# Patient Record
Sex: MALE | Race: WHITE | NOT HISPANIC OR LATINO | URBAN - METROPOLITAN AREA
[De-identification: names, ages, dates, MRNs, and addresses within clinical notes are randomized per-mention and may not be internally consistent; named-entity substitution may affect disease eponyms.]

---

## 2017-01-04 ENCOUNTER — OUTPATIENT (OUTPATIENT)
Dept: OUTPATIENT SERVICES | Facility: HOSPITAL | Age: 67
LOS: 1 days | Discharge: HOME | End: 2017-01-04

## 2017-06-27 DIAGNOSIS — R94.39 ABNORMAL RESULT OF OTHER CARDIOVASCULAR FUNCTION STUDY: ICD-10-CM

## 2017-06-27 DIAGNOSIS — I10 ESSENTIAL (PRIMARY) HYPERTENSION: ICD-10-CM

## 2019-05-01 PROBLEM — Z00.00 ENCOUNTER FOR PREVENTIVE HEALTH EXAMINATION: Status: ACTIVE | Noted: 2019-05-01

## 2019-06-06 ENCOUNTER — APPOINTMENT (OUTPATIENT)
Dept: SURGERY | Facility: CLINIC | Age: 69
End: 2019-06-06
Payer: MEDICARE

## 2019-06-06 VITALS — HEIGHT: 72 IN | BODY MASS INDEX: 26.41 KG/M2 | WEIGHT: 195 LBS

## 2019-06-06 PROCEDURE — 99203 OFFICE O/P NEW LOW 30 MIN: CPT

## 2019-06-06 NOTE — ASSESSMENT
[FreeTextEntry1] : Evan is a pleasant 68-year-old retired [employer]  with no significant past medical history other than GERD who presents to the office with an enlarging and now tender bulge in the right groin suspicious for a hernia.\par \par Physical examination demonstrates a large egg-sized bulge in the right groin which is mildly tender but easily reducible consistent with a large symptomatic right inguinal hernia warranting surgical repair.  There is no evidence of incarceration or strangulation, and the patient denies any symptoms of obstruction.  Examination of his left groin demonstrates a moderate to significant weakness but no obvious hernia. Both testicles are normal. His umbilical examination is unremarkable. His current BMI is 26.\par \par I explained the pros and cons of surgery, as well as all risks, benefits, indications and alternatives of the procedure and the patient understood and agreed. Evan's third and last daughter is getting  on June 22nd and he would prefer to defer surgical intervention until thereafter. Evan was scheduled for the repair of his right inguinal hernia with mesh on Wednesday, June 26, 2019 under local with IV sedation at [facility] with presurgical testing waived.  The patient was encouraged to avoid heavy lifting and strenuous activity in the interim, of course.

## 2019-06-06 NOTE — PHYSICAL EXAM
[Normal Breath Sounds] : Normal breath sounds [Alert] : alert [No Rash or Lesion] : No rash or lesion [Calm] : calm [JVD] : no jugular venous distention  [de-identified] : soft and flat abdomen\par  [de-identified] : normal [de-identified] : healthy [de-identified] : normal testicles [de-identified] : right inguinal hernia

## 2019-06-06 NOTE — CONSULT LETTER
[Dear  ___] : Dear  [unfilled], [Courtesy Letter:] : I had the pleasure of seeing your patient, [unfilled], in my office today. [Please see my note below.] : Please see my note below. [Consult Closing:] : Thank you very much for allowing me to participate in the care of this patient.  If you have any questions, please do not hesitate to contact me. [FreeTextEntry3] : Respectfully,\par \par Julito Landon M.D., FACS\par

## 2019-06-26 ENCOUNTER — APPOINTMENT (OUTPATIENT)
Dept: SURGERY | Facility: AMBULATORY SURGERY CENTER | Age: 69
End: 2019-06-26
Payer: MEDICARE

## 2019-06-26 ENCOUNTER — OUTPATIENT (OUTPATIENT)
Dept: OUTPATIENT SERVICES | Facility: HOSPITAL | Age: 69
LOS: 1 days | Discharge: HOME | End: 2019-06-26

## 2019-06-26 VITALS
OXYGEN SATURATION: 97 % | DIASTOLIC BLOOD PRESSURE: 73 MMHG | HEART RATE: 50 BPM | RESPIRATION RATE: 16 BRPM | SYSTOLIC BLOOD PRESSURE: 129 MMHG

## 2019-06-26 VITALS
SYSTOLIC BLOOD PRESSURE: 146 MMHG | TEMPERATURE: 98 F | HEART RATE: 54 BPM | DIASTOLIC BLOOD PRESSURE: 69 MMHG | RESPIRATION RATE: 16 BRPM | WEIGHT: 188.05 LBS | OXYGEN SATURATION: 97 %

## 2019-06-26 DIAGNOSIS — Z96.643 PRESENCE OF ARTIFICIAL HIP JOINT, BILATERAL: Chronic | ICD-10-CM

## 2019-06-26 DIAGNOSIS — Z94.5 SKIN TRANSPLANT STATUS: Chronic | ICD-10-CM

## 2019-06-26 DIAGNOSIS — Z90.89 ACQUIRED ABSENCE OF OTHER ORGANS: Chronic | ICD-10-CM

## 2019-06-26 PROCEDURE — 49505 PRP I/HERN INIT REDUC >5 YR: CPT | Mod: RT

## 2019-06-26 RX ORDER — HYDROMORPHONE HYDROCHLORIDE 2 MG/ML
0.5 INJECTION INTRAMUSCULAR; INTRAVENOUS; SUBCUTANEOUS
Refills: 0 | Status: DISCONTINUED | OUTPATIENT
Start: 2019-06-26 | End: 2019-06-26

## 2019-06-26 RX ORDER — OXYCODONE AND ACETAMINOPHEN 5; 325 MG/1; MG/1
1 TABLET ORAL EVERY 4 HOURS
Refills: 0 | Status: DISCONTINUED | OUTPATIENT
Start: 2019-06-26 | End: 2019-06-26

## 2019-06-26 RX ORDER — SODIUM CHLORIDE 9 MG/ML
1000 INJECTION, SOLUTION INTRAVENOUS
Refills: 0 | Status: DISCONTINUED | OUTPATIENT
Start: 2019-06-26 | End: 2019-07-11

## 2019-06-26 RX ORDER — ONDANSETRON 8 MG/1
4 TABLET, FILM COATED ORAL ONCE
Refills: 0 | Status: DISCONTINUED | OUTPATIENT
Start: 2019-06-26 | End: 2019-07-11

## 2019-06-26 RX ORDER — TRAMADOL HYDROCHLORIDE 50 MG/1
1 TABLET ORAL
Qty: 30 | Refills: 0
Start: 2019-06-26 | End: 2019-06-30

## 2019-06-26 RX ADMIN — SODIUM CHLORIDE 100 MILLILITER(S): 9 INJECTION, SOLUTION INTRAVENOUS at 08:48

## 2019-06-26 NOTE — ASU DISCHARGE PLAN (ADULT/PEDIATRIC) - CARE PROVIDER_API CALL
Julito Landon)  Surgery  [address] [facility] [facility] [address]  [place] [address]  Phone: [phone]  Fax: [phone]  Follow Up Time: 1 week

## 2019-06-30 DIAGNOSIS — K40.90 UNILATERAL INGUINAL HERNIA, WITHOUT OBSTRUCTION OR GANGRENE, NOT SPECIFIED AS RECURRENT: ICD-10-CM

## 2019-06-30 DIAGNOSIS — K21.9 GASTRO-ESOPHAGEAL REFLUX DISEASE WITHOUT ESOPHAGITIS: ICD-10-CM

## 2019-07-09 ENCOUNTER — APPOINTMENT (OUTPATIENT)
Dept: SURGERY | Facility: CLINIC | Age: 69
End: 2019-07-09
Payer: MEDICARE

## 2019-07-09 DIAGNOSIS — K40.90 UNILATERAL INGUINAL HERNIA, W/OUT OBSTRUCTION OR GANGRENE, NOT SPECIFIED AS RECURRENT: ICD-10-CM

## 2019-07-09 PROCEDURE — 99024 POSTOP FOLLOW-UP VISIT: CPT

## 2019-07-09 NOTE — ASSESSMENT
[FreeTextEntry1] : Evan underwent the repair of his very large pantaloon right inguinal hernia with mesh on June 26, 2019 under local with IV sedation without any problems or complications. His wound is clean, dry and intact. There is no evidence of erythema, seroma formation or infection. He is tolerating a diet and having normal bowel movements. He denies any significant postoperative pain or discomfort at this time.\par \par He was counseled and reassured. He was discharged from the office with no specific followup necessary, but he knows to avoid any heavy lifting or strenuous activity for the next several weeks.

## 2019-07-09 NOTE — CONSULT LETTER
[FreeTextEntry1] : Dear Dr. Cody Bergeron, \par \par I had the pleasure of seeing your patient, NYDIA VALVERDE, in my office today. Please see my note below. \par \par Thank you very much for allowing me to participate in the care of this patient. If you have any questions, please do not hesitate to contact me. \par \par \par Respectfully,\par \par Julito Landon M.D., FACS

## 2020-01-03 ENCOUNTER — TRANSCRIPTION ENCOUNTER (OUTPATIENT)
Age: 70
End: 2020-01-03

## 2020-08-27 ENCOUNTER — INPATIENT (INPATIENT)
Facility: HOSPITAL | Age: 70
LOS: 6 days | Discharge: HOME | End: 2020-09-03
Attending: INTERNAL MEDICINE | Admitting: INTERNAL MEDICINE
Payer: MEDICARE

## 2020-08-27 VITALS
OXYGEN SATURATION: 96 % | WEIGHT: 192.24 LBS | RESPIRATION RATE: 18 BRPM | HEART RATE: 77 BPM | DIASTOLIC BLOOD PRESSURE: 67 MMHG | SYSTOLIC BLOOD PRESSURE: 123 MMHG

## 2020-08-27 DIAGNOSIS — Z90.89 ACQUIRED ABSENCE OF OTHER ORGANS: Chronic | ICD-10-CM

## 2020-08-27 DIAGNOSIS — Z96.643 PRESENCE OF ARTIFICIAL HIP JOINT, BILATERAL: Chronic | ICD-10-CM

## 2020-08-27 DIAGNOSIS — Z94.5 SKIN TRANSPLANT STATUS: Chronic | ICD-10-CM

## 2020-08-27 LAB
ALBUMIN SERPL ELPH-MCNC: 4 G/DL — SIGNIFICANT CHANGE UP (ref 3.5–5.2)
ALLERGY+IMMUNOLOGY DIAG STUDY NOTE: SIGNIFICANT CHANGE UP
ALP SERPL-CCNC: 58 U/L — SIGNIFICANT CHANGE UP (ref 30–115)
ALT FLD-CCNC: 17 U/L — SIGNIFICANT CHANGE UP (ref 0–41)
ANION GAP SERPL CALC-SCNC: 9 MMOL/L — SIGNIFICANT CHANGE UP (ref 7–14)
APTT BLD: 22.2 SEC — CRITICAL LOW (ref 27–39.2)
AST SERPL-CCNC: 27 U/L — SIGNIFICANT CHANGE UP (ref 0–41)
BASOPHILS # BLD AUTO: 0.03 K/UL — SIGNIFICANT CHANGE UP (ref 0–0.2)
BASOPHILS NFR BLD AUTO: 0.4 % — SIGNIFICANT CHANGE UP (ref 0–1)
BILIRUB SERPL-MCNC: 0.3 MG/DL — SIGNIFICANT CHANGE UP (ref 0.2–1.2)
BLD GP AB SCN SERPL QL: SIGNIFICANT CHANGE UP
BUN SERPL-MCNC: 22 MG/DL — HIGH (ref 10–20)
CALCIUM SERPL-MCNC: 8.7 MG/DL — SIGNIFICANT CHANGE UP (ref 8.5–10.1)
CHLORIDE SERPL-SCNC: 106 MMOL/L — SIGNIFICANT CHANGE UP (ref 98–110)
CHOLEST SERPL-MCNC: 179 MG/DL — SIGNIFICANT CHANGE UP (ref 100–200)
CK MB CFR SERPL CALC: 2.4 NG/ML — SIGNIFICANT CHANGE UP (ref 0.6–6.3)
CK SERPL-CCNC: 134 U/L — SIGNIFICANT CHANGE UP (ref 0–225)
CO2 SERPL-SCNC: 24 MMOL/L — SIGNIFICANT CHANGE UP (ref 17–32)
CREAT SERPL-MCNC: 1.1 MG/DL — SIGNIFICANT CHANGE UP (ref 0.7–1.5)
DIR ANTIGLOB POLYSPECIFIC INTERPRETATION: SIGNIFICANT CHANGE UP
EOSINOPHIL # BLD AUTO: 0.11 K/UL — SIGNIFICANT CHANGE UP (ref 0–0.7)
EOSINOPHIL NFR BLD AUTO: 1.5 % — SIGNIFICANT CHANGE UP (ref 0–8)
GLUCOSE SERPL-MCNC: 105 MG/DL — HIGH (ref 70–99)
HCT VFR BLD CALC: 41.1 % — LOW (ref 42–52)
HDLC SERPL-MCNC: 77 MG/DL — SIGNIFICANT CHANGE UP
HGB BLD-MCNC: 13.7 G/DL — LOW (ref 14–18)
IMM GRANULOCYTES NFR BLD AUTO: 0.4 % — HIGH (ref 0.1–0.3)
INR BLD: 1.04 RATIO — SIGNIFICANT CHANGE UP (ref 0.65–1.3)
LIPID PNL WITH DIRECT LDL SERPL: 86 MG/DL — SIGNIFICANT CHANGE UP (ref 4–129)
LYMPHOCYTES # BLD AUTO: 0.87 K/UL — LOW (ref 1.2–3.4)
LYMPHOCYTES # BLD AUTO: 12 % — LOW (ref 20.5–51.1)
MCHC RBC-ENTMCNC: 32.9 PG — HIGH (ref 27–31)
MCHC RBC-ENTMCNC: 33.3 G/DL — SIGNIFICANT CHANGE UP (ref 32–37)
MCV RBC AUTO: 98.8 FL — HIGH (ref 80–94)
MONOCYTES # BLD AUTO: 0.63 K/UL — HIGH (ref 0.1–0.6)
MONOCYTES NFR BLD AUTO: 8.7 % — SIGNIFICANT CHANGE UP (ref 1.7–9.3)
NEUTROPHILS # BLD AUTO: 5.57 K/UL — SIGNIFICANT CHANGE UP (ref 1.4–6.5)
NEUTROPHILS NFR BLD AUTO: 77 % — HIGH (ref 42.2–75.2)
NRBC # BLD: 0 /100 WBCS — SIGNIFICANT CHANGE UP (ref 0–0)
PLATELET # BLD AUTO: 171 K/UL — SIGNIFICANT CHANGE UP (ref 130–400)
POTASSIUM SERPL-MCNC: 4 MMOL/L — SIGNIFICANT CHANGE UP (ref 3.5–5)
POTASSIUM SERPL-SCNC: 4 MMOL/L — SIGNIFICANT CHANGE UP (ref 3.5–5)
PROT SERPL-MCNC: 5.8 G/DL — LOW (ref 6–8)
PROTHROM AB SERPL-ACNC: 12 SEC — SIGNIFICANT CHANGE UP (ref 9.95–12.87)
RBC # BLD: 4.16 M/UL — LOW (ref 4.7–6.1)
RBC # FLD: 12 % — SIGNIFICANT CHANGE UP (ref 11.5–14.5)
SARS-COV-2 RNA SPEC QL NAA+PROBE: SIGNIFICANT CHANGE UP
SODIUM SERPL-SCNC: 139 MMOL/L — SIGNIFICANT CHANGE UP (ref 135–146)
TOTAL CHOLESTEROL/HDL RATIO MEASUREMENT: 2.3 RATIO — LOW (ref 4–5.5)
TRIGL SERPL-MCNC: 75 MG/DL — SIGNIFICANT CHANGE UP (ref 10–149)
TROPONIN T SERPL-MCNC: <0.01 NG/ML — SIGNIFICANT CHANGE UP
WBC # BLD: 7.24 K/UL — SIGNIFICANT CHANGE UP (ref 4.8–10.8)
WBC # FLD AUTO: 7.24 K/UL — SIGNIFICANT CHANGE UP (ref 4.8–10.8)

## 2020-08-27 PROCEDURE — 0042T: CPT

## 2020-08-27 PROCEDURE — 71045 X-RAY EXAM CHEST 1 VIEW: CPT | Mod: 26

## 2020-08-27 PROCEDURE — 61645 PERQ ART M-THROMBECT &/NFS: CPT | Mod: LT

## 2020-08-27 PROCEDURE — 93010 ELECTROCARDIOGRAM REPORT: CPT | Mod: 77

## 2020-08-27 PROCEDURE — 93010 ELECTROCARDIOGRAM REPORT: CPT

## 2020-08-27 PROCEDURE — 99291 CRITICAL CARE FIRST HOUR: CPT | Mod: CS,GC

## 2020-08-27 PROCEDURE — 70450 CT HEAD/BRAIN W/O DYE: CPT | Mod: 26

## 2020-08-27 RX ORDER — ALTEPLASE 100 MG
7.8 KIT INTRAVENOUS ONCE
Refills: 0 | Status: COMPLETED | OUTPATIENT
Start: 2020-08-27 | End: 2020-08-27

## 2020-08-27 RX ORDER — ATORVASTATIN CALCIUM 80 MG/1
80 TABLET, FILM COATED ORAL AT BEDTIME
Refills: 0 | Status: DISCONTINUED | OUTPATIENT
Start: 2020-08-27 | End: 2020-09-03

## 2020-08-27 RX ORDER — ALTEPLASE 100 MG
70.6 KIT INTRAVENOUS ONCE
Refills: 0 | Status: COMPLETED | OUTPATIENT
Start: 2020-08-27 | End: 2020-08-27

## 2020-08-27 RX ORDER — SODIUM CHLORIDE 9 MG/ML
1000 INJECTION INTRAMUSCULAR; INTRAVENOUS; SUBCUTANEOUS
Refills: 0 | Status: DISCONTINUED | OUTPATIENT
Start: 2020-08-27 | End: 2020-08-28

## 2020-08-27 RX ORDER — CHLORHEXIDINE GLUCONATE 213 G/1000ML
1 SOLUTION TOPICAL
Refills: 0 | Status: DISCONTINUED | OUTPATIENT
Start: 2020-08-27 | End: 2020-09-03

## 2020-08-27 RX ORDER — SODIUM CHLORIDE 9 MG/ML
500 INJECTION INTRAMUSCULAR; INTRAVENOUS; SUBCUTANEOUS ONCE
Refills: 0 | Status: COMPLETED | OUTPATIENT
Start: 2020-08-27 | End: 2020-08-27

## 2020-08-27 RX ORDER — PANTOPRAZOLE SODIUM 20 MG/1
40 TABLET, DELAYED RELEASE ORAL DAILY
Refills: 0 | Status: DISCONTINUED | OUTPATIENT
Start: 2020-08-27 | End: 2020-08-28

## 2020-08-27 RX ORDER — OMEPRAZOLE 10 MG/1
0 CAPSULE, DELAYED RELEASE ORAL
Qty: 0 | Refills: 0 | DISCHARGE

## 2020-08-27 RX ADMIN — ALTEPLASE 468 MILLIGRAM(S): KIT at 17:25

## 2020-08-27 RX ADMIN — ALTEPLASE 70.6 MILLIGRAM(S): KIT at 17:26

## 2020-08-27 RX ADMIN — SODIUM CHLORIDE 1000 MILLILITER(S): 9 INJECTION INTRAMUSCULAR; INTRAVENOUS; SUBCUTANEOUS at 21:30

## 2020-08-27 NOTE — CONSULT NOTE ADULT - SUBJECTIVE AND OBJECTIVE BOX
Neurocritical Care Consult Note:    1. Brief Presentation: L MCA syndrome    2. Today's Acute Problems:     - L ICA, superior and inferior division MCA occlusions (POD 0 s/p DSA + mechanical thrombectomy with IV tPA + IA tPA 8 mg, IA Verapamil 10 mg; residual MCA branch occlusion)  - Acute stroke, L MCA territory    3. Relevant brief History: 70y/o M with no PMH presents with R sided weakness/aphasia starting 2hr prior to arrival. Was doing heavy garden work when he suddenly collapsed. Wife also reported that pt mentioned 8/24 he mentioned stumbling against car but that was it. Otherwise fully healthy, former , and exercises. When wife went to him he couldn't speak, staring, and wasn't moving R side.     In ED: T98.7, HR 80, /68, RR18, SpO2 100%.   Stroke code called, NIH initially 20.    4-Yesterday's Plan: N/A    5. Last 24 hour updates: Femstop applied to right groin post-operatively    6. Medications:   atorvastatin 80 milliGRAM(s) Oral at bedtime  chlorhexidine 4% Liquid 1 Application(s) Topical <User Schedule>  pantoprazole  Injectable 40 milliGRAM(s) IV Push daily  sodium chloride 0.9%. 1000 milliLiter(s) IV Continuous <Continuous>    7. Ancillary Management:   Chest PT[ ]   Head of bed >35 [x]   Out of bed to chair [ ]   PT/OT/SP Eval [x]   Spirometry [x]   DVT prophylaxis [ ]    8. Neurologic Examination:  Mentation: Awake, alert. Oriented to person and place when provided options. Disoriented to time. Wernicke's aphasia is present. Follows simple, one part commands when given adequate time. Demonstrates some ideational and ideomotor apraxia with various tasks. Neglect is not present (resolved compared to previously).  Cranial Nerves:  	II – Blink to threat b/l.  	III/IV/VI – Pupils 4 mm. PERRL. EOMI (gaze resolved).  	V – Grossly intact sensation.  	VII – Minor palsy.  	VIII – No nystagmus.  	IX/X – Symmetric palate rise. Uvula midline.  	XI – Hemiparetic R SCM.  	XII – Tongue protrusion midline.  Motor: RUE 3+/5, RLE deferred due to right groin femstop in situ; LUE/LLE 5/5.  Sensory: Mild right hemisensory loss.  Reflexes: 2+ generally.  Cerebellum: Unable.    NIH STROKE SCALE  Item	                                                        Score  1 a.	Level of Consciousness	               	0  1 b. LOC Questions	                                1  1 c.	LOC Commands	                               	1  2.	Best Gaze	                                        0  3.	Visual	                                                0  4.	Facial Palsy	                                        1  5 a.	Motor Arm - Left	                                0  5 b.	Motor Arm - Right	                        2  6 a.	Motor Leg - Left	                                0  6 b.	Motor Leg - Right	                                2  7.	Limb Ataxia	                                        0  8.	Sensory	                                                1  9.	Language	                                        2  10.	Dysarthria	                                        0  11.	Extinction and Inattention  	        1  ______________________________________  TOTAL	                                                        11      mRS:  0 No symptoms at all  1 No significant disability despite symptoms; able to carry out all usual duties and activities without assistance  2 Slight disability; unable to carry out all previous activities, but able to look after own affairs  3 Moderate disability; requiring some help, but able to walk without assistance  4 Moderately severe disability; unable to walk without assistance and unable to attend to own bodily needs without assistance  5 Severe disability; bedridden, incontinent and requiring constant nursing care and attention  6 Dead    Last CTH: < from: CT Brain Stroke Protocol (08.27.20 @ 16:53) >  FINDINGS:    There is subtle hypodensity noted in the left lateralaspect of the temporal lobe. There is no evidence of hemorrhage.     There is no intraparenchymal hematoma, mass effect or midline shift. No abnormal extra-axial fluid collections are present.    The calvarium is intact. Patchy opacification and mucosal thickening of the ethmoid air cells. Paranasal sinuses and tympanomastoid cavities are otherwise unremarkable. Chronic appearing bilateral nasal bone fractures, partially visualized.    IMPRESSION:  Hypodensity involving the left temporal lobe which may represent an area of evolving acute ischemia. No evidence of hemorrhage.    < end of copied text >      Last CTP: < from: CT Perfusion w/ Maps w/ IV Cont (08.27.20 @ 17:28) >  Impression:    Multiple occlusions in the superior and inferior division of the left MCA.    On perfusion imaging, there is an infarct in the left MCA territory measuring 52 cc.    Additionally, there is an ischemic penumbra measuring 120 cc in the left MCA territory.      < end of copied text >      Last MRI:    Last TCD:    Last EEG:    EVD: [ ] [place]: [ ]     ICP:     CPP:     Level(cm):     24hr(ml):     CSF:     W:     R:     C:     P:     G:     LA:    9. Cardiovascular:   Vital Signs Last 24 Hrs  T(C): 37.1 (27 Aug 2020 18:05), Max: 37.1 (27 Aug 2020 17:25)  T(F): 98.7 (27 Aug 2020 17:25), Max: 98.7 (27 Aug 2020 17:25)  HR: 68 (27 Aug 2020 18:05) (68 - 80)  BP: 143/83 (27 Aug 2020 18:05) (120/68 - 143/83)  BP(mean): --  RR: 18 (27 Aug 2020 18:05) (18 - 18)  SpO2: 100% (27 Aug 2020 18:05) (96% - 100%)     Last Echo:    Last EKG: < from: 12 Lead ECG (08.27.20 @ 17:25) >  Ventricular Rate 58 BPM    Atrial Rate 58 BPM    P-R Interval 146 ms    QRS Duration 114 ms    Q-T Interval 436 ms    QTC Calculation(Bezet) 428 ms    P Axis 75 degrees    R Axis 4 degrees    T Axis 51 degrees    Sinus bradycardia with Premature atrial complexes  Otherwise normal ECG    < end of copied text >      CVP   MAP/CPP/SBP target:   CO:      CI:       Enzymes/Trop:    10. Respiratory:   ABG:    VBG:    Chest Xray: clear        Peak Pressure/[place] Pressure:    11.GI:    Prophylaxis     Bowel mvt:     Abd distension:   LIVER FUNCTIONS - ( 27 Aug 2020 17:25 )  Alb: 4.0 g/dL / Pro: 5.8 g/dL / ALK PHOS: 58 U/L / ALT: 17 U/L / AST: 27 U/L / GGT: x             12.Renal/Fluids/Electrolytes:    08-27    139  |  106  |  22<H>  ----------------------------<  105<H>  4.0   |  24  |  1.1    Ca    8.7      27 Aug 2020 17:25    TPro  5.8<L>  /  Alb  4.0  /  TBili  0.3  /  DBili  x   /  AST  27  /  ALT  17  /  AlkPhos  58  08-27      I&O's Detail      13.ID:   TMax:   Vital Signs Last 24 Hrs  T(C): 37.1 (27 Aug 2020 18:05), Max: 37.1 (27 Aug 2020 17:25)  T(F): 98.7 (27 Aug 2020 17:25), Max: 98.7 (27 Aug 2020 17:25)  HR: 68 (27 Aug 2020 18:05) (68 - 80)  BP: 143/83 (27 Aug 2020 18:05) (120/68 - 143/83)  BP(mean): --  RR: 18 (27 Aug 2020 18:05) (18 - 18)  SpO2: 100% (27 Aug 2020 18:05) (96% - 100%)           Lines: Central[] Date inserted: Peripheral[]    14. Hematology:                         13.7   7.24  )-----------( 171      ( 27 Aug 2020 17:25 )             41.1      08-27    139  |  106  |  22<H>  ----------------------------<  105<H>  4.0   |  24  |  1.1    Ca    8.7      27 Aug 2020 17:25    TPro  5.8<L>  /  Alb  4.0  /  TBili  0.3  /  DBili  x   /  AST  27  /  ALT  17  /  AlkPhos  58  08-27     PT/INR - ( 27 Aug 2020 17:25 )   PT: 12.00 sec;   INR: 1.04 ratio         PTT - ( 27 Aug 2020 17:25 )  PTT:22.2 sec    DVT Prophylaxis Lovenox[ ] Heparin[ ] Venodynes[ ] SCD's[x]    15. Impression:    - L ICA, superior and inferior division MCA occlusions (POD 0 s/p DSA + mechanical thrombectomy with IV tPA + IA tPA 8 mg, IA Verapamil 10 mg; residual MCA branch occlusion)  - Acute stroke, L MCA territory    16. Suggestions:    - Q1 neuro checks  - Hold AP for now  - Lipitor 80 mg HS  - Please check 24 hr CT head  - MRI Brain w/o gadolinium  - Keep -170   - Check TTE w/bubble  - Post-thrombectomy protocol neurovascular checks  - Femstop management; please call for any hematoma formation, loss of pulses, etc.  - Keep knee immobilizer to right leg for 4 hours post-op  - Check lipid panel and A1c  - PT/OT/SLP eval and tx  - Please start NS @ 100 mL/hr to maintain adequate hydration      17. Disposition: ICU

## 2020-08-27 NOTE — H&P ADULT - ASSESSMENT
70y/o M with no PMH presents with large L MCA CVA with NIH initially of 20 s/p neurointervention recannalization.    #L MCA CVA  - s/p tPA and neurointervention 8/27 with only partially successful recannalization   - stat head CT for any change in status, high risk for bleed or re-stroke  - neuro check q1h, no blood draws/snyder/sticks/NGT for 24hr   - goal -170  - start atorvastatin 80   - f/u A1c, lipid panel  - Neuro follow-up  - Speech eval, will need OT/PT    #MISC  - DVT ppx: hold until cleared by neuro  - GI ppx: protonix IV  - Diet: NPO, IVF @ 75  - Activity: bedrest  - Dispo: ICU 68y/o M with no PMH presents with large L MCA CVA with NIH initially of 20 s/p neurointervention recannalization.    #L MCA CVA  - s/p tPA and neurointervention 8/27 with only partially successful recannalization   - stat head CT for any change in status, high risk for bleed or re-stroke  - neuro check q1h, no blood draws/snyder/sticks/NGT for 24hr   - goal -170  - start low dose levo for BP  - start atorvastatin 80   - f/u A1c, lipid panel  - 2D echo + bubble  - Neuro follow-up  - Speech eval, will need OT/PT    #MISC  - DVT ppx: hold until cleared by neuro  - GI ppx: protonix IV  - Diet: NPO, IVF @ 75  - Activity: bedrest  - Dispo: ICU

## 2020-08-27 NOTE — ED ADULT TRIAGE NOTE - CHIEF COMPLAINT QUOTE
BIBA, as per EMS, pt was found on the floor in his garage after moving bricks all day. last known well at 245 PM

## 2020-08-27 NOTE — ED PROVIDER NOTE - ATTENDING CONTRIBUTION TO CARE
70 y/o m w/ no sig pmhx presented after wife found pt with r sided weakness and aphasic, pt was working outside in backyard, last seen normal ~ 2 hours pta, wife called ambulance, not on any AC. FS in field 140's. code stroke called as prenotification, in er pt aphasic unable to provide detailed ROS. NIH ~ 20.     on exam:   Constitutional: male pt sitting on stretcher in nad.  Skin: no rash, no signs of trauma:  HEENT: PERRL, EOM intact,  mmm. No tongue deviation.   NECK and BACK: neck supple, no spinous ttp to neck or back, FROM, no palpable shelves or step offs, no meningeal signs.  CARDIO: regular rate, radial pulses 2/4 b/l, dp and pt pulses 2/4 b/l.  Lungs: Ctabl w/ breath sounds present b/l, no accessory muscle use, no tachypnea, no stridor  ABD: BS present throughout all 4 quadrants, abd soft, nd, nt, no rebound tenderness or guarding, no cvat,;  EXT: FROM of LUE with no drift, drift to LLE, no movement of RUE and RLE, , no calf pain/swelling/erythema.  NEURO: Pt aphasic, Motor 5/5 and sensation intact throughout LUE and LLE. (+) R sided facial droop. NIH ~ 20.

## 2020-08-27 NOTE — ED ADULT NURSE NOTE - OBJECTIVE STATEMENT
ANDREY, was found in his garage on the floor, unable to speak, unable to move his right arm or right leg, right facial paralysis. GCS = 15. last known well at 1445. was working in his garage today "moving bricks"

## 2020-08-27 NOTE — ED ADULT NURSE REASSESSMENT NOTE - NS ED NURSE REASSESS COMMENT FT1
pt transported to IR for STAT CODE NI ACTUAL. TPA infusing as ordered. clothing remains with patient and wife at bedside

## 2020-08-27 NOTE — H&P ADULT - NSHPLABSRESULTS_GEN_ALL_CORE
< from: CT Perfusion w/ Maps w/ IV Cont (08.27.20 @ 17:28) >    Multiple occlusions in the superior and inferior division of the left MCA.    On perfusion imaging, there is an infarct in the left MCA territory measuring 52 cc.    Additionally, there is an ischemic penumbra measuring 120 cc in the left MCA territory.    < end of copied text >

## 2020-08-27 NOTE — CHART NOTE - NSCHARTNOTEFT_GEN_A_CORE
PACU ANESTHESIA ADMISSION NOTE      Procedure: Cerebral angiogram and revascularization  Post op diagnosis:      ____  Intubated  TV:______       Rate: ______      FiO2: ______    _X___  Patent Airway    __X__  Full return of protective reflexes    __X__  Full recovery from anesthesia / back to baseline status    Vitals:  T: 97F  HR:65  BP: 133/64  RR: 20  SpO2: 100%     Mental Status:  __X__ Awake   _____ Alert   _____ Drowsy   _____ Sedated    Nausea/Vomiting:  _X___ NO  ______Yes,   See Post - Op Orders          Pain Scale (0-10):  ___0__    Treatment: ___X_ None    ____ See Post - Op/PCA Orders    Post - Operative Fluids:   ____ Oral   ___X_ See Post - Op Orders    Plan: Discharge:   ____Home       _____Floor     ____X_Critical Care    _____  Other:_________________    Comments: No anesthetic related complications noted. pt transported to ICU, report endorsed to Rn and MICU resident.

## 2020-08-27 NOTE — H&P ADULT - HISTORY OF PRESENT ILLNESS
68y/o M with no PMH presents with R sided weakness/aphasia starting 2hr prior to arrival. Was doing heavy garden work when he suddenly collapsed. Wife also reported that pt mentioned 8/24 he mentioned stumbling against car but that was it. Otherwise fully healthy, former , and exercises. When wife went to him he couldn't speak, staring, and wasn't moving R side.     In ED: T98.7, HR 80, /68, RR18, SpO2 100%.   Stroke code called, NIH initially 20.  CTH showed L temporal CVA, CTA multi-infarct of L MCA.  S/p tPA and neurointervention with tPA & re-cannalization of some branches but not all of L MCA via R groin.

## 2020-08-27 NOTE — ED PROVIDER NOTE - CARE PLAN
Assessment and plan of treatment:	Plan: CODE STROKE, MONITOR, FS, EKG, CXR, CT HEAD, CTA HEAD AND NECK, NEUROLOGY CONSULT, REASSESS. Principal Discharge DX:	CVA (cerebral vascular accident)  Assessment and plan of treatment:	Plan: CODE STROKE, MONITOR, FS, EKG, CXR, CT HEAD, CTA HEAD AND NECK, NEUROLOGY CONSULT, REASSESS.

## 2020-08-27 NOTE — ED PROVIDER NOTE - PROGRESS NOTE DETAILS
NIHSS: 20 ED Attending MANUEL Dinero  Neurology evaluated pt, tpa ordered as discussed and agreed with pt and neurology, report NI candidate, NI called in ed. ED Attending Bar, S  ICU fellow aware of pt, pt received tpa, taken for NI, ICU team aware of pt, approved under Dr. Garcia.

## 2020-08-27 NOTE — H&P ADULT - NSHPPHYSICALEXAM_GEN_ALL_CORE
GENERAL: NAD, well-developed M awake and aware  HEENT:  Atraumatic, Normocephalic; EOMI, PERRLA, conjunctiva pink, sclera white, Supple, No JVD appreciated  CHEST/LUNG: Clear to auscultation bilaterally; No wheeze/crackles  HEART: Regular rate and rhythm; No murmurs  ABDOMEN: Soft, Nontender, Nondistended; Bowel sounds present  EXTREMITIES:  2+ Peripheral Pulses, No peripheral pitting edema  NEUROLOGY: NIH scale  - alert, AAOx3, follows commands,   SKIN: R femoral dressing in place w/o bleeding GENERAL: NAD, well-developed M awake and aware  HEENT:  Atraumatic, Normocephalic; EOMI, PERRLA, conjunctiva pink, sclera white, Supple, No JVD appreciated  CHEST/LUNG: Clear to auscultation bilaterally; No wheeze/crackles  HEART: Regular rate and rhythm; No murmurs  ABDOMEN: Soft, Nontender, Nondistended; Bowel sounds present  EXTREMITIES:  2+ Peripheral Pulses, No peripheral pitting edema  NEUROLOGY: NIH scale 9  - alert, AAOx3, follows commands, no gaze deviation, no face droop, LLE/LUE no drift, no dysarthria, no extinction noted  + difficulty with R visual fields (R upper partial hemianopia), unable to lift R arm off bed at shoulder (can lift from elbow down up but can't raise arm himself, RLE drift, RUE ataxia, dec sensation on R arm (face?), severe aphasia speech very fragmented   SKIN: R femoral dressing in place w/o bleeding GENERAL: NAD, well-developed M awake and aware  HEENT:  Atraumatic, Normocephalic; EOMI, R pupil 1mm, L pupil 2mm but both responsive, conjunctiva pink, sclera white, Supple, No JVD appreciated  CHEST/LUNG: Clear to auscultation bilaterally; No wheeze/crackles  HEART: Regular rate and rhythm; No murmurs  ABDOMEN: Soft, Nontender, Nondistended; Bowel sounds present  EXTREMITIES:  2+ Peripheral Pulses, No peripheral pitting edema  NEUROLOGY: NIH scale 9  - alert, AAOx3, follows commands, no gaze deviation, no face droop, LLE/LUE no drift, no dysarthria, no extinction noted  + difficulty with R visual fields (R upper partial hemianopia), unable to lift R arm off bed at shoulder (can lift from elbow down up but can't raise arm himself, RLE drift, RUE ataxia, dec sensation on R arm (face?), severe aphasia speech very fragmented   SKIN: R femoral dressing in place w/o bleeding

## 2020-08-27 NOTE — ED PROVIDER NOTE - PHYSICAL EXAMINATION
CONSTITUTIONAL: Well-developed; well-nourished; in no acute distress.   SKIN: warm, dry  HEAD: Normocephalic; atraumatic.  EYES: PERRL, EOMI, normal sclera and conjunctiva   ENT: No nasal discharge; airway clear.  NECK: Supple; non tender.  CARD: S1, S2 normal; no murmurs, gallops, or rubs. Regular rate and rhythm.   RESP: No wheezes, rales or rhonchi.  ABD: soft ntnd  EXT: Normal ROM.  No clubbing, cyanosis or edema.   LYMPH: No acute cervical adenopathy.  NEURO: Alert, oriented. aphasic, 5/5 strength in LUE, 0/5 strength in RUE, 0/5 strength in RLE, 1/5 strength in LLE.   PSYCH: Cooperative, appropriate.

## 2020-08-27 NOTE — ED PROVIDER NOTE - NS ED ROS FT
Eyes:  No visual changes, eye pain or discharge.  ENMT:  No hearing changes, pain, discharge or infections. No neck pain or stiffness.  Cardiac:  No chest pain, SOB or edema. No chest pain with exertion.  Respiratory:  No cough or respiratory distress. No hemoptysis. No history of asthma or RAD.  GI:  No nausea, vomiting, diarrhea or abdominal pain.  :  No dysuria, frequency or burning.  MS:  No myalgia, muscle weakness, joint pain or back pain.  Neuro:  No headache. +weakness. +aphasia.  No LOC.  Skin:  No skin rash.   Endocrine: No history of thyroid disease or diabetes.

## 2020-08-27 NOTE — H&P ADULT - NSICDXPASTSURGICALHX_GEN_ALL_CORE_FT
PAST SURGICAL HISTORY:  H/O bilateral hip replacements     H/O skin graft upper body  legs    S/P tonsillectomy

## 2020-08-28 PROBLEM — K21.9 GASTRO-ESOPHAGEAL REFLUX DISEASE WITHOUT ESOPHAGITIS: Chronic | Status: ACTIVE | Noted: 2019-06-26

## 2020-08-28 PROCEDURE — 99233 SBSQ HOSP IP/OBS HIGH 50: CPT

## 2020-08-28 PROCEDURE — 71046 X-RAY EXAM CHEST 2 VIEWS: CPT | Mod: 26

## 2020-08-28 PROCEDURE — 70450 CT HEAD/BRAIN W/O DYE: CPT | Mod: 26

## 2020-08-28 PROCEDURE — 93306 TTE W/DOPPLER COMPLETE: CPT | Mod: 26

## 2020-08-28 RX ORDER — NOREPINEPHRINE BITARTRATE/D5W 8 MG/250ML
0.05 PLASTIC BAG, INJECTION (ML) INTRAVENOUS
Qty: 8 | Refills: 0 | Status: DISCONTINUED | OUTPATIENT
Start: 2020-08-28 | End: 2020-08-30

## 2020-08-28 RX ORDER — SODIUM CHLORIDE 9 MG/ML
1000 INJECTION INTRAMUSCULAR; INTRAVENOUS; SUBCUTANEOUS
Refills: 0 | Status: DISCONTINUED | OUTPATIENT
Start: 2020-08-28 | End: 2020-08-29

## 2020-08-28 RX ORDER — PANTOPRAZOLE SODIUM 20 MG/1
40 TABLET, DELAYED RELEASE ORAL DAILY
Refills: 0 | Status: DISCONTINUED | OUTPATIENT
Start: 2020-08-28 | End: 2020-09-03

## 2020-08-28 RX ORDER — PANTOPRAZOLE SODIUM 20 MG/1
40 TABLET, DELAYED RELEASE ORAL
Refills: 0 | Status: DISCONTINUED | OUTPATIENT
Start: 2020-08-28 | End: 2020-08-28

## 2020-08-28 RX ADMIN — ATORVASTATIN CALCIUM 80 MILLIGRAM(S): 80 TABLET, FILM COATED ORAL at 04:59

## 2020-08-28 RX ADMIN — ATORVASTATIN CALCIUM 80 MILLIGRAM(S): 80 TABLET, FILM COATED ORAL at 22:22

## 2020-08-28 RX ADMIN — PANTOPRAZOLE SODIUM 40 MILLIGRAM(S): 20 TABLET, DELAYED RELEASE ORAL at 12:20

## 2020-08-28 NOTE — CONSULT NOTE ADULT - ASSESSMENT
IMPRESSION:    CVA SP TPA and thrombectomy    PLAN:    CNS: No depressants  FU with Neuro IR    HEENT: Oral care.  Speech and swallow     PULMONARY:  HOB @ 45 degrees.  Aspiration precautions     CARDIOVASCULAR:  Wan Levophed.  ECHO     GI: GI prophylaxis.  Feeding per speech and swallow   Bowel regimen     RENAL:  Follow up lytes.  Correct as needed    INFECTIOUS DISEASE: Follow up cultures    HEMATOLOGICAL:  DVT prophylaxis.    ENDOCRINE:  Follow up FS.  Insulin protocol if needed.    MUSCULOSKELETAL:  Bed rest PT OT in am,     MICu today IMPRESSION:    CVA SP TPA and thrombectomy    PLAN:    CNS: No depressants  FU with Neuro IR    HEENT: Oral care.  Speech and swallow     PULMONARY:  HOB @ 45 degrees.  Aspiration precautions.  CXR PA and lateral when stable     CARDIOVASCULAR:  Wan Levophed.  ECHO     GI: GI prophylaxis.  Feeding per speech and swallow   Bowel regimen     RENAL:  Follow up lytes.  Correct as needed    INFECTIOUS DISEASE: Follow up cultures    HEMATOLOGICAL:  DVT prophylaxis.    ENDOCRINE:  Follow up FS.  Insulin protocol if needed.    MUSCULOSKELETAL:  Bed rest PT OT in am,     MICu today

## 2020-08-28 NOTE — CONSULT NOTE ADULT - SUBJECTIVE AND OBJECTIVE BOX
Patient is a 69y old  Male who presents with a chief complaint of CVA (27 Aug 2020 21:31)      HPI:  68y/o M with no PMH presents with R sided weakness/aphasia starting 2hr prior to arrival. Was doing heavy garden work when he suddenly collapsed. Wife also reported that pt mentioned 8/24 he mentioned stumbling against car but that was it. Otherwise fully healthy, former , and exercises. When wife went to him he couldn't speak, staring, and wasn't moving R side.     In ED: T98.7, HR 80, /68, RR18, SpO2 100%.   Stroke code called, NIH initially 20.  CTH showed L temporal CVA, CTA multi-infarct of L MCA.  S/p tPA and neurointervention with tPA & re-cannalization of some branches but not all of L MCA via R groin. (27 Aug 2020 21:31)      PAST MEDICAL & SURGICAL HISTORY:  GERD (gastroesophageal reflux disease)  S/P tonsillectomy  H/O bilateral hip replacements  H/O skin graft: upper body  legs      SOCIAL HX:   Smoking          Former                ETOH                            Other    FAMILY HISTORY:  No pertinent family history in first degree relatives  :  No known cardiovacular family hisotry     Review Of Systems:     All ROS are negative except per HPI       Allergies    amoxicillin (Rash)    Intolerances          PHYSICAL EXAM    ICU Vital Signs Last 24 Hrs  T(C): 36.5 (28 Aug 2020 04:00), Max: 37.1 (27 Aug 2020 17:25)  T(F): 97.7 (28 Aug 2020 04:00), Max: 98.7 (27 Aug 2020 17:25)  HR: 46 (28 Aug 2020 06:30) (44 - 80)  BP: 138/68 (28 Aug 2020 06:30) (113/74 - 154/77)  BP(mean): 101 (28 Aug 2020 06:30) (79 - 113)  ABP: --  ABP(mean): --  RR: 16 (28 Aug 2020 06:30) (15 - 30)  SpO2: 96% (28 Aug 2020 06:30) (93% - 100%)      CONSTITUTIONAL:  Well nourished.  NAD    ENT:   Airway patent,   Mouth with normal mucosa.   No thrush    EYES:   pupils equal,   round and reactive to light.    CARDIAC:   Normal rate,   Regular rhythm.    Heart sounds S1, S2.   No edema      Vascular:   normal systolic impulse  no bruits    RESPIRATORY:   No wheezing   Normal chest expansion  No use of accessory muscles    GASTROINTESTINAL:  Abdomen soft   Non-tender,   No guarding,   + BS    GENITOURINARY  normal genitalia for sex  no edema    MUSCULOSKELETAL:   Range of motion is not limited,  Nno clubbing, cyanosis    NEUROLOGICAL:   Alert and oriented   No motor or sensory deficits.  Expressive aphasia     SKIN:   Skin normal color for race,   Warm and dry  No evidence of rash.    PSYCHIATRIC:   Normal mood and affect.   No apparent risk to self or others.    HEME LYMPH:   No cervical  lymphadenopathy.  No inguinal lymphadenopathy            08-27-20 @ 07:01  -  08-28-20 @ 07:00  --------------------------------------------------------  IN:    norepinephrine Infusion: 17 mL    Sodium Chloride 0.9% IV Bolus: 500 mL    sodium chloride 0.9%.: 750 mL  Total IN: 1267 mL    OUT:    Indwelling Catheter - Urethral: 1025 mL  Total OUT: 1025 mL    Total NET: 242 mL          LABS:                          13.7   7.24  )-----------( 171      ( 27 Aug 2020 17:25 )             41.1                                               08-27    139  |  106  |  22<H>  ----------------------------<  105<H>  4.0   |  24  |  1.1    Ca    8.7      27 Aug 2020 17:25    TPro  5.8<L>  /  Alb  4.0  /  TBili  0.3  /  DBili  x   /  AST  27  /  ALT  17  /  AlkPhos  58  08-27      PT/INR - ( 27 Aug 2020 17:25 )   PT: 12.00 sec;   INR: 1.04 ratio         PTT - ( 27 Aug 2020 17:25 )  PTT:22.2 sec                                           CARDIAC MARKERS ( 27 Aug 2020 17:25 )  x     / <0.01 ng/mL / 134 U/L / x     / 2.4 ng/mL                                            LIVER FUNCTIONS - ( 27 Aug 2020 17:25 )  Alb: 4.0 g/dL / Pro: 5.8 g/dL / ALK PHOS: 58 U/L / ALT: 17 U/L / AST: 27 U/L / GGT: x                                                                                                                                       X-Rays reviewed:                                                                                    ECHO    CXR interpreted by me:  No infiltrate     MEDICATIONS  (STANDING):  atorvastatin 80 milliGRAM(s) Oral at bedtime  chlorhexidine 4% Liquid 1 Application(s) Topical <User Schedule>  norepinephrine Infusion 0.05 MICROgram(s)/kG/Min (8.18 mL/Hr) IV Continuous <Continuous>  pantoprazole  Injectable 40 milliGRAM(s) IV Push daily  sodium chloride 0.9%. 1000 milliLiter(s) (75 mL/Hr) IV Continuous <Continuous>    MEDICATIONS  (PRN):

## 2020-08-28 NOTE — OCCUPATIONAL THERAPY INITIAL EVALUATION ADULT - SPECIFY REASON(S)
OT attempted to see pt in AM for IE, however pt currently with active bedrest orders 24hrs s/p tPA received 7pm on 8/27. OT will f/u with pt when appropriate.

## 2020-08-28 NOTE — SWALLOW BEDSIDE ASSESSMENT ADULT - SWALLOW EVAL: DIAGNOSIS
No s/s aspiration/penetration for thin liquids, puree, and mechanical soft, mild oral dysphagia. Moderate oral dysphagia for regular consistency

## 2020-08-28 NOTE — PROGRESS NOTE ADULT - SUBJECTIVE AND OBJECTIVE BOX
<<<<<<ICU Progress Note>>>>>>    Patient is a 69y old  Male who presents with a chief complaint of CVA (28 Aug 2020 09:50)      INTERVAL HPI/OVERNIGHT EVENTS:    No acute events overnight. Afebrile, hemodynamically stable.     ICU Vital Signs Last 24 Hrs  T(C): 36.2 (28 Aug 2020 12:00), Max: 37.1 (27 Aug 2020 17:25)  T(F): 97.1 (28 Aug 2020 12:00), Max: 98.7 (27 Aug 2020 17:25)  HR: 58 (28 Aug 2020 13:30) (44 - 80)  BP: 142/74 (28 Aug 2020 13:30) (113/74 - 165/72)  BP(mean): 106 (28 Aug 2020 13:30) (79 - 113)  ABP: --  ABP(mean): --  RR: 29 (28 Aug 2020 13:30) (15 - 30)  SpO2: 100% (28 Aug 2020 13:30) (93% - 100%)    I&O's Summary    27 Aug 2020 07:01  -  28 Aug 2020 07:00  --------------------------------------------------------  IN: 1267 mL / OUT: 1100 mL / NET: 167 mL    28 Aug 2020 07:01  -  28 Aug 2020 14:48  --------------------------------------------------------  IN: 541 mL / OUT: 325 mL / NET: 216 mL          LABS:                        13.7   7.24  )-----------( 171      ( 27 Aug 2020 17:25 )             41.1     08-27    139  |  106  |  22<H>  ----------------------------<  105<H>  4.0   |  24  |  1.1    Ca    8.7      27 Aug 2020 17:25    TPro  5.8<L>  /  Alb  4.0  /  TBili  0.3  /  DBili  x   /  AST  27  /  ALT  17  /  AlkPhos  58  08-27    PT/INR - ( 27 Aug 2020 17:25 )   PT: 12.00 sec;   INR: 1.04 ratio         PTT - ( 27 Aug 2020 17:25 )  PTT:22.2 sec    CAPILLARY BLOOD GLUCOSE            RADIOLOGY & ADDITIONAL TESTS:    Consultant(s) Notes Reviewed:  [x ] YES  [ ] NO    MEDICATIONS  (STANDING):  atorvastatin 80 milliGRAM(s) Oral at bedtime  chlorhexidine 4% Liquid 1 Application(s) Topical <User Schedule>  norepinephrine Infusion 0.05 MICROgram(s)/kG/Min (8.18 mL/Hr) IV Continuous <Continuous>  pantoprazole   Suspension 40 milliGRAM(s) Oral daily  sodium chloride 0.9%. 1000 milliLiter(s) (75 mL/Hr) IV Continuous <Continuous>    MEDICATIONS  (PRN):      PHYSICAL EXAM:  GENERAL: well built, well nourished  HEAD:  Atraumatic, Normocephalic  EYES: EOMI, PERRLA, conjunctiva and sclera clear  ENT: No tonsillar erythema, exudates, or enlargement; Moist mucous membranes, Good dentition, No lesions  NECK: Supple, No JVD, Normal thyroid, no enlarged nodes  NERVOUS SYSTEM: Alert and responds to commands. Expressive aphasia. Motor Strength 4/5 LUE 5/5 in all other extremities, Sensation intact B/L   CHEST/LUNG: B/L good air entry; No rales, rhonchi, or wheezing  HEART: S1S2 normal, no S3, Regular rate and rhythm; No murmurs, rubs, or gallops  ABDOMEN: Soft, Nontender, Nondistended; Bowel sounds present  EXTREMITIES:  2+ Peripheral Pulses, No clubbing, cyanosis, or edema  LYMPH: No lymphadenopathy noted  SKIN: No rashes or lesions    Care Discussed with Consultants/Other Providers [ x] YES  [ ] NO    ASSESSMENT/PLAN    70y/o M with no PMH presents with large L MCA CVA with NIH initially of 20 s/p neurointervention recannalization.    #Left MCA stroke  - s/p tPA and neurointervention 8/27 with only partially successful recannalization   - neuro check q1h  - goal -170  - continue atorvastatin 80 mg  - on low dose levophed, will continue NS @ 100 cc in maintain SBP and wean on levo  - Hold AC until repeat head CT negative for hemorrhage  - Lipid Panel normal  - Follow up A1c  - Follow up 2D echo + bubble  - Follow up Brain MRI w/ jhon  - Follow up Head CT  - Dysphagia 3, thin liquid as per speech eval  - PT/OT recs   - NeuroIR following    #MISC  - DVT ppx: hold until cleared by neuro  - GI ppx: protonix IV  - Diet: NPO, IVF @ 75  - Activity: bedrest  - Dispo: ICU

## 2020-08-28 NOTE — PROGRESS NOTE ADULT - SUBJECTIVE AND OBJECTIVE BOX
NEUROENDOVASCULAR PROGRESS NOTE    Patient is a 69y old  Male who presents with a chief complaint of CVA (28 Aug 2020 08:25)    Interval history: A 69 years old man with no significant pmhx presents with LMCA syndrome, initial NIHSS 20 when Stroke Code called. CTH reports of hypodensity of left temporal lobe and no intracranial hemorrhage. He is s/p IV-tPA. CTP/CTA reports of multifocal occlusion of the left M2/M3 branches, large area of ischemic within the left MCA territory measuring 172 cc and core infarct of 52 cc (mismatch volume/penumbra of 120 cc), he is s/p emergent IA mechanical thrombectomy of left ICA/MCA of superior/inferior division. No overnight events, significant neurological improvement noted and no right groin hematoma.     REVIEW OF SYSTEMS  Constitutional: No fever  Eyes: No eye pain, visual disturbances, or discharge  ENMT:  No difficulty hearing, tinnitus, vertigo; No sinus or throat pain  Neck: No pain or stiffness  Respiratory: No cough, wheezing, chills or hemoptysis  Cardiovascular: No chest pain, palpitations, shortness of breath, dizziness or leg swelling  Gastrointestinal: No abdominal pain. No nausea, vomiting or hematemesis; No diarrhea or constipation  Genitourinary: No dysuria, frequency, hematuria or incontinence  Neurological: As per HPI  Skin: No itching, burning, rashes or lesions   Endocrine: No heat or cold intolerance; No hair loss  Musculoskeletal: No joint pain or swelling; No muscle, back or extremity pain  Psychiatric: No depression, anxiety, mood swings or difficulty sleeping  Heme/Lymph: No easy bruising or bleeding gums    Vital Signs Last 24 Hrs  T(C): 36.3 (28 Aug 2020 08:00), Max: 37.1 (27 Aug 2020 17:25)  T(F): 97.4 (28 Aug 2020 08:00), Max: 98.7 (27 Aug 2020 17:25)  HR: 54 (28 Aug 2020 08:30) (44 - 80)  BP: 129/61 (28 Aug 2020 08:30) (113/74 - 165/72)  BP(mean): 84 (28 Aug 2020 08:30) (79 - 113)  RR: 18 (28 Aug 2020 08:30) (15 - 30)  SpO2: 100% (28 Aug 2020 08:30) (93% - 100%)    Neurologic Exam:  Mental status: Awake, alert and oriented x 3. Attention and concentration intact.  Language: Follows all commands. Naming, repetition, fluency, and comprehension intact. + dysarthria, no aphasia.  Cranial nerves: B/l pupils equally round and reactive to light, visual fields full, no nystagmus, extraocular muscles intact, V1 through V3 intact bilaterally and symmetric, face symmetric, hearing intact to finger rub, palate elevation symmetric, tongue was midline, sternocleidomastoid/shoulder shrug strength bilaterally 5/5.    Motor: No drifting in all extremities. Normal bulk and tone, strength 5/5 in bilateral upper and lower extremities.   strength 5/5.  Sensation: Intact to light touch, proprioception, and pinprick.  No neglect.   Coordination: No dysmetria on finger-to-nose and heel-to-shin.   Gait: Deferred    Ext: no c/c/e, 2+ pulses throughout, no right groin hematoma, mild ecchymosis    NIH STROKE SCALE  Item	                                                        Score  1 a.	Level of Consciousness	               	0  1 b. LOC Questions	                                    0  1 c.	LOC Commands	                               	0  2.	Best Gaze	                                    0  3.	Visual	                                                0  4.	Facial Palsy	                                    0  5 a.	Motor Arm - Left	                        0  5 b.	Motor Arm - Right	                        0  6 a.	Motor Leg - Left	                        0  6 b.	Motor Leg - Right	                        0  7.	Limb Ataxia	                                    0  8.	Sensory	                                                0  9.	Language	                                    1  10.	Dysarthria	                                    1  11.	Extinction and Inattention  	            0  __________________________________________________________________________  TOTAL	                                                            2    NIHSS Initial: 20             NIHSS Yesterday: 11        NIHSS Today: 2    mRS: baseline 0-> 1 No significant disability despite symptoms; able to carry out all usual duties and activities without assistance    MEDICATIONS  (STANDING):  atorvastatin 80 milliGRAM(s) Oral at bedtime  chlorhexidine 4% Liquid 1 Application(s) Topical <User Schedule>  norepinephrine Infusion 0.05 MICROgram(s)/kG/Min IV Continuous <Continuous>  pantoprazole   Suspension 40 milliGRAM(s) Oral daily  sodium chloride 0.9%. 1000 milliLiter(s) IV Continuous <Continuous>    LABS:                      13.7   7.24  )-----------( 171      ( 27 Aug 2020 17:25 )             41.1     08-27    139  |  106  |  22<H>  ----------------------------<  105<H>  4.0   |  24  |  1.1    Ca    8.7      27 Aug 2020 17:25    TPro  5.8<L>  /  Alb  4.0  /  TBili  0.3  /  DBili  x   /  AST  27  /  ALT  17  /  AlkPhos  58  08-27    PT/INR - ( 27 Aug 2020 17:25 )   PT: 12.00 sec;   INR: 1.04 ratio         PTT - ( 27 Aug 2020 17:25 )  PTT:22.2 sec    LIVER FUNCTIONS - ( 27 Aug 2020 17:25 )  Alb: 4.0 g/dL / Pro: 5.8 g/dL / ALK PHOS: 58 U/L / ALT: 17 U/L / AST: 27 U/L / GGT: x           RADIOLOGY:  < from: CT Brain Stroke Protocol (08.27.20 @ 16:53) >  IMPRESSION:  Hypodensity involving the left temporal lobe which may represent an area of evolving acute ischemia. No evidence of hemorrhage.  These findings were discussed with Dr. CAITLIN SCHILLING [phone] at 8/27/2020 5:01 PM by Dr. Pulido with read back confirmation.    < from: CT Perfusion w/ Maps w/ IV Cont (08.27.20 @ 17:28) >  Findings:    CTA neck:  The aortic arch, origin of the great vessels and subclavian arteries are unremarkable.  The bilateral common carotid arteries and internal carotid arteries are unremarkable without significant stenosis seen.  The vertebral arteries are codominant. No significant vertebral artery stenosis is noted.    CTA head:  The distal segments of the internal carotid arteries are unremarkable. There are multifocal occlusions of the left MCA branches: Inferior M2 branch on series 4 image 482, superior branches on series 703 images 45-50 and series 703 image 53.  The right ADEOLA and MCA are patent. The left ADEOLA is patent.  The distal vertebral arteries, basilar artery and posterior cerebral arteries are patent.    CT perfusion:  The cerebral blood volume and time to peak images demonstrate increased time to peak in the left MCA territory measuring 172 cc. There is a decrease in total cerebral blood flow volume of 52 cc in the left MCA territory consistent with acute infarct. There is a mismatch volume of 120 cc indicating ischemic penumbra.    Other:  Multilevel degenerative changes of the cervical spine. Trace retrolisthesis of C2 on C3. Mucosal thickening of bilateral ethmoid sinuses.    IMPRESSION: Multifocal occlusions of the left middle cerebral artery M2 and M3 branches. Corresponding large area of ischemia within the left MCA territory measuring 172 cc, with core infarct of 52 cc (mismatch volume/penumbra of 120 cc)

## 2020-08-28 NOTE — CHART NOTE - NSCHARTNOTEFT_GEN_A_CORE
Suggestions:  - Q1 neuro checks  - Hold AP for now (may restart once 24 hr post tPA repeat CTH is negative for hemorrhage)  - Lipitor 80 mg HS  - Obtain 24 hr post tPA repeat CTH (between 3:25pm-7:25pm)  - Obtain MRI Brain w/o gadolinium  - Keep -170   - F/u TTE w/bubble  - Post-thrombectomy protocol neurovascular checks  - Femstop management; please call for any hematoma formation, loss of pulses, etc.  - F/u lipid panel and A1c  - PT/OT/SLP eval and tx  - Please continue NS @ 100 mL/hr to maintain adequate hydration    Gretel Mason, BETHANY  [phone] Suggestions:  - Q1 neuro checks  - Hold AP for now (may restart once 24 hr post tPA repeat CTH is negative for hemorrhage)  - Lipitor 80 mg HS  - Obtain 24 hr post tPA repeat CTH (between 3:25pm-7:25pm)  - Obtain MRI Brain w/o gadolinium  - Keep -170   - F/u TTE w/bubble  - Post-thrombectomy protocol neurovascular checks  - F/u lipid panel and A1c  - PT/OT/SLP eval and tx  - Please continue NS @ 100 mL/hr to maintain adequate hydration    Gretel Mason, NP  [phone]

## 2020-08-28 NOTE — SPEECH LANGUAGE PATHOLOGY EVALUATION - SLP GENERAL OBSERVATIONS
Pt awake and alert with no c/o pain, +aphasia, suspected apraxia of speech Pt awake and alert with no c/o pain, +aphasia, suspected apraxia of speech, mild dysarthria

## 2020-08-28 NOTE — SWALLOW BEDSIDE ASSESSMENT ADULT - SLP PERTINENT HISTORY OF CURRENT PROBLEM
Pt collapsed at home while gardening outside, R sided weakness/aphasia. Neurointervention with TPA, thrombectomy. CTH: L temporal CVA, multiple infarcts of L MCA. PMHx: GERD

## 2020-08-29 PROCEDURE — 70551 MRI BRAIN STEM W/O DYE: CPT | Mod: 26

## 2020-08-29 PROCEDURE — 71045 X-RAY EXAM CHEST 1 VIEW: CPT | Mod: 26

## 2020-08-29 PROCEDURE — 99233 SBSQ HOSP IP/OBS HIGH 50: CPT

## 2020-08-29 RX ORDER — ASPIRIN/CALCIUM CARB/MAGNESIUM 324 MG
81 TABLET ORAL DAILY
Refills: 0 | Status: DISCONTINUED | OUTPATIENT
Start: 2020-08-29 | End: 2020-08-31

## 2020-08-29 RX ADMIN — ATORVASTATIN CALCIUM 80 MILLIGRAM(S): 80 TABLET, FILM COATED ORAL at 22:05

## 2020-08-29 RX ADMIN — CHLORHEXIDINE GLUCONATE 1 APPLICATION(S): 213 SOLUTION TOPICAL at 07:31

## 2020-08-29 RX ADMIN — PANTOPRAZOLE SODIUM 40 MILLIGRAM(S): 20 TABLET, DELAYED RELEASE ORAL at 13:10

## 2020-08-29 RX ADMIN — Medication 81 MILLIGRAM(S): at 13:09

## 2020-08-29 NOTE — PROGRESS NOTE ADULT - SUBJECTIVE AND OBJECTIVE BOX
Over Night Events:  S/p repeat CTH, no bleeding.      ROS:  See HPI    PHYSICAL EXAM    ICU Vital Signs Last 24 Hrs  T(C): 36.7 (29 Aug 2020 04:00), Max: 37.1 (28 Aug 2020 20:00)  T(F): 98 (29 Aug 2020 04:00), Max: 98.7 (28 Aug 2020 20:00)  HR: 60 (29 Aug 2020 07:00) (44 - 72)  BP: 140/74 (29 Aug 2020 07:00) (123/67 - 163/70)  BP(mean): 90 (29 Aug 2020 07:00) (82 - 109)  RR: 22 (29 Aug 2020 07:00) (14 - 34)  SpO2: 98% (29 Aug 2020 07:00) (95% - 100%)      General: NARD  HEENT: SAMI             Lymph Nodes: No cervical LN   Lungs: Bilateral BS  Cardiovascular: Regular   Abdomen: Soft, Positive BS  Extremities: No clubbing   Skin: Warm  Neurological: following commands, aaox3,Non focal, aphasia(expressive/receptive)      08-28-20 @ 07:01  -  08-29-20 @ 07:00  --------------------------------------------------------  IN:    norepinephrine Infusion: 70.5 mL    sodium chloride 0.9%: 700 mL    sodium chloride 0.9%.: 1300 mL  Total IN: 2070.5 mL    OUT:    Indwelling Catheter - Urethral: 1135 mL  Total OUT: 1135 mL    Total NET: 935.5 mL          LABS:                          13.7   7.24  )-----------( 171      ( 27 Aug 2020 17:25 )             41.1                                               08-27    139  |  106  |  22<H>  ----------------------------<  105<H>  4.0   |  24  |  1.1    Ca    8.7      27 Aug 2020 17:25    TPro  5.8<L>  /  Alb  4.0  /  TBili  0.3  /  DBili  x   /  AST  27  /  ALT  17  /  AlkPhos  58  08-27      PT/INR - ( 27 Aug 2020 17:25 )   PT: 12.00 sec;   INR: 1.04 ratio         PTT - ( 27 Aug 2020 17:25 )  PTT:22.2 sec                                           CARDIAC MARKERS ( 27 Aug 2020 17:25 )  x     / <0.01 ng/mL / 134 U/L / x     / 2.4 ng/mL                                            LIVER FUNCTIONS - ( 27 Aug 2020 17:25 )  Alb: 4.0 g/dL / Pro: 5.8 g/dL / ALK PHOS: 58 U/L / ALT: 17 U/L / AST: 27 U/L / GGT: x                                                                                                                                       MEDICATIONS  (STANDING):  atorvastatin 80 milliGRAM(s) Oral at bedtime  chlorhexidine 4% Liquid 1 Application(s) Topical <User Schedule>  norepinephrine Infusion 0.05 MICROgram(s)/kG/Min (8.18 mL/Hr) IV Continuous <Continuous>  pantoprazole   Suspension 40 milliGRAM(s) Oral daily  sodium chloride 0.9%. 1000 milliLiter(s) (100 mL/Hr) IV Continuous <Continuous>    MEDICATIONS  (PRN):      Xrays:                                                                                     ECHO  no focal opacity. Over Night Events:    S/p repeat CTH, no bleeding. feels better, still on low dose levophed      ROS:  See HPI    PHYSICAL EXAM    ICU Vital Signs Last 24 Hrs  T(C): 36.7 (29 Aug 2020 04:00), Max: 37.1 (28 Aug 2020 20:00)  T(F): 98 (29 Aug 2020 04:00), Max: 98.7 (28 Aug 2020 20:00)  HR: 60 (29 Aug 2020 07:00) (44 - 72)  BP: 140/74 (29 Aug 2020 07:00) (123/67 - 163/70)  BP(mean): 90 (29 Aug 2020 07:00) (82 - 109)  RR: 22 (29 Aug 2020 07:00) (14 - 34)  SpO2: 98% (29 Aug 2020 07:00) (95% - 100%)      General: NARD  HEENT: SAMI             Lymph Nodes: No cervical LN   Lungs: Bilateral BS  Cardiovascular: Regular   Abdomen: Soft, Positive BS  Extremities: No clubbing   Skin: Warm  Neurological: following commands, aaox3,Non focal, aphasia(expressive/receptive), r sided weakness improved      08-28-20 @ 07:01  -  08-29-20 @ 07:00  --------------------------------------------------------  IN:    norepinephrine Infusion: 70.5 mL    sodium chloride 0.9%: 700 mL    sodium chloride 0.9%.: 1300 mL  Total IN: 2070.5 mL    OUT:    Indwelling Catheter - Urethral: 1135 mL  Total OUT: 1135 mL    Total NET: 935.5 mL          LABS:                          13.7   7.24  )-----------( 171      ( 27 Aug 2020 17:25 )             41.1                                               08-27    139  |  106  |  22<H>  ----------------------------<  105<H>  4.0   |  24  |  1.1    Ca    8.7      27 Aug 2020 17:25    TPro  5.8<L>  /  Alb  4.0  /  TBili  0.3  /  DBili  x   /  AST  27  /  ALT  17  /  AlkPhos  58  08-27      PT/INR - ( 27 Aug 2020 17:25 )   PT: 12.00 sec;   INR: 1.04 ratio         PTT - ( 27 Aug 2020 17:25 )  PTT:22.2 sec                                           CARDIAC MARKERS ( 27 Aug 2020 17:25 )  x     / <0.01 ng/mL / 134 U/L / x     / 2.4 ng/mL                                            LIVER FUNCTIONS - ( 27 Aug 2020 17:25 )  Alb: 4.0 g/dL / Pro: 5.8 g/dL / ALK PHOS: 58 U/L / ALT: 17 U/L / AST: 27 U/L / GGT: x                                                                                                                                       MEDICATIONS  (STANDING):  atorvastatin 80 milliGRAM(s) Oral at bedtime  chlorhexidine 4% Liquid 1 Application(s) Topical <User Schedule>  norepinephrine Infusion 0.05 MICROgram(s)/kG/Min (8.18 mL/Hr) IV Continuous <Continuous>  pantoprazole   Suspension 40 milliGRAM(s) Oral daily  sodium chloride 0.9%. 1000 milliLiter(s) (100 mL/Hr) IV Continuous <Continuous>    MEDICATIONS  (PRN):      Xrays:                                                                                     no focal opacity.

## 2020-08-29 NOTE — PROGRESS NOTE ADULT - SUBJECTIVE AND OBJECTIVE BOX
Neurocritical Care Consult Note:    1. Brief Presentation: L MCA syndrome    2. Today's Acute Problems:     - L ICA, superior and inferior division MCA occlusions (POD 0 s/p DSA + mechanical thrombectomy with IV tPA + IA tPA 8 mg, IA Verapamil 10 mg; residual MCA branch occlusion)  - Acute stroke, L MCA territory    3. Relevant brief History: 68y/o M with no PMH presents with R sided weakness/aphasia starting 2hr prior to arrival. Was doing heavy garden work when he suddenly collapsed. Wife also reported that pt mentioned 8/24 he mentioned stumbling against car but that was it. Otherwise fully healthy, former , and exercises. When wife went to him he couldn't speak, staring, and wasn't moving R side.     In ED: T98.7, HR 80, /68, RR18, SpO2 100%.   Stroke code called, [facility] initially 20.    4-Yesterday's Plan:     - Q1 neuro checks  - Hold AP for now  - Lipitor 80 mg HS  - Please check 24 hr CT head  - MRI Brain w/o gadolinium  - Keep -170   - Check TTE w/bubble  - Post-thrombectomy protocol neurovascular checks  - Femstop management; please call for any hematoma formation, loss of pulses, etc.  - Keep knee immobilizer to right leg for 4 hours post-op  - Check lipid panel and A1c  - PT/OT/SLP eval and tx  - Please start NS @ 100 mL/hr to maintain adequate hydration      5. Last 24 hour updates: Femstop applied to right groin post-operatively    6. Medications:   atorvastatin 80 milliGRAM(s) Oral at bedtime  chlorhexidine 4% Liquid 1 Application(s) Topical <User Schedule>  pantoprazole  Injectable 40 milliGRAM(s) IV Push daily  sodium chloride 0.9%. 1000 milliLiter(s) IV Continuous <Continuous>    7. Ancillary Management:   Chest PT[ ]   Head of bed >35 [x]   Out of bed to chair [ ]   PT/OT/SP Eval [x]   Spirometry [x]   DVT prophylaxis [ ]    8. Neurologic Examination:  Mentation: Awake, alert. Oriented to person and place when provided options. Disoriented to time. Wernicke's aphasia is present. Follows simple, one part commands when given adequate time.   Cranial Nerves:  	II – Blink to threat b/l.  	III/IV/VI – Pupils 4 mm. PERRL. EOMI (gaze resolved).  	V – Grossly intact sensation.  	VII – Minor palsy.  	VIII – No nystagmus.  	IX/X – Symmetric palate rise. Uvula midline.  	XI – Hemiparetic R SCM.  	XII – Tongue protrusion midline.  Motor: RUE 3+/5, RLE 4-/5; LUE/LLE 5/5.  Sensory: Mild right hemisensory loss.  Reflexes: 2+ generally.  Cerebellum: Unable.    NIH STROKE SCALE  Item	                                                        Score  1 a.	Level of Consciousness	               	0  1 b. LOC Questions	                                1  1 c.	LOC Commands	                               	1  2.	Best Gaze	                                        0  3.	Visual	                                                0  4.	Facial Palsy	                                        1  5 a.	Motor Arm - Left	                                0  5 b.	Motor Arm - Right	                        2  6 a.	Motor Leg - Left	                                0  6 b.	Motor Leg - Right	                                2  7.	Limb Ataxia	                                        0  8.	Sensory	                                                1  9.	Language	                                        2  10.	Dysarthria	                                        0  11.	Extinction and Inattention  	        1  ______________________________________  TOTAL	                                                        11      mRS:  0 No symptoms at all  1 No significant disability despite symptoms; able to carry out all usual duties and activities without assistance  2 Slight disability; unable to carry out all previous activities, but able to look after own affairs  3 Moderate disability; requiring some help, but able to walk without assistance  4 Moderately severe disability; unable to walk without assistance and unable to attend to own bodily needs without assistance  5 Severe disability; bedridden, incontinent and requiring constant nursing care and attention  6 Dead    Last CTH:   < from: CT Head No Cont (08.28.20 @ 17:58) >  IMPRESSION:    No evidence for acute intracranial hemorrhage, midline shift, or mass effect.    Left frontotemporal lobe hypodensity is increased in size compared to previous day's study, consistent with evolution of ischemic infarct.    < end of copied text >      Last CTP: < from: CT Perfusion w/ Maps w/ IV Cont (08.27.20 @ 17:28) >  Impression:    Multiple occlusions in the superior and inferior division of the left MCA.    On perfusion imaging, there is an infarct in the left MCA territory measuring 52 cc.    Additionally, there is an ischemic penumbra measuring 120 cc in the left MCA territory.      < end of copied text >      Last MRI:    Last TCD:    Last EEG:    EVD: [ ] [place]: [ ]     ICP:     CPP:     Level(cm):     24hr(ml):     CSF:     W:     R:     C:     P:     G:     LA:    9. Cardiovascular:   Vital Signs Last 24 Hrs  T(C): 37.1 (27 Aug 2020 18:05), Max: 37.1 (27 Aug 2020 17:25)  T(F): 98.7 (27 Aug 2020 17:25), Max: 98.7 (27 Aug 2020 17:25)  HR: 68 (27 Aug 2020 18:05) (68 - 80)  BP: 143/83 (27 Aug 2020 18:05) (120/68 - 143/83)  BP(mean): --  RR: 18 (27 Aug 2020 18:05) (18 - 18)  SpO2: 100% (27 Aug 2020 18:05) (96% - 100%)     Last Echo:    Last EKG: < from: 12 Lead ECG (08.27.20 @ 17:25) >  Ventricular Rate 58 BPM    Atrial Rate 58 BPM    P-R Interval 146 ms    QRS Duration 114 ms    Q-T Interval 436 ms    QTC Calculation(Bezet) 428 ms    P Axis 75 degrees    R Axis 4 degrees    T Axis 51 degrees    Sinus bradycardia with Premature atrial complexes  Otherwise normal ECG    < end of copied text >      CVP   MAP/CPP/SBP target:   CO:      CI:       Enzymes/Trop:    10. Respiratory:   ABG:    VBG:    Chest Xray: clear        Peak Pressure/[place] Pressure:    11.GI:    Prophylaxis     Bowel mvt:     Abd distension:   LIVER FUNCTIONS - ( 27 Aug 2020 17:25 )  Alb: 4.0 g/dL / Pro: 5.8 g/dL / ALK PHOS: 58 U/L / ALT: 17 U/L / AST: 27 U/L / GGT: x             12.Renal/Fluids/Electrolytes:    08-27    139  |  106  |  22<H>  ----------------------------<  105<H>  4.0   |  24  |  1.1    Ca    8.7      27 Aug 2020 17:25    TPro  5.8<L>  /  Alb  4.0  /  TBili  0.3  /  DBili  x   /  AST  27  /  ALT  17  /  AlkPhos  58  08-27      I&O's Detail      13.ID:   TMax:   Vital Signs Last 24 Hrs  T(C): 37.1 (27 Aug 2020 18:05), Max: 37.1 (27 Aug 2020 17:25)  T(F): 98.7 (27 Aug 2020 17:25), Max: 98.7 (27 Aug 2020 17:25)  HR: 68 (27 Aug 2020 18:05) (68 - 80)  BP: 143/83 (27 Aug 2020 18:05) (120/68 - 143/83)  BP(mean): --  RR: 18 (27 Aug 2020 18:05) (18 - 18)  SpO2: 100% (27 Aug 2020 18:05) (96% - 100%)           Lines: Central[] Date inserted: Peripheral[]    14. Hematology:                         13.7   7.24  )-----------( 171      ( 27 Aug 2020 17:25 )             41.1      08-27    139  |  106  |  22<H>  ----------------------------<  105<H>  4.0   |  24  |  1.1    Ca    8.7      27 Aug 2020 17:25    TPro  5.8<L>  /  Alb  4.0  /  TBili  0.3  /  DBili  x   /  AST  27  /  ALT  17  /  AlkPhos  58  08-27     PT/INR - ( 27 Aug 2020 17:25 )   PT: 12.00 sec;   INR: 1.04 ratio         PTT - ( 27 Aug 2020 17:25 )  PTT:22.2 sec    DVT Prophylaxis Lovenox[ ] Heparin[ ] Venodynes[ ] SCD's[x]    15. Impression:    - L ICA, superior and inferior division MCA occlusions (POD 0 s/p DSA + mechanical thrombectomy with IV tPA + IA tPA 8 mg, IA Verapamil 10 mg; residual MCA branch occlusion)  - Acute stroke, L MCA territory    16. Suggestions:    - Q1 neuro checks  - Start ASA 81 mg daily  - Lipitor 80 mg HS  - MRI Brain w/o gadolinium  - Keep -170   - Check TTE w/bubble  - Post-thrombectomy protocol neurovascular checks  - PT/OT/SLP eval and tx  - Continue IV hydration      17. Disposition: ICU

## 2020-08-29 NOTE — PROGRESS NOTE ADULT - ASSESSMENT
IMPRESSION:    CVA SP TPA and Endovascular therapy.    PLAN:    CNS: No depressants  FU with Neuro and neuroIR. Start aspirin. on lipitor. repeat CTH no bleed. -170 as / neuro    HEENT: Oral care.     PULMONARY:  HOB @ 45 degrees.  Aspiration precautions.  CXR PA and lateral when stable     CARDIOVASCULAR:  Wean Levophed.  Echo: normal EF.    GI: GI prophylaxis.  advance diet.   Bowel regimen     RENAL:  Follow up lytes.  Correct as needed    INFECTIOUS DISEASE: Follow up cultures    HEMATOLOGICAL:  DVT prophylaxis.    ENDOCRINE:  Follow up FS.  Insulin protocol if needed.    MUSCULOSKELETAL:  Bed rest PT OT today,     Downgrade to stroke unit if ok by neuro. IMPRESSION:    CVA SP TPA and Endovascular therapy.    PLAN:    CNS: No depressants  FU with Neuro and neuroIR. Start aspirin. on lipitor. repeat CTH no bleed. -170 as / neuro    HEENT: Oral care.     PULMONARY:  HOB @ 45 degrees.  Aspiration precautions.  CXR PA and lateral when stable     CARDIOVASCULAR: DC IV fluids. Wean Levophed.  Echo: normal EF.    GI: GI prophylaxis.  advance diet.   Bowel regimen     RENAL:  Follow up lytes.  Correct as needed    INFECTIOUS DISEASE: Follow up cultures    HEMATOLOGICAL:  DVT prophylaxis.    ENDOCRINE:  Follow up FS.  Insulin protocol if needed.    MUSCULOSKELETAL:  Bed rest PT OT today,     Downgrade to stroke unit if ok by neuro. IMPRESSION:    CVA SP TPA and Endovascular therapy S/P repeat head CT    PLAN:    CNS: No depressants  FU with Neuro and neuroIR. aspirin. on lipitor. repeat CTH no bleed. -170 as / neuro, [place] MRI    HEENT: Oral care.     PULMONARY:  HOB @ 45 degrees.  Aspiration precautions.     CARDIOVASCULAR: DC IV fluids. Wean Levophed.  Echo: normal EF.    GI: GI prophylaxis.  advance diet.   Bowel regimen     RENAL:  Follow up lytes.  Correct as needed    INFECTIOUS DISEASE: Follow up cultures    HEMATOLOGICAL:  DVT prophylaxis.    ENDOCRINE:  Follow up FS.  Insulin protocol if needed.    MUSCULOSKELETAL:  Bed rest PT OT today,     Downgrade to stroke unit if ok by neuro and off pressors

## 2020-08-30 LAB
ALBUMIN SERPL ELPH-MCNC: 3.6 G/DL — SIGNIFICANT CHANGE UP (ref 3.5–5.2)
ALP SERPL-CCNC: 48 U/L — SIGNIFICANT CHANGE UP (ref 30–115)
ALT FLD-CCNC: 14 U/L — SIGNIFICANT CHANGE UP (ref 0–41)
ANION GAP SERPL CALC-SCNC: 10 MMOL/L — SIGNIFICANT CHANGE UP (ref 7–14)
AST SERPL-CCNC: 26 U/L — SIGNIFICANT CHANGE UP (ref 0–41)
BASOPHILS # BLD AUTO: 0.03 K/UL — SIGNIFICANT CHANGE UP (ref 0–0.2)
BASOPHILS NFR BLD AUTO: 0.4 % — SIGNIFICANT CHANGE UP (ref 0–1)
BILIRUB SERPL-MCNC: 0.9 MG/DL — SIGNIFICANT CHANGE UP (ref 0.2–1.2)
BUN SERPL-MCNC: 10 MG/DL — SIGNIFICANT CHANGE UP (ref 10–20)
CALCIUM SERPL-MCNC: 8.3 MG/DL — LOW (ref 8.5–10.1)
CHLORIDE SERPL-SCNC: 103 MMOL/L — SIGNIFICANT CHANGE UP (ref 98–110)
CO2 SERPL-SCNC: 24 MMOL/L — SIGNIFICANT CHANGE UP (ref 17–32)
CREAT SERPL-MCNC: 0.9 MG/DL — SIGNIFICANT CHANGE UP (ref 0.7–1.5)
EOSINOPHIL # BLD AUTO: 0.22 K/UL — SIGNIFICANT CHANGE UP (ref 0–0.7)
EOSINOPHIL NFR BLD AUTO: 2.7 % — SIGNIFICANT CHANGE UP (ref 0–8)
GLUCOSE SERPL-MCNC: 108 MG/DL — HIGH (ref 70–99)
HCT VFR BLD CALC: 37.2 % — LOW (ref 42–52)
HGB BLD-MCNC: 12.6 G/DL — LOW (ref 14–18)
IMM GRANULOCYTES NFR BLD AUTO: 0.2 % — SIGNIFICANT CHANGE UP (ref 0.1–0.3)
LYMPHOCYTES # BLD AUTO: 1.37 K/UL — SIGNIFICANT CHANGE UP (ref 1.2–3.4)
LYMPHOCYTES # BLD AUTO: 17 % — LOW (ref 20.5–51.1)
MAGNESIUM SERPL-MCNC: 1.7 MG/DL — LOW (ref 1.8–2.4)
MCHC RBC-ENTMCNC: 32.9 PG — HIGH (ref 27–31)
MCHC RBC-ENTMCNC: 33.9 G/DL — SIGNIFICANT CHANGE UP (ref 32–37)
MCV RBC AUTO: 97.1 FL — HIGH (ref 80–94)
MONOCYTES # BLD AUTO: 0.9 K/UL — HIGH (ref 0.1–0.6)
MONOCYTES NFR BLD AUTO: 11.2 % — HIGH (ref 1.7–9.3)
NEUTROPHILS # BLD AUTO: 5.52 K/UL — SIGNIFICANT CHANGE UP (ref 1.4–6.5)
NEUTROPHILS NFR BLD AUTO: 68.5 % — SIGNIFICANT CHANGE UP (ref 42.2–75.2)
NRBC # BLD: 0 /100 WBCS — SIGNIFICANT CHANGE UP (ref 0–0)
PLATELET # BLD AUTO: 153 K/UL — SIGNIFICANT CHANGE UP (ref 130–400)
POTASSIUM SERPL-MCNC: 4 MMOL/L — SIGNIFICANT CHANGE UP (ref 3.5–5)
POTASSIUM SERPL-SCNC: 4 MMOL/L — SIGNIFICANT CHANGE UP (ref 3.5–5)
PROT SERPL-MCNC: 5.5 G/DL — LOW (ref 6–8)
RBC # BLD: 3.83 M/UL — LOW (ref 4.7–6.1)
RBC # FLD: 11.7 % — SIGNIFICANT CHANGE UP (ref 11.5–14.5)
SODIUM SERPL-SCNC: 137 MMOL/L — SIGNIFICANT CHANGE UP (ref 135–146)
WBC # BLD: 8.06 K/UL — SIGNIFICANT CHANGE UP (ref 4.8–10.8)
WBC # FLD AUTO: 8.06 K/UL — SIGNIFICANT CHANGE UP (ref 4.8–10.8)

## 2020-08-30 PROCEDURE — 99232 SBSQ HOSP IP/OBS MODERATE 35: CPT

## 2020-08-30 PROCEDURE — 71045 X-RAY EXAM CHEST 1 VIEW: CPT | Mod: 26

## 2020-08-30 RX ORDER — SENNA PLUS 8.6 MG/1
2 TABLET ORAL AT BEDTIME
Refills: 0 | Status: DISCONTINUED | OUTPATIENT
Start: 2020-08-30 | End: 2020-09-03

## 2020-08-30 RX ORDER — POLYETHYLENE GLYCOL 3350 17 G/17G
17 POWDER, FOR SOLUTION ORAL DAILY
Refills: 0 | Status: DISCONTINUED | OUTPATIENT
Start: 2020-08-30 | End: 2020-09-03

## 2020-08-30 RX ADMIN — CHLORHEXIDINE GLUCONATE 1 APPLICATION(S): 213 SOLUTION TOPICAL at 05:06

## 2020-08-30 RX ADMIN — SENNA PLUS 2 TABLET(S): 8.6 TABLET ORAL at 22:19

## 2020-08-30 RX ADMIN — PANTOPRAZOLE SODIUM 40 MILLIGRAM(S): 20 TABLET, DELAYED RELEASE ORAL at 11:34

## 2020-08-30 RX ADMIN — Medication 81 MILLIGRAM(S): at 11:34

## 2020-08-30 RX ADMIN — ATORVASTATIN CALCIUM 80 MILLIGRAM(S): 80 TABLET, FILM COATED ORAL at 22:19

## 2020-08-30 NOTE — PROGRESS NOTE ADULT - SUBJECTIVE AND OBJECTIVE BOX
OVERNIGHT EVENTS: events noted, off levophed, no IVF, eating    Vital Signs Last 24 Hrs  T(C): 37.2 (30 Aug 2020 04:00), Max: 37.3 (30 Aug 2020 00:00)  T(F): 98.9 (30 Aug 2020 04:00), Max: 99.1 (30 Aug 2020 00:00)  HR: 62 (30 Aug 2020 08:00) (46 - 76)  BP: 138/49 (30 Aug 2020 08:00) (131/64 - 169/71)  BP(mean): 76 (30 Aug 2020 08:00) (76 - 149)  RR: 24 (30 Aug 2020 08:00) (15 - 38)  SpO2: 93% (30 Aug 2020 08:00) (91% - 99%)    PHYSICAL EXAMINATION:    GENERAL: awake, follows commands    HEENT: Head is normocephalic and atraumatic.    NECK: Supple.    LUNGS: Clear to auscultation without wheezing, rales or rhonchi; respirations unlabored    HEART: Regular rate and rhythm without murmur.    ABDOMEN: Soft, nontender, and nondistended.      EXTREMITIES: Without any cyanosis, clubbing, rash, lesions or edema. R thigh hematoma    NEUROLOGIC: improving    SKIN: No ulceration or induration present.      LABS:                        12.6   8.06  )-----------( 153      ( 30 Aug 2020 04:28 )             37.2     08-30    137  |  103  |  10  ----------------------------<  108<H>  4.0   |  24  |  0.9    Ca    8.3<L>      30 Aug 2020 04:28  Mg     1.7     08-30    TPro  5.5<L>  /  Alb  3.6  /  TBili  0.9  /  DBili  x   /  AST  26  /  ALT  14  /  AlkPhos  48  08-30 08-29-20 @ 07:01  -  08-30-20 @ 07:00  --------------------------------------------------------  IN: 206.4 mL / OUT: 1335 mL / NET: -1128.6 mL        MICROBIOLOGY:      MEDICATIONS  (STANDING):  aspirin  chewable 81 milliGRAM(s) Oral daily  atorvastatin 80 milliGRAM(s) Oral at bedtime  chlorhexidine 4% Liquid 1 Application(s) Topical <User Schedule>  norepinephrine Infusion 0.05 MICROgram(s)/kG/Min (8.18 mL/Hr) IV Continuous <Continuous>  pantoprazole   Suspension 40 milliGRAM(s) Oral daily    MEDICATIONS  (PRN):      RADIOLOGY & ADDITIONAL STUDIES:

## 2020-08-30 NOTE — PROGRESS NOTE ADULT - SUBJECTIVE AND OBJECTIVE BOX
Neurocritical Care Consult Note:    1. Brief Presentation: L MCA syndrome    2. Today's Acute Problems:     - L ICA, superior and inferior division MCA occlusions (POD 3 s/p DSA + mechanical thrombectomy with IV tPA + IA tPA 8 mg, IA Verapamil 10 mg; residual MCA branch occlusion)  - Acute stroke, L MCA territory    3. Relevant brief History: 70y/o M with no PMH presents with R sided weakness/aphasia starting 2hr prior to arrival. Was doing heavy garden work when he suddenly collapsed. Wife also reported that pt mentioned 8/24 he mentioned stumbling against car but that was it. Otherwise fully healthy, former , and exercises. When wife went to him he couldn't speak, staring, and wasn't moving R side.     In ED: T98.7, HR 80, /68, RR18, SpO2 100%.   Stroke code called, [facility] initially 20.    4-Yesterday's Plan:     - Q1 neuro checks  - Start ASA 81 mg daily  - Lipitor 80 mg HS  - MRI Brain w/o gadolinium  - Keep -170   - Check TTE w/bubble  - Post-thrombectomy protocol neurovascular checks  - PT/OT/SLP eval and tx  - Continue IV hydration      5. Last 24 hour updates:     6. Medications:   atorvastatin 80 milliGRAM(s) Oral at bedtime  chlorhexidine 4% Liquid 1 Application(s) Topical <User Schedule>  pantoprazole  Injectable 40 milliGRAM(s) IV Push daily  sodium chloride 0.9%. 1000 milliLiter(s) IV Continuous <Continuous>    7. Ancillary Management:   Chest PT[ ]   Head of bed >35 [x]   Out of bed to chair [ ]   PT/OT/SP Eval [x]   Spirometry [x]   DVT prophylaxis [ ]    8. Neurologic Examination:  Mentation: Awake, alert. Oriented to person and place when provided options. Disoriented to time. Wernicke's aphasia is present. Follows simple, one part commands when given adequate time.   Cranial Nerves:  	II – Blink to threat b/l.  	III/IV/VI – Pupils 4 mm. PERRL. EOMI (gaze resolved).  	V – Grossly intact sensation.  	VII – Minor palsy.  	VIII – No nystagmus.  	IX/X – Symmetric palate rise. Uvula midline.  	XI – Hemiparetic R SCM.  	XII – Tongue protrusion midline.  Motor: RUE 3+/5, RLE 4-/5; LUE/LLE 5/5.  Sensory: Mild right hemisensory loss.  Reflexes: 2+ generally.  Cerebellum: Unable.    NIH STROKE SCALE  Item	                                                        Score  1 a.	Level of Consciousness	               	0  1 b. LOC Questions	                                1  1 c.	LOC Commands	                               	1  2.	Best Gaze	                                        0  3.	Visual	                                                0  4.	Facial Palsy	                                        1  5 a.	Motor Arm - Left	                                0  5 b.	Motor Arm - Right	                        2  6 a.	Motor Leg - Left	                                0  6 b.	Motor Leg - Right	                                2  7.	Limb Ataxia	                                        0  8.	Sensory	                                                1  9.	Language	                                        2  10.	Dysarthria	                                        0  11.	Extinction and Inattention  	        1  ______________________________________  TOTAL	                                                        11      mRS:  0 No symptoms at all  1 No significant disability despite symptoms; able to carry out all usual duties and activities without assistance  2 Slight disability; unable to carry out all previous activities, but able to look after own affairs  3 Moderate disability; requiring some help, but able to walk without assistance  4 Moderately severe disability; unable to walk without assistance and unable to attend to own bodily needs without assistance  5 Severe disability; bedridden, incontinent and requiring constant nursing care and attention  6 Dead    Last CTH:   < from: CT Head No Cont (08.28.20 @ 17:58) >  IMPRESSION:    No evidence for acute intracranial hemorrhage, midline shift, or mass effect.    Left frontotemporal lobe hypodensity is increased in size compared to previous day's study, consistent with evolution of ischemic infarct.    < end of copied text >      Last CTP: < from: CT Perfusion w/ Maps w/ IV Cont (08.27.20 @ 17:28) >  Impression:    Multiple occlusions in the superior and inferior division of the left MCA.    On perfusion imaging, there is an infarct in the left MCA territory measuring 52 cc.    Additionally, there is an ischemic penumbra measuring 120 cc in the left MCA territory.      < end of copied text >      Last MRI:    Last TCD:    Last EEG:    EVD: [ ] [place]: [ ]     ICP:     CPP:     Level(cm):     24hr(ml):     CSF:     W:     R:     C:     P:     G:     LA:    9. Cardiovascular:   Vital Signs Last 24 Hrs  T(C): 37.1 (27 Aug 2020 18:05), Max: 37.1 (27 Aug 2020 17:25)  T(F): 98.7 (27 Aug 2020 17:25), Max: 98.7 (27 Aug 2020 17:25)  HR: 68 (27 Aug 2020 18:05) (68 - 80)  BP: 143/83 (27 Aug 2020 18:05) (120/68 - 143/83)  BP(mean): --  RR: 18 (27 Aug 2020 18:05) (18 - 18)  SpO2: 100% (27 Aug 2020 18:05) (96% - 100%)     Last Echo:    Last EKG: < from: 12 Lead ECG (08.27.20 @ 17:25) >  Ventricular Rate 58 BPM    Atrial Rate 58 BPM    P-R Interval 146 ms    QRS Duration 114 ms    Q-T Interval 436 ms    QTC Calculation(Bezet) 428 ms    P Axis 75 degrees    R Axis 4 degrees    T Axis 51 degrees    Sinus bradycardia with Premature atrial complexes  Otherwise normal ECG    < end of copied text >      CVP   MAP/CPP/SBP target:   CO:      CI:       Enzymes/Trop:    10. Respiratory:   ABG:    VBG:    Chest Xray: clear        Peak Pressure/[place] Pressure:    11.GI:    Prophylaxis     Bowel mvt:     Abd distension:   LIVER FUNCTIONS - ( 27 Aug 2020 17:25 )  Alb: 4.0 g/dL / Pro: 5.8 g/dL / ALK PHOS: 58 U/L / ALT: 17 U/L / AST: 27 U/L / GGT: x             12.Renal/Fluids/Electrolytes:    08-27    139  |  106  |  22<H>  ----------------------------<  105<H>  4.0   |  24  |  1.1    Ca    8.7      27 Aug 2020 17:25    TPro  5.8<L>  /  Alb  4.0  /  TBili  0.3  /  DBili  x   /  AST  27  /  ALT  17  /  AlkPhos  58  08-27      I&O's Detail      13.ID:   TMax:   Vital Signs Last 24 Hrs  T(C): 37.1 (27 Aug 2020 18:05), Max: 37.1 (27 Aug 2020 17:25)  T(F): 98.7 (27 Aug 2020 17:25), Max: 98.7 (27 Aug 2020 17:25)  HR: 68 (27 Aug 2020 18:05) (68 - 80)  BP: 143/83 (27 Aug 2020 18:05) (120/68 - 143/83)  BP(mean): --  RR: 18 (27 Aug 2020 18:05) (18 - 18)  SpO2: 100% (27 Aug 2020 18:05) (96% - 100%)           Lines: Central[] Date inserted: Peripheral[]    14. Hematology:                         13.7   7.24  )-----------( 171      ( 27 Aug 2020 17:25 )             41.1      08-27    139  |  106  |  22<H>  ----------------------------<  105<H>  4.0   |  24  |  1.1    Ca    8.7      27 Aug 2020 17:25    TPro  5.8<L>  /  Alb  4.0  /  TBili  0.3  /  DBili  x   /  AST  27  /  ALT  17  /  AlkPhos  58  08-27     PT/INR - ( 27 Aug 2020 17:25 )   PT: 12.00 sec;   INR: 1.04 ratio         PTT - ( 27 Aug 2020 17:25 )  PTT:22.2 sec    DVT Prophylaxis Lovenox[ ] Heparin[ ] Venodynes[ ] SCD's[x]    15. Impression:    - L ICA, superior and inferior division MCA occlusions (POD 3 s/p DSA + mechanical thrombectomy with IV tPA + IA tPA 8 mg, IA Verapamil 10 mg; residual MCA branch occlusion)  - Acute stroke, L MCA territory    16. Suggestions:    - Q4 neuro checks  - Continue ASA  - Lipitor 80 mg HS  - MRI Brain w/o gadolinium  - Keep -170   - PT/OT/SLP eval and tx  - Continue IV hydration      17. Disposition: Consider transfer to Stroke Unit today.

## 2020-08-30 NOTE — CHART NOTE - NSCHARTNOTEFT_GEN_A_CORE
ICU Transfer Note    Transfer from:     Transfer to: (  ) Medicine    (  ) Telemetry     (  ) RCU       (  ) CEU    (  ) VENT                        (  ) Palliative    (  ) Stroke Unit    (  ) MICU    (  ) CCU    Signout given to:     ----------------------------------------------------------------------------------------------------------  HPI / ICU COURSE:    HPI:  68y/o M with no PMH presents with R sided weakness/aphasia starting 2hr prior to arrival. Was doing heavy garden work when he suddenly collapsed. Wife also reported that pt mentioned 8/24 he mentioned stumbling against car but that was it. Otherwise fully healthy, former , and exercises. When wife went to him he couldn't speak, staring, and wasn't moving R side.     In ED: T98.7, HR 80, /68, RR18, SpO2 100%.   Stroke code called, NIH initially 20.  CTH showed L temporal CVA, CTA multi-infarct of L MCA.  S/p tPA and neurointervention with tPA & re-cannalization of some branches but not all of L MCA via R groin. (27 Aug 2020 21:31)      Pressors during ICU course:  Antibiotics:  Lines:  Eleuterio:  --------------------------------------------------------------------------------------------------------  PMH/PSH:  PAST MEDICAL & SURGICAL HISTORY:  GERD (gastroesophageal reflux disease)  S/P tonsillectomy  H/O bilateral hip replacements  H/O skin graft: upper body  legs      -------------------------------------------------------------------------------------------------------  PHYSICAL EXAM:  General: NAD  HEENT: Atraumatic  Respiratory: CTAB  Cardiac: RRR  Abdomen: soft, non-tender, non-distended  Extremities: warm and well-perfused  Neuro: A+Ox4. No FND    Vital Signs Last 24 Hrs  T(C): 36.4 (30 Aug 2020 16:00), Max: 37.3 (30 Aug 2020 00:00)  T(F): 97.6 (30 Aug 2020 16:00), Max: 99.1 (30 Aug 2020 00:00)  HR: 54 (30 Aug 2020 16:00) (46 - 76)  BP: 151/79 (30 Aug 2020 16:00) (131/64 - 169/71)  BP(mean): 108 (30 Aug 2020 16:00) (70 - 149)  RR: 22 (30 Aug 2020 16:00) (15 - 38)  SpO2: 97% (30 Aug 2020 16:00) (86% - 100%)    I&O's Summary    29 Aug 2020 07:01  -  30 Aug 2020 07:00  --------------------------------------------------------  IN: 206.4 mL / OUT: 1335 mL / NET: -1128.6 mL    30 Aug 2020 07:01  -  30 Aug 2020 17:42  --------------------------------------------------------  IN: 200 mL / OUT: 861 mL / NET: -661 mL        --------------------------------------------------------------------------------------------------------  LABS:                               12.6   8.06  )-----------( 153      ( 30 Aug 2020 04:28 )             37.2       08-30    137  |  103  |  10  ----------------------------<  108<H>  4.0   |  24  |  0.9    Ca    8.3<L>      30 Aug 2020 04:28  Mg     1.7     08-30    TPro  5.5<L>  /  Alb  3.6  /  TBili  0.9  /  DBili  x   /  AST  26  /  ALT  14  /  AlkPhos  48  08-30                  Specimen Source:   Method Type:   Gram Stain:   Culture Results:   Bacteria:       -------------------------------------------------------------------------------------------------  RADIOLOGY:      ---------------------------------------------------------------------------------------------------  ASSESSMENT & PLAN:       FOR FOLLOW UP:  [ ]   [ ]   [ ] ICU Transfer Note    Transfer from: ICU    Transfer to: ( x ) Medicine    (  ) Telemetry     (  ) RCU       (  ) CEU    (  ) VENT                        (  ) Palliative    (  ) Stroke Unit    (  ) MICU    (  ) CCU    Signout given to:     ----------------------------------------------------------------------------------------------------------  HPI / ICU COURSE:    HPI:  70y/o M with no PMH presents with R sided weakness/aphasia starting 2hr prior to arrival. Was doing heavy garden work when he suddenly collapsed. Wife also reported that pt mentioned 8/24 he mentioned stumbling against car but that was it. Otherwise fully healthy, former , and exercises. When wife went to him he couldn't speak, staring, and wasn't moving R side.     In ED: T98.7, HR 80, /68, RR18, SpO2 100%.   Stroke code called, NIH initially 20.  CTH showed L temporal CVA, CTA multi-infarct of L MCA.  S/p tPA and neurointervention with tPA & re-cannalization of some branches but not all of L MCA via R groin. (27 Aug 2020 21:31)    Patient was monitored in the ICU post-tpa and thrombectomy, patient's RUE weakness improved, however, expressive aphasia was still present. MRI brain was performed and showed possible hemorrhagic mass, however, no obvious lesion was present in the same area on Head CT. Patient was downgraded to stroke unit.     Pressors during ICU course:  Antibiotics:  Lines:  Eleuterio:  --------------------------------------------------------------------------------------------------------  PMH/PSH:  PAST MEDICAL & SURGICAL HISTORY:  GERD (gastroesophageal reflux disease)  S/P tonsillectomy  H/O bilateral hip replacements  H/O skin graft: upper body  legs      -------------------------------------------------------------------------------------------------------  PHYSICAL EXAM:  General: NAD  HEENT: Atraumatic  Respiratory: CTAB  Cardiac: RRR  Abdomen: soft, non-tender, non-distended  Extremities: warm and well-perfused  Neuro: RUE 4/5, expressive aphasia    Vital Signs Last 24 Hrs  T(C): 36.4 (30 Aug 2020 16:00), Max: 37.3 (30 Aug 2020 00:00)  T(F): 97.6 (30 Aug 2020 16:00), Max: 99.1 (30 Aug 2020 00:00)  HR: 54 (30 Aug 2020 16:00) (46 - 76)  BP: 151/79 (30 Aug 2020 16:00) (131/64 - 169/71)  BP(mean): 108 (30 Aug 2020 16:00) (70 - 149)  RR: 22 (30 Aug 2020 16:00) (15 - 38)  SpO2: 97% (30 Aug 2020 16:00) (86% - 100%)    I&O's Summary    29 Aug 2020 07:01  -  30 Aug 2020 07:00  --------------------------------------------------------  IN: 206.4 mL / OUT: 1335 mL / NET: -1128.6 mL    30 Aug 2020 07:01  -  30 Aug 2020 17:42  --------------------------------------------------------  IN: 200 mL / OUT: 861 mL / NET: -661 mL        --------------------------------------------------------------------------------------------------------  LABS:                               12.6   8.06  )-----------( 153      ( 30 Aug 2020 04:28 )             37.2       08-30    137  |  103  |  10  ----------------------------<  108<H>  4.0   |  24  |  0.9    Ca    8.3<L>      30 Aug 2020 04:28  Mg     1.7     08-30    TPro  5.5<L>  /  Alb  3.6  /  TBili  0.9  /  DBili  x   /  AST  26  /  ALT  14  /  AlkPhos  48  08-30                  Specimen Source:   Method Type:   Gram Stain:   Culture Results:   Bacteria:       -------------------------------------------------------------------------------------------------  RADIOLOGY:      ---------------------------------------------------------------------------------------------------  ASSESSMENT & PLAN:     ASSESSMENT/PLAN    70y/o M with no PMH presents with large L MCA CVA with NIH initially of 20 s/p neurointervention recannalization.    #Left MCA stroke  - s/p tPA and neurointervention 8/27 with only partially successful recannalization   - neuro check q4h  - goal -170  - continue atorvastatin 80 mg  - continue ASA 81 mg  - Lipid Panel normal  - Follow up A1c  - 2D echo + bubble normal  - Can repeat CTH tomorrow and if appears to be more edema then would obtain MRI brain w/ROMIE  - Dysphagia 3, thin liquid as per speech eval  - PT/OT recs   - NeuroIR following    #MISC  - DVT ppx: SCDs  - GI ppx: protonix  - Diet: Dysphagia 3  - Activity: bedrest  - Dispo: stroke unit

## 2020-08-30 NOTE — PROGRESS NOTE ADULT - ASSESSMENT
IMPRESSION:    CVA SP TPA and Endovascular therapy S/P repeat head CT/ MRI/ ? hemorrhagic mass    PLAN:    CNS: No depressants  FU with Neuro and neuroIR. aspirin. on lipitor. -170 as / neuro    HEENT: Oral care.     PULMONARY:  HOB @ 45 degrees.  Aspiration precautions.     CARDIOVASCULAR: Echo: normal EF.    GI: GI prophylaxis.  advance diet.   Bowel regimen     RENAL:  Follow up lytes.  Correct as needed    INFECTIOUS DISEASE: Follow up cultures    HEMATOLOGICAL:  DVT prophylaxis.    ENDOCRINE:  Follow up FS.  Insulin protocol if needed.    MUSCULOSKELETAL:  Bed rest PT OT today,     Downgrade to stroke unit if ok by neuro

## 2020-08-31 LAB
ALBUMIN SERPL ELPH-MCNC: 3.8 G/DL — SIGNIFICANT CHANGE UP (ref 3.5–5.2)
ALP SERPL-CCNC: 56 U/L — SIGNIFICANT CHANGE UP (ref 30–115)
ALT FLD-CCNC: 16 U/L — SIGNIFICANT CHANGE UP (ref 0–41)
ANION GAP SERPL CALC-SCNC: 11 MMOL/L — SIGNIFICANT CHANGE UP (ref 7–14)
AST SERPL-CCNC: 28 U/L — SIGNIFICANT CHANGE UP (ref 0–41)
BASOPHILS # BLD AUTO: 0.03 K/UL — SIGNIFICANT CHANGE UP (ref 0–0.2)
BASOPHILS NFR BLD AUTO: 0.4 % — SIGNIFICANT CHANGE UP (ref 0–1)
BILIRUB SERPL-MCNC: 1 MG/DL — SIGNIFICANT CHANGE UP (ref 0.2–1.2)
BUN SERPL-MCNC: 12 MG/DL — SIGNIFICANT CHANGE UP (ref 10–20)
CALCIUM SERPL-MCNC: 8.9 MG/DL — SIGNIFICANT CHANGE UP (ref 8.5–10.1)
CHLORIDE SERPL-SCNC: 101 MMOL/L — SIGNIFICANT CHANGE UP (ref 98–110)
CO2 SERPL-SCNC: 24 MMOL/L — SIGNIFICANT CHANGE UP (ref 17–32)
CREAT SERPL-MCNC: 0.9 MG/DL — SIGNIFICANT CHANGE UP (ref 0.7–1.5)
EOSINOPHIL # BLD AUTO: 0.38 K/UL — SIGNIFICANT CHANGE UP (ref 0–0.7)
EOSINOPHIL NFR BLD AUTO: 5.1 % — SIGNIFICANT CHANGE UP (ref 0–8)
GLUCOSE SERPL-MCNC: 97 MG/DL — SIGNIFICANT CHANGE UP (ref 70–99)
HCT VFR BLD CALC: 39.7 % — LOW (ref 42–52)
HGB BLD-MCNC: 13.8 G/DL — LOW (ref 14–18)
IMM GRANULOCYTES NFR BLD AUTO: 0.3 % — SIGNIFICANT CHANGE UP (ref 0.1–0.3)
LYMPHOCYTES # BLD AUTO: 1.21 K/UL — SIGNIFICANT CHANGE UP (ref 1.2–3.4)
LYMPHOCYTES # BLD AUTO: 16.4 % — LOW (ref 20.5–51.1)
MAGNESIUM SERPL-MCNC: 1.8 MG/DL — SIGNIFICANT CHANGE UP (ref 1.8–2.4)
MCHC RBC-ENTMCNC: 32.9 PG — HIGH (ref 27–31)
MCHC RBC-ENTMCNC: 34.8 G/DL — SIGNIFICANT CHANGE UP (ref 32–37)
MCV RBC AUTO: 94.7 FL — HIGH (ref 80–94)
MONOCYTES # BLD AUTO: 0.78 K/UL — HIGH (ref 0.1–0.6)
MONOCYTES NFR BLD AUTO: 10.6 % — HIGH (ref 1.7–9.3)
NEUTROPHILS # BLD AUTO: 4.96 K/UL — SIGNIFICANT CHANGE UP (ref 1.4–6.5)
NEUTROPHILS NFR BLD AUTO: 67.2 % — SIGNIFICANT CHANGE UP (ref 42.2–75.2)
NRBC # BLD: 0 /100 WBCS — SIGNIFICANT CHANGE UP (ref 0–0)
PLATELET # BLD AUTO: 154 K/UL — SIGNIFICANT CHANGE UP (ref 130–400)
POTASSIUM SERPL-MCNC: 4 MMOL/L — SIGNIFICANT CHANGE UP (ref 3.5–5)
POTASSIUM SERPL-SCNC: 4 MMOL/L — SIGNIFICANT CHANGE UP (ref 3.5–5)
PROT SERPL-MCNC: 6 G/DL — SIGNIFICANT CHANGE UP (ref 6–8)
RBC # BLD: 4.19 M/UL — LOW (ref 4.7–6.1)
RBC # FLD: 11.5 % — SIGNIFICANT CHANGE UP (ref 11.5–14.5)
SODIUM SERPL-SCNC: 136 MMOL/L — SIGNIFICANT CHANGE UP (ref 135–146)
WBC # BLD: 7.38 K/UL — SIGNIFICANT CHANGE UP (ref 4.8–10.8)
WBC # FLD AUTO: 7.38 K/UL — SIGNIFICANT CHANGE UP (ref 4.8–10.8)

## 2020-08-31 PROCEDURE — 86077 PHYS BLOOD BANK SERV XMATCH: CPT

## 2020-08-31 PROCEDURE — 99233 SBSQ HOSP IP/OBS HIGH 50: CPT

## 2020-08-31 PROCEDURE — 99223 1ST HOSP IP/OBS HIGH 75: CPT | Mod: 57

## 2020-08-31 PROCEDURE — 70553 MRI BRAIN STEM W/O & W/DYE: CPT | Mod: 26

## 2020-08-31 RX ORDER — HEPARIN SODIUM 5000 [USP'U]/ML
5000 INJECTION INTRAVENOUS; SUBCUTANEOUS EVERY 12 HOURS
Refills: 0 | Status: DISCONTINUED | OUTPATIENT
Start: 2020-08-31 | End: 2020-08-31

## 2020-08-31 RX ADMIN — SENNA PLUS 2 TABLET(S): 8.6 TABLET ORAL at 21:42

## 2020-08-31 RX ADMIN — Medication 81 MILLIGRAM(S): at 12:04

## 2020-08-31 RX ADMIN — ATORVASTATIN CALCIUM 80 MILLIGRAM(S): 80 TABLET, FILM COATED ORAL at 21:42

## 2020-08-31 RX ADMIN — CHLORHEXIDINE GLUCONATE 1 APPLICATION(S): 213 SOLUTION TOPICAL at 05:01

## 2020-08-31 RX ADMIN — PANTOPRAZOLE SODIUM 40 MILLIGRAM(S): 20 TABLET, DELAYED RELEASE ORAL at 12:04

## 2020-08-31 NOTE — CONSULT NOTE ADULT - CONSULT REASON
CVA
CVA, s/p thrombectomy, aphasia
Evaluation for ILR
Code Stroke
JANET
Simple: Patient demonstrates quick and easy understanding

## 2020-08-31 NOTE — PROGRESS NOTE ADULT - ASSESSMENT
70y/o M with no PMH presents with large L MCA CVA with NIH initially of 20 s/p neurointervention recannalization.    #L MCA CVA  - s/p tPA and neurointervention 8/27 with only partially successful recannalization   - on aspirin  - Dysphagia 3, thin liquid as per speech eval  - PT/OT and physiartry consulted  - NeuroIR following  -plan for JANET tomorrow        #MISC  - DVT ppx: hold until cleared by neuro  - GI ppx: protonix IV  - Diet: NPO, IVF @ 75  - Activity: bedrest  - Dispo: ICU

## 2020-08-31 NOTE — CONSULT NOTE ADULT - SUBJECTIVE AND OBJECTIVE BOX
Patient is a 69y old  Male who presents with a chief complaint of CVA (31 Aug 2020 11:53)    HPI:  70y/o M with no PMH presents with R sided weakness/aphasia starting 2hr prior to arrival. Was doing heavy garden work when he suddenly collapsed. Wife also reported that pt mentioned 8/24 he mentioned stumbling against car but that was it. Otherwise fully healthy, former , and exercises. When wife went to him he couldn't speak, staring, and wasn't moving R side.     In ED: T98.7, HR 80, /68, RR18, SpO2 100%.     Stroke code called, NIH initially 20.  CTH showed L temporal CVA, CTA multi-infarct of L MCA.  S/p tPA and neurointervention with tPA & re-cannalization of some branches but not all of L MCA via R groin. (27 Aug 2020 21:31)      EP consulted for evaluation for ILR.     REVIEW OF SYSTEMS  A ten-point review of systems was otherwise negative except as noted.    PAST MEDICAL & SURGICAL HISTORY:  GERD (gastroesophageal reflux disease)  S/P tonsillectomy  H/O bilateral hip replacements  H/O skin graft: upper body  legs      Home Medications:      Allergies:  amoxicillin (Rash)      PREVIOUS DIAGNOSTIC TESTING:      ECHO  FINDINGS:    STRESS  FINDINGS:  < from: Transthoracic Echocardiogram (08.28.20 @ 14:25) >  Summary:   1. LV Ejection Fraction by Plaza's Method with a biplane EF of 67 %.   2. Normal left ventricular size and wall thicknesses, with normal systolic and diastolic function.   3. The mean global longitudinal peak strain by speckle tracking is -22.0% which is normal.   4. Mild mitral valve regurgitation.   5. Estimated pulmonary artery systolic pressure is 39.9 mmHg assuming a right atrial pressure of 8 mmHg, which is consistent with borderline pulmonary hypertension.   6. Color flow doppler and intravenous injection of agitated saline demonstrates the presence of an intact intra atrial septum.   7. LA volume Index is 25.5 ml/m² ml/m2.    PHYSICIAN INTERPRETATION:  Left Ventricle: Normal left ventricular size and wall thicknesses, with normal systolic and diastolic function. Spectral Doppler shows normal pattern of LV diastolic filling. Normal LV filling pressures. The mean global longitudinal peak strain by speckle tracking is -22.0% which is normal.      LV Wall Scoring:  All segments are normal.    Right Ventricle: Normal right ventricular size and function.  Left Atrium: Color flow doppler and intravenous injection of agitated saline demonstrates the presence of an intact intra atrial septum. LA volume Index is 25.5 ml/m² ml/m2.  Pericardium: There is no evidence of pericardial effusion.  Mitral Valve: Structurally normal mitral valve, with normal leaflet excursion. Mild mitral valve regurgitation is seen.  Tricuspid Valve: Structurally normal tricuspid valve, with normal leaflet excursion. Trivial tricuspid regurgitation is visualized. Estimated pulmonary artery systolic pressure is 39.9 mmHg assuming a right atrial pressure of 8 mmHg, which is consistent with borderline pulmonary hypertension.  Aortic Valve: Normal trileaflet aortic valve with normal opening. No evidence of aortic valve regurgitation is seen.  Pulmonic Valve: Structurally normal pulmonic valve, with normal leaflet excursion. No indication of pulmonic valve regurgitation.  Aorta: The aortic root and ascending aorta are structurally normal, with no evidence of dilitation.  Pulmonary Artery: The main pulmonary artery is normal in size.  Venous: The inferior vena cava was normal sized, with respiratory size variation less than 50%.  Shunts: Agitated saline contrast was given intravenously to evaluate for intracardiac shunting.There is no evidence of a patent foramen ovale. No ventricular septal defect is seen or detected. There is no evidence of any atrial septal defect.    < end of copied text >    FAMILY HISTORY:  No pertinent family history in first degree relatives      SOCIAL HISTORY:  Former smoker years prior as per wife. Retired fire-fighter. No Drug/EtOH dependence.    MEDICATIONS  (STANDING):  aspirin  chewable 81 milliGRAM(s) Oral daily  atorvastatin 80 milliGRAM(s) Oral at bedtime  chlorhexidine 4% Liquid 1 Application(s) Topical <User Schedule>  heparin   Injectable 5000 Unit(s) SubCutaneous every 12 hours  pantoprazole   Suspension 40 milliGRAM(s) Oral daily  polyethylene glycol 3350 17 Gram(s) Oral daily  senna 2 Tablet(s) Oral at bedtime    MEDICATIONS  (PRN):      Vital Signs Last 24 Hrs  T(C): 36.2 (31 Aug 2020 05:22), Max: 36.8 (31 Aug 2020 01:34)  T(F): 97.2 (31 Aug 2020 05:22), Max: 98.2 (31 Aug 2020 01:34)  HR: 67 (31 Aug 2020 05:22) (52 - 67)  BP: 150/75 (31 Aug 2020 05:22) (121/71 - 151/79)  BP(mean): 100 (30 Aug 2020 22:00) (92 - 108)  RR: 20 (31 Aug 2020 05:22) (15 - 28)  SpO2: 96% (31 Aug 2020 01:34) (94% - 98%)    PHYSICAL EXAM:    GENERAL: In no apparent distress, well nourished, and hydrated.  HEART: Regular rate and rhythm; No murmurs, rubs, or gallops.  PULMONARY: Clear to auscultation and perfusion.  No rales, wheezing, or rhonchi bilaterally.  ABDOMEN: Soft, Nontender, Nondistended; Bowel sounds present  EXTREMITIES:  2+ Peripheral Pulses, No clubbing, cyanosis, or edema  NEUROLOGICAL: AO x3, DELUNA, speech clear      INTERPRETATION OF TELEMETRY: SB 50 BPM    ECG: < from: 12 Lead ECG (08.27.20 @ 17:25) >  Ventricular Rate 58 BPM    Atrial Rate 58 BPM    P-R Interval 146 ms    QRS Duration 114 ms    Q-T Interval 436 ms    QTC Calculation(Bezet) 428 ms    P Axis 75 degrees    R Axis 4 degrees    T Axis 51 degrees    Diagnosis Line Sinus bradycardia with Premature atrial complexes  Otherwise normal ECG    Confirmed by GLEN MORA MD ([id number]) on 8/27/2020 10:25:07 PM    < end of copied text >      I&O's Detail    30 Aug 2020 07:01  -  31 Aug 2020 07:00  --------------------------------------------------------  IN:    Oral Fluid: 320 mL  Total IN: 320 mL    OUT:    Indwelling Catheter - Urethral: 860 mL    Stool: 1 mL    Voided: 700 mL  Total OUT: 1561 mL    Total NET: -1241 mL    31 Aug 2020 07:01  -  31 Aug 2020 12:04  --------------------------------------------------------  IN:  Total IN: 0 mL    OUT:    Voided: 150 mL  Total OUT: 150 mL    Total NET: -150 mL      LABS:                        13.8   7.38  )-----------( 154      ( 31 Aug 2020 05:37 )             39.7     08-31    136  |  101  |  12  ----------------------------<  97  4.0   |  24  |  0.9    Ca    8.9      31 Aug 2020 05:37  Mg     1.8     08-31    TPro  6.0  /  Alb  3.8  /  TBili  1.0  /  DBili  x   /  AST  28  /  ALT  16  /  AlkPhos  56  08-31    I&O's Detail    30 Aug 2020 07:01  -  31 Aug 2020 07:00  --------------------------------------------------------  IN:    Oral Fluid: 320 mL  Total IN: 320 mL    OUT:    Indwelling Catheter - Urethral: 860 mL    Stool: 1 mL    Voided: 700 mL  Total OUT: 1561 mL    Total NET: -1241 mL    31 Aug 2020 07:01  -  31 Aug 2020 12:04  --------------------------------------------------------  IN:  Total IN: 0 mL    OUT:    Voided: 150 mL  Total OUT: 150 mL    Total NET: -150 mL    RADIOLOGY & ADDITIONAL STUDIES:

## 2020-08-31 NOTE — CONSULT NOTE ADULT - ATTENDING COMMENTS
late entry. pt seen on 8/31.     Based on our literature, we recommend an implantable loop recorder as the 30 day event monitor has been shown to not be as effective for the evaluation of occult AFib for cryptogenic strokes. We discussed the risks/benefits/alternatives, nature of procedure, and follow up care after device is implanted. We also discussed remote monitoring in details. We discussed the risks of bleeding and infection. I answered all questions in detail. Patient expressed understanding of the discussion and would like to proceed with loop recorder implant.

## 2020-08-31 NOTE — PROGRESS NOTE ADULT - ASSESSMENT
A 69 years old man with no significant pmhx presents with left M1 syndrome, initial NIHSS 20 and s/p IV-tPA. He is s/p emergent IA mechanical thrombectomy of left ICA/MCA on 8/27th. POD #4 pt downgraded to Stroke Unit, remains neurologically stable, but continues to have aphasia.     SUGGESTIONS:  - Neuro management per Neurovascular team  - Medical management per primary team   - Follow up at [facility] at [address] [place] [address] [address]. May call Tel: [phone] to change appointment time    Case discussed and patient seen with attending physician   Toshia Rizo NP  [phone]

## 2020-08-31 NOTE — PROGRESS NOTE ADULT - SUBJECTIVE AND OBJECTIVE BOX
NEUROENDOVASCULAR PROGRESS NOTE    Patient is a 69y old  Male who presents with a chief complaint of CVA (28 Aug 2020 08:25)    Interval history: A 69 years old man with no significant pmhx presents with LMCA syndrome, initial NIHSS 20 when Stroke Code called. CTH reports of hypodensity of left temporal lobe and no intracranial hemorrhage. He is s/p IV-tPA. CTP/CTA reports of multifocal occlusion of the left M2/M3 branches, large area of ischemic within the left MCA territory measuring 172 cc and core infarct of 52 cc (mismatch volume/penumbra of 120 cc), he is s/p emergent IA mechanical thrombectomy of left ICA/MCA of superior/inferior division. No overnight events, significant neurological improvement noted and no right groin hematoma.     8/31: POD #4, pt seen in 3E Stroke Unit, reading children's book, continues to have aphasia. Otherwise remains neurologically stable. Right groin continues to have ecchymosis, no hematoma noted. Denies tenderness or pain on palpation.     REVIEW OF SYSTEMS  Constitutional: No fever  Eyes: No eye pain, visual disturbances, or discharge  ENMT:  No difficulty hearing, tinnitus, vertigo; No sinus or throat pain  Neck: No pain or stiffness  Respiratory: No cough, wheezing, chills or hemoptysis  Cardiovascular: No chest pain, palpitations, shortness of breath, dizziness or leg swelling  Gastrointestinal: No abdominal pain. No nausea, vomiting or hematemesis; No diarrhea or constipation  Genitourinary: No dysuria, frequency, hematuria or incontinence  Neurological: As per HPI  Skin: No itching, burning, rashes or lesions   Endocrine: No heat or cold intolerance; No hair loss  Musculoskeletal: No joint pain or swelling; No muscle, back or extremity pain  Psychiatric: No depression, anxiety, mood swings or difficulty sleeping  Heme/Lymph: No easy bruising or bleeding gums    Vital Signs Last 24 Hrs  T(C): 36.2 (08-31-20 @ 05:22), Max: 36.8 (08-31-20 @ 01:34)  T(F): 97.2 (08-31-20 @ 05:22), Max: 98.2 (08-31-20 @ 01:34)  HR: 67 (08-31-20 @ 05:22) (48 - 70)  BP: 150/75 (08-31-20 @ 05:22) (121/71 - 151/79)  BP(mean): 100 (08-30-20 @ 22:00) (92 - 115)  RR: 20 (08-31-20 @ 05:22) (15 - 35)  SpO2: 96% (08-31-20 @ 01:34) (86% - 98%)    Neurologic Exam:  Mental status: Awake, alert and oriented x 3. Attention and concentration intact.  Language: Follows all commands. Naming, repetition, fluency, and comprehension intact. Resolved dysarthria but continues to have aphasia.  Cranial nerves: B/l pupils equally round and reactive to light, visual fields full, no nystagmus, extraocular muscles intact, V1 through V3 intact bilaterally and symmetric, face symmetric, hearing intact to finger rub, palate elevation symmetric, tongue was midline, sternocleidomastoid/shoulder shrug strength bilaterally 5/5.    Motor: No drifting in all extremities. Normal bulk and tone, strength 5/5 in bilateral upper and lower extremities.   strength 5/5.  Sensation: Intact to light touch, proprioception, and pinprick.  No neglect.   Coordination: No dysmetria on finger-to-nose and heel-to-shin.   Gait: Deferred    Ext: no c/c/e, 2+ pulses throughout, no right groin hematoma, + ecchymosis    NIH STROKE SCALE  Item	                                                        Score  1 a.	Level of Consciousness	               	0  1 b. LOC Questions	                                    0  1 c.	LOC Commands	                               	0  2.	Best Gaze	                                    0  3.	Visual	                                                0  4.	Facial Palsy	                                    0  5 a.	Motor Arm - Left	                        0  5 b.	Motor Arm - Right	                        0  6 a.	Motor Leg - Left	                        0  6 b.	Motor Leg - Right	                        0  7.	Limb Ataxia	                                    0  8.	Sensory	                                                0  9.	Language	                                    1  10.	Dysarthria	                                    0  11.	Extinction and Inattention  	            0  __________________________________________________________________________  TOTAL	                                                            1    NIHSS Initial: 20             NIHSS Yesterday: N/A       NIHSS Today: 1    mRS: baseline 0-> 1 No significant disability despite symptoms; able to carry out all usual duties and activities without assistance    MEDICATIONS  (STANDING):  aspirin  chewable 81 milliGRAM(s) Oral daily  atorvastatin 80 milliGRAM(s) Oral at bedtime  chlorhexidine 4% Liquid 1 Application(s) Topical <User Schedule>  pantoprazole   Suspension 40 milliGRAM(s) Oral daily  polyethylene glycol 3350 17 Gram(s) Oral daily  senna 2 Tablet(s) Oral at bedtime    LABS:                            13.8   7.38  )-----------( 154      ( 31 Aug 2020 05:37 )             39.7       08-31    136  |  101  |  12  ----------------------------<  97  4.0   |  24  |  0.9    Ca    8.9      31 Aug 2020 05:37  Mg     1.8     08-31    TPro  6.0  /  Alb  3.8  /  TBili  1.0  /  DBili  x   /  AST  28  /  ALT  16  /  AlkPhos  56  08-31    RADIOLOGY:  < from: CT Head No Cont (08.28.20 @ 17:58) >  IMPRESSION:  No evidence for acute intracranial hemorrhage, midline shift, or mass effect.  Left frontotemporal lobe hypodensity is increased in size compared to previous day's study, consistent with evolution of ischemic infarct.    < from: CT Brain Stroke Protocol (08.27.20 @ 16:53) >  IMPRESSION:  Hypodensity involving the left temporal lobe which may represent an area of evolving acute ischemia. No evidence of hemorrhage.  These findings were discussed with Dr. CAITLIN SCHILLING [phone] at 8/27/2020 5:01 PM by Dr. Pulido with read back confirmation.    < from: CT Perfusion w/ Maps w/ IV Cont (08.27.20 @ 17:28) >  Findings:    CTA neck:  The aortic arch, origin of the great vessels and subclavian arteries are unremarkable.  The bilateral common carotid arteries and internal carotid arteries are unremarkable without significant stenosis seen.  The vertebral arteries are codominant. No significant vertebral artery stenosis is noted.    CTA head:  The distal segments of the internal carotid arteries are unremarkable. There are multifocal occlusions of the left MCA branches: Inferior M2 branch on series 4 image 482, superior branches on series 703 images 45-50 and series 703 image 53.  The right ADEOLA and MCA are patent. The left ADEOLA is patent.  The distal vertebral arteries, basilar artery and posterior cerebral arteries are patent.    CT perfusion:  The cerebral blood volume and time to peak images demonstrate increased time to peak in the left MCA territory measuring 172 cc. There is a decrease in total cerebral blood flow volume of 52 cc in the left MCA territory consistent with acute infarct. There is a mismatch volume of 120 cc indicating ischemic penumbra.    Other:  Multilevel degenerative changes of the cervical spine. Trace retrolisthesis of C2 on C3. Mucosal thickening of bilateral ethmoid sinuses.    IMPRESSION: Multifocal occlusions of the left middle cerebral artery M2 and M3 branches. Corresponding large area of ischemia within the left MCA territory measuring 172 cc, with core infarct of 52 cc (mismatch volume/penumbra of 120 cc)

## 2020-08-31 NOTE — OCCUPATIONAL THERAPY INITIAL EVALUATION ADULT - PHYSICAL ASSIST/NONPHYSICAL ASSIST: GROOMING, OT EVAL
set-up required/seated in bedside chair to perform oral hygiene care/verbal cues/supervision/nonverbal cues (demo/gestures)

## 2020-08-31 NOTE — PROGRESS NOTE ADULT - ASSESSMENT
13. Impression:  A 69 years old man with no significant pmhx presents with left M1 syndrome, initial NIHSS 20 and s/p IV-tPA. He is s/p emergent IA mechanical thrombectomy of left ICA/MCA on 8/27th. POD #4 pt downgraded to Stroke Unit, remains neurologically stable, but continues to have aphasia.   14. Probable cause/s of Stroke:      15. Suggestions:   Routine stroke workup including:  Continue ASA.  Continue Statin.  Maintain hydration.   PT OT Rehab Speech therapy.  Needs JANET   Recommend ILR for prolonged cardiac monitoring.    16. Disposition: Stroke Unit    Jan Anthony NP  [phone]

## 2020-08-31 NOTE — OCCUPATIONAL THERAPY INITIAL EVALUATION ADULT - PHYSICAL ASSIST/NONPHYSICAL ASSIST:DRESS UPPER BODY, OT EVAL
nonverbal cues (demo/gestures)/standing EOB to don wraparound garment, requiring (a) threading RUE/1 person assist/verbal cues/set-up required

## 2020-08-31 NOTE — OCCUPATIONAL THERAPY INITIAL EVALUATION ADULT - RANGE OF MOTION EXAMINATION, UPPER EXTREMITY
RUE AROM shoulder noted 3/4 range, PROM full range./bilateral UE Passive ROM was WFL  (within functional limits)/bilateral UE Active ROM was WFL  (within functional limits)

## 2020-08-31 NOTE — PROGRESS NOTE ADULT - SUBJECTIVE AND OBJECTIVE BOX
Stroke Progress Note:    KAIILNYDIA    1. Chief Complaint: Aphasia    HPI:  68y/o M with no PMH presents with R sided weakness/aphasia starting 2hr prior to arrival. Was doing heavy garden work when he suddenly collapsed. Wife also reported that pt mentioned 8/24 he mentioned stumbling against car but that was it. Otherwise fully healthy, former , and exercises. When wife went to him he couldn't speak, staring, and wasn't moving R side.   Stroke code called, NIH initially 20. CTH showed L temporal CVA, CTA multi-infarct of L MCA. S/p tPA and neurointervention with tPA & re-cannalization of some branches but not all of L MCA via R groin.     2. Relevant PMH:   Prior ischemic stroke/TIA[ ], Afib [ ], CAD [ ], HTN [ ], DLD [ ], DM [ ], PVD [ ], Obesity [ ],   Sedentary lifestyle [ ], CHF [ ], ENMANUEL [ ], Cancer Hx [ ].    3. Social History: Smoking [ ], Drug Use [ ], Alcohol Use [ ], Other [ ]    4. Possible Location of Stroke:  see below  5. Relevant Brain Tissue Imaging:    < from: MR Head No Cont (08.29.20 @ 12:41) >    IMPRESSION:    Redemonstration of acute infarcts involving the left frontal, temporal, and parietal lobes, and the posterior left insula, predominantly in gyriform pattern with moderate mass effect.    Superimposed abnormal mass effect with susceptibility signal in the infarcted area of the left parietal lobe suspicious for underlying hemorrhagic mass. No malika acute intracranial hemorrhage. Further characterization with contrast-enhanced MRI is recommended.    < end of copied text >  6. Relevant Cerebrovascular Imaging:         CT Perfusion w/ Maps w/ IV Cont:   EXAM:  CT PERFUSION W MAPS IC          IMPRESSION:    Multifocal occlusions of the left middle cerebral artery M2 and M3 branches. Corresponding large area of ischemia within the left MCA territory measuring 172 cc, with core infarct of 52 cc (mismatch volume/penumbra of 120 cc)    7. Relevant blood tests: Direct LDL: 86 mg/dL [4 - 129] (08-27-20 @ 21:54)       8. Relevant cardiac rhythm monitoring:  no reported events  9. Relevant Cardiac Structure: (TTE/JANET +/-):[ ]No intracardiac thrombus/[ ] no vegetation/[ ]no akynesia/EF:  < from: Transthoracic Echocardiogram (08.28.20 @ 14:25) >    Summary:   1. LV Ejection Fraction by Plaza's Method with a biplane EF of 67 %.   2. Normal left ventricular size and wall thicknesses, with normal systolic and diastolic function.   3. The mean global longitudinal peak strain by speckle tracking is -22.0% which is normal.   4. Mild mitral valve regurgitation.   5. Estimated pulmonary artery systolic pressure is 39.9 mmHg assuming a right atrial pressure of 8 mmHg, which is consistent with borderline pulmonary hypertension.   6. Color flow doppler and intravenous injection of agitated saline demonstrates the presence of an intact intra atrial septum.   7. LA volume Index is 25.5 ml/m² ml/m2.    Home Medications:      MEDICATIONS  (STANDING):  aspirin  chewable 81 milliGRAM(s) Oral daily  atorvastatin 80 milliGRAM(s) Oral at bedtime  chlorhexidine 4% Liquid 1 Application(s) Topical <User Schedule>  pantoprazole   Suspension 40 milliGRAM(s) Oral daily  polyethylene glycol 3350 17 Gram(s) Oral daily  senna 2 Tablet(s) Oral at bedtime      10. PT/OT/Speech/Rehab/S&Sw/ Cognitive eval results and recommendations:  pending  11. Exam:    Vital Signs Last 24 Hrs  T(C): 36.2 (31 Aug 2020 05:22), Max: 36.8 (31 Aug 2020 01:34)  T(F): 97.2 (31 Aug 2020 05:22), Max: 98.2 (31 Aug 2020 01:34)  HR: 67 (31 Aug 2020 05:22) (48 - 67)  BP: 150/75 (31 Aug 2020 05:22) (121/71 - 151/79)  BP(mean): 100 (30 Aug 2020 22:00) (92 - 108)  RR: 20 (31 Aug 2020 05:22) (15 - 32)  SpO2: 96% (31 Aug 2020 01:34) (94% - 98%)    12.   NIH STROKE SCALE  Item	                                                        Score  1 a.	Level of Consciousness	               	0  1 b. LOC Questions	                                0  1 c.	LOC Commands	                               	0  2.	Best Gaze	                                        0  3.	Visual	                                                0  4.	Facial Palsy	                                        0  5 a.	Motor Arm - Left	                                0  5 b.	Motor Arm - Right	                        0  6 a.	Motor Leg - Left	                                0  6 b.	Motor Leg - Right	                                0  7.	Limb Ataxia	                                        0  8.	Sensory	                                                0  9.	Language	                                        1  10.	Dysarthria	                                        0  11.	Extinction and Inattention  	        0  ______________________________________  TOTAL	                                                     1    Total NIHSS on admission:  20         NIHSS today: 1    mRS:1  0 No symptoms at all  1 No significant disability despite symptoms; able to carry out all usual duties and activities without assistance  2 Slight disability; unable to carry out all previous activities, but able to look after own affairs  3 Moderate disability; requiring some help, but able to walk without assistance  4 Moderately severe disability; unable to walk without assistance and unable to attend to own bodily needs without assistance  5 Severe disability; bedridden, incontinent and requiring constant nursing care and attention  6 Dead

## 2020-08-31 NOTE — CONSULT NOTE ADULT - ASSESSMENT
68 y/o M with no known PMH, s/p neurointerventional recannulization for large L MCA CVA.  Evaluated for JANET.      #L MCA CVA  -s/p tPA and neurointervention 8/27  -JANET to evaluate atrial appendage tomorrow  -NPO after midnight  -Continue atorvastatin 80 mg. qday  -ambulation as tolerated 70 y/o M with no known PMH, s/p neurointerventional recannulization for large L MCA CVA.  Evaluated for JANET.      #L MCA CVA  -s/p tPA and neurointervention 8/27  - Will schedule JANET tomorrow  -NPO after midnight; please get COVID test TODAY  -Continue atorvastatin 80 mg qday

## 2020-08-31 NOTE — CONSULT NOTE ADULT - SUBJECTIVE AND OBJECTIVE BOX
HPI:  70y/o M with no known PMH presented with R sided weakness/aphasia starting 4 days ago. Found to have large left MCA infarct. CTH showed L temporal CVA, CTA multi-infarct of L MCA. S/p tPA and neurointervention with tPA & re-cannalization of some branches but not all of L MCA via R groin. (27 Aug 2020 21:31) Seen for evaluation prior to JANET.      PAST MEDICAL & SURGICAL HISTORY  GERD (gastroesophageal reflux disease)  S/P tonsillectomy  H/O bilateral hip replacements  H/O skin graft: upper body  legs      FAMILY HISTORY:  FAMILY HISTORY:  No pertinent family history in first degree relatives      SOCIAL HISTORY:  Former smoker- reported by wife  Social ETOH      ALLERGIES:  amoxicillin (Rash)      MEDICATIONS:  MEDICATIONS  (STANDING):  atorvastatin 80 milliGRAM(s) Oral at bedtime  chlorhexidine 4% Liquid 1 Application(s) Topical <User Schedule>  pantoprazole   Suspension 40 milliGRAM(s) Oral daily  polyethylene glycol 3350 17 Gram(s) Oral daily  senna 2 Tablet(s) Oral at bedtime        VITALS:   T(F): 97.2 (08-31 @ 05:22), Max: 99.1 (08-30 @ 00:00)  HR: 67 (08-31 @ 05:22) (44 - 76)  BP: 150/75 (08-31 @ 05:22) (121/71 - 169/71)  BP(mean): 100 (08-30 @ 22:00) (70 - 149)  RR: 20 (08-31 @ 05:22) (14 - 38)  SpO2: 96% (08-31 @ 01:34) (86% - 100%)    I&O's Summary    30 Aug 2020 07:01  -  31 Aug 2020 07:00  --------------------------------------------------------  IN: 320 mL / OUT: 1561 mL / NET: -1241 mL    31 Aug 2020 07:01  -  31 Aug 2020 16:02  --------------------------------------------------------  IN: 0 mL / OUT: 150 mL / NET: -150 mL        REVIEW OF SYSTEMS:  CONSTITUTIONAL: No weakness, fevers or chills  EYES: No visual changes  ENT: No vertigo or throat pain   NECK: No pain or stiffness  RESPIRATORY: No cough, wheezing, hemoptysis; No shortness of breath  CARDIOVASCULAR: No chest pain or palpitations  GASTROINTESTINAL: No abdominal or epigastric pain. No nausea, vomiting, or hematemesis; No diarrhea or constipation. No melena or hematochezia.  GENITOURINARY: No dysuria, frequency or hematuria  NEUROLOGICAL: No numbness or weakness  SKIN: No itching, no rashes      PHYSICAL EXAM:  NEURO: patient is awake , alert and oriented, expressive aphasia  GEN: Not in acute distress  NECK: no thyroid enlargement, no JVD  LUNGS: Clear to auscultation bilaterally   CARDIOVASCULAR: S1/S2 present, RRR , no murmurs or rubs, no carotid bruits,  + PP bilaterally  ABD: Soft, non-tender, non-distended, +BS  EXT: No JADEN  SKIN: Intact    LABS:                        13.8   7.38  )-----------( 154      ( 31 Aug 2020 05:37 )             39.7     08-31    136  |  101  |  12  ----------------------------<  97  4.0   |  24  |  0.9    Ca    8.9      31 Aug 2020 05:37  Mg     1.8     08-31    TPro  6.0  /  Alb  3.8  /  TBili  1.0  /  DBili  x   /  AST  28  /  ALT  16  /  AlkPhos  56  08-31              Troponin trend: <.01 8/27/20 @17:25      08-27 Chol 179 LDL 86 HDL 77 Trig 75      RADIOLOGY:  -CXR: No radiographic evidence of acute cardiopulmonary disease. 8/30/20  -TTE: Left Ventricle:  8/28/20: Normal left ventricular size and wall thicknesses, with normal systolic and diastolic function. Spectral Doppler shows normal pattern of LV diastolic filling. Normal LV filling pressures. The mean global longitudinal peak strain by speckle tracking is -22.0% which is normal.  Right Ventricle: Normal right ventricular size and function.  Left Atrium: Color flow doppler and intravenous injection of agitated saline demonstrates the presence of an intact intra atrial septum. LA volume Index is 25.5 ml/m² ml/m2.  Pericardium: There is no evidence of pericardial effusion.  Mitral Valve: Structurally normal mitral valve, with normal leaflet excursion. Mild mitral valve regurgitation is seen.  Tricuspid Valve: Structurally normal tricuspid valve, with normal leaflet excursion. Trivial tricuspid regurgitation is visualized. Estimated pulmonary artery systolic pressure is 39.9 mmHg assuming a right atrial pressure of 8 mmHg, which is consistent with borderline pulmonary hypertension.  Aortic Valve: Normal trileaflet aortic valve with normal opening. No evidence of aortic valve regurgitation is seen.  Pulmonic Valve: Structurally normal pulmonic valve, with normal leaflet excursion. No indication of pulmonic valve regurgitation.  Aorta: The aortic root and ascending aorta are structurally normal, with no evidence of dilitation.  Pulmonary Artery: The main pulmonary artery is normal in size.  Venous: The inferior vena cava was normal sized, with respiratory size variation less than 50%.  Shunts: Agitated saline contrast was given intravenously to evaluate for intracardiac shunting. There is no evidence of a patent foramen ovale. No ventricular septal defect is seen or detected. There is no evidence of any atrial septal defect.    ECG: Ventricular Rate 58 BPM  Atrial Rate 58 BPM  P-R Interval 146 ms  QRS Duration 114 ms  Q-T Interval 436 ms  QTC Calculation(Bezet) 428 ms  P Axis 75 degrees  R Axis 4 degrees  T Axis 51 degrees  Diagnosis Line Sinus bradycardia with Premature atrial complexes  Otherwise normal ECG      TELEMETRY EVENTS:  No acute events HPI:  68y/o M with no known PMH presented with R sided weakness/aphasia starting 4 days ago. Found to have large left MCA infarct. CTH showed L temporal CVA, CTA multi-infarct of L MCA. S/p tPA and neurointervention with tPA & re-cannalization of some branches but not all of L MCA via R groin. (27 Aug 2020 21:31) Seen for evaluation prior to JANET.      PAST MEDICAL & SURGICAL HISTORY  GERD (gastroesophageal reflux disease)  S/P tonsillectomy  H/O bilateral hip replacements  H/O skin graft: upper body  legs      No pertinent family history of premature CAD in first degree relatives    SOCIAL HISTORY:  Former smoker- reported by wife  Social ETOH      ALLERGIES:  amoxicillin (Rash)      MEDICATIONS:  MEDICATIONS  (STANDING):  atorvastatin 80 milliGRAM(s) Oral at bedtime  chlorhexidine 4% Liquid 1 Application(s) Topical <User Schedule>  pantoprazole   Suspension 40 milliGRAM(s) Oral daily  polyethylene glycol 3350 17 Gram(s) Oral daily  senna 2 Tablet(s) Oral at bedtime      VITALS:   T(F): 97.2 (08-31 @ 05:22), Max: 99.1 (08-30 @ 00:00)  HR: 67 (08-31 @ 05:22) (44 - 76)  BP: 150/75 (08-31 @ 05:22) (121/71 - 169/71)  BP(mean): 100 (08-30 @ 22:00) (70 - 149)  RR: 20 (08-31 @ 05:22) (14 - 38)  SpO2: 96% (08-31 @ 01:34) (86% - 100%)    I&O's Summary    30 Aug 2020 07:01  -  31 Aug 2020 07:00  --------------------------------------------------------  IN: 320 mL / OUT: 1561 mL / NET: -1241 mL    31 Aug 2020 07:01  -  31 Aug 2020 16:02  --------------------------------------------------------  IN: 0 mL / OUT: 150 mL / NET: -150 mL        REVIEW OF SYSTEMS:  CONSTITUTIONAL: No weakness, fevers or chills  EYES: No visual changes  ENT: No vertigo or throat pain   NECK: No pain or stiffness  RESPIRATORY: No cough, wheezing, hemoptysis; No shortness of breath  CARDIOVASCULAR: No chest pain or palpitations  GASTROINTESTINAL: No abdominal or epigastric pain. No nausea, vomiting, or hematemesis; No diarrhea or constipation. No melena or hematochezia.  GENITOURINARY: No dysuria, frequency or hematuria  NEUROLOGICAL: See HPI  SKIN: No itching, no rashes      PHYSICAL EXAM:  NEURO: patient is awake , alert and oriented, expressive aphasia  GEN: Not in acute distress  NECK: no thyroid enlargement, no JVD  LUNGS: Clear to auscultation bilaterally   CARDIOVASCULAR: S1/S2 present, RRR , no murmurs or rubs, no carotid bruits,  + PP bilaterally  ABD: Soft, non-tender, non-distended, +BS  EXT: No JADEN  SKIN: Intact      LABS:                        13.8   7.38  )-----------( 154      ( 31 Aug 2020 05:37 )             39.7     08-31    136  |  101  |  12  ----------------------------<  97  4.0   |  24  |  0.9    Ca    8.9      31 Aug 2020 05:37  Mg     1.8     08-31    TPro  6.0  /  Alb  3.8  /  TBili  1.0  /  DBili  x   /  AST  28  /  ALT  16  /  AlkPhos  56  08-31        Troponin trend: <.01 8/27/20 @17:25      08-27 Chol 179 LDL 86 HDL 77 Trig 75      RADIOLOGY:  -CXR: No radiographic evidence of acute cardiopulmonary disease. 8/30/20        < from: Transthoracic Echocardiogram (08.28.20 @ 14:25) >  Summary:   1. LV Ejection Fraction by Plaza's Method with a biplane EF of 67 %.   2. Normal left ventricular size and wall thicknesses, with normal systolic and diastolic function.   3. The mean global longitudinal peak strain by speckle tracking is -22.0% which is normal.   4. Mild mitral valve regurgitation.   5. Estimated pulmonary artery systolic pressure is 39.9 mmHg assuming a right atrial pressure of 8 mmHg, which is consistent with borderline pulmonary hypertension.   6. Color flow doppler and intravenous injection of agitated saline demonstrates the presence of an intact intra atrial septum.   7. LA volume Index is 25.5 ml/m² ml/m2.    < end of copied text >        < from: 12 Lead ECG (08.27.20 @ 17:25) >  Diagnosis Line Normal sinus rhythm  Normal ECG    < end of copied text >      < from: 12 Lead ECG (08.27.20 @ 17:25) >  Diagnosis Line Sinus bradycardia with Premature atrial complexes  Otherwise normal ECG    < end of copied text >

## 2020-08-31 NOTE — OCCUPATIONAL THERAPY INITIAL EVALUATION ADULT - GENERAL OBSERVATIONS, REHAB EVAL
Pt currently on bedrest. Updated activity orders are required to cont with OT evaluation. OT to cont when appropriate.
pt received semifowler in bed in NAD +monitoring +IV lock. pt agreeable to OT IE. pt left seated in bedside chair in NAD +IV lock +call tavares within reach. RN Mary foster.

## 2020-08-31 NOTE — OCCUPATIONAL THERAPY INITIAL EVALUATION ADULT - PHYSICAL ASSIST/NONPHYSICAL ASSIST:DRESS LOWER BODY, OT EVAL
seated EOB to don b/l socks/nonverbal cues (demo/gestures)/verbal cues/1 person assist/set-up required

## 2020-08-31 NOTE — PROGRESS NOTE ADULT - ATTENDING COMMENTS
Patient seen and examined and agree with above except as noted.  Patients history, notes, labs, imaging and vitals reviewed personally.  Patient doing well still has mixed expressive > receptive aphasia and some weakness in RUE mostly proximally.  NO events overnight    Plan as above
Patient seen and examined and agree with above except as noted.  Patients history, notes, labs, imaging, vitals and meds reviewed personally.  Patient doing well and MRI brain done overnight.  Possible hemorrhagic mass read on MRI however reviewing initial CTH no obvious lesion in this region.  More likely hemorrhage in the region of ischemia    Plan   1. Downgrade to stroke unit  2. Can repeat CTH tomorrow and if appears to be more edema then would obtain MRI brain w/ROMIE  3. Continue current medications  4. DVT prophylaxsis  5. Speech/ PT/OT
69 right-handed healthy man with acute onset of left M1 syndrome, NIHSS of 20, s/p IV t-PA and IA Thrombectomy has NIHSS of 2 (dysarthria 1, word finding 1) this morning.   Suggestions:  24 hrs post IV t-PA CTH please, and discuss with NCC team who is managing the patient in this regard.  -Neuro management per NCC Team  -Medical management per MICU Team
Patient seen and examined independently earlier today. Case discussed with housestaff, nursing, case mgmt, neuro Dr. Gonzalez, spouse. I agree with most of the resident's note, physical exam, and plan except as below. Patient feels better. No complaints. Denies CP, SOB, AP. Remainder ROS unremarkable. On exam, mostly expressive aphasia w/ mild component of receptive aphasia w/ mild cognitive deficits. 5-/5 on Rt. No sign risk factors or FHx. High suspicion for cardioembolic etiology. Needs JANET and ILR. MRI w/ ?hemorrhagic mass. Will get MRI w/ gadolinium. Called neuro to ask whether to cont ASA, heparin SQ for now and waiting for call back. No events on tele    #Progress Note Handoff  Pending (specify):  Consults_Cardio, EPS___Clinical improvement and stability_x__Tests_____, PT_____x___  Pt/Family discussion: Pt/spouse informed and agree with the current plan  Disposition: Home______SNF_______To be determined________
patient seen and examined, agree with above, CVA/ tpa/ thrombectomy, neuro f/up, taper pressor, speech eval, stroke unit

## 2020-08-31 NOTE — CONSULT NOTE ADULT - ASSESSMENT
IMPRESSION: Rehab of   70 yo M with CVa, s/p thrombectomy, aphasia,  right hemiparesis-resolving  PRECAUTIONS: [  ] Cardiac  [  ] Respiratory  [  ] Seizures [  ] Contact Isolation  [  ] Droplet Isolation  [  ] Other    Weight Bearing Status:     RECOMMENDATION:    Out of Bed to Chair     DVT/Decubiti Prophylaxis    REHAB PLAN:     [  x ] Bedside P/T 3-5 times a week   [ x  ]   Bedside O/T  2-3 times a week             [   ] No Rehab Therapy Indicated                   [ x  ]  Speech Therapy   Conditioning/ROM                                    ADL  Bed Mobility                                               Conditioning/ROM  Transfers                                                     Bed Mobility  Sitting /Standing Balance                         Transfers                                        Gait Training                                               Sitting/Standing Balance  Stair Training [   ]Applicable                    Home equipment Eval                                                                        Splinting  [   ] Only      GOALS:   ADL   [x   ]   Independent                    Transfers  [ x  ] Independent                          Ambulation  [x   ] Independent     [  x  ] With device                            [   ]  CG                                                         [   ]  CG                                                                  [   ] CG                            [    ] Min A                                                   [   ] Min A                                                              [   ] Min  A          DISCHARGE PLAN:   [   ]  Good candidate for Intensive Rehabilitation/Hospital based-4A [facility]                                             Will tolerate 3hrs Intensive Rehab Daily                                       [    ]  Short Term Rehab in Skilled Nursing Facility                                       [ x   ]  Home with Outpatient or  services=--home or outpatient services. Speech therapy is certainly needed.                                         [    ]  Possible Candidate for Intensive Hospital based Rehab

## 2020-08-31 NOTE — OCCUPATIONAL THERAPY INITIAL EVALUATION ADULT - PERTINENT HX OF CURRENT PROBLEM, REHAB EVAL
pt 68 y/o male, no significant PMH who presented to ED with R sided weakness and aphasia s/p sudden collapse while working in yard at home.

## 2020-08-31 NOTE — OCCUPATIONAL THERAPY INITIAL EVALUATION ADULT - NS ASR APHASIA TYPE OT
pt able to follow simple one step commands, perform object identification during ADL routine and maintain logical conversation though benefits from verbal cueing and choices throughout IE./expressive

## 2020-08-31 NOTE — PROGRESS NOTE ADULT - SUBJECTIVE AND OBJECTIVE BOX
24H events:    Patient is a 69y old Male who presents with a chief complaint of CVA (31 Aug 2020 10:13)    Primary diagnosis of CVA (cerebral vascular accident)     Today is hospital day 4d. This morning patient was seen and examined at bedside, resting comfortably in bed.      PAST MEDICAL & SURGICAL HISTORY  GERD (gastroesophageal reflux disease)  S/P tonsillectomy  H/O bilateral hip replacements  H/O skin graft: upper body  legs    SOCIAL HISTORY:  Social History:  Former smoker years prior as per wife. Retired fire-fighter. No Drug/EtOH dependence. (27 Aug 2020 21:31)      ALLERGIES:  amoxicillin (Rash)    MEDICATIONS:  STANDING MEDICATIONS  aspirin  chewable 81 milliGRAM(s) Oral daily  atorvastatin 80 milliGRAM(s) Oral at bedtime  chlorhexidine 4% Liquid 1 Application(s) Topical <User Schedule>  heparin   Injectable 5000 Unit(s) SubCutaneous every 12 hours  pantoprazole   Suspension 40 milliGRAM(s) Oral daily  polyethylene glycol 3350 17 Gram(s) Oral daily  senna 2 Tablet(s) Oral at bedtime    PRN MEDICATIONS    VITALS:   T(F): 97.2  HR: 67  BP: 150/75  RR: 20  SpO2: 96%    PHYSICAL EXAM:  GENERAL: NAD, well-groomed, well-developed  HEAD:  Atraumatic, Normocephalic  EYES: EOMI  NECK: Supple  NERVOUS SYSTEM:  Alert & Oriented X3, non focal. expressive aphasia (mild)  CHEST/LUNG: Clear to auscultation bilaterally; No rales, rhonchi, wheezing, or rubs  HEART: Regular rate and rhythm; No murmurs, rubs, or gallops  ABDOMEN: Soft, Nontender, Nondistended; Bowel sounds present  EXTREMITIES:  2+ Peripheral Pulses, No clubbing, cyanosis, or edema. +4 in right lower extremity  LYMPH: No lymphadenopathy noted  SKIN: No rashes or lesions  LABS:                        13.8   7.38  )-----------( 154      ( 31 Aug 2020 05:37 )             39.7     08-31    136  |  101  |  12  ----------------------------<  97  4.0   |  24  |  0.9    Ca    8.9      31 Aug 2020 05:37  Mg     1.8     08-31    TPro  6.0  /  Alb  3.8  /  TBili  1.0  /  DBili  x   /  AST  28  /  ALT  16  /  AlkPhos  56  08-31                  RADIOLOGY:

## 2020-08-31 NOTE — CONSULT NOTE ADULT - SUBJECTIVE AND OBJECTIVE BOX
HPI:  68y/o M with no PMH presents with R sided weakness/aphasia starting 2hr prior to arrival. Was doing heavy garden work when he suddenly collapsed. Wife also reported that pt mentioned 8/24 he mentioned stumbling against car but that was it. Otherwise fully healthy, former , and exercises. When wife went to him he couldn't speak, staring, and wasn't moving R side.     In ED: T98.7, HR 80, /68, RR18, SpO2 100%.   Stroke code called, NIH initially 20.  CTH showed L temporal CVA, CTA multi-infarct of L MCA.  S/p tPA and neurointervention with tPA & re-cannalization of some branches but not all of L MCA via R groin. (27 Aug 2020 21:31)      PAST MEDICAL & SURGICAL HISTORY:  GERD (gastroesophageal reflux disease)  S/P tonsillectomy  H/O bilateral hip replacements  H/O skin graft: upper body  legs  Hospital coarse:   he did well after TPR. right sided weakness resolved. Did very well with PT amb without ad and on stairs. he has had partial improvement of aphasia.      TODAY's  SUBJECTIVE & REVIEW OF SYMPTOMS:     Constitutional WNL   Cardio WNL   Resp WNL   GI WNL  Heme WNL  Endo WNL  Skin WNL  MSK WNL  Neuro WNL  Cognitive WNL  Psych WNL      MEDICATIONS  (STANDING):  aspirin  chewable 81 milliGRAM(s) Oral daily  atorvastatin 80 milliGRAM(s) Oral at bedtime  chlorhexidine 4% Liquid 1 Application(s) Topical <User Schedule>  heparin   Injectable 5000 Unit(s) SubCutaneous every 12 hours  pantoprazole   Suspension 40 milliGRAM(s) Oral daily  polyethylene glycol 3350 17 Gram(s) Oral daily  senna 2 Tablet(s) Oral at bedtime    MEDICATIONS  (PRN):      FAMILY HISTORY:  No pertinent family history in first degree relatives      Allergies    amoxicillin (Rash)    Intolerances        SOCIAL HISTORY:    [  ] Etoh  [  ] Smoking  [  ] Substance abuse     Home Environment:  [  ] Home Alone  [x  ] Lives with Family  [  ] Home Health Aid    Dwelling:  [  ] Apartment  [  ] Private House  [  ] Adult Home  [  ] Skilled Nursing Facility      [  ] Short Term  [  ] Long Term  [  ] Stairs       Elevator [  ]    FUNCTIONAL STATUS PTA: (Check all that apply)  Ambulation: [x   ]Independent    [  ] Dependent     [  ] Non-Ambulatory  Assistive Device: [  ] SA Cane  [  ]  Q Cane  [  ] Walker  [  ]  Wheelchair  ADL : [  ] Independent  [  ]  Dependent       Vital Signs Last 24 Hrs  T(C): 36.2 (31 Aug 2020 05:22), Max: 36.8 (31 Aug 2020 01:34)  T(F): 97.2 (31 Aug 2020 05:22), Max: 98.2 (31 Aug 2020 01:34)  HR: 67 (31 Aug 2020 05:22) (52 - 67)  BP: 150/75 (31 Aug 2020 05:22) (121/71 - 151/79)  BP(mean): 100 (30 Aug 2020 22:00) (92 - 108)  RR: 20 (31 Aug 2020 05:22) (15 - 28)  SpO2: 96% (31 Aug 2020 01:34) (94% - 98%)      PHYSICAL EXAM: Alert & Oriented X3  GENERAL: NAD, well-groomed, well-developed  HEAD:  Atraumatic, Normocephalic  EYES: EOMI, PERRLA, conjunctiva and sclera clear  NECK: Supple, No JVD, Normal thyroid  CHEST/LUNG: Clear to percussion bilaterally; No rales, rhonchi, wheezing, or rubs  HEART: Regular rate and rhythm; No murmurs, rubs, or gallops  ABDOMEN: Soft, Nontender, Nondistended; Bowel sounds present  EXTREMITIES:  2+ Peripheral Pulses, No clubbing, cyanosis, or edema    NERVOUS SYSTEM:  Cranial Nerves 2-12 intact [  ] Abnormal  [  ]    aphasia; much difficulty repeating no, ifs ands or buts.  ROM: WFL all extremities [  ]  Abnormal [  ]  Motor Strength: WFL all extremities  [  ]  Abnormal [  ]  Sensation: intact to light touch [  ] Abnormal [  ]  Reflexes: Symmetric [  ]  Abnormal [  ]    FUNCTIONAL STATUS:  Bed Mobility: Independent [  ]  Supervision [  ]  Needs Assistance [  ]  N/A [  ]  Transfers: Independent [  ]  Supervision [  ]  Needs Assistance [  ]  N/A [  ]   Ambulation: Independent [  ]  Supervision [  ]  Needs Assistance [  ]  N/A [  ]  ADL: Independent [  ] Requires Assistance [  ] N/A [  ]      LABS:                        13.8   7.38  )-----------( 154      ( 31 Aug 2020 05:37 )             39.7     08-31    136  |  101  |  12  ----------------------------<  97  4.0   |  24  |  0.9    Ca    8.9      31 Aug 2020 05:37  Mg     1.8     08-31    TPro  6.0  /  Alb  3.8  /  TBili  1.0  /  DBili  x   /  AST  28  /  ALT  16  /  AlkPhos  56  08-31          RADIOLOGY & ADDITIONAL STUDIES:    Assesment:

## 2020-08-31 NOTE — OCCUPATIONAL THERAPY INITIAL EVALUATION ADULT - PLANNED THERAPY INTERVENTIONS, OT EVAL
IADL retraining/ADL retraining/bed mobility training/fine motor coordination training/strengthening/transfer training/balance training/stretching/joint mobilization/motor coordination training/ROM

## 2020-08-31 NOTE — CONSULT NOTE ADULT - ASSESSMENT
68y/o M with no PMH who is admitted with acute CVA. CTH showed L temporal CVA, CTA multi-infarct of L MCA. S/p TPA and neuro-intervention with tPA & re-cannalization of some branches but not all of L MCA. EP consulted for cryptogenic CVA.     Impression:  Acute CVA (multi-infarct of L MCA. S/p TPA and neurointervention with tPA & re-cannalization of some branches but not all of L MCA)    Plan:  - Recommend implantable loop recorder for cryptogenic stroke, awaiting patient decision   - Cont current management  - Cont tele while in house to monitor for AF  - Will follow 68y/o M with no PMH who is admitted with acute CVA. CTH showed L temporal CVA, CTA multi-infarct of L MCA. S/p TPA and neuro-intervention with tPA & re-cannalization of some branches but not all of L MCA. EP consulted for cryptogenic CVA.     Impression:  Acute CVA (multi-infarct of L MCA. S/p TPA and neurointervention with tPA & re-cannalization of some branches but not all of L MCA)    Plan:  - Recommend implantable loop recorder for cryptogenic stroke, awaiting patient decision   - Awaiting JANET  - Cont current management  - Cont tele while in house to monitor for AF  - Will follow 70y/o M with no PMH who is admitted with acute CVA. CTH showed L temporal CVA, CTA multi-infarct of L MCA. S/p TPA and neuro-intervention with tPA & re-cannalization of some branches but not all of L MCA. EP consulted for cryptogenic CVA.     Impression:  Acute CVA (multi-infarct of L MCA. S/p TPA and neurointervention with tPA & re-cannalization of some branches but not all of L MCA)  COVID Negative 8/27    Plan:  - Recommend implantable loop recorder for cryptogenic stroke, awaiting patient decision   - Awaiting JANET  - Cont current management  - Cont tele while in house to monitor for AF  - Will follow 70y/o M with no PMH who is admitted with acute CVA. CTH showed L temporal CVA, CTA multi-infarct of L MCA. S/p TPA and neuro-intervention with tPA & re-cannalization of some branches but not all of L MCA. EP consulted for cryptogenic CVA.     Impression:  Acute CVA (multi-infarct of L MCA. S/p TPA and neurointervention with tPA & re-cannalization of some branches but not all of L MCA)  COVID Negative 8/27    Plan:  - Loop recorder placement tomorrow. Spoke with patient and family who are all agreeable. Pt does not need to be NPO for procedure  - Awaiting JANET  - Cont current management  - Cont tele while in house to monitor for AF  - Will follow

## 2020-08-31 NOTE — OCCUPATIONAL THERAPY INITIAL EVALUATION ADULT - SHORT TERM MEMORY, REHAB EVAL
intact/pt able to recall 3/3 words trial x1, 3/3 words trial x2 following 5min and 3/3 words trial x3 following 10min without verbal cueing

## 2020-09-01 ENCOUNTER — TRANSCRIPTION ENCOUNTER (OUTPATIENT)
Age: 70
End: 2020-09-01

## 2020-09-01 LAB
ALBUMIN SERPL ELPH-MCNC: 3.7 G/DL — SIGNIFICANT CHANGE UP (ref 3.5–5.2)
ALP SERPL-CCNC: 59 U/L — SIGNIFICANT CHANGE UP (ref 30–115)
ALT FLD-CCNC: 16 U/L — SIGNIFICANT CHANGE UP (ref 0–41)
ANION GAP SERPL CALC-SCNC: 10 MMOL/L — SIGNIFICANT CHANGE UP (ref 7–14)
AST SERPL-CCNC: 26 U/L — SIGNIFICANT CHANGE UP (ref 0–41)
BILIRUB SERPL-MCNC: 0.6 MG/DL — SIGNIFICANT CHANGE UP (ref 0.2–1.2)
BUN SERPL-MCNC: 17 MG/DL — SIGNIFICANT CHANGE UP (ref 10–20)
CALCIUM SERPL-MCNC: 8.6 MG/DL — SIGNIFICANT CHANGE UP (ref 8.5–10.1)
CHLORIDE SERPL-SCNC: 103 MMOL/L — SIGNIFICANT CHANGE UP (ref 98–110)
CO2 SERPL-SCNC: 24 MMOL/L — SIGNIFICANT CHANGE UP (ref 17–32)
CREAT SERPL-MCNC: 0.9 MG/DL — SIGNIFICANT CHANGE UP (ref 0.7–1.5)
GLUCOSE BLDC GLUCOMTR-MCNC: 117 MG/DL — HIGH (ref 70–99)
GLUCOSE SERPL-MCNC: 95 MG/DL — SIGNIFICANT CHANGE UP (ref 70–99)
HCT VFR BLD CALC: 38.7 % — LOW (ref 42–52)
HGB BLD-MCNC: 13.5 G/DL — LOW (ref 14–18)
MCHC RBC-ENTMCNC: 33.5 PG — HIGH (ref 27–31)
MCHC RBC-ENTMCNC: 34.9 G/DL — SIGNIFICANT CHANGE UP (ref 32–37)
MCV RBC AUTO: 96 FL — HIGH (ref 80–94)
NRBC # BLD: 0 /100 WBCS — SIGNIFICANT CHANGE UP (ref 0–0)
PLATELET # BLD AUTO: 181 K/UL — SIGNIFICANT CHANGE UP (ref 130–400)
POTASSIUM SERPL-MCNC: 3.9 MMOL/L — SIGNIFICANT CHANGE UP (ref 3.5–5)
POTASSIUM SERPL-SCNC: 3.9 MMOL/L — SIGNIFICANT CHANGE UP (ref 3.5–5)
PROT SERPL-MCNC: 5.8 G/DL — LOW (ref 6–8)
RBC # BLD: 4.03 M/UL — LOW (ref 4.7–6.1)
RBC # FLD: 11.7 % — SIGNIFICANT CHANGE UP (ref 11.5–14.5)
SARS-COV-2 RNA SPEC QL NAA+PROBE: SIGNIFICANT CHANGE UP
SODIUM SERPL-SCNC: 137 MMOL/L — SIGNIFICANT CHANGE UP (ref 135–146)
WBC # BLD: 6.5 K/UL — SIGNIFICANT CHANGE UP (ref 4.8–10.8)
WBC # FLD AUTO: 6.5 K/UL — SIGNIFICANT CHANGE UP (ref 4.8–10.8)

## 2020-09-01 PROCEDURE — 99233 SBSQ HOSP IP/OBS HIGH 50: CPT

## 2020-09-01 PROCEDURE — 71045 X-RAY EXAM CHEST 1 VIEW: CPT | Mod: 26

## 2020-09-01 PROCEDURE — 99222 1ST HOSP IP/OBS MODERATE 55: CPT

## 2020-09-01 PROCEDURE — 70450 CT HEAD/BRAIN W/O DYE: CPT | Mod: 26

## 2020-09-01 RX ORDER — ASPIRIN/CALCIUM CARB/MAGNESIUM 324 MG
1 TABLET ORAL
Qty: 30 | Refills: 0
Start: 2020-09-01 | End: 2020-09-30

## 2020-09-01 RX ORDER — PANTOPRAZOLE SODIUM 20 MG/1
1 TABLET, DELAYED RELEASE ORAL
Qty: 30 | Refills: 0
Start: 2020-09-01 | End: 2020-09-30

## 2020-09-01 RX ORDER — ATORVASTATIN CALCIUM 80 MG/1
1 TABLET, FILM COATED ORAL
Qty: 30 | Refills: 0
Start: 2020-09-01 | End: 2020-09-30

## 2020-09-01 RX ORDER — SODIUM CHLORIDE 9 MG/ML
1000 INJECTION INTRAMUSCULAR; INTRAVENOUS; SUBCUTANEOUS
Refills: 0 | Status: DISCONTINUED | OUTPATIENT
Start: 2020-09-01 | End: 2020-09-02

## 2020-09-01 RX ADMIN — SODIUM CHLORIDE 75 MILLILITER(S): 9 INJECTION INTRAMUSCULAR; INTRAVENOUS; SUBCUTANEOUS at 20:18

## 2020-09-01 RX ADMIN — SENNA PLUS 2 TABLET(S): 8.6 TABLET ORAL at 21:58

## 2020-09-01 RX ADMIN — SODIUM CHLORIDE 75 MILLILITER(S): 9 INJECTION INTRAMUSCULAR; INTRAVENOUS; SUBCUTANEOUS at 13:00

## 2020-09-01 RX ADMIN — PANTOPRAZOLE SODIUM 40 MILLIGRAM(S): 20 TABLET, DELAYED RELEASE ORAL at 12:14

## 2020-09-01 RX ADMIN — ATORVASTATIN CALCIUM 80 MILLIGRAM(S): 80 TABLET, FILM COATED ORAL at 21:59

## 2020-09-01 NOTE — DISCHARGE NOTE PROVIDER - NSFOLLOWUPCLINICS_GEN_ALL_ED_FT
[facility] ([facility])  Rehabilitation  [facility] [address] [place] [address]  Phone: [phone]  Fax:   Follow Up Time: 1-3 days

## 2020-09-01 NOTE — DISCHARGE NOTE PROVIDER - REASON FOR ADMISSION
[facility]  Interventional Cardiology  H&P    Patient Name: Jose Fonseca Jr.  MRN: [id number]  Admission Date: 5/22/2019  Code Status: No Order   Attending Provider: Ken Ibarra MD   Primary Care Physician: Vasyl Morris MD  Principal Problem:CAD (coronary artery disease)    Patient information was obtained from patient and past medical records.     Subjective:     HPI:  Mr. Fonseca is a 76 y/o gentleman with a PMHx CAD s/p CABG 1996 (LIMA-OM, LANCE-LAD, SVG-PDA) s/p LAD PCI in 2013. His LANCE is known to be occluded. He has stable angina with OCONNELL since 2/2018. His HF has been managed and orthopnea has improved with diuresis. However he still has angina. TTE with EF deecreased to 45% along with moderate MR.    Procedure was cancelled in the past due to KASSIE, now resolved with Cr 1.1 this AM.    Now here for LHC +/- PCI, patient is a MIGUEL candidate.  NPO since yesterday.  Has not been on any DAPT, loaded with ASA and Plavix this AM.    Past Medical History:   Diagnosis Date    Coronary artery disease     S/P 3 vessel CABG; stents    Glaucoma     HTN (hypertension) 4/19/2013    Hyperlipidemia     Myocardial infarction     Obstructive sleep apnea     Squamous cell carcinoma      Past Surgical History:   Procedure Laterality Date    ADENOIDECTOMY      ANGIOGRAM, CORONARY, INCLUDING BYPASS GRAFT, WITH LEFT HEART CATHETERIZATION Right 4/22/2019    Performed by [facility] at [facility] CATH LAB    ARTHROSCOPY-SHOULDER EXCISION DISTAL CLAVICLE Left 3/18/2015    Performed by John Lindsey [facility] at [facility] [place], operated by [facility] OR    ARTHROSCOPY-SHOULDER WITH SUBACROMIAL DECOMPRESSION Left 3/18/2015    Performed by John Lindsey MD at [facility] [place], operated by [facility] OR    CATARACT EXTRACTION      patient not sure which eye    CATHETERIZATION, HEART, LEFT Left 10/1/2013    Performed by [facility] at [facility] CATH LAB    COLONOSCOPY N/A 11/20/2014    Performed by Ken Gay MD at [facility] ENDO (4TH FLR)     CORONARY ANGIOPLASTY      CORONARY ARTERY BYPASS GRAFT      REPAIR-ROTATOR CUFF Left 3/18/2015    Performed by [facility] at [facility] OR    REPAIR-TENDON-BICEP Left 3/18/2015    Performed by [facility] at [facility] OR    ROTATOR CUFF REPAIR      SHOULDER SURGERY      TONSILLECTOMY Left 2015     Review of patient's allergies indicates:  No Known Allergies    PTA Medications   Medication Sig    allopurinol (ZYLOPRIM) 300 MG tablet Take 1 tablet by mouth every evening.     atorvastatin (LIPITOR) 40 MG tablet Take 1 tablet (40 mg total) by mouth once daily. (Patient taking differently: Take 40 mg by mouth every evening. )    furosemide (LASIX) 40 MG tablet TAKE 1 TABLET(40 MG) BY MOUTH EVERY DAY    lisinopril (PRINIVIL,ZESTRIL) 20 MG tablet Take 1 tablet (20 mg total) by mouth once daily.    tamsulosin (FLOMAX) 0.4 mg Cap Take 1 capsule (0.4 mg total) by mouth every evening.    aspirin (ECOTRIN) 81 MG EC tablet Take 1 tablet (81 mg total) by mouth once daily.    sildenafil (VIAGRA) 100 MG tablet Take 1 tablet (100 mg total) by mouth daily as needed for Erectile Dysfunction.     Family History     Problem Relation (Age of Onset)    Diabetes Maternal Grandmother, Maternal Grandfather    Heart attack Father    Heart disease Father    Heart failure Father    Hypertension Father        Tobacco Use    Smoking status: Former Smoker     Packs/day: 1.00     Years: 31.00     Pack years: 31.00     Last attempt to quit: 1991     Years since quittin.0    Smokeless tobacco: Never Used   Substance and Sexual Activity    Alcohol use: Yes     Alcohol/week: 1.2 oz     Types: 2 Glasses of wine per week     Comment: Socially    Drug use: No    Sexual activity: Not on file     Review of Systems   Constitution: Negative for chills, fever and weight loss.   HENT: Negative for sore throat and stridor.    Eyes: Negative for blurred vision, double vision and pain.   Cardiovascular: Positive for chest  CVA pain and dyspnea on exertion. Negative for claudication, near-syncope, orthopnea, palpitations, paroxysmal nocturnal dyspnea and syncope.   Respiratory: Positive for shortness of breath. Negative for cough, sputum production and wheezing.    Endocrine: Negative for polydipsia, polyphagia and polyuria.   Skin: Negative for rash.   Musculoskeletal: Negative for joint pain, joint swelling and myalgias.   Gastrointestinal: Negative for abdominal pain, constipation, diarrhea, heartburn, melena, nausea and vomiting.   Genitourinary: Negative for dysuria and hematuria.   Neurological: Negative for focal weakness and loss of balance.   Psychiatric/Behavioral: Negative for depression and memory loss.     Objective:     Vital Signs (Most Recent):  Temp: 98.5 °F (36.9 °C) (05/22/19 0800)  Pulse: 80 (05/22/19 0800)  Resp: 20 (05/22/19 0800)  BP: (!) 121/56 (05/22/19 0801)  SpO2: (!) 94 % (05/22/19 0800) Vital Signs (24h Range):  Temp:  [98.5 °F (36.9 °C)] 98.5 °F (36.9 °C)  Pulse:  [80] 80  Resp:  [20] 20  SpO2:  [94 %] 94 %  BP: (121-131)/(56-60) 121/56     Weight: 109 kg (240 lb 4.8 oz)  Body mass index is 32.59 kg/m².  SpO2: (!) 94 %  O2 Device (Oxygen Therapy): room air  No intake or output data in the 24 hours ending 05/22/19 1013    Lines/Drains/Airways          None        Physical Exam   Constitutional: He is oriented to person, place, and time. He appears well-developed and well-nourished.   HENT:   Head: Normocephalic.   Eyes: Pupils are equal, round, and reactive to light.   Neck: Normal range of motion. No JVD present.   Cardiovascular: Normal rate and regular rhythm.   Murmur heard.   Blowing holosystolic murmur is present with a grade of 2/6 at the apex.  Pulses:       Radial pulses are 2+ on the right side, and 2+ on the left side.        Femoral pulses are 2+ on the right side, and 2+ on the left side.       Dorsalis pedis pulses are 2+ on the right side, and 2+ on the left side.        Posterior tibial pulses  are 2+ on the right side, and 2+ on the left side.   Pulmonary/Chest: Effort normal and breath sounds normal. No respiratory distress. He has no wheezes. He has no rales.   Abdominal: Soft. Bowel sounds are normal. He exhibits no distension. There is no tenderness.   Musculoskeletal: Normal range of motion. He exhibits no edema.   Neurological: He is alert and oriented to person, place, and time.   Skin: Skin is warm and dry.     Significant Labs:   CMP   Recent Labs   Lab 05/22/19  0739      K 4.4   *   CO2 24   *   BUN 28*   CREATININE 1.1   CALCIUM 9.8   ANIONGAP 6*   ESTGFRAFRICA >60.0   EGFRNONAA >60.0   , CBC   Recent Labs   Lab 05/22/19  0739   WBC 9.18   HGB 11.8*   HCT 35.9*        Assessment and Plan:     * CAD (coronary artery disease)  - Here for LHC +/- PCI, patient is a MIGUEL candidate  - Anti-platelet Therapy: ASA 81 mg Daily and Clopidogrel 600 mg load x 1  - Access: R CFA  - Creatinine/CrCl: 1.1  - Allergies: No shellfish/Iodine allergy  - Pre-Hydration: 3 cc/kg/hr IV, continuous, for 1 hour, Pre-Procedure  - Pre-Op Med: Diphenhydramine (Benadryl) 50 mg, Oral, Once, Pre-Procedure   - Consents signed in clinic, scanned in media  - All patient's questions were answered  - The risks, benefits & alternatives of the procedure were explained to the patient  - The risks of coronary angiography include but are not limited to: Bleeding, infection, heart rhythm abnormalities, allergic reactions, kidney injury requiring dilaysis, limb loss, stroke and death   - Should stenting be indicated, the patient has agreed to dual anti-platelet therapy for 1-consecutive year with a drug-eluting stent and a minimum of 1-month with the use of a bare metal stent  - Additionally, pt is aware that non-compliance is likely to result in stent clotting with heart attack, heart failure, and/or death  - The risks of moderate sedation include hypotension, respiratory depression, arrhythmias, bronchospasm,  & death  - Informed consent was obtained & the patient is agreeable to proceed with the procedure    Ramsey Salomon MD  Interventional Cardiology   [facility]

## 2020-09-01 NOTE — DISCHARGE NOTE PROVIDER - PROVIDER TOKENS
PROVIDER:[TOKEN:[[id number]],FOLLOWUP:[1 week]],FREE:[LAST:[[facility]],PHONE:[phone]],FAX:[(   )    -],ADDRESS:[[address] [place] [address]],FOLLOWUP:[2 weeks]] PROVIDER:[TOKEN:[[id number]],FOLLOWUP:[1 week]],PROVIDER:[TOKEN:[[id number]],FOLLOWUP:[2 weeks]],PROVIDER:[TOKEN:[[id number]],FOLLOWUP:[1 week]],PROVIDER:[TOKEN:[[id number]],FOLLOWUP:[2 weeks]]

## 2020-09-01 NOTE — DISCHARGE NOTE PROVIDER - NSDCFUSCHEDAPPT_GEN_ALL_CORE_FT
NYDIA VALVERDE ; 10/06/2020 ; [facility] [address] NYDIA VALVERDE ; 10/06/2020 ; [facility] [address] [facility]  NYDIA VALVERDE ; 10/07/2020 ; [facility] [address] [facility] NYDIA VALVERDE ; 10/06/2020 ; [facility]  NYDIA VALVERDE ; 10/07/2020 ; [facility] Cardio [facility]

## 2020-09-01 NOTE — DISCHARGE NOTE PROVIDER - NSDCCPCAREPLAN_GEN_ALL_CORE_FT
PRINCIPAL DISCHARGE DIAGNOSIS  Diagnosis: CVA (cerebral vascular accident)  Assessment and Plan of Treatment: Your symtoms are likely due to a stroke. Please take your medications as prescribed and follow up with your primary care physician in 1 week and with neurology in 2 weeks PRINCIPAL DISCHARGE DIAGNOSIS  Diagnosis: CVA (cerebral vascular accident)  Assessment and Plan of Treatment: Your symtoms are likely due to a stroke. Please take your medications as prescribed and follow up with your primary care physician in 1 week and with neurology in 2 weeks. Follow up with electrophysiologist in 2weeks to follow up results of the loop recorder. Cont speech therapy as outpt.

## 2020-09-01 NOTE — PROGRESS NOTE ADULT - SUBJECTIVE AND OBJECTIVE BOX
Stroke Progress Note:    1. Chief Complaint: Aphasia    HPI: A 69 years old man with no significant pmhx presents with LMCA syndrome, initial NIHSS 20 when Stroke Code called. CTH reports of hypodensity of left temporal lobe and no intracranial hemorrhage. He is s/p IV-tPA. CTP/CTA reports of multifocal occlusion of the left M2/M3 branches, large area of ischemic within the left MCA territory measuring 172 cc and core infarct of 52 cc (mismatch volume/penumbra of 120 cc), he is s/p emergent IA mechanical thrombectomy of left ICA/MCA of superior/inferior division. No overnight events, significant neurological improvement noted and no right groin hematoma.     INTERVAL HISTORY:  8/31: POD #4, pt seen in 3E Stroke Unit, reading children's book, continues to have aphasia. Otherwise remains neurologically stable. Right groin continues to have ecchymosis, no hematoma noted. Denies tenderness or pain on palpation.   9/1: Pt seen in 3E Stroke Unit this afternoon, OOB to chair. Reports of no new neurological changes. Repeat CT head for questionable hemorrhagic mass and now reports of evolving left MCA territory infarct with minimal petechial hemorrhage and no mass mentioned.     2. Relevant PMH:   Prior ischemic stroke/TIA[ ], Afib [ ], CAD [ ], HTN [ ], DLD [ ], DM [ ], PVD [ ], Obesity [ ],   Sedentary lifestyle [ ], CHF [ ], ENMANUEL [ ], Cancer Hx [ ].    3. Social History: Smoking [ ], Drug Use [ ], Alcohol Use [ ], Other [ ]    4. Possible Location of Stroke: Left MCA territory    5. Relevant Brain Tissue Imaging:  < from: CT Head No Cont (09.01.20 @ 13:34) >  IMPRESSION: Evolving acute left MCA territory infarct with minimal petechial hemorrhage noted in frontoparietal region. No significant mass effect or midline shift.    < from: MR Head No Cont (08.29.20 @ 12:41) >  IMPRESSION: Redemonstration of acute infarcts involving the left frontal, temporal, and parietal lobes, and the posterior left insula, predominantly in gyriform pattern with moderate mass effect.  Superimposed abnormal mass effect with susceptibility signal in the infarcted area of the left parietal lobe suspicious for underlying hemorrhagic mass. No malika acute intracranial hemorrhage. Further characterization with contrast-enhanced MRI is recommended.    < from: CT Head No Cont (08.28.20 @ 17:58) >  IMPRESSION:  No evidence for acute intracranial hemorrhage, midline shift, or mass effect.  Left frontotemporal lobe hypodensity is increased in size compared to previous day's study, consistent with evolution of ischemic infarct.    < from: CT Brain Stroke Protocol (08.27.20 @ 16:53) >  IMPRESSION:  Hypodensity involving the left temporal lobe which may represent an area of evolving acute ischemia. No evidence of hemorrhage.  These findings were discussed with Dr. CAITLIN SCHILLING [phone] at 8/27/2020 5:01 PM by Dr. Pulido with read back confirmation.    6. Relevant Cerebrovascular Imaging:   < from: CT Perfusion w/ Maps w/ IV Cont (08.27.20 @ 17:28) >  Findings:    CTA neck:  The aortic arch, origin of the great vessels and subclavian arteries are unremarkable.  The bilateral common carotid arteries and internal carotid arteries are unremarkable without significant stenosis seen.  The vertebral arteries are codominant. No significant vertebral artery stenosis is noted.    CTA head:  The distal segments of the internal carotid arteries are unremarkable. There are multifocal occlusions of the left MCA branches: Inferior M2 branch on series 4 image 482, superior branches on series 703 images 45-50 and series 703 image 53.  The right ADEOLA and MCA are patent. The left ADEOLA is patent.  The distal vertebral arteries, basilar artery and posterior cerebral arteries are patent.    CT perfusion:  The cerebral blood volume and time to peak images demonstrate increased time to peak in the left MCA territory measuring 172 cc. There is a decrease in total cerebral blood flow volume of 52 cc in the left MCA territory consistent with acute infarct. There is a mismatch volume of 120 cc indicating ischemic penumbra.    Other:  Multilevel degenerative changes of the cervical spine. Trace retrolisthesis of C2 on C3. Mucosal thickening of bilateral ethmoid sinuses.    IMPRESSION: Multifocal occlusions of the left middle cerebral artery M2 and M3 branches. Corresponding large area of ischemia within the left MCA territory measuring 172 cc, with core infarct of 52 cc (mismatch volume/penumbra of 120 cc)    7. Relevant blood tests:                         13.5   6.50  )-----------( 181      ( 01 Sep 2020 06:11 )             38.7       09-01    137  |  103  |  17  ----------------------------<  95  3.9   |  24  |  0.9    Ca    8.6      01 Sep 2020 06:11  Mg     1.8     08-31    TPro  5.8<L>  /  Alb  3.7  /  TBili  0.6  /  DBili  x   /  AST  26  /  ALT  16  /  AlkPhos  59  09-01    Lipid Profile (08.27.20 @ 21:54)    Total Cholesterol/HDL Ratio Measurement: 2.3 Ratio    Cholesterol, Serum: 179 mg/dL    Triglycerides, Serum: 75 mg/dL    HDL Cholesterol, Serum: 77: HDL Levels >/= 60 mg/dL are considered beneficial and a "negative" risk  factor.  Effective 08/15/2018: New reference range and interpretive comment. mg/dL    Direct LDL: 86: LDL Cholesterol (mg/dL) --- Interpretive Comment (for adults 18 and over)    8. Relevant cardiac rhythm monitoring:  < from: 12 Lead ECG (08.27.20 @ 17:25) >  Ventricular Rate 58 BPM  Atrial Rate 58 BPM  P-R Interval 146 ms  QRS Duration 114 ms  Q-T Interval 436 ms  QTC Calculation(Bezet) 428 ms  P Axis 75 degrees  R Axis 4 degrees  T Axis 51 degrees  Diagnosis Line Sinus bradycardia with Premature atrial complexes  Otherwise normal ECG    9. Relevant Cardiac Structure: (TTE/JANET +/-):[ ]No intracardiac thrombus/[ ] no vegetation/[ ]no akynesia/EF:  < from: Transthoracic Echocardiogram (08.28.20 @ 14:25) >  Summary:   1. LV Ejection Fraction by Plaza's Method with a biplane EF of 67 %.   2. Normal left ventricular size and wall thicknesses, with normal systolic and diastolic function.   3. The mean global longitudinal peak strain by speckle tracking is -22.0% which is normal.   4. Mild mitral valve regurgitation.   5. Estimated pulmonary artery systolic pressure is 39.9 mmHg assuming a right atrial pressure of 8 mmHg, which is consistent with borderline pulmonary hypertension.   6. Color flow doppler and intravenous injection of agitated saline demonstrates the presence of an intact intra atrial septum.   7. LA volume Index is 25.5 ml/m² ml/m2.    MEDICATIONS  (STANDING):  aspirin  chewable 81 milliGRAM(s) Oral daily  atorvastatin 80 milliGRAM(s) Oral at bedtime  chlorhexidine 4% Liquid 1 Application(s) Topical <User Schedule>  heparin   Injectable 5000 Unit(s) SubCutaneous every 8 hours  pantoprazole   Suspension 40 milliGRAM(s) Oral daily  polyethylene glycol 3350 17 Gram(s) Oral daily  senna 2 Tablet(s) Oral at bedtime  sodium chloride 0.9%. 1000 milliLiter(s) (60 mL/Hr) IV Continuous <Continuous>  sodium chloride 0.9%. 1000 milliLiter(s) (75 mL/Hr) IV Continuous <Continuous>    10. PT/OT/Speech/Rehab/S&Sw/ Cognitive eval results and recommendations: Complete    11. Exam:    Vital Signs Last 24 Hrs  T(C): 36.4 (09-01-20 @ 13:50), Max: 36.4 (08-31-20 @ 18:51)  T(F): 97.6 (09-01-20 @ 13:50), Max: 97.6 (09-01-20 @ 13:50)  HR: 51 (09-01-20 @ 16:54) (50 - 97)  BP: 137/66 (09-01-20 @ 16:54) (114/59 - 145/66)  BP(mean): --  RR: 18 (09-01-20 @ 16:54) (18 - 18)  SpO2: 97% (09-01-20 @ 16:54) (96% - 97%)    12. Neurologic Exam:  Mental status: Awake, alert and oriented x 3. Attention and concentration intact.  Language: Follows all commands. Naming, repetition, fluency, and comprehension intact. Resolved dysarthria but continues to have aphasia.  Cranial nerves: B/l pupils equally round and reactive to light, visual fields full, no nystagmus, extraocular muscles intact, V1 through V3 intact bilaterally and symmetric, face symmetric, hearing intact to finger rub, palate elevation symmetric, tongue was midline, sternocleidomastoid/shoulder shrug strength bilaterally 5/5.    Motor: No drifting in all extremities. Normal bulk and tone, strength 5/5 in bilateral upper and lower extremities.   strength 5/5.  Sensation: Intact to light touch, proprioception, and pinprick.  No neglect.   Coordination: No dysmetria on finger-to-nose and heel-to-shin.   Gait: Steady    Ext: no c/c/e, 2+ pulses throughout, no right groin hematoma, + ecchymosis    NIH STROKE SCALE  Item	                                                        Score  1 a.	Level of Consciousness	               	0  1 b. LOC Questions	                                    0  1 c.	LOC Commands	                               	0  2.	Best Gaze	                                    0  3.	Visual	                                                0  4.	Facial Palsy	                                    0  5 a.	Motor Arm - Left	                        0  5 b.	Motor Arm - Right	                        0  6 a.	Motor Leg - Left	                        0  6 b.	Motor Leg - Right	                        0  7.	Limb Ataxia	                                    0  8.	Sensory	                                                0  9.	Language	                                    1  10.	Dysarthria	                                    0  11.	Extinction and Inattention  	            0  __________________________________________________________________________  TOTAL	                                                            1    NIHSS Initial: 20             NIHSS Yesterday: 1     NIHSS Today: 1    mRS: baseline 0-> 1 No significant disability despite symptoms; able to carry out all usual duties and activities without assistance

## 2020-09-01 NOTE — DISCHARGE NOTE PROVIDER - CARE PROVIDERS DIRECT ADDRESSES
,DirectAddress_Unknown,DirectAddress_Unknown ,DirectAddress_Unknown,[email or web address]

## 2020-09-01 NOTE — PROGRESS NOTE ADULT - ASSESSMENT
A 69 years old man with no significant pmhx presents with left M1 syndrome, initial NIHSS 20 and s/p IV-tPA. He is s/p emergent IA mechanical thrombectomy of left ICA/MCA on 8/27th. POD #5 remains neurologically stable, but continues to have aphasia. Pending JANET once COVID resulted. Repeat CT head for questionable hemorrhagic mass and now reports of evolving left MCA territory infarct with minimal petechial hemorrhage and no mass mentioned.    SUGGESTIONS:  - Routine neuro checks. From Neurovascular perspective patient may downgrade from Stroke Unit to 3E  - C/w ASA 81 mg QD and high dose statin therapy  - Pending JANET and if negative, consider cardiac event monitoring   - Stroke Clinic in 2-3 weeks of discharge  - Follow up at [facility] at [address] [place] [address] [address]. May call Tel: [phone] to change appointment time  - Medical management per primary team     Case discussed and patient seen with attending physician   Toshia Rizo, BETHANY  [id number]

## 2020-09-01 NOTE — DISCHARGE NOTE PROVIDER - HOSPITAL COURSE
68y/o M with no PMH presented with R sided weakness/aphasia starting 2hr prior to arrival. Was doing heavy garden work when he suddenly collapsed. When wife went to him he couldn't speak, staring, and wasn't moving R side. In ED, Stroke code called, NIH initially 20. CTH showed L temporal CVA, CTA multi-infarct of L MCA S/p tPA and neurointervention with tPA & re-cannalization of some branches but not all of L MCA via R groin. Patient is doing better, with residual expressive aphasia and +3 left sided muscle power.    He is medically stable and ready for discharge. 68y/o M with no PMH presented with R sided weakness/aphasia starting 2hr prior to arrival. Was doing heavy garden work when he suddenly collapsed. When wife went to him he couldn't speak, staring, and wasn't moving R side. In ED, Stroke code called, NIH initially 20. CTH showed L temporal CVA, CTA multi-infarct of L MCA S/p tPA and neurointervention with tPA & re-cannalization of some branches but not all of L MCA via R groin. Patient is doing better, with residual mild expressive aphasia.    He is medically stable and ready for discharge.

## 2020-09-01 NOTE — PROGRESS NOTE ADULT - SUBJECTIVE AND OBJECTIVE BOX
Patient is a 69y old  Male who presents with a chief complaint of CVA (01 Sep 2020 12:35)    INTERVAL HPI/OVERNIGHT EVENTS: no complaints, feels better  ROS: Denies CP, SOB, AP, new weakness  All other systems reviewed and are within normal limits.  InitialHPI:  68y/o M with no PMH presents with R sided weakness/aphasia starting 2hr prior to arrival. Was doing heavy garden work when he suddenly collapsed. Wife also reported that pt mentioned 8/24 he mentioned stumbling against car but that was it. Otherwise fully healthy, former , and exercises. When wife went to him he couldn't speak, staring, and wasn't moving R side.     In ED: T98.7, HR 80, /68, RR18, SpO2 100%.   Stroke code called, NIH initially 20.  CTH showed L temporal CVA, CTA multi-infarct of L MCA.  S/p tPA and neurointervention with tPA & re-cannalization of some branches but not all of L MCA via R groin. (27 Aug 2020 21:31)    CVA  ^CODE STROKE  No pertinent family history in first degree relatives  Handoff  MEWS Score  GERD (gastroesophageal reflux disease)  CVA (cerebral vascular accident)  S/P tonsillectomy  H/O bilateral hip replacements  H/O skin graft  CODE STROKE  [email or web address]    PAST MEDICAL & SURGICAL HISTORY:  GERD (gastroesophageal reflux disease)  S/P tonsillectomy  H/O bilateral hip replacements  H/O skin graft: upper body  legs      General: NAD, AAO3, +mild expressive aphasia  HEENT:  EOMI, no LAD  CV: S1 S2  Resp: decreased breath sounds at bases  GI: NT/ND/S +BS  MS: no clubbing/cyanosis/edema, 2+ pulses b/l  Neuro: nonfocal, 2+reflexes thruout    tele - no events    MEDICATIONS  (STANDING):  aspirin  chewable 81 milliGRAM(s) Oral daily  atorvastatin 80 milliGRAM(s) Oral at bedtime  chlorhexidine 4% Liquid 1 Application(s) Topical <User Schedule>  heparin   Injectable 5000 Unit(s) SubCutaneous every 8 hours  pantoprazole   Suspension 40 milliGRAM(s) Oral daily  polyethylene glycol 3350 17 Gram(s) Oral daily  senna 2 Tablet(s) Oral at bedtime  sodium chloride 0.9%. 1000 milliLiter(s) (60 mL/Hr) IV Continuous <Continuous>  sodium chloride 0.9%. 1000 milliLiter(s) (75 mL/Hr) IV Continuous <Continuous>    MEDICATIONS  (PRN):    Home Medications:    Vital Signs Last 24 Hrs  T(C): 36.4 (01 Sep 2020 13:50), Max: 36.4 (31 Aug 2020 18:51)  T(F): 97.6 (01 Sep 2020 13:50), Max: 97.6 (01 Sep 2020 13:50)  HR: 54 (01 Sep 2020 13:50) (50 - 97)  BP: 138/73 (01 Sep 2020 13:50) (114/59 - 145/66)  BP(mean): --  RR: 18 (01 Sep 2020 13:50) (18 - 18)  SpO2: 96% (01 Sep 2020 12:20) (96% - 97%)  CAPILLARY BLOOD GLUCOSE        LABS:                        13.5   6.50  )-----------( 181      ( 01 Sep 2020 06:11 )             38.7     09-01    137  |  103  |  17  ----------------------------<  95  3.9   |  24  |  0.9    Ca    8.6      01 Sep 2020 06:11  Mg     1.8     08-31    TPro  5.8<L>  /  Alb  3.7  /  TBili  0.6  /  DBili  x   /  AST  26  /  ALT  16  /  AlkPhos  59  09-01    LIVER FUNCTIONS - ( 01 Sep 2020 06:11 )  Alb: 3.7 g/dL / Pro: 5.8 g/dL / ALK PHOS: 59 U/L / ALT: 16 U/L / AST: 26 U/L / GGT: x                           Chart, Consultant(s) Notes Reviewed:  [x ] YES  [ ] NO  Care Discussed with Consultants/Other Providers/ Housestaff [ x] YES  [ ] NO  Radiology, labs, old available records personally reviewed.      A/P    68y/o M with no PMH presents with large L MCA CVA with NIH initially of 20 s/p neurointervention recannalization.    #L MCA CVA  - s/p tPA and neurointervention 8/27 with  partially successful recannalization   - on aspirin, Lipitor  - Dysphagia 3, thin liquid as per speech eval  - PT/OT and physiartry consulted  -plan for JANET tomorrow and loop recorder    #Questionable hemorrhagic mass on MRI  -MRI brain w/ contrast shows no mass  -CT head w/ no significant bleeding    #MISC  - DVT ppx: Heparin SQ  - GI ppx: protonix  -  Progress Note Handoff  Pending (specify):  Consults_Cardio, EPS___Clinical improvement and stability_x__Tests_____, PT_____x___  Pt/Family discussion: Pt/spouse informed and agree with the current plan  Disposition: Home______SNF_______To be determined________

## 2020-09-01 NOTE — DISCHARGE NOTE PROVIDER - CARE PROVIDER_API CALL
Cody Bergeron  INTERNAL MEDICINE  [address] [place] [address]  Phone: [phone]  Fax: [phone]  Follow Up Time: 1 week    [facility],   [address] [place] [address]  Phone: [phone]  Fax: (   )    -  Follow Up Time: 2 weeks Cody Bergeron  INTERNAL MEDICINE  [address] [facility] [place] [address]  Phone: [phone]  Fax: [phone]  Follow Up Time: 1 week    Khadijah Cohen)  Cardiovascular Disease; Internal Medicine  [address] [place] [address], [address]  [place], [place] [id number]  Phone: [phone]  Fax: [phone]  Follow Up Time: 2 weeks    Ben Olvera  EEG/EPILEPSY  [address] [place] [address], [address]  [place] [address]  Phone: [phone]  Fax: [phone]  Follow Up Time: 1 week    Mary Helms)  Neurology; Vascular Neurology  [address] [facility], [address]  [place], [place] [address]  Phone: [phone]  Fax: [phone]  Follow Up Time: 2 weeks

## 2020-09-02 PROCEDURE — 93010 ELECTROCARDIOGRAM REPORT: CPT

## 2020-09-02 PROCEDURE — 93312 ECHO TRANSESOPHAGEAL: CPT | Mod: 26,XU

## 2020-09-02 PROCEDURE — 33285 INSJ SUBQ CAR RHYTHM MNTR: CPT

## 2020-09-02 PROCEDURE — 99239 HOSP IP/OBS DSCHRG MGMT >30: CPT

## 2020-09-02 RX ADMIN — Medication 1 TABLET(S): at 14:36

## 2020-09-02 RX ADMIN — SENNA PLUS 2 TABLET(S): 8.6 TABLET ORAL at 21:45

## 2020-09-02 RX ADMIN — PANTOPRAZOLE SODIUM 40 MILLIGRAM(S): 20 TABLET, DELAYED RELEASE ORAL at 11:17

## 2020-09-02 RX ADMIN — POLYETHYLENE GLYCOL 3350 17 GRAM(S): 17 POWDER, FOR SOLUTION ORAL at 11:17

## 2020-09-02 RX ADMIN — CHLORHEXIDINE GLUCONATE 1 APPLICATION(S): 213 SOLUTION TOPICAL at 06:19

## 2020-09-02 RX ADMIN — ATORVASTATIN CALCIUM 80 MILLIGRAM(S): 80 TABLET, FILM COATED ORAL at 21:45

## 2020-09-02 NOTE — PROGRESS NOTE ADULT - SUBJECTIVE AND OBJECTIVE BOX
PREOPERATIVE DAY OF PROCEDURE EVALUATION:  I have personally seen and examined the patient.  I agree with the history and physical which I have reviewed and noted any changes below.  (Signed electronically by __hortensia________)  09-02-20 @ 09:03

## 2020-09-02 NOTE — PROGRESS NOTE ADULT - PROVIDER SPECIALTY LIST ADULT
Cardiology
Cardiology
Critical Care
Hospitalist
Internal Medicine
Neurology
Critical Care
Neurology

## 2020-09-02 NOTE — PACU DISCHARGE NOTE - COMMENTS
PT IS STATUS POST JANET UNDER MAC, UNEVENTFUL.  VITALS IN CARDIOLOGY RECOVERY ROOM  114/69, HR 73, , TEMP 97.3F O2 SAT 96^

## 2020-09-02 NOTE — PROGRESS NOTE ADULT - SUBJECTIVE AND OBJECTIVE BOX
POST OPERATIVE PROCEDURAL DOCUMENTATION  PRE-OP DIAGNOSIS: CVA     POST-OP DIAGNOSIS: CVA    PROCEDURE: Transesophageal echocardiogram    Primary Physician:  Scarlett   Assistant:    ANESTHESIA TYPE  [  ] General Anesthesia  [ x ] Conscious Sedation  [  ] Local/Regional    CONDITION  [  ] Critical  [  ] Serious  [  ] Fair  [x  ] Good    SPECIMENS REMOVED (IF APPLICABLE): N/A    IMPLANTS (IF APPLICABLE): None    ESTIMATED BLOOD LOSS: None    COMPLICATIONS: None      FINDINGS:    After risks and benefits of procedures were explained, informed consent was obtained and placed in chart. Refer to Anesthesia note for sedation details.  The JANET probe was passed into the esophagus without difficulty.  Transesophageal and transgastric images were obtained.  The JANET probe was removed without difficulty and examined.  There was no evidence for bleeding.  The patient tolerated the procedure well without any immediate JANET-related complications.      Preliminary Findings:  LA: normal size no thrombus   JOANNA: Left atrial appendage was clear of clot and smoke.  LV: LVEF was estimated at 60-65%  MV:mild  MR, no evidence of MS.   AV: No evidence of AI, no evidence of AS.   RA: Nl size   TV: no TR.   PV: no PI.   IAS: no PFO. No R-> L shunt.     DIAGNOSIS/IMPRESSION:  No evidence of intracardiac source of embolism   PLAN OF CARE:  follow up with neurology

## 2020-09-02 NOTE — PRE-ANESTHESIA EVALUATION ADULT - NSANTHOSAYNRD_GEN_A_CORE
No. ENMANUEL screening performed.  STOP BANG Legend: 0-2 = LOW Risk; 3-4 = INTERMEDIATE Risk; 5-8 = HIGH Risk
unknown

## 2020-09-02 NOTE — CHART NOTE - NSCHARTNOTEFT_GEN_A_CORE
EP PROCEDURE NOTE    Date of Procedure: 09-02-20  EP Attending: Dr Flmeing  Assistant: Tali MATHEW  Referring Physician: Dr Gonzalez  Procedure: Loop Recorder Implant    Indication: 69y Male with history of CVA referred for implantable loop recorder.     Anesthesia: Local    EQUIPMENT IMPLANTED  : Medtronic Linq  Model Number: [id number]  Serial Number: [id number]    PROCEDURE DESCRIPTION  The patient was brought to the electrophysiology procedure area in a non-sedated state and received preoperative antibiotics. Informed, written consent was obtained prior to the procedure. The left anterior chest region was prepped and cleaned from the nipple to the upper left clavicular border with serial applications of Chlorhexidine. Patient was then covered with sterile drapes in the usual manner. Blood pressure, oxygenation, and level of comfort were stable throughout.     Following infiltration with local anesthetic, a 1-cm incision was made along the left sternal border at the fourth intercostal space. Using the tunneling device the implantable loop recorder was inserted into the subcutaneous tissue. Direct pressure was applied to the wound to obtain hemostasis. The wound was approximated with Dermabond. Steri-strips and a dry, sterile dressing were placed over the wound. R waves were noted to be 0.11 mV.     The patient tolerated the procedure well.     COMPLICATIONS  No immediate complications    CONCLUSIONS  Successful loop recorder implant    EBL: 2 cc

## 2020-09-02 NOTE — CHART NOTE - NSCHARTNOTEFT_GEN_A_CORE
Electrophysiology Brief Post-Operative Note    I have personally seen and examined the patient.  I agree with the history and physical which I have reviewed and noted any changes below.  09-02-20 @ 16:08    PRE-OP DIAGNOSIS:  - TIA/Stroke    POST-OP DIAGNOSIS:  -TIA/Stroke    PROCEDURE: Implant of Implantable Loop Recorder     Physician: OLGA Fleming MD  Assistant: None    ESTIMATED BLOOD LOSS: 1 mL    ANESTHESIA TYPE:  [  ]General Anesthesia  [  ] Sedation  [X] Local    CONDITION  [  ] Critical  [  ] Serious  [  ]Fair  [X]Good      SPECIMENS REMOVED (IF APPLICABLE): None      IMPLANTS (IF APPLICABLE)  Implantable Loop Recorder      FINDINGS  No immediate complications      PLAN OF CARE  -Outpatient follow-up with EP in 4-6 weeks          [address] [place] [address] ([address])          [place] [address] [phone]

## 2020-09-02 NOTE — PROGRESS NOTE ADULT - SUBJECTIVE AND OBJECTIVE BOX
Patient is a 69y old  Male who presents with a chief complaint of CVA (01 Sep 2020 12:35)    INTERVAL HPI/OVERNIGHT EVENTS: no complaints, feels better  ROS: Denies CP, SOB, AP, new weakness  All other systems reviewed and are within normal limits.  InitialHPI:  70y/o M with no PMH presents with R sided weakness/aphasia starting 2hr prior to arrival. Was doing heavy garden work when he suddenly collapsed. Wife also reported that pt mentioned 8/24 he mentioned stumbling against car but that was it. Otherwise fully healthy, former , and exercises. When wife went to him he couldn't speak, staring, and wasn't moving R side.     In ED: T98.7, HR 80, /68, RR18, SpO2 100%.   Stroke code called, NIH initially 20.  CTH showed L temporal CVA, CTA multi-infarct of L MCA.  S/p tPA and neurointervention with tPA & re-cannalization of some branches but not all of L MCA via R groin. (27 Aug 2020 21:31)    CVA  ^CODE STROKE  No pertinent family history in first degree relatives  Handoff  MEWS Score  GERD (gastroesophageal reflux disease)  CVA (cerebral vascular accident)  S/P tonsillectomy  H/O bilateral hip replacements  H/O skin graft  CODE STROKE  [email or web address]    PAST MEDICAL & SURGICAL HISTORY:  GERD (gastroesophageal reflux disease)  S/P tonsillectomy  H/O bilateral hip replacements  H/O skin graft: upper body  legs      General: NAD, AAO3, +mild expressive aphasia  HEENT:  EOMI, no LAD  CV: S1 S2  Resp: decreased breath sounds at bases  GI: NT/ND/S +BS  MS: no clubbing/cyanosis/edema, 2+ pulses b/l  Neuro: nonfocal, 2+reflexes thruout            MEDICATIONS  (STANDING):  aspirin  chewable 81 milliGRAM(s) Oral daily  atorvastatin 80 milliGRAM(s) Oral at bedtime  chlorhexidine 4% Liquid 1 Application(s) Topical <User Schedule>  heparin   Injectable 5000 Unit(s) SubCutaneous every 8 hours  pantoprazole   Suspension 40 milliGRAM(s) Oral daily  polyethylene glycol 3350 17 Gram(s) Oral daily  senna 2 Tablet(s) Oral at bedtime    MEDICATIONS  (PRN):    Home Medications:    Vital Signs Last 24 Hrs  T(C): 36.2 (02 Sep 2020 16:44), Max: 36.8 (01 Sep 2020 22:00)  T(F): 97.2 (02 Sep 2020 16:44), Max: 98.2 (01 Sep 2020 22:00)  HR: 52 (02 Sep 2020 16:44) (52 - 61)  BP: 131/72 (02 Sep 2020 16:44) (121/69 - 145/70)  BP(mean): 96 (02 Sep 2020 16:44) (96 - 96)  RR: 18 (02 Sep 2020 13:29) (18 - 19)  SpO2: 98% (02 Sep 2020 16:44) (98% - 98%)  CAPILLARY BLOOD GLUCOSE        LABS:                        13.5   6.50  )-----------( 181      ( 01 Sep 2020 06:11 )             38.7     09-01    137  |  103  |  17  ----------------------------<  95  3.9   |  24  |  0.9    Ca    8.6      01 Sep 2020 06:11    TPro  5.8<L>  /  Alb  3.7  /  TBili  0.6  /  DBili  x   /  AST  26  /  ALT  16  /  AlkPhos  59  09-01    LIVER FUNCTIONS - ( 01 Sep 2020 06:11 )  Alb: 3.7 g/dL / Pro: 5.8 g/dL / ALK PHOS: 59 U/L / ALT: 16 U/L / AST: 26 U/L / GGT: x                           Consultant Notes Reviewed:  [x ] YES  [ ] NO  Care Discussed with Consultants/Other Providers/ Housestaff [ x] YES  [ ] NO  Radiology, labs, new studies personally reviewed.      A/P    70y/o M with no PMH presents with large L MCA CVA with NIH initially of 20 s/p neurointervention recannalization.    #L MCA CVA  - s/p tPA and neurointervention 8/27  - on aspirin, Lipitor  - Dysphagia 3, thin liquid as per speech eval  - PT/OT and physiartry consulted  -s/p JANET  -awaiting loop recorder  -cleared by neuro    Discharge instructions discussed and patient knows when to seek immediate medical attention.  Patient has proper follow up.  All results discussed and patient aware they require further follow up/work up.  Stressed importance of proper follow up.  Medications prescribed and changes discussed.  All questions and concerns from patient and family addressed. Understanding of instructions verbalized.    Time spent in completing discharge process and coordinating care 45 minutes.    Discussed with housestaff, nursing, case mgmt, neuro, cardio, EPS

## 2020-09-02 NOTE — CHART NOTE - NSCHARTNOTEFT_GEN_A_CORE
69yMale with h/o CVA     s/p implantation of loop recorder    Plan:  Bactrum DS x1 periop  observe post procedure  Remove dressing tomorrow  Do not wet site for 48hrs  f/u in the office in 1month  10/7 @9:30am Mallory Cohen, office  [address] [place] [address], [place] [address]  [phone]

## 2020-09-03 ENCOUNTER — TRANSCRIPTION ENCOUNTER (OUTPATIENT)
Age: 70
End: 2020-09-03

## 2020-09-03 VITALS — HEIGHT: 70 IN | WEIGHT: 186.07 LBS

## 2020-09-03 PROCEDURE — 99238 HOSP IP/OBS DSCHRG MGMT 30/<: CPT

## 2020-09-03 RX ADMIN — POLYETHYLENE GLYCOL 3350 17 GRAM(S): 17 POWDER, FOR SOLUTION ORAL at 11:14

## 2020-09-03 RX ADMIN — CHLORHEXIDINE GLUCONATE 1 APPLICATION(S): 213 SOLUTION TOPICAL at 05:10

## 2020-09-03 RX ADMIN — PANTOPRAZOLE SODIUM 40 MILLIGRAM(S): 20 TABLET, DELAYED RELEASE ORAL at 11:14

## 2020-09-03 NOTE — DIETITIAN INITIAL EVALUATION ADULT. - OTHER INFO
p/w CVA R SIDED weakness and aphasic. L MCA CVA, s/p TPA and neuro intervention 8/17. Diet per SLP 8/28. s/p JANET, awaiting loop recorder.

## 2020-09-03 NOTE — DIETITIAN INITIAL EVALUATION ADULT. - REASON INDICATOR FOR ASSESSMENT
LOS - pt is alert and oriented. NO edema. Skin intact. LBM 8/31. on Dysphagia 3 thin diet since 8/28 and pt admits to eating well now. GOod appetite. No other issues.

## 2020-09-03 NOTE — DIETITIAN INITIAL EVALUATION ADULT. - CONTINUE CURRENT NUTRITION CARE PLAN
pt to consume and tolerate >75% of all meals and snacks through LOS 7 days. Meals and snacks. RD to monitor diet order, energy intake, body composition

## 2020-09-03 NOTE — DISCHARGE NOTE NURSING/CASE MANAGEMENT/SOCIAL WORK - PATIENT PORTAL LINK FT
You can access the FollowMyHealth Patient Portal offered by [facility] by registering at the following website: [email or web address]. By joining [employer]’s FollowMyHealth portal, you will also be able to view your health information using other applications (apps) compatible with our system.

## 2020-09-03 NOTE — DIETITIAN INITIAL EVALUATION ADULT. - DIET TYPE
PTA eats very well, takes Coq10, MVI, fishoil, d3, no supplement. NKFA. UBW around 195-200#. Possible a few pounds drop since admission per pt report. Not on any diet.

## 2020-09-04 ENCOUNTER — APPOINTMENT (OUTPATIENT)
Dept: CARE COORDINATION | Facility: HOME HEALTH | Age: 70
End: 2020-09-04

## 2020-09-04 ENCOUNTER — NON-APPOINTMENT (OUTPATIENT)
Age: 70
End: 2020-09-04

## 2020-09-04 PROBLEM — I63.9 CEREBRAL INFARCTION, UNSPECIFIED: Chronic | Status: ACTIVE | Noted: 2020-09-02

## 2020-09-08 NOTE — PRE-ANESTHESIA EVALUATION ADULT - NSRADCARDRESULTSFT_GEN_ALL_CORE
Anesthesia Pre Eval Note    OB Evaluation    Anesthesia ROS/Med Hx          Neuro/Psych Review:    Positive for headaches      Relevant Problems   No relevant active problems       Physical Exam     Airway   Mallampati: II    Cardiovascular  Cardiovascular exam normal    Pulmonary Exam  Pulmonary exam normal    Abdominal Exam    Patient Demonstrates:  Gravid      Anesthesia Plan    ASA Status: 2    Anesthesia Type: L&D Epidural      Checklist  Reviewed: Past Med History, Allergies, Medications, DNR Status, Problem list and NPO Status    Informed Consent  The proposed anesthetic plan, including its risks and benefits, have been discussed with the Patient  - along with the risks and benefits of alternatives.  Questions were encouraged and answered and the patient and/or representative understands and agrees to proceed.       
reviewed

## 2020-09-11 DIAGNOSIS — R20.0 ANESTHESIA OF SKIN: ICD-10-CM

## 2020-09-11 DIAGNOSIS — R13.10 DYSPHAGIA, UNSPECIFIED: ICD-10-CM

## 2020-09-11 DIAGNOSIS — I10 ESSENTIAL (PRIMARY) HYPERTENSION: ICD-10-CM

## 2020-09-11 DIAGNOSIS — E78.5 HYPERLIPIDEMIA, UNSPECIFIED: ICD-10-CM

## 2020-09-11 DIAGNOSIS — R29.810 FACIAL WEAKNESS: ICD-10-CM

## 2020-09-11 DIAGNOSIS — G81.91 HEMIPLEGIA, UNSPECIFIED AFFECTING RIGHT DOMINANT SIDE: ICD-10-CM

## 2020-09-11 DIAGNOSIS — R48.2 APRAXIA: ICD-10-CM

## 2020-09-11 DIAGNOSIS — H53.461 HOMONYMOUS BILATERAL FIELD DEFECTS, RIGHT SIDE: ICD-10-CM

## 2020-09-11 DIAGNOSIS — R47.01 APHASIA: ICD-10-CM

## 2020-09-11 DIAGNOSIS — I63.312 CEREBRAL INFARCTION DUE TO THROMBOSIS OF LEFT MIDDLE CEREBRAL ARTERY: ICD-10-CM

## 2020-09-11 DIAGNOSIS — Z87.891 PERSONAL HISTORY OF NICOTINE DEPENDENCE: ICD-10-CM

## 2020-09-11 DIAGNOSIS — R29.720 NIHSS SCORE 20: ICD-10-CM

## 2020-09-11 DIAGNOSIS — Z96.643 PRESENCE OF ARTIFICIAL HIP JOINT, BILATERAL: ICD-10-CM

## 2020-09-11 DIAGNOSIS — K21.9 GASTRO-ESOPHAGEAL REFLUX DISEASE WITHOUT ESOPHAGITIS: ICD-10-CM

## 2020-09-16 NOTE — HISTORY OF PRESENT ILLNESS
[Home] : at home, [unfilled] , at the time of the visit. [Spouse] : spouse [Other Location: e.g. Home (Enter Location, City,State)___] : at [unfilled] [Verbal consent obtained from patient] : the patient, [unfilled] [FreeTextEntry1] : Pt evaluated for weakness, aphasia [de-identified] : This is a 68 y/o male enrolled in [facility] with a  dx of CVA, Pt has no pertinent medical history.  He was treated \par \par \par

## 2020-10-06 ENCOUNTER — LABORATORY RESULT (OUTPATIENT)
Age: 70
End: 2020-10-06

## 2020-10-06 ENCOUNTER — APPOINTMENT (OUTPATIENT)
Dept: NEUROLOGY | Facility: CLINIC | Age: 70
End: 2020-10-06
Payer: MEDICARE

## 2020-10-06 VITALS
HEIGHT: 72 IN | WEIGHT: 195 LBS | DIASTOLIC BLOOD PRESSURE: 71 MMHG | SYSTOLIC BLOOD PRESSURE: 134 MMHG | OXYGEN SATURATION: 97 % | HEART RATE: 50 BPM | BODY MASS INDEX: 26.41 KG/M2 | TEMPERATURE: 98 F

## 2020-10-06 VITALS
SYSTOLIC BLOOD PRESSURE: 140 MMHG | HEIGHT: 72 IN | BODY MASS INDEX: 26.41 KG/M2 | HEART RATE: 57 BPM | TEMPERATURE: 97.6 F | WEIGHT: 195 LBS | DIASTOLIC BLOOD PRESSURE: 75 MMHG | OXYGEN SATURATION: 97 %

## 2020-10-06 DIAGNOSIS — Z78.9 OTHER SPECIFIED HEALTH STATUS: ICD-10-CM

## 2020-10-06 PROCEDURE — 99214 OFFICE O/P EST MOD 30 MIN: CPT

## 2020-10-06 PROCEDURE — 99215 OFFICE O/P EST HI 40 MIN: CPT

## 2020-10-06 NOTE — REASON FOR VISIT
[Post Hospitalization] : a post hospitalization visit [FreeTextEntry1] : s/p emergent IA mechanical thrombectomy of left ADEOLA/MCA

## 2020-10-06 NOTE — DISCUSSION/SUMMARY
[Antithrombotic therapy with ___] : antithrombotic therapy with  [unfilled] [Patient encouraged to discuss with Primary MD] : I encouraged the patient to discuss these important issues with ~his/her~ primary care doctor [Goals and Counseling] : I have reviewed the goals of stroke risk factor modification. I counseled the patient on measures to reduce stroke risk, including the importance of medication compliance, risk factor control, exercise, healthy diet and avoidance of smoking. I reviewed stroke warning signs and symptoms and appropriate actions to take if such occur. [FreeTextEntry1] : Mr. Biggs is a 8yo man with recent stroke on unknown etiology (i.e. cryptogenic) s/p mechanical thrombectomy.  He has no significant vascular risk factors and has an ILR placed now.  Will send for additional hypercoagulable labs\par 1. Continue aspirin 81mg QD and atorvastatin 20mg QD\par 2. Hypercoagulable labs\par 3. Continue speech therapy\par 4. Diet and exercise discussed\par 5. Maintain adequate hydration of 2-3L of water per day\par 6. F/u with PMD and EPS\par

## 2020-10-06 NOTE — REVIEW OF SYSTEMS
[As Noted in HPI] : as noted in HPI [Difficulty with Language] : ~M difficulty with language [Negative] : Heme/Lymph

## 2020-10-06 NOTE — PROCEDURE
[FreeTextEntry1] : EXAM: IR PROCEDURE NEURO \par PROCEDURE DATE: 08/27/2020 \par INTERPRETATION: NEUROENDOVASCULAR SURGERY PROCEDURE NOTE: \par \par DATE/TIME OF PROCEDURE: 8/27/2020 17:00 PM (NYDIA VALVERDE) \par OPERATORS: Dr. Mary Helms \par ASSISTANTS: N/A \par ANESTHESIOLOGIST: Dr. Britton Gotti \par URGENCY OF THE PROCEDURE: Emergent \par PRE-OPERATIVE DIAGNOSIS: ISCHEMIC STROKE \par POST-OPERATIVE DIAGNOSIS: ISCHEMIC STROKE \par \par (KEY: SCA=subclavian Artery; CCA=Common Carotid Artery; ICA=Internal Carotid Artery; ECA=External Carotid Artery; MCA=Middle Cerebral Artery; ADEOLA=Anterior Cerebral Artery; PCOM= Posterior Communicating Artery; ACOM=Anterior Communicating Artery; VA=Vertebral Artery; BA=Basilar Artery; PCA=Posterior Cerebral Artery;PICA=Posterior Inferior Cerebellar Artery; AICA= Anterior Inferior Cerebellar Artery: B.SCA= Superior Cerebellar Artery.) \par \par TYPE OF PROCEDURE: Emergent Left ICA/MCA Recanalization and Distal Reperfusion in Acute Ischemic Stroke setting. \par \par INDICATION: Mr. Valverde is a 69 years old man with no significant past medical history who presents with sudden left MCA syndrome. Stroke Code was called and initial NIHSS 20. CTH reports of hypodensity of left temporal lobe and no intracranial hemorrhage. He is s/p IV-tPA. CTP/CTA reports of multifocal occlusion of the left M2/M3 branches, large area of ischemic within the left MCA territory measuring 172 cc and core infarct of 52 cc (mismatch volume/penumbra of 120 cc), he is to undergo emergent Neuroendovascular intervention. \par \par VESSELS SELECTIVELY CATHETERIZED: (Upon catheterization intracranial and extracranial portions were imaged): Left CCA, Left ICA, and Left MCA. \par \par CINERADIOGRAPHY OF: None. \par 3D RECONSTRUCTION OF: None. \par PUNCTURE SITE: Right Femoral Artery. \par MICRO-PUNCTURE ACCESS: Yes. \par ULTRASOUND ASSISTANCE: Yes. \par U/S IMAGE SAVED: Obtained but not saved. \par FEMORAL RUN: Yes. \par ARTERIOTOMY/OCCLUSIVE DEVICE IF USED: Yes- 8Fr Angioseal. \par ESTIMATED BLOOD LOSS: 100 ml. \par SPECIMEN REMOVED: No. \par SHEATH & CATHETER(S) USED: 8Fr Flowgate Guide catheter 85 cm, 5Fr V-TECK 125cm, 6Fr JET D reperfusion catheter, and Trevo Trak 21 microcatheter. \par WIRE(S) USED: 0.035 Regular Glidewire 180 cm and Transcend Ex Platinum tip 0.014 microwire 205 cm. \par DEVICE(S) USED: FemoStop Compression System. \par AMOUNT AND TYPE OF CONTRAST USED: 150 ml of Visipaque 320. \par IA MEDS USED: Heparinized Saline, Visipaque 320, t-PA 8 mg, Nitroglycerin 200 mcg, and Verapamil 5 mg. \par IV MEDS USED: Pepcid 20 mg and Ancef 2g. \par SEDATION: Propofol 20 mg and Benadryl 50 mg. See Anesthesiologist note. \par BASELINE AND MAXIMUM ACT IF HEPARIN USED: N/A \par \par CODE NI Alert Time: 17:09 pm \par CODE NI Actual Time: 17:35 pm \par NIHSS Pre-Procedure: 20 \par NIHSS Post-Procedure: 11 \par TICI Pre-Procedure: 0 \par TICI Post-Procedure: 3 \par Puncture Time: 18:17 \par 1st Pass: \par Recanalization Time: \par \par INFORMED CONSENT: \par An informed consent was obtained in the presence of witnesses. The risks of \par bleeding, groin site infection, vascular injury, leg injury, allergic reactions, contrast nephropathy, ischemic stroke, bleeding inside the head, coma and even death were explained to the health care proxy. The health care proxy understood the risks and benefits and the consent form was signed in the presence of witness. \par \par OPERATORS: \par Dr. Helms performed the procedure and interpreted the images. No second \par  available. \par \par DIAGNOSTIC PROCEDURE TECHNIQUE AND FINDINGS: \par The right groin was prepped and draped in the usual sterile fashion. After \par identification of the Inguinal Ligament and the femoral artery pulse, a \par surgical clamp was placed about 2 cm under the cross section of those two \par and an X-ray obtained to assure the point of arteriotomy will be around the \par lower half of the inner edge of the femoral head. Then the entry site was \par infiltrated with 1% Xylocaine. A dermatotomy incision was made with an 11 \par blade.Under ultrasonography guidance which revealed patency of Common \par Femoral Artery (CFA) and Superficial Femoral Artery (SFA), the lower half of \par CFA was punctured with single-wall Seldinger technique and using a \par micropuncture kit. Then over the 0.035 Bentson wire, the 8Fr Endophys \par Pressure Sensing Long Sheath was inserted. Then over the 0.035 Regular Glidewire, \par the system including the 8Fr Flowgate and 5Fr VTEK 125cm diagnostic catheter \par were advanced into the ascending aortic arch. Then the left CCA was selected. After obtaining runs, which showed occlusion of the main branches of the left MCA, the system was advanced into the proximal portion of the left ICA. The ICA runs confirmed the occlusions. \par \par INTERVENTION PROCEDURE TECHNIQUE AND FINDINGS: \par The confirmatory run through the Flowgate approved the occlusion of one of the A3/4 segment, both mid M3 branches of inferior M2 and one M4 segment of superior M2, likely due to fragmented clot. Consequently initial dose of 6mg diluted t-PA, intermittent with diluted total of 5mg Verapamil was infused into the left ICA over about total of 20 minutes. The f/up runs were showing gradual movement of the occlusive objects distally, with the exception of the one occluding the distal superior M3. Then the microwire through the microcatheter and suction catheter was advanced into the M1 segment. Then the microwire and micro catheter was advanced into the distal superior M3. After few attempts the microwire passed the hard occlusive object, however the microcather failed to pass through safely. Therefore attempted to do partial recanalization and clot manipulation with the microwire and suction the residual with the microcatheter. During this time another dose of 2 mg diluted t-PA was infused into the ICA bifurcation and M1 segment followed by 200 mg of Nitroglycerine each over about 10 min. \par The final run showed complete recanalization of the A3/4 segment, complete recanalization of the M4 segment of superior M2 branch, distal migration of the clot in the inferior M3 branch of inferior M2, and partial recanalization of the superior M3 branch of the inferior M2 segment. Then the patient started speaking on the table and moving his right arm and leg. \par Then the procedure was terminated and the arteriotomy site was sealed with 8Fr. Angioseal and Femostop. \par \par \par VENOUS PHASE: No significant venous abnormality observed. \par \par COMPLICATIONS: None \par \par IMPRESSION: \par Complete recanalization of few and partial recanalization of two other completely occluded mid-branches of the left MCA and left ADEOLA, in a patient with clinical symptoms of complete MCA occlusion, after finishing the IV t-PA. These occlusive objects were likely distally migrated and fragmented clot. \par \par \par RECOMMENDATIONS: \par - Keep right lower extremity straight, maintain reverse trendelenberg position for 6 hours \par - Neuro checks including NIHSS, neurovascular checks including groin and distal pedal pulses, vitals Q15 min x 2 hrs, then Q30 min x 4 hrs, and then Q1h x 18 hrs \par - Follow tPA protocol. \par - Management per critical care team \par

## 2020-10-06 NOTE — HISTORY OF PRESENT ILLNESS
[FreeTextEntry1] : Mr. Biggs is a 70yo man here for follow up of his recent stroke.  He was admitted on August 27, 2020 and was discharged on Sept 2, 2020 and since then he has been doing speech therapy and making significant improvement.  He has not motor or sensory complaints.  He says that prior to the stroke he was working at home and was dehydrated for a number of days.  He had an episode the week before where he had a near syncopal episode.  Prior to discharge he had an implantable loop recorder placed and has had no events reported.\par \par  Hospital summary\par A 69 years old man with no significant pmhx presents with LMCA syndrome,\par initial NIHSS 20 when Stroke Code called. CTH reports of hypodensity of left\par temporal lobe and no intracranial hemorrhage. He is s/p IV-tPA. CTP/CTA reports\par of multifocal occlusion of the left M2/M3 branches, large area of ischemic\par within the left MCA territory measuring 172 cc and core infarct of 52 cc\par (mismatch volume/penumbra of 120 cc), he is s/p emergent IA mechanical\par thrombectomy of left ICA/MCA of superior/inferior division. No overnight\par events, significant neurological improvement noted and no right groin hematoma.\par \par \par INTERVAL HISTORY:\par 8/31: POD #4, pt seen in 3E Stroke Unit, reading children's book, continues to\par have aphasia. Otherwise remains neurologically stable. Right groin continues to\par have ecchymosis, no hematoma noted. Denies tenderness or pain on palpation.\par 9/1: Pt seen in 3E Stroke Unit this afternoon, OOB to chair. Reports of no new\par neurological changes. Repeat CT head for questionable hemorrhagic mass and now\par reports of evolving left MCA territory infarct with minimal petechial\par hemorrhage and no mass mentioned.\par \par 2. Relevant PMH:\par Prior ischemic stroke/TIA[ ], Afib [ ], CAD [ ], HTN [ ], DLD [ ], DM [ ], PVD\par [ ], Obesity [ ],\par Sedentary lifestyle [ ], CHF [ ], ENMANUEL [ ], Cancer Hx [ ].\par \par 3. Social History: Smoking [ ], Drug Use [ ], Alcohol Use [ ], Other [ ]\par \par 4. Possible Location of Stroke: Left MCA territory\par \par 5. Relevant Brain Tissue Imaging:\par < from: CT Head No Cont (09.01.20 @ 13:34) >\par IMPRESSION: Evolving acute left MCA territory infarct with minimal petechial\par hemorrhage noted in frontoparietal region. No significant mass effect or\par midline shift.\par \par < from: MR Head No Cont (08.29.20 @ 12:41) >\par IMPRESSION: Redemonstration of acute infarcts involving the left frontal,\par temporal, and parietal lobes, and the posterior left insula, predominantly in\par gyriform pattern with moderate mass effect.\par Superimposed abnormal mass effect with susceptibility signal in the infarcted\par area of the left parietal lobe suspicious for underlying hemorrhagic mass. No\par malika acute intracranial hemorrhage. Further characterization with\par contrast-enhanced MRI is recommended.\par \par < from: CT Head No Cont (08.28.20 @ 17:58) >\par IMPRESSION:\par No evidence for acute intracranial hemorrhage, midline shift, or mass effect.\par Left frontotemporal lobe hypodensity is increased in size compared to previous\par day's study, consistent with evolution of ischemic infarct.\par \par < from: CT Brain Stroke Protocol (08.27.20 @ 16:53) >\par IMPRESSION:\par Hypodensity involving the left temporal lobe which may represent an area of\par evolving acute ischemia. No evidence of hemorrhage.\par These findings were discussed with Dr. CAITLIN SCHILLING [phone] at 8/27/2020\par 5:01 PM by Dr. Pulido with read back confirmation.\par \par 6. Relevant Cerebrovascular Imaging:\par < from: CT Perfusion w/ Maps w/ IV Cont (08.27.20 @ 17:28) >\par Findings:\par \par CTA neck:\par The aortic arch, origin of the great vessels and subclavian arteries are\par unremarkable.\par The bilateral common carotid arteries and internal carotid arteries are\par unremarkable without significant stenosis seen.\par The vertebral arteries are codominant. No significant vertebral artery stenosis\par is noted.\par \par CTA head:\par The distal segments of the internal carotid arteries are unremarkable. There\par are multifocal occlusions of the left MCA branches: Inferior M2 branch on\par series 4 image 482, superior branches on series 703 images 45-50 and series 703\par image 53.\par The right ADEOLA and MCA are patent. The left ADEOLA is patent.\par The distal vertebral arteries, basilar artery and posterior cerebral arteries\par are patent.\par \par CT perfusion:\par The cerebral blood volume and time to peak images demonstrate increased time to\par peak in the left MCA territory measuring 172 cc. There is a decrease in total\par cerebral blood flow volume of 52 cc in the left MCA territory consistent with\par acute infarct. There is a mismatch volume of 120 cc indicating ischemic\par penumbra.\par \par Other:\par Multilevel degenerative changes of the cervical spine. Trace retrolisthesis of\par C2 on C3. Mucosal thickening of bilateral ethmoid sinuses.\par \par IMPRESSION: Multifocal occlusions of the left middle cerebral artery M2 and M3\par branches. Corresponding large area of ischemia within the left MCA territory\par measuring 172 cc, with core infarct of 52 cc (mismatch volume/penumbra of 120\par cc)\par \par 7. Relevant blood tests:\par \par 13.5\par 6.50 )-----------( 181 ( 01 Sep 2020 06:11 )\par 38.7\par \par \par 09-01\par \par 137 | 103 | 17\par ----------------------------< 95\par 3.9 | 24 | 0.9\par \par Ca 8.6 01 Sep 2020 06:11\par Mg 1.8 08-31\par \par TPro 5.8<L> / Alb 3.7 / TBili 0.6 / DBili x / AST 26 / ALT 16\par / AlkPhos 59 09-01\par \par Lipid Profile (08.27.20 @ 21:54)\par Total Cholesterol/HDL Ratio Measurement: 2.3 Ratio\par Cholesterol, Serum: 179 mg/dL\par Triglycerides, Serum: 75 mg/dL\par HDL Cholesterol, Serum: 77: HDL Levels >/= 60 mg/dL are considered beneficial\par and a "negative" risk\par factor.\par Effective 08/15/2018: New reference range and interpretive comment. mg/dL\par Direct LDL: 86: LDL Cholesterol (mg/dL) --- Interpretive Comment (for adults\par 18 and over)\par \par 8. Relevant cardiac rhythm monitoring:\par < from: 12 Lead ECG (08.27.20 @ 17:25) >\par Ventricular Rate 58 BPM\par Atrial Rate 58 BPM\par P-R Interval 146 ms\par QRS Duration 114 ms\par Q-T Interval 436 ms\par QTC Calculation(Bezet) 428 ms\par P Axis 75 degrees\par R Axis 4 degrees\par T Axis 51 degrees\par Diagnosis Line Sinus bradycardia with Premature atrial complexes\par Otherwise normal ECG\par \par 9. Relevant Cardiac Structure: (TTE/JANET +/-):[ ]No intracardiac thrombus/[ ] no\par vegetation/[ ]no [place]/EF:\par < from: Transthoracic Echocardiogram (08.28.20 @ 14:25) >\par Summary:\par 1. LV Ejection Fraction by Plaza's Method with a biplane EF of 67 %.\par 2. Normal left ventricular size and wall thicknesses, with normal systolic and\par diastolic function.\par 3. The mean global longitudinal peak strain by speckle tracking is -22.0%\par which is normal.\par 4. Mild mitral valve regurgitation.\par 5. Estimated pulmonary artery systolic pressure is 39.9 mmHg assuming a right\par atrial pressure of 8 mmHg, which is consistent with borderline pulmonary\par hypertension.\par 6. Color flow doppler and intravenous injection of agitated saline\par demonstrates the presence of an intact intra atrial septum.\par 7. LA volume Index is 25.5 ml/m? ml/m2.\par \par MEDICATIONS (STANDING):\par aspirin chewable 81 milliGRAM(s) Oral daily\par atorvastatin 80 milliGRAM(s) Oral at bedtime\par chlorhexidine 4% Liquid 1 Application(s) Topical <User Schedule>\par heparin Injectable 5000 Unit(s) SubCutaneous every 8 hours\par pantoprazole Suspension 40 milliGRAM(s) Oral daily\par polyethylene glycol 3350 17 Gram(s) Oral daily\par senna 2 Tablet(s) Oral at bedtime\par sodium chloride 0.9%. 1000 milliLiter(s) (60 mL/Hr) IV Continuous <Continuous>\par sodium chloride 0.9%. 1000 milliLiter(s) (75 mL/Hr) IV Continuous <Continuous>\par \par 10. PT/OT/Speech/Rehab/S&Sw/ Cognitive eval results and recommendations:\par Complete\par \par 11. Exam:\par \par Vital Signs Last 24 Hrs\par T(C): 36.4 (09-01-20 @ 13:50), Max: 36.4 (08-31-20 @ 18:51)\par T(F): 97.6 (09-01-20 @ 13:50), Max: 97.6 (09-01-20 @ 13:50)\par HR: 51 (09-01-20 @ 16:54) (50 - 97)\par BP: 137/66 (09-01-20 @ 16:54) (114/59 - 145/66)\par BP(mean): --\par RR: 18 (09-01-20 @ 16:54) (18 - 18)\par SpO2: 97% (09-01-20 @ 16:54) (96% - 97%)\par \par 12. Neurologic Exam:\par Mental status: Awake, alert and oriented x 3. Attention and concentration\par intact.\par Language: Follows all commands. Naming, repetition, fluency, and comprehension\par intact. Resolved dysarthria but continues to have aphasia.\par Cranial nerves: B/l pupils equally round and reactive to light, visual fields\par full, no nystagmus, extraocular muscles intact, V1 through V3 intact\par bilaterally and symmetric, face symmetric, hearing intact to finger rub, palate\par elevation symmetric, tongue was midline, sternocleidomastoid/shoulder shrug\par strength bilaterally 5/5.\par Motor: No drifting in all extremities. Normal bulk and tone, strength 5/5 in\par bilateral upper and lower extremities.  strength 5/5.\par Sensation: Intact to light touch, proprioception, and pinprick. No neglect.\par Coordination: No dysmetria on finger-to-nose and heel-to-shin.\par Gait: Steady\par \par Ext: no c/c/e, 2+ pulses throughout, no right groin hematoma, + ecchymosis\par \par NIH STROKE SCALE\par Item Score\par 1 a. Level of Consciousness 0\par 1 b. LOC Questions 0\par 1 c. LOC Commands 0\par 2. Best Gaze 0\par 3. Visual 0\par 4. Facial Palsy 0\par 5 a. Motor Arm - Left 0\par 5 b. Motor Arm - Right 0\par 6 a. Motor Leg - Left 0\par 6 b. Motor Leg - Right 0\par 7. Limb Ataxia 0\par 8. Sensory 0\par 9. Language 1\par 10. Dysarthria 0\par 11. Extinction and Inattention 0\par __________________________________________________________________________\par TOTAL 1\par \par NIHSS Initial: 20 NIHSS Yesterday: 1 NIHSS Today: 1\par \par mRS: baseline 0-> 1 No significant disability despite symptoms; able to carry\par out all usual duties and activities without assistance\par

## 2020-10-06 NOTE — PHYSICAL EXAM
[FreeTextEntry1] : Neurologic Exam:\par \par Mental status: Awake, alert and oriented x 4. Recent and remote memory intact. \par Language: Follows all commands. Naming, repetition and comprehension intact. Attention/concentration intact. + mild dysarthria, no aphasia.  Fund of knowledge appropriate.  \par Cranial nerves: Pupils equally round and reactive to light, visual fields full to finger counting, no nystagmus, EOMI, face symmetric.\par Motor: No drifting in all extremities. Normal bulk and tone, strength 5/5 in bilateral upper and lower extremities.   strength 5/5. Mild right hand tremors\par Sensation: Intact to light touch.  No neglect. \par Coordination: No dysmetria on finger-to-nose and heel-to-shin.\par Reflexes: 2+ in upper and lower extremities\par Gait: Steady without any assistive device\par \par Ext: no c/c/e\par \par mRS: baseline 0 -> 1 No significant disability despite symptoms; able to carry out all usual duties and activities without assistance\par \par NIH STROKE SCALE\par \par Item	                                                        Score\par 1 a.	Level of Consciousness	            0\par 1 b.          LOC Questions	                            0\par 1 c.	LOC Commands	                            0\par 2.	Best Gaze	                            0\par 3.	Visual	                                            0\par 4.	Facial Palsy	                            0\par 5 a.	Motor Arm - Left	                            0\par 5 b.	Motor Arm - Right	                            0\par 6 a.	Motor Leg - Left	                            0\par 6 b.	Motor Leg - Right	                            0\par 7.	Limb Ataxia	                            0\par 8.	Sensory	                                            0\par 9.	Language	                            0\par 10.	Dysarthria	                            1\par 11.	Extinction and Inattention  	            0\par ___________________________________________\par TOTAL	                                                            1\par

## 2020-10-06 NOTE — PHYSICAL EXAM
[FreeTextEntry1] : a+Ox3 \par some difficulty with naming and calculation\par CN 2-12 normal\par No drift power 5/5\par Temp, Vib, LT symmetric\par No neglect\par FTN NL with tremor in both hands\par Gait normal\par \par NIH STROKE SCALE\par Item Score\par 1 a. Level of Consciousness 0\par 1 b. LOC Questions 0\par 1 c. LOC Commands 0\par 2. Best Gaze 0\par 3. Visual 0\par 4. Facial Palsy 0\par 5 a. Motor Arm - Left 0\par 5 b. Motor Arm - Right 0\par 6 a. Motor Leg - Left 0\par 6 b. Motor Leg - Right 0\par 7. Limb Ataxia 0\par 8. Sensory 0\par 9. Language 1\par 10. Dysarthria 0\par 11. Extinction and Inattention 0\par __________________________________________________________________________\par TOTAL 1\par \par mRankin 1

## 2020-10-06 NOTE — ASSESSMENT
[FreeTextEntry1] : A 69 years old man with no significant pmhx presents to clinic today for follow up post hospitalization for left MCA syndrome and he underwent s/p emergent IA mechanical & pharmacologic  thrombectomy of left ADEOLA/MCA on 8/27/2020 TICI 0 to 3. We reviewed imaging with patient and informed him of complete recanalization of few and partial recanalization of two other completely occluded mid-branches of the left MCA and left ADEOLA; these occlusive objects were likely distally migrated and fragmented. Today, he is with significant neurological improvement. He has followed up in Stroke Clinic prior to our visit. He is with mild dysarthria and RUE tremors but no drifting noted with NIHSS 1. \par \par PLAN:\par - Follow up Stroke clinic for stroke management\par - Medical management per PMD\par - Instructed patient to call 911 or report to ED if any acute neurological changes occur\par Will sign off\par \par

## 2020-10-06 NOTE — HISTORY OF PRESENT ILLNESS
[FreeTextEntry1] : Mr. Biggs is a 69 years old man with no significant pmhx presents to clinic today for follow up post hospitalization after he underwent s/p emergent IA mechanical thrombectomy of left ADEOLA/MCA on 8/27/2020 TICI 0 to 3. He initially presented with LMCA syndrome, initial NIHSS 20, received IV-tPA. CTP/CTA at time reports of multifocal occlusion of the left M2/M3 branches, large area of ischemic within the left MCA territory measuring 172 cc and core infarct of 52 cc (mismatch volume/penumbra of 120 cc), therefore he underwent s/p emergent IA mechanical thrombectomy and with complete recanalization of few and partial recanalization of two other completely occluded mid-branches of the left MCA and left ADEOLA. These occlusive objects were likely distally migrated and fragmented. Today, he is with significant neurological improvement. He has followed up in Stroke Clinic prior to our visit. He is with mild dysarthria and RUE tremors but no drifting noted. \par

## 2020-10-07 ENCOUNTER — NON-APPOINTMENT (OUTPATIENT)
Age: 70
End: 2020-10-07

## 2020-10-07 ENCOUNTER — APPOINTMENT (OUTPATIENT)
Dept: CARDIOLOGY | Facility: CLINIC | Age: 70
End: 2020-10-07
Payer: MEDICARE

## 2020-10-07 DIAGNOSIS — Z80.1 FAMILY HISTORY OF MALIGNANT NEOPLASM OF TRACHEA, BRONCHUS AND LUNG: ICD-10-CM

## 2020-10-07 DIAGNOSIS — Z82.49 FAMILY HISTORY OF ISCHEMIC HEART DISEASE AND OTHER DISEASES OF THE CIRCULATORY SYSTEM: ICD-10-CM

## 2020-10-07 DIAGNOSIS — Z82.5 FAMILY HISTORY OF ASTHMA AND OTHER CHRONIC LOWER RESPIRATORY DISEASES: ICD-10-CM

## 2020-10-07 DIAGNOSIS — Z45.09 ENCOUNTER FOR ADJUSTMENT AND MANAGEMENT OF OTHER CARDIAC DEVICE: ICD-10-CM

## 2020-10-07 LAB
AT III PPP CHRO-ACNC: 107 %
CHOLEST SERPL-MCNC: 162 MG/DL
CHOLEST/HDLC SERPL: 2.2 RATIO
HDLC SERPL-MCNC: 75 MG/DL
LDLC SERPL CALC-MCNC: 74 MG/DL
LUPUS ANTICOAGULANT CASCADE REFLEX: NORMAL
PROT S AG ACT/NOR PPP IA: 106 %
TRIGL SERPL-MCNC: 72 MG/DL

## 2020-10-07 PROCEDURE — 99213 OFFICE O/P EST LOW 20 MIN: CPT

## 2020-10-07 PROCEDURE — 93291 INTERROG DEV EVAL SCRMS IP: CPT

## 2020-10-07 NOTE — PHYSICAL EXAM
[General Appearance - Well Developed] : well developed [Heart Rate And Rhythm] : heart rate and rhythm were normal [] : no respiratory distress [Murmurs] : no murmurs present [Edema] : no peripheral edema present [Abdomen Soft] : soft [Clean] : clean [Dry] : dry [Well-Healed] : well-healed [Erythema] : not erythematous [FreeTextEntry1] : Neurologic Exam:\par \par Mental status: Awake, alert and oriented x 4. Recent and remote memory intact. \par Language: Follows all commands. Naming, repetition and comprehension intact. Attention/concentration intact. + mild dysarthria, no aphasia.  Fund of knowledge appropriate.  \par Cranial nerves: Pupils equally round and reactive to light, visual fields full to finger counting, no nystagmus, EOMI, face symmetric.\par Motor: No drifting in all extremities. Normal bulk and tone, strength 5/5 in bilateral upper and lower extremities.   strength 5/5. Mild right hand tremors\par Sensation: Intact to light touch.  No neglect. \par Coordination: No dysmetria on finger-to-nose and heel-to-shin.\par Reflexes: 2+ in upper and lower extremities\par Gait: Steady without any assistive device\par \par Ext: no c/c/e\par \par mRS: baseline 0 -> 1 No significant disability despite symptoms; able to carry out all usual duties and activities without assistance\par \par NIH STROKE SCALE\par \par Item	                                                        Score\par 1 a.	Level of Consciousness	            0\par 1 b.          LOC Questions	                            0\par 1 c.	LOC Commands	                            0\par 2.	Best Gaze	                            0\par 3.	Visual	                                            0\par 4.	Facial Palsy	                            0\par 5 a.	Motor Arm - Left	                            0\par 5 b.	Motor Arm - Right	                            0\par 6 a.	Motor Leg - Left	                            0\par 6 b.	Motor Leg - Right	                            0\par 7.	Limb Ataxia	                            0\par 8.	Sensory	                                            0\par 9.	Language	                            0\par 10.	Dysarthria	                            1\par 11.	Extinction and Inattention  	            0\par ___________________________________________\par TOTAL	                                                            1\par

## 2020-10-07 NOTE — HISTORY OF PRESENT ILLNESS
[FreeTextEntry1] : Mr. Biggs is a 69 years old retired  with no significant medical history, he was admitted in [facility] on 8/27 thru ED for CVA. CTH showed Left temporal CVA, CTA showed multi infarct of Left MCA. He received TPA and  mechanical thrombectomy. An ILR was implanted by EP on 9/2/2020\par \par presents for device interrogation and incision check\par \par Denies any sob, MACEDO, PND, orthopnea, no palpitations, no dizziness,lightheadedness, denies chest pains\par No aphasia, denies weakness, gait steady\par \par ECG ( 10/7/2020) 51 bpm sinus silvia TX 150ms, QRS 112ms, QTC 413ms\par JANET ( 9/2/2020) no LA thrombus, intact intra atrial septum\par TTE ( 8/28/2020) EF 67%

## 2020-10-07 NOTE — ASSESSMENT
[FreeTextEntry1] : A 69 years old man with no significant pmhx , presented to ED for stroke on 8/27/2020;  Left MCA syndrome , he underwent  emergent IA mechanical & pharmacologic  thrombectomy of left ADEOLA/MCA on 8/27/2020. EP was consulted for ILR placement on 9/2/2020 for long term arrhythmia monitoring.\par \par Cryptogenic stroke s/p ILR implant\par Device was interrogated showed no event\par \par Incision site healed well\par \par On remote monitor\par Remote monitoring was discussed with patient, schedule, process,  as well as associated co-pay that may not covered by their insurance.\par \par RTO in 6 months with Dr. Fleming\par I have also advised the patient to go to the nearest emergency room if he experiences any chest pain, dyspnea, syncope, or has any other compelling symptoms.\par \par \par

## 2020-10-07 NOTE — PROCEDURE
[No] : not [NSR] : normal sinus rhythm [Longevity: ___ months] : The estimated remaining battery life is [unfilled] months [de-identified] : [employer]VE[employer] LINQ [de-identified] : [id number] [de-identified] : [id number] [de-identified] : 9/2/2020 [de-identified] : Battery is good. \par No events detected.

## 2020-10-08 LAB — CARDIOLIPIN AB SER IA-ACNC: POSITIVE

## 2020-10-09 ENCOUNTER — OUTPATIENT (OUTPATIENT)
Dept: OUTPATIENT SERVICES | Facility: HOSPITAL | Age: 70
LOS: 1 days | Discharge: HOME | End: 2020-10-09

## 2020-10-09 DIAGNOSIS — I69.00 UNSPECIFIED SEQUELAE OF NONTRAUMATIC SUBARACHNOID HEMORRHAGE: ICD-10-CM

## 2020-10-09 DIAGNOSIS — I69.351 HEMIPLEGIA AND HEMIPARESIS FOLLOWING CEREBRAL INFARCTION AFFECTING RIGHT DOMINANT SIDE: ICD-10-CM

## 2020-10-09 DIAGNOSIS — F43.20 ADJUSTMENT DISORDER, UNSPECIFIED: ICD-10-CM

## 2020-10-09 DIAGNOSIS — Z94.5 SKIN TRANSPLANT STATUS: Chronic | ICD-10-CM

## 2020-10-09 DIAGNOSIS — I69.320 APHASIA FOLLOWING CEREBRAL INFARCTION: ICD-10-CM

## 2020-10-09 DIAGNOSIS — Z96.643 PRESENCE OF ARTIFICIAL HIP JOINT, BILATERAL: Chronic | ICD-10-CM

## 2020-10-09 DIAGNOSIS — Z90.89 ACQUIRED ABSENCE OF OTHER ORGANS: Chronic | ICD-10-CM

## 2020-10-09 LAB — PROT C PPP CHRO-ACNC: 82 %

## 2020-10-12 LAB — PROT S FREE PPP-ACNC: 99 % NORMAL

## 2020-10-13 ENCOUNTER — INPATIENT (INPATIENT)
Facility: HOSPITAL | Age: 70
LOS: 6 days | Discharge: NURSING, REHAB OR LONG-TERM CARE | End: 2020-10-20
Attending: INTERNAL MEDICINE | Admitting: INTERNAL MEDICINE
Payer: MEDICARE

## 2020-10-13 VITALS
OXYGEN SATURATION: 96 % | DIASTOLIC BLOOD PRESSURE: 69 MMHG | SYSTOLIC BLOOD PRESSURE: 148 MMHG | RESPIRATION RATE: 18 BRPM | HEART RATE: 79 BPM

## 2020-10-13 DIAGNOSIS — Z94.5 SKIN TRANSPLANT STATUS: Chronic | ICD-10-CM

## 2020-10-13 DIAGNOSIS — Z90.89 ACQUIRED ABSENCE OF OTHER ORGANS: Chronic | ICD-10-CM

## 2020-10-13 DIAGNOSIS — Z96.643 PRESENCE OF ARTIFICIAL HIP JOINT, BILATERAL: Chronic | ICD-10-CM

## 2020-10-13 LAB
ALBUMIN SERPL ELPH-MCNC: 4.2 G/DL — SIGNIFICANT CHANGE UP (ref 3.5–5.2)
ALP SERPL-CCNC: 75 U/L — SIGNIFICANT CHANGE UP (ref 30–115)
ALT FLD-CCNC: 17 U/L — SIGNIFICANT CHANGE UP (ref 0–41)
ANION GAP SERPL CALC-SCNC: 10 MMOL/L — SIGNIFICANT CHANGE UP (ref 7–14)
APTT BLD: 31.6 SEC — SIGNIFICANT CHANGE UP (ref 27–39.2)
AST SERPL-CCNC: 24 U/L — SIGNIFICANT CHANGE UP (ref 0–41)
BASOPHILS # BLD AUTO: 0.05 K/UL — SIGNIFICANT CHANGE UP (ref 0–0.2)
BASOPHILS NFR BLD AUTO: 0.8 % — SIGNIFICANT CHANGE UP (ref 0–1)
BILIRUB SERPL-MCNC: 0.2 MG/DL — SIGNIFICANT CHANGE UP (ref 0.2–1.2)
BLD GP AB SCN SERPL QL: SIGNIFICANT CHANGE UP
BUN SERPL-MCNC: 17 MG/DL — SIGNIFICANT CHANGE UP (ref 10–20)
CALCIUM SERPL-MCNC: 9.6 MG/DL — SIGNIFICANT CHANGE UP (ref 8.5–10.1)
CHLORIDE SERPL-SCNC: 103 MMOL/L — SIGNIFICANT CHANGE UP (ref 98–110)
CHOLEST SERPL-MCNC: 149 MG/DL — SIGNIFICANT CHANGE UP (ref 100–200)
CO2 SERPL-SCNC: 28 MMOL/L — SIGNIFICANT CHANGE UP (ref 17–32)
CREAT SERPL-MCNC: 1.1 MG/DL — SIGNIFICANT CHANGE UP (ref 0.7–1.5)
EOSINOPHIL # BLD AUTO: 0.3 K/UL — SIGNIFICANT CHANGE UP (ref 0–0.7)
EOSINOPHIL NFR BLD AUTO: 4.8 % — SIGNIFICANT CHANGE UP (ref 0–8)
GLUCOSE SERPL-MCNC: 116 MG/DL — HIGH (ref 70–99)
HCT VFR BLD CALC: 42.1 % — SIGNIFICANT CHANGE UP (ref 42–52)
HDLC SERPL-MCNC: 71 MG/DL — SIGNIFICANT CHANGE UP
HGB BLD-MCNC: 14.4 G/DL — SIGNIFICANT CHANGE UP (ref 14–18)
IMM GRANULOCYTES NFR BLD AUTO: 0.2 % — SIGNIFICANT CHANGE UP (ref 0.1–0.3)
INR BLD: 1.03 RATIO — SIGNIFICANT CHANGE UP (ref 0.65–1.3)
LIPID PNL WITH DIRECT LDL SERPL: 65 MG/DL — SIGNIFICANT CHANGE UP (ref 4–129)
LYMPHOCYTES # BLD AUTO: 1.95 K/UL — SIGNIFICANT CHANGE UP (ref 1.2–3.4)
LYMPHOCYTES # BLD AUTO: 30.9 % — SIGNIFICANT CHANGE UP (ref 20.5–51.1)
MCHC RBC-ENTMCNC: 33 PG — HIGH (ref 27–31)
MCHC RBC-ENTMCNC: 34.2 G/DL — SIGNIFICANT CHANGE UP (ref 32–37)
MCV RBC AUTO: 96.6 FL — HIGH (ref 80–94)
MONOCYTES # BLD AUTO: 0.58 K/UL — SIGNIFICANT CHANGE UP (ref 0.1–0.6)
MONOCYTES NFR BLD AUTO: 9.2 % — SIGNIFICANT CHANGE UP (ref 1.7–9.3)
NEUTROPHILS # BLD AUTO: 3.42 K/UL — SIGNIFICANT CHANGE UP (ref 1.4–6.5)
NEUTROPHILS NFR BLD AUTO: 54.1 % — SIGNIFICANT CHANGE UP (ref 42.2–75.2)
NRBC # BLD: 0 /100 WBCS — SIGNIFICANT CHANGE UP (ref 0–0)
PLATELET # BLD AUTO: 211 K/UL — SIGNIFICANT CHANGE UP (ref 130–400)
POTASSIUM SERPL-MCNC: 4 MMOL/L — SIGNIFICANT CHANGE UP (ref 3.5–5)
POTASSIUM SERPL-SCNC: 4 MMOL/L — SIGNIFICANT CHANGE UP (ref 3.5–5)
PROT SERPL-MCNC: 6.8 G/DL — SIGNIFICANT CHANGE UP (ref 6–8)
PROTHROM AB SERPL-ACNC: 11.9 SEC — SIGNIFICANT CHANGE UP (ref 9.95–12.87)
RBC # BLD: 4.36 M/UL — LOW (ref 4.7–6.1)
RBC # FLD: 11.2 % — LOW (ref 11.5–14.5)
SODIUM SERPL-SCNC: 141 MMOL/L — SIGNIFICANT CHANGE UP (ref 135–146)
TOTAL CHOLESTEROL/HDL RATIO MEASUREMENT: 2.1 RATIO — LOW (ref 4–5.5)
TRIGL SERPL-MCNC: 113 MG/DL — SIGNIFICANT CHANGE UP (ref 10–149)
TROPONIN T SERPL-MCNC: <0.01 NG/ML — SIGNIFICANT CHANGE UP
WBC # BLD: 6.31 K/UL — SIGNIFICANT CHANGE UP (ref 4.8–10.8)
WBC # FLD AUTO: 6.31 K/UL — SIGNIFICANT CHANGE UP (ref 4.8–10.8)

## 2020-10-13 PROCEDURE — 99291 CRITICAL CARE FIRST HOUR: CPT

## 2020-10-13 PROCEDURE — 70450 CT HEAD/BRAIN W/O DYE: CPT | Mod: 26

## 2020-10-13 PROCEDURE — 99223 1ST HOSP IP/OBS HIGH 75: CPT

## 2020-10-13 PROCEDURE — 71045 X-RAY EXAM CHEST 1 VIEW: CPT | Mod: 26

## 2020-10-13 PROCEDURE — 93010 ELECTROCARDIOGRAM REPORT: CPT

## 2020-10-13 PROCEDURE — 0042T: CPT

## 2020-10-13 RX ORDER — SODIUM CHLORIDE 9 MG/ML
1000 INJECTION, SOLUTION INTRAVENOUS ONCE
Refills: 0 | Status: COMPLETED | OUTPATIENT
Start: 2020-10-13 | End: 2020-10-13

## 2020-10-13 RX ADMIN — Medication 2 MILLIGRAM(S): at 23:24

## 2020-10-13 RX ADMIN — SODIUM CHLORIDE 1000 MILLILITER(S): 9 INJECTION, SOLUTION INTRAVENOUS at 23:09

## 2020-10-13 NOTE — H&P ADULT - ASSESSMENT
69 years old M with PMHx  emergent IA mechanical thrombectomy of left ADEOLA/MCA on 8/27/2020 presenting with R sided facial droop, weakness, and dysarthria.    IMPRESSION:    L MCA CVA s/p IV TPA/IA thrombectomy   Acute recurrent CVA vs hypoperfusion vs simple partial seizure    PLAN:    CNS: f/u CTA final read, benzodiazepines, fluid resuscitation, may need to start  Integrilin gtt.    HEENT:  Oral care    PULMONARY:  HOB @ 45 degrees    CARDIOVASCULAR:  EKG, CE x 2    GI: GI prophylaxis         Feeding     RENAL:  F/u  lytes.  Correct as needed. accurate I/O    INFECTIOUS DISEASE: off abx     HEMATOLOGICAL:  DVT prophylaxis.    ENDOCRINE:  Follow up FS.  Insulin protocol if needed.    MUSCULOSKELETAL: Early Ambulation     CODE STATUS: FULL CODE    DISPOSITION: Pt requires continued monitoring in the MICU       69 years old M with PMHx  emergent IA mechanical thrombectomy of left ADEOLA/MCA on 8/27/2020 presenting with R sided facial droop, weakness, and dysarthria.    IMPRESSION:    L MCA CVA s/p IV TPA/IA thrombectomy   Acute recurrent MCA CVA  vs hypoperfusion vs simple partial seizure    PLAN:    CNS: Q1 neuro checks,  Integrilin gtt 1 mcg/kg/min, Lipitor 80 mg HS,  repeat CT tomorrow evening, repeat CTA 24-48 hrs post Integrilin initiation, Brain MRI, Keep HOB near flat, Neuro follow up     HEENT:  Oral care    PULMONARY:  HOB almost flat     CARDIOVASCULAR: IVF to keep -180,  EKG, CE x 2, EP  for loop recorder interrogation , TTE     GI: GI prophylaxis , NPO till Speech and Swallow eval     RENAL:  F/u  lytes.  Goal sodium 140-145,  Correct as needed. Accurate I/O    INFECTIOUS DISEASE: off abx     HEMATOLOGICAL:  DVT prophylaxis.    ENDOCRINE:  Follow up FS.  Insulin protocol if needed.    MUSCULOSKELETAL: Early Ambulation, PT/OT    CODE STATUS: FULL CODE    DISPOSITION: Pt requires continued monitoring in the MICU

## 2020-10-13 NOTE — ED ADULT NURSE NOTE - OBJECTIVE STATEMENT
Pre note, BIBA for right side deficits and aphasia. Pt has recent hx of CVA with TPA and IR intervention.

## 2020-10-13 NOTE — CONSULT NOTE ADULT - SUBJECTIVE AND OBJECTIVE BOX
Neurocritical Care Consult Note:    1. Brief Presentation: Code Stroke, L MCA syndrome    2. Today's Acute Problems:    - Recurrent L MCA stroke due to M2/M3 occlusion    3. Relevant brief History:    This is a 70 yo M who had a L MCA stroke about 6 weeks ago and was treated here and underwent L MCA thrombectomy and IV tPA. He presents tonight due to recurrent L MCA syndrome, viz. aphasia, gaze preference, and right hemiparesis that started around 9 pm. He was a pre-notification by EMS. Code NI alert was called upon arrival due to the presence of cortical signs consistent with large vessel occlusion. He was not a tPA candidate given his recent large stroke. His discharge NIHSS from the last hospital stay was 1 for mild aphasia only.    Recent stroke work up including a negative JANET and ILR placement    4-Yesterday's Plan: N/A    5. Last 24 hour updates:    6. Medications:   eptifibatide Infusion 75 mg/100 mL 1 MICROgram(s)/kG/Min IV Continuous <Continuous>  eptifibatide Injectable 7900 MICROGram(s) IV Push once      7. Ancillary Management:   Chest PT[ ]   Head of bed >35 [ ]   Out of bed to chair [ ]   PT/OT/SP Eval [x]   Spirometry[ ]   DVT prophylaxis [x]    8. Neurologic Examination:  Mentation: Awake, alert, oriented to person, place, and time (when provided options). Severe Broca's aphasia is present. Some evidence of ideational apraxia (patient used a pen like a cigarette when asked how to demonstrate its use, but correctly demonstrated the use of a telephone). Follows simple, one part commands intermittently. Mild right neglect was present.  Cranial Nerves:  	II – ? Suspected right homonymous hemianopia (though difficult to precisely assess because patient keeps 'cheating')  	III/IV/VI – Pupils 3 mm. PERRL. Left gaze preference.  	V – Grossly intact sensation.  	VII – Facial palsy is present.  	VIII – No nystagmus.  	IX/X – Symmetric palate rise. Uvula midline.  	XI – Hemiplegic R SCM.  	XII – Tongue protrusion midline.  Motor: Right hemiplegia, 0/5 power in arm and leg.   Sensory: Severely diminished right hemisensory loss  Reflexes: 2+ generally.  Cerebellum: No dysmetria. Gait deferred.    After about 30 minutes, several of these deficits improved, such as the gaze preference, right leg power, sensory deficit, and neglect. The aphasia remains quite severe.    NIH STROKE SCALE  Item	                                                        Score  1 a.	Level of Consciousness	               	0  1 b. LOC Questions	                                2  1 c.	LOC Commands	                               	1  2.	Best Gaze	                                        1  3.	Visual	                                                2  4.	Facial Palsy	                                        2  5 a.	Motor Arm - Left	                                0  5 b.	Motor Arm - Right	                        4  6 a.	Motor Leg - Left	                                0  6 b.	Motor Leg - Right	                                4  7.	Limb Ataxia	                                        0  8.	Sensory	                                                2  9.	Language	                                        2  10.	Dysarthria	                                        2  11.	Extinction and Inattention  	        1  ______________________________________  TOTAL	                                                        23 (initial presentation) ---> 18 (NIHSS improved to 18 after about 30 minutes)      mRS:  0 No symptoms at all  1 No significant disability despite symptoms; able to carry out all usual duties and activities without assistance  2 Slight disability; unable to carry out all previous activities, but able to look after own affairs  3 Moderate disability; requiring some help, but able to walk without assistance  4 Moderately severe disability; unable to walk without assistance and unable to attend to own bodily needs without assistance  5 Severe disability; bedridden, incontinent and requiring constant nursing care and attention  6 Dead    Last CTH:    < from: CT Head No Cont (09.01.20 @ 13:34) >  IMPRESSION:  Evolving acute left MCA territory infarct with minimal petechial hemorrhage noted in frontoparietal region. No significant mass effect or midline shift.    < end of copied text >    Last CTA/MRA:    10.13.20 @ 22:50  CTA neck:  The aortic arch, origin of the great vessels and subclavian arteries are unremarkable.    The bilateral common carotid arteries and internal carotid arteries are unremarkable without significant stenosis seen.    The vertebral arteries are codominant. No significant vertebral artery stenosis is noted.    CTA head:  The distal segments of the internal carotid arteries are unremarkable. There are multifocal occlusions of the left MCA branches: superior M2 and M3 branches (series 4, image [id number]). The right ADEOLA and MCA are patent. The left ADEOLA is patent.    The distal vertebral arteries, basilar artery and posterior cerebral arteries are patent.    Last CTP:    < from: CT Perfusion w/ Maps w/ IV Cont (10.13.20 @ 22:50) >  Findings:      CT perfusion:  The cerebral blood volume and time to peak images demonstrate increased increased time to peak in the left MCA territory measuring 113 cc. There is a decrease in total cerebral blood flow volume of 12 cc in the left MCA territory. There is a mismatch volume of101 cc suggesting ischemic penumbra.    Other:  Aeration of wedge-shaped area of hypoattenuation within the left MCA territory, consistent with prior infarct. Multilevel degenerative changes of the cervical spine. Trace retrolisthesis of C2 on C3. Mucosal thickening of bilateral ethmoid sinuses.    IMPRESSION:    1- Occlusion of the left superior division of the MCA  (602/93-88)    2-occlusion of the left M3 branch of the MCA, leading to the area of chronic infarct      < end of copied text >    Last MRI:    Last TCD:    Last EEG:    EVD: [ ] [place]: [ ]     ICP:     CPP:     Level(cm):     24hr(ml):     CSF:     W:     R:     C:     P:     G:     LA:    9. Cardiovascular:   Vital Signs Last 24 Hrs  T(C): --  T(F): --  HR: 80 (13 Oct 2020 23:08) (68 - 109)  BP: 142/71 (13 Oct 2020 23:08) (123/77 - 151/53)  BP(mean): --  RR: 18 (13 Oct 2020 23:08) (18 - 20)  SpO2: 97% (13 Oct 2020 23:08) (96% - 100%)     Last Echo:    Last EKG:  · EKG Date/Time: 13-Oct-2020 23:22  · Rate: 73   · Interpretation: normal sinus rhythm   · [place]: 174  · QRS: 108  · ST/T Wave: no sedrick or depressions  · Other Findings: Qtc 451 ms, sinus arrythmia      CVP   MAP/CPP/SBP target:   CO:      CI:       Enzymes/Trop:    10. Respiratory:   ABG:    VBG:    Chest Xray: Clear        Peak Pressure/Victor Pressure:    11. GI:    Trophallaxis     Bowel mvt:     Abd distension:   LIVER FUNCTIONS - ( 13 Oct 2020 22:04 )  Alb: 4.2 g/dL / Pro: 6.8 g/dL / ALK PHOS: 75 U/L / ALT: 17 U/L / AST: 24 U/L / GGT: x             12.Renal/Fluids/Electrolytes:    10-13    141  |  103  |  17  ----------------------------<  116<H>  4.0   |  28  |  1.1    Ca    9.6      13 Oct 2020 22:04    TPro  6.8  /  Alb  4.2  /  TBili  0.2  /  DBili  x   /  AST  24  /  ALT  17  /  AlkPhos  75  10-13      I&O's Detail      13.ID:   TMax:   Vital Signs Last 24 Hrs  T(C): --  T(F): --  HR: 80 (13 Oct 2020 23:08) (68 - 109)  BP: 142/71 (13 Oct 2020 23:08) (123/77 - 151/53)  BP(mean): --  RR: 18 (13 Oct 2020 23:08) (18 - 20)  SpO2: 97% (13 Oct 2020 23:08) (96% - 100%)           Lines: Central[] Date inserted: Peripheral[]    14. Hematology:                         14.4   6.31  )-----------( 211      ( 13 Oct 2020 22:04 )             42.1      PT/INR - ( 13 Oct 2020 22:04 )   PT: 11.90 sec;   INR: 1.03 ratio         PTT - ( 13 Oct 2020 22:04 )  PTT:31.6 sec    DVT Prophylaxis Lovenox[ ] Heparin[x] Venodynes[ ] SCD's[x]    15. Impression:    - Recurrent L MCA stroke, due to M2/M3 occlusion      Code stroke course:    Given the CTP indicating perfusion mismatch consistent with LVO, but initial report of CTA as no vascular occlusion, hypoperfusion in setting of low normal BP (SBP 110s) was considered. A differential diagnosis of focal seizure was also entertained (CTP can at times be a 'poor man's EEG) given that he has cortical stroke and post-stroke seizure is a known, not uncommon, clinical entity. A trial dose of Ativan was given with no appreciated improvement. Patient was placed in reverse Trendelenburg. His blood pressure was augmented with IV fluids given by pressure bag. SBP increased from 110 to 142 and the patient demonstrated some improvement in his RLE motor function, and very mild improvement in aphasia.    16. Suggestions:    NEURO:  - Q1 neuro checks  - Integrilin 90 mcg/kg IVP bolus x1 = 7,900 mcg  - Integrilin gtt 1 mcg/kg/min  - Lipitor 80 mg HS  - Would suggest repeat CT tomorrow evening  - Probably would need repeat CTA 24-48 hrs post Integrilin initiation, will decide tomorrow  - Brain MRI when able and if no contraindication  - Keep HOB near flat for now to promote circulation    The patient is not tPA candidate due to recent large stroke. He is also not an intervention candidate after extensive discussion with Dr. Helms.     CARDIAC:  - -180 to promote good circulation to at-risk tissue and hopefully minimize progression of penumbra to infarct core  - Please ask EP to interrogate loop recorder   - 2D echocardiogram    PULMONARY:  - Diligent secretions management since pt is in near recumbent position  - Suction Q2 prn, pulmonary toilet  - SaO2 >/= 92%, avoid hyperoxia    GI:  - NPO, most certainly would fail swallow eval for now  - GI ppx  - BM regimen  - Check official dysphagia eval; may need NG tube for early aggressive enteral nutrition    Endocrine:  - Recent lipid panel, A1c reviewed  - Maintain -180    Renal:  - Strict I&O  - Daily weight  - Please start IV fluids  - Maintain euvolemia  - Goal sodium 140-145 for now; please maintain as stroke territory may be large depending upon its evolution    Heme:  - VTE ppx  - Keep Hgb > 8    MSK:  - PT/OT/SLP eval and tx    17. Disposition: ICU

## 2020-10-13 NOTE — ED PROVIDER NOTE - PROGRESS NOTE DETAILS
Patient seen by me on arrival. NIHSS ~ 20. Code activated soon after my evaluation. Patient seen by me on arrival. NIHSS ~ 20. Code activated soon after my evaluation. Neurology NP at bedside, NIH alert called as pt was admitted on 8/27 for CVA and received TPA, due to this not candidate for tpa. ED Attending MANUEL Dinero  Pt returned from Ct, with similar exam, Neurology NP at bedside with telestroke on as well. ED Attending MANUEL Dinero NI actual alert called. ED Attending MANUEL Dinero  Spoke to Dr. Bruce, approved for ICU. ED Attending MNAUEL Dinero  Pt now moving RLE, able to say few words, Neurology NP reports fluids to increased BP, ativan for twitching if RLE, no intervention at this time as ct angio shows no blockage. will reassess. Neurology NP reports admission to ICU, ICU team aware of pt.

## 2020-10-13 NOTE — ED PROVIDER NOTE - CRITICAL CARE PROVIDED
consultation with other physicians/additional history taking/interpretation of diagnostic studies/direct patient care (not related to procedure)/documentation/consult w/ pt's family directly relating to pts condition

## 2020-10-13 NOTE — ED PROVIDER NOTE - ATTENDING CONTRIBUTION TO CARE
70 y/o m w/ pmhx of CVA diagnosed august 27 s/p TPA and neuro-intervention presents today, last seen normal at 9PM with R sided facial droop, weakness, and dysarthria. Prenotification CODE STROKE called in ed. no known fever,  v,  seizure like activity trauma. On previous admission: Pt has R side neurological deficits. CTH showed L temporal CVA, CTA multi-infarct of L MCA S/p tPA and neurointervention with tPA & re-cannalization of some branches but not all of L MCA via R groin. Follows with Dr. Alvarez and Dr. Helms.     on exam:   Constitutional: male pt sitting on stretcher with dysarthria and R sided weakness sitting on stretcher.   Skin: no rash, no signs of trauma:  HEENT: PERRL, EOM intact, mmm. No tongue deviation.   NECK and BACK: neck supple, no spinous ttp to neck or back, FROM, no palpable shelves or step offs, no meningeal signs.  CARDIO: regular rate, radial pulses 2/4 b/l, dp and pt pulses 2/4 b/l.  Lungs: Ctabl w/ breath sounds present b/l, no wheezing or crackles, gno accessory muscle use, no tachypnea, no stridor  ABD: BS present throughout all 4 quadrants, abd soft, nd, nt, no rebound tenderness or guarding, no cvat,;  EXT: FROM of LUE and LLE w/ no drift, no movement of RUE and RLE.   NEURO: Pt with dysarthria, when asked to use pen places in mouth to use as cigarette, knows how to use phone, nods yes when asked if this is a hospital. Unable to articulate. (+) R sided facial drop. NIH ~ 20.

## 2020-10-13 NOTE — STROKE CODE NOTE - NS AS ED ARRIVAL MODE
Ambulance from scene Pre-notification called at 2134, patient arrived several minutes later and was assessed at that time at 2145/Ambulance from scene

## 2020-10-13 NOTE — DOWNTIME INTERRUPTION NOTE - WHICH MANUAL FORMS INITIATED?
Assessment form completed. Patient is admitted to ICU for stroke. NI alert cancelled at this time. will be managed by MICU for care. SBP >140. NIH checks done q1h. will continue to monitor pt. Assessment form completed. Patient is admitted to ICU for stroke. NI alert cancelled at this time. will be managed by MICU for care. SBP >140. NIH checks done q1h.  see ED adult reassessment note for NIH score q1h. will continue to monitor pt.

## 2020-10-13 NOTE — ED PROVIDER NOTE - PHYSICAL EXAMINATION
CONSTITUTIONAL: Well-appearing; well-nourished; in no apparent distress.   EYES: PERRL; EOM intact.   CARDIOVASCULAR: Normal S1, S2; no murmurs, rubs, or gallops.   RESPIRATORY: Normal chest excursion with respiration; breath sounds clear and equal bilaterally; no wheezes, rhonchi, or rales.  GI/: Normal bowel sounds; non-distended; non-tender; no palpable organomegaly.   MS: No evidence of trauma or deformity to extremities.   SKIN: Normal for age and race; warm; dry; good turgor; no apparent lesions or exudate.   NEURO/PSYCH: A & O x 2; grossly unremarkable. right sided facial droop with right sided extremities (UE/LE) no movement. + dysarthria NIHSS - 18

## 2020-10-13 NOTE — ED PROVIDER NOTE - CLINICAL SUMMARY MEDICAL DECISION MAKING FREE TEXT BOX
pt presented with R sided weakness, CODE STROKE, NI alert. pt was recently admitted on 8/27 for CVA s/p tpa, and NI, returned today with similar symptoms, not a tpa candidate, neurology following including neurointerventional team, ICU team aware of pt and admission.

## 2020-10-13 NOTE — STROKE CODE NOTE - NIH STROKE SCALE: 4. FACIAL PALSY, QM
(3) Complete paralysis of one or both sides (absence of facial movement in the upper and lower face) (2) Partial paralysis (total or near-total paralysis of lower face)

## 2020-10-13 NOTE — ED PROVIDER NOTE - OBJECTIVE STATEMENT
70 yo male hx of GERD/ Recent CVA with residual dysphagia BIBA 2/2 stroke code. patient was getting into his bed around 9 pm and suddenly fell off. wife noticed right sided facial droop and right sided weakness so she called EMS. wife reported patient was doing well after last stroke and symptoms started tonight. patient denies HA/chest pain/ abd pain.

## 2020-10-13 NOTE — ED PROVIDER NOTE - PLAN OF CARE
Plan: CODE STROKE, Monitor, Pacer pads, ct head, EKG, CXR, labs, neurology consult, will continue to monitor and reassess.

## 2020-10-13 NOTE — CONSULT NOTE ADULT - ASSESSMENT
Assessment:  This is a 69y Male with h/o recent L MCA CVA s/p IV TPA/IA thrombectomy currently p/w L MCA syndrome suspicious for acute recurrent stroke vs hypoperfusion vs simple partial seizure.  Pt is not a candidate for thrombolysis with IV tPA with recent CVA within the last 3 months.  Pt is not candidate for IA TPA since recent stroke within same territory of current suspected stroke and increased hemorrhagic conversion risk.  Recommend f/u CTA results with Neuroradiology and if evidence of LVO, may be candidate for mechanical thrombectomy.  Discussed with Dr. Del Angel and Dr. Helms who will decide on possible intervention.  If no intervention planned and no response to benzodiazepines or fluid resuscitation, may consider integrilin gtt.

## 2020-10-13 NOTE — STROKE CODE NOTE - NIH STROKE SCALE: 10. DYSARTHRIA, QM
(1) Mild-to-moderate dysarthria; patient slurs at least some words and, at worst, can be understood with some difficulty (2) Severe dysarthria; patients speech is so slurred as to be unintelligible in the absence of or out of proportion to any dysphasia, or is mute/anarthric

## 2020-10-13 NOTE — ED ADULT NURSE REASSESSMENT NOTE - FACIAL SYMMETRY
symmetrical [Follow-Up: _____] : a [unfilled] follow-up visit [FreeTextEntry1] : CC: headache and leg numbness.

## 2020-10-13 NOTE — CONSULT NOTE ADULT - SUBJECTIVE AND OBJECTIVE BOX
***STROKE ALERT - TELESTROKE CONSULTATION  10-13-20 @ 22:41  Last known normal time:	  Consultant paged:	  Video start:	  Video end:	  Video total:	    This is a telehealth visit and the patient is being seen on 10/13/2020 using bi-directional audio/video at the request of Dr. Dinero at [facility].  	                                                     Chief Complaint: stroke alert    Last known normal time: 21:00    HPI: 68 yo RHM w/ h/o recent CVA in 8/27/20 with L MCA CVA s/p IV TPA w/ IA thrombectomy with residual L M2/M3 ischemia NIHSS 1 upon discharge.  Had ILR placed recently.  On ASA and lipitor developed acute aphasia with RHP witnessed by family LKNT 21:00.  Symptoms currently persistent.  Had been compliant with medications.  Recent workup for stroke negative.        PAST MEDICAL & SURGICAL HISTORY:  Cerebrovascular accident (CVA)    GERD (gastroesophageal reflux disease)    S/P tonsillectomy    H/O bilateral hip replacements    H/O skin graft  upper body  legs    FAMILY HISTORY:  No pertinent family history in first degree relatives    Social History: (-) x 3    Allergies    amoxicillin (Rash)    Intolerances    MEDICATIONS  (STANDING):  ASA, Lipitor    Review of systems:    Constitutional: No fever, weight loss or fatigue    Eyes: No eye pain or discharge  ENMT:  No difficulty hearing; No sinus or throat pain  Neck: No pain or stiffness  Respiratory: No cough, wheezing, chills or hemoptysis  Cardiovascular: No chest pain, palpitations, shortness of breath, dyspnea on exertion  Gastrointestinal: No abdominal pain, nausea, vomiting or hematemesis; No diarrhea or constipation.   Genitourinary: No dysuria, frequency, hematuria or incontinence  Neurological: As per HPI  Skin: No rashes or lesions   Endocrine: No heat or cold intolerance; No hair loss  Musculoskeletal: No joint pain or swelling  Psychiatric: No depression, anxiety, mood swings  Heme/Lymph: No easy bruising or bleeding gums    Vital Signs Last 24 Hrs  T(C): --  T(F): --  HR: 109 (13 Oct 2020 22:30) (68 - 109)  BP: 151/53 (13 Oct 2020 22:30) (136/76 - 151/53)  BP(mean): --  RR: 20 (13 Oct 2020 22:30) (20 - 20)  SpO2: 97% (13 Oct 2020 22:30) (97% - 100%)    Video assisted telemedicine examination performed w/ RN and family at bedside.    NIH STROKE SCALE  Item	                                                        Score  1 a.	Level of Consciousness	                    0  1 b. LOC Questions	                                2  1 c.	LOC Commands	                                0  2.	Best Gaze	                                0  3.	Visual	                                            0  4.	Facial Palsy	                                2  5 a.	Motor Arm - Left	                    0  5 b.	Motor Arm - Right	                    4  6 a.	Motor Leg - Left	                    0  6 b.	Motor Leg - Right	                    2  7.	Limb Ataxia	                                2  8.	Sensory	                                            2  9.	Language	                                2  10.	Dysarthria	                                2  11.	Extinction and Inattention  	        0  ______________________________________  TOTAL	                                                        18    Labs:   CBC Full  -  ( 13 Oct 2020 22:04 )  WBC Count : 6.31 K/uL  RBC Count : 4.36 M/uL  Hemoglobin : 14.4 g/dL  Hematocrit : 42.1 %  Platelet Count - Automated : 211 K/uL  Mean Cell Volume : 96.6 fL  Mean Cell Hemoglobin : 33.0 pg  Mean Cell Hemoglobin Concentration : 34.2 g/dL  Auto Neutrophil # : 3.42 K/uL  Auto Lymphocyte # : 1.95 K/uL  Auto Monocyte # : 0.58 K/uL  Auto Eosinophil # : 0.30 K/uL  Auto Basophil # : 0.05 K/uL  Auto Neutrophil % : 54.1 %  Auto Lymphocyte % : 30.9 %  Auto Monocyte % : 9.2 %  Auto Eosinophil % : 4.8 %  Auto Basophil % : 0.8 %    10-13    141  |  103  |  17  ----------------------------<  116<H>  4.0   |  28  |  1.1    Ca    9.6      13 Oct 2020 22:04    TPro  6.8  /  Alb  4.2  /  TBili  0.2  /  DBili  x   /  AST  24  /  ALT  17  /  AlkPhos  75  10-13    LIVER FUNCTIONS - ( 13 Oct 2020 22:04 )  Alb: 4.2 g/dL / Pro: 6.8 g/dL / ALK PHOS: 75 U/L / ALT: 17 U/L / AST: 24 U/L / GGT: x           PT/INR - ( 13 Oct 2020 22:04 )   PT: 11.90 sec;   INR: 1.03 ratio         PTT - ( 13 Oct 2020 22:04 )  PTT:31.6 sec        Neuroimaging:  NCHCT:  (prelim) subacute/chronic L branch MCA stroke  CT Angiography/Perfusion (if applicable):  89cc mismatch L MCA territory    Assessment:  This is a 69y Male with h/o recent L MCA CVA s/p IV TPA/IA thrombectomy currently p/w L MCA syndrome suspicious for acute recurrent stroke vs hypoperfusion vs simple partial seizure.  Pt is not a candidate for thrombolysis with IV tPA with recent CVA within the last 3 months.  Pt is not candidate for mechanical thrombectomy with no evidence of LVO.  Pt is not candidate for IA TPA since recent stroke within same territory of current suspected stroke and increased hemorrhagic conversion risk.  Recommend treat medically.    Weight (kg): 88 (10-13-20 @ 22:19)  Treat with IV TPA:                                         Yes/No  IV TPA bolus time:   TPA total dose:  TPA bolus dose:  If NO IV TPA, then why:  recent cVA w/n last 3 months    Neurovascular intervention candidate:    Yes/No	  If NO intervention, then why:  see above    Plan:     10-13-20 @ 22:41         ***STROKE ALERT - TELESTROKE CONSULTATION  10-13-20 @ 22:41  Last known normal time:	  Consultant paged:	  Video start:	  Video end:	  Video total:	    This is a telehealth visit and the patient is being seen on 10/13/2020 using bi-directional audio/video at the request of Dr. Dinero at [facility].  	                                                     Chief Complaint: stroke alert    Last known normal time: 21:00    HPI: 70 yo RHM w/ h/o recent CVA in 8/27/20 with L MCA CVA s/p IV TPA w/ IA thrombectomy with residual L M2/M3 ischemia NIHSS 1 upon discharge.  Had ILR placed recently.  On ASA and lipitor developed acute aphasia with RHP witnessed by family LKNT 21:00.  Symptoms currently persistent.  Had been compliant with medications.  Recent workup for stroke negative.        PAST MEDICAL & SURGICAL HISTORY:  Cerebrovascular accident (CVA)    GERD (gastroesophageal reflux disease)    S/P tonsillectomy    H/O bilateral hip replacements    H/O skin graft  upper body  legs    FAMILY HISTORY:  No pertinent family history in first degree relatives    Social History: (-) x 3    Allergies    amoxicillin (Rash)    Intolerances    MEDICATIONS  (STANDING):  ASA, Lipitor    Review of systems:    Constitutional: No fever, weight loss or fatigue    Eyes: No eye pain or discharge  ENMT:  No difficulty hearing; No sinus or throat pain  Neck: No pain or stiffness  Respiratory: No cough, wheezing, chills or hemoptysis  Cardiovascular: No chest pain, palpitations, shortness of breath, dyspnea on exertion  Gastrointestinal: No abdominal pain, nausea, vomiting or hematemesis; No diarrhea or constipation.   Genitourinary: No dysuria, frequency, hematuria or incontinence  Neurological: As per HPI  Skin: No rashes or lesions   Endocrine: No heat or cold intolerance; No hair loss  Musculoskeletal: No joint pain or swelling  Psychiatric: No depression, anxiety, mood swings  Heme/Lymph: No easy bruising or bleeding gums    Vital Signs Last 24 Hrs  T(C): --  T(F): --  HR: 109 (13 Oct 2020 22:30) (68 - 109)  BP: 151/53 (13 Oct 2020 22:30) (136/76 - 151/53)  BP(mean): --  RR: 20 (13 Oct 2020 22:30) (20 - 20)  SpO2: 97% (13 Oct 2020 22:30) (97% - 100%)    Video assisted telemedicine examination performed w/ RN and family at bedside.    NIH STROKE SCALE  Item	                                                        Score  1 a.	Level of Consciousness	                    0  1 b. LOC Questions	                                2  1 c.	LOC Commands	                                0  2.	Best Gaze	                                0  3.	Visual	                                            0  4.	Facial Palsy	                                2  5 a.	Motor Arm - Left	                    0  5 b.	Motor Arm - Right	                    4  6 a.	Motor Leg - Left	                    0  6 b.	Motor Leg - Right	                    2  7.	Limb Ataxia	                                2  8.	Sensory	                                            2  9.	Language	                                2  10.	Dysarthria	                                2  11.	Extinction and Inattention  	        0  ______________________________________  TOTAL	                                                        18    Labs:   CBC Full  -  ( 13 Oct 2020 22:04 )  WBC Count : 6.31 K/uL  RBC Count : 4.36 M/uL  Hemoglobin : 14.4 g/dL  Hematocrit : 42.1 %  Platelet Count - Automated : 211 K/uL  Mean Cell Volume : 96.6 fL  Mean Cell Hemoglobin : 33.0 pg  Mean Cell Hemoglobin Concentration : 34.2 g/dL  Auto Neutrophil # : 3.42 K/uL  Auto Lymphocyte # : 1.95 K/uL  Auto Monocyte # : 0.58 K/uL  Auto Eosinophil # : 0.30 K/uL  Auto Basophil # : 0.05 K/uL  Auto Neutrophil % : 54.1 %  Auto Lymphocyte % : 30.9 %  Auto Monocyte % : 9.2 %  Auto Eosinophil % : 4.8 %  Auto Basophil % : 0.8 %    10-13    141  |  103  |  17  ----------------------------<  116<H>  4.0   |  28  |  1.1    Ca    9.6      13 Oct 2020 22:04    TPro  6.8  /  Alb  4.2  /  TBili  0.2  /  DBili  x   /  AST  24  /  ALT  17  /  AlkPhos  75  10-13    LIVER FUNCTIONS - ( 13 Oct 2020 22:04 )  Alb: 4.2 g/dL / Pro: 6.8 g/dL / ALK PHOS: 75 U/L / ALT: 17 U/L / AST: 24 U/L / GGT: x           PT/INR - ( 13 Oct 2020 22:04 )   PT: 11.90 sec;   INR: 1.03 ratio         PTT - ( 13 Oct 2020 22:04 )  PTT:31.6 sec        Neuroimaging:  NCHCT:  (prelim) subacute/chronic L branch MCA stroke  CT Angiography/Perfusion (if applicable):  89cc mismatch L MCA territory  CTA pending

## 2020-10-13 NOTE — H&P ADULT - NSICDXPASTMEDICALHX_GEN_ALL_CORE_FT
PAST MEDICAL HISTORY:  Cerebrovascular accident (CVA)     GERD (gastroesophageal reflux disease)

## 2020-10-13 NOTE — H&P ADULT - HISTORY OF PRESENT ILLNESS
69 years old M with PMHx  emergent IA mechanical thrombectomy of left ADEOLA/MCA on 8/27/2020 presenting with R sided facial droop, weakness, and dysarthria. He had been following up with Dr. Bhatt and Dr. Conner since his CVA,  last seen one week ago with significant neurological improvement. He had mild dysarthria and RUE tremors but no drifting noted at that time. On presentation NIHSS ~ 20, stroke code/NI alert was called NIH alert called, but due last admission and use of TPA, he was not a candidate for tpa on this admission.  CTH showed Wedge-shaped area of hypoattenuation within the left frontal and parietal lobes consistent with prior left MCA territory infarct. CT perfusion showed findings consistent with likely occlusions of the left middle cerebral artery M2 and M3 branches. Corresponding large area of ischemia within the left MCA territory measuring 113 cc, with core infarct of 12 cc.  Pt now moving RLE, able to say few words,.  Neurointerventional assessed the patient,  no intervention at this time as ct angio shows no blockage.  Neurology recommended fluids to increased BP to 160s, ativan for twitching of RLE and ICU monitoring.     T(C): --  HR: 80 (10-13-20 @ 23:08) (68 - 109)  BP: 142/71 (10-13-20 @ 23:08) (123/77 - 151/53)  RR: 18 (10-13-20 @ 23:08) (18 - 20)  SpO2: 97% (10-13-20 @ 23:08) (96% - 100%)              69 years old M with PMHx  emergent IA mechanical thrombectomy of left ADEOLA/MCA on 8/27/2020 presenting with R sided facial droop, weakness, and dysarthria. He had been following up with Dr. Bhatt and Dr. Conner since his CVA,  last seen one week ago with significant neurological improvement. He had mild dysarthria and RUE tremors but no drifting noted at that time. On presentation NIHSS ~ 20, stroke code/NI alert was called NIH alert called, but due last admission and use of TPA, he was not a candidate for tpa on this admission.  CTH showed Wedge-shaped area of hypoattenuation within the left frontal and parietal lobes consistent with prior left MCA territory infarct. CT perfusion showed findings consistent with likely occlusions of the left middle cerebral artery M2 and M3 branches. Corresponding large area of ischemia within the left MCA territory measuring 113 cc, with core infarct of 12 cc.  Pt now moving RLE, able to say few words, Neurointerventional assessed the patient,  no intervention at this time as ct angio shows no blockage.  Neurology recommended fluids to increased BP to 160s, ativan for twitching of RLE and ICU monitoring.     T(C): --  HR: 80 (10-13-20 @ 23:08) (68 - 109)  BP: 142/71 (10-13-20 @ 23:08) (123/77 - 151/53)  RR: 18 (10-13-20 @ 23:08) (18 - 20)  SpO2: 97% (10-13-20 @ 23:08) (96% - 100%)              69 years old M with PMHx  emergent IA mechanical thrombectomy of left ADEOLA/MCA on 8/27/2020 presenting with R sided facial droop, weakness, and dysarthria. He had been following up with Dr. Bhatt and Dr. Conner since his CVA,  last seen one week ago with significant neurological improvement. He had mild dysarthria and RUE tremors but no drifting noted at that time. On presentation NIHSS ~ 20, stroke code/NI alert was called NIH alert called, but due last admission and use of TPA, he was not a candidate for tpa on this admission.  CTH showed Wedge-shaped area of hypoattenuation within the left frontal and parietal lobes consistent with prior left MCA territory infarct. CT perfusion showed findings consistent with likely occlusions of the left middle cerebral artery M2 and M3 branches. Corresponding large area of ischemia within the left MCA territory measuring 113 cc, with core infarct of 12 cc.  Pt now moving RLE, able to say few words.  Patient was admitted for ICU monitoring.     T(C): --  HR: 80 (10-13-20 @ 23:08) (68 - 109)  BP: 142/71 (10-13-20 @ 23:08) (123/77 - 151/53)  RR: 18 (10-13-20 @ 23:08) (18 - 20)  SpO2: 97% (10-13-20 @ 23:08) (96% - 100%)        69 years old M with PMHx  emergent IA mechanical thrombectomy of left ADEOLA/MCA on 8/27/2020 presenting with R sided facial droop, weakness, and dysarthria. He had been following up with Dr. Bhatt and Dr. Conner since his CVA,  last seen one week ago with significant neurological improvement. He had mild dysarthria and RUE tremors but no drifting noted at that time. Patient was eating a sandwich around 9 pm and started having worsening dysarthria and weakness so his wife brought him to the ED. On presentation NIHSS ~ 20, stroke code/NI alert was called NIH alert called, but due last admission and use of TPA, he was not a candidate for tpa on this admission.  CTH showed Wedge-shaped area of hypoattenuation within the left frontal and parietal lobes consistent with prior left MCA territory infarct. CT perfusion showed findings consistent with likely occlusions of the left middle cerebral artery M2 and M3 branches. Corresponding large area of ischemia within the left MCA territory measuring 113 cc, with core infarct of 12 cc.  Pt now moving RLE, able to say few words.  Patient was admitted for ICU monitoring.     T(C): --  HR: 80 (10-13-20 @ 23:08) (68 - 109)  BP: 142/71 (10-13-20 @ 23:08) (123/77 - 151/53)  RR: 18 (10-13-20 @ 23:08) (18 - 20)  SpO2: 97% (10-13-20 @ 23:08) (96% - 100%)

## 2020-10-13 NOTE — H&P ADULT - NSHPPHYSICALEXAM_GEN_ALL_CORE
GENERAL: NAD, well-developed  PSYCH: AAOx3  HEENT:  Atraumatic, Normocephalic. EOMI, PERRLA, conjunctiva clear, sclera white, No JVD  PULMONARY: Clear to auscultation bilaterally; No wheeze  CARDIOVASCULAR: Regular rate and rhythm; No murmurs, rubs, or gallops  GASTROINTESTINAL: Soft, Nontender, Nondistended; Bowel sounds present  MUSCULOSKELETAL:  2+ Peripheral Pulses, No clubbing, cyanosis, or edema  NEUROLOGY: Right facial palsy, Dec strength right arm, right leg, right sided sensory deficits, dysarthria   SKIN: No rashes or lesions GENERAL: NAD, well-developed  PSYCH: AAOx3  HEENT:  Atraumatic, Normocephalic. EOMI, PERRLA, conjunctiva clear, sclera white, No JVD  PULMONARY: Clear to auscultation bilaterally; No wheeze  CARDIOVASCULAR: Regular rate and rhythm; No murmurs, rubs, or gallops  GASTROINTESTINAL: Soft, Nontender, Nondistended; Bowel sounds present  MUSCULOSKELETAL:  2+ Peripheral Pulses, No clubbing, cyanosis, or edema  NEUROLOGY: Right facial palsy, Dec strength right arm 1/5 and right leg 1/5, moves toes,  right sided sensory deficits, dysarthria   SKIN: No rashes or lesions

## 2020-10-13 NOTE — STROKE CODE NOTE - NIH STROKE SCALE: 8. SENSORY, QM
(1) Mild-to-moderate sensory loss; patient feels pinprick is less sharp or is dull on the affected side; or there is a loss of superficial pain with pinprick, but patient is aware of being touched (2) Severe to total sensory loss; patient is not aware of being touched in the face, arm, and leg

## 2020-10-13 NOTE — H&P ADULT - NSHPLABSRESULTS_GEN_ALL_CORE
14.4   6.31  )-----------( 211      ( 13 Oct 2020 22:04 )             42.1       10-13    141  |  103  |  17  ----------------------------<  116<H>  4.0   |  28  |  1.1    Ca    9.6      13 Oct 2020 22:04    TPro  6.8  /  Alb  4.2  /  TBili  0.2  /  DBili  x   /  AST  24  /  ALT  17  /  AlkPhos  75  10-13                  PT/INR - ( 13 Oct 2020 22:04 )   PT: 11.90 sec;   INR: 1.03 ratio         PTT - ( 13 Oct 2020 22:04 )  PTT:31.6 sec    Lactate Trend      CARDIAC MARKERS ( 13 Oct 2020 22:04 )  x     / <0.01 ng/mL / x     / x     / x            CAPILLARY BLOOD GLUCOSE        CT BRAIN STROKE PROTOCOL            PROCEDURE DATE:  10/13/2020          IMPRESSION:    1.  No evidence of acute intracranial hemorrhage, mass effect or midline shift.    2.  Wedge-shaped area of hypoattenuation within the left frontal and parietal lobes consistent with prior left MCA territory infarct.    CT PERFUSION W MAPS IC            PROCEDURE DATE:  10/13/2020      IMPRESSION:    Findings consistent with likely occlusions of the left middle cerebral artery M2 and M3 branches. Corresponding large area of ischemia within the left MCA territory measuring 113 cc, with core infarct of 12 cc (mismatch volume/penumbra of 101 cc). 14.4   6.31  )-----------( 211      ( 13 Oct 2020 22:04 )             42.1       10-13    141  |  103  |  17  ----------------------------<  116<H>  4.0   |  28  |  1.1    Ca    9.6      13 Oct 2020 22:04    TPro  6.8  /  Alb  4.2  /  TBili  0.2  /  DBili  x   /  AST  24  /  ALT  17  /  AlkPhos  75  10-13          PT/INR - ( 13 Oct 2020 22:04 )   PT: 11.90 sec;   INR: 1.03 ratio         PTT - ( 13 Oct 2020 22:04 )  PTT:31.6 sec    Lactate Trend      CARDIAC MARKERS ( 13 Oct 2020 22:04 )  x     / <0.01 ng/mL / x     / x     / x            CAPILLARY BLOOD GLUCOSE    c    CT BRAIN STROKE PROTOCOL          PROCEDURE DATE:  10/13/2020      IMPRESSION:    1.  No evidence of acute intracranial hemorrhage, mass effect or midline shift.    2.  Wedge-shaped area of hypoattenuation within the left frontal and parietal lobes consistent with prior left MCA territory infarct.    CT PERFUSION W MAPS IC            PROCEDURE DATE:  10/13/2020      IMPRESSION:    IMPRESSION:    1- Occlusion of the left superior division of the MCA  (602/93-88)    2-occlusion of the left M3 branch of the MCA, leading to the area of chronic infarct    The above were added to the preliminary report and discussed with Dr. Helms    The following is the preliminary report:    Findings consistent with likely occlusions of the left middle cerebral artery M2 and M3 branches. Corresponding large area of ischemia within the left MCA territory measuring 113 cc, with core infarct of 12 cc (mismatch volume/penumbra of 101 cc).

## 2020-10-13 NOTE — ED PROVIDER NOTE - CARE PLAN
Assessment and plan of treatment:	Plan: CODE STROKE, Monitor, Pacer pads, ct head, EKG, CXR, labs, neurology consult, will continue to monitor and reassess.   Principal Discharge DX:	CVA (cerebral vascular accident)  Assessment and plan of treatment:	Plan: CODE STROKE, Monitor, Pacer pads, ct head, EKG, CXR, labs, neurology consult, will continue to monitor and reassess.

## 2020-10-14 LAB
ALLERGY+IMMUNOLOGY DIAG STUDY NOTE: SIGNIFICANT CHANGE UP
ANION GAP SERPL CALC-SCNC: 8 MMOL/L — SIGNIFICANT CHANGE UP (ref 7–14)
APTT BLD: 29.7 SEC — SIGNIFICANT CHANGE UP (ref 27–39.2)
BUN SERPL-MCNC: 15 MG/DL — SIGNIFICANT CHANGE UP (ref 10–20)
CA-I BLD-SCNC: 1.22 MMOL/L — SIGNIFICANT CHANGE UP (ref 1.12–1.3)
CALCIUM SERPL-MCNC: 9.3 MG/DL — SIGNIFICANT CHANGE UP (ref 8.5–10.1)
CHLORIDE SERPL-SCNC: 106 MMOL/L — SIGNIFICANT CHANGE UP (ref 98–110)
CHOLEST SERPL-MCNC: 143 MG/DL — SIGNIFICANT CHANGE UP (ref 100–200)
CO2 SERPL-SCNC: 25 MMOL/L — SIGNIFICANT CHANGE UP (ref 17–32)
CREAT SERPL-MCNC: 1.1 MG/DL — SIGNIFICANT CHANGE UP (ref 0.7–1.5)
DIR ANTIGLOB POLYSPECIFIC INTERPRETATION: SIGNIFICANT CHANGE UP
GLUCOSE BLDC GLUCOMTR-MCNC: 124 MG/DL — HIGH (ref 70–99)
GLUCOSE SERPL-MCNC: 119 MG/DL — HIGH (ref 70–99)
HCT VFR BLD CALC: 39.9 % — LOW (ref 42–52)
HCV AB S/CO SERPL IA: 0.04 COI — SIGNIFICANT CHANGE UP
HCV AB SERPL-IMP: SIGNIFICANT CHANGE UP
HDLC SERPL-MCNC: 66 MG/DL — SIGNIFICANT CHANGE UP
HGB BLD-MCNC: 13.7 G/DL — LOW (ref 14–18)
INR BLD: 1.1 RATIO — SIGNIFICANT CHANGE UP (ref 0.65–1.3)
LIPID PNL WITH DIRECT LDL SERPL: 62 MG/DL — SIGNIFICANT CHANGE UP (ref 4–129)
MAGNESIUM SERPL-MCNC: 1.8 MG/DL — SIGNIFICANT CHANGE UP (ref 1.8–2.4)
MCHC RBC-ENTMCNC: 32.2 PG — HIGH (ref 27–31)
MCHC RBC-ENTMCNC: 34.3 G/DL — SIGNIFICANT CHANGE UP (ref 32–37)
MCV RBC AUTO: 93.9 FL — SIGNIFICANT CHANGE UP (ref 80–94)
NRBC # BLD: 0 /100 WBCS — SIGNIFICANT CHANGE UP (ref 0–0)
PHOSPHATE SERPL-MCNC: 3.4 MG/DL — SIGNIFICANT CHANGE UP (ref 2.1–4.9)
PLATELET # BLD AUTO: 193 K/UL — SIGNIFICANT CHANGE UP (ref 130–400)
POTASSIUM SERPL-MCNC: 4.2 MMOL/L — SIGNIFICANT CHANGE UP (ref 3.5–5)
POTASSIUM SERPL-SCNC: 4.2 MMOL/L — SIGNIFICANT CHANGE UP (ref 3.5–5)
PROTHROM AB SERPL-ACNC: 12.6 SEC — SIGNIFICANT CHANGE UP (ref 9.95–12.87)
RAPID RVP RESULT: SIGNIFICANT CHANGE UP
RBC # BLD: 4.25 M/UL — LOW (ref 4.7–6.1)
RBC # FLD: 11.2 % — LOW (ref 11.5–14.5)
SARS-COV-2 RNA SPEC QL NAA+PROBE: SIGNIFICANT CHANGE UP
SODIUM SERPL-SCNC: 139 MMOL/L — SIGNIFICANT CHANGE UP (ref 135–146)
TOTAL CHOLESTEROL/HDL RATIO MEASUREMENT: 2.2 RATIO — LOW (ref 4–5.5)
TRIGL SERPL-MCNC: 53 MG/DL — SIGNIFICANT CHANGE UP (ref 10–149)
WBC # BLD: 7.55 K/UL — SIGNIFICANT CHANGE UP (ref 4.8–10.8)
WBC # FLD AUTO: 7.55 K/UL — SIGNIFICANT CHANGE UP (ref 4.8–10.8)

## 2020-10-14 PROCEDURE — 71045 X-RAY EXAM CHEST 1 VIEW: CPT | Mod: 26

## 2020-10-14 PROCEDURE — 70450 CT HEAD/BRAIN W/O DYE: CPT | Mod: 26

## 2020-10-14 RX ORDER — CLOPIDOGREL BISULFATE 75 MG/1
300 TABLET, FILM COATED ORAL ONCE
Refills: 0 | Status: COMPLETED | OUTPATIENT
Start: 2020-10-14 | End: 2020-10-14

## 2020-10-14 RX ORDER — EPTIFIBATIDE 2 MG/ML
7900 INJECTION, SOLUTION INTRAVENOUS ONCE
Refills: 0 | Status: COMPLETED | OUTPATIENT
Start: 2020-10-14 | End: 2020-10-14

## 2020-10-14 RX ORDER — CHLORHEXIDINE GLUCONATE 213 G/1000ML
1 SOLUTION TOPICAL
Refills: 0 | Status: DISCONTINUED | OUTPATIENT
Start: 2020-10-14 | End: 2020-10-20

## 2020-10-14 RX ORDER — PANTOPRAZOLE SODIUM 20 MG/1
40 TABLET, DELAYED RELEASE ORAL DAILY
Refills: 0 | Status: DISCONTINUED | OUTPATIENT
Start: 2020-10-14 | End: 2020-10-15

## 2020-10-14 RX ORDER — ENOXAPARIN SODIUM 100 MG/ML
40 INJECTION SUBCUTANEOUS AT BEDTIME
Refills: 0 | Status: DISCONTINUED | OUTPATIENT
Start: 2020-10-14 | End: 2020-10-20

## 2020-10-14 RX ORDER — SODIUM CHLORIDE 9 MG/ML
1000 INJECTION INTRAMUSCULAR; INTRAVENOUS; SUBCUTANEOUS
Refills: 0 | Status: DISCONTINUED | OUTPATIENT
Start: 2020-10-14 | End: 2020-10-14

## 2020-10-14 RX ORDER — EPTIFIBATIDE 2 MG/ML
1 INJECTION, SOLUTION INTRAVENOUS
Qty: 75 | Refills: 0 | Status: DISCONTINUED | OUTPATIENT
Start: 2020-10-14 | End: 2020-10-14

## 2020-10-14 RX ADMIN — SODIUM CHLORIDE 75 MILLILITER(S): 9 INJECTION INTRAMUSCULAR; INTRAVENOUS; SUBCUTANEOUS at 03:11

## 2020-10-14 RX ADMIN — EPTIFIBATIDE 7900 MICROGRAM(S): 2 INJECTION, SOLUTION INTRAVENOUS at 01:14

## 2020-10-14 RX ADMIN — CHLORHEXIDINE GLUCONATE 1 APPLICATION(S): 213 SOLUTION TOPICAL at 06:16

## 2020-10-14 RX ADMIN — EPTIFIBATIDE 7.04 MICROGRAM(S)/KG/MIN: 2 INJECTION, SOLUTION INTRAVENOUS at 01:18

## 2020-10-14 RX ADMIN — SODIUM CHLORIDE 100 MILLILITER(S): 9 INJECTION INTRAMUSCULAR; INTRAVENOUS; SUBCUTANEOUS at 08:42

## 2020-10-14 RX ADMIN — SODIUM CHLORIDE 100 MILLILITER(S): 9 INJECTION INTRAMUSCULAR; INTRAVENOUS; SUBCUTANEOUS at 06:16

## 2020-10-14 RX ADMIN — CLOPIDOGREL BISULFATE 300 MILLIGRAM(S): 75 TABLET, FILM COATED ORAL at 17:45

## 2020-10-14 RX ADMIN — PANTOPRAZOLE SODIUM 40 MILLIGRAM(S): 20 TABLET, DELAYED RELEASE ORAL at 12:29

## 2020-10-14 RX ADMIN — EPTIFIBATIDE 7.04 MICROGRAM(S)/KG/MIN: 2 INJECTION, SOLUTION INTRAVENOUS at 08:41

## 2020-10-14 RX ADMIN — ENOXAPARIN SODIUM 40 MILLIGRAM(S): 100 INJECTION SUBCUTANEOUS at 23:30

## 2020-10-14 NOTE — PHYSICAL THERAPY INITIAL EVALUATION ADULT - GENERAL OBSERVATIONS, REHAB EVAL
8:55-9:40. Chart reviewed. Pt. encountered semifowler. No apparent distress. WILLIE Morrow present upon arrival. +IV, +telemetry, +condom catheter, +L hand glove, +sequentials, +O2 via NC (2L) +bed alarm. Pt presents with right sided hemiplegia, drooling on right side, expressive aphasia and right sided homonymous hemianopsia detected on visual exam

## 2020-10-14 NOTE — PHYSICAL THERAPY INITIAL EVALUATION ADULT - PERTINENT HX OF CURRENT PROBLEM, REHAB EVAL
69 years old right hand dominant male with PMHx  emergent IA mechanical thrombectomy of left ADEOLA/MCA on 8/27/2020 presenting with R sided facial droop, weakness, and dysarthria

## 2020-10-14 NOTE — SWALLOW BEDSIDE ASSESSMENT ADULT - ASR SWALLOW LINGUAL MOBILITY
impaired anterior elevation/impaired left lateral movement/impaired right lateral movement/impaired protrusion

## 2020-10-14 NOTE — PHYSICAL THERAPY INITIAL EVALUATION ADULT - GAIT TRAINING, PT EVAL
Pt will be able to 15-20 feet with moderate PT assist using appropriate assistive device by discharge

## 2020-10-14 NOTE — PATIENT PROFILE ADULT - NSTRANSFERBELONGINGSDISPO_GEN_A_NUR
Pt called to ask for lab orders to be placed as she has an appt on 04/05/17 and would like to have the labs drawn prior to appt.      Please call her home number to advise that orders have been placed  [phone] (home)           not applicable

## 2020-10-14 NOTE — PHYSICAL THERAPY INITIAL EVALUATION ADULT - LIVES WITH, PROFILE
other relative/Pt states that he lives with his brother and sister in a private house, 5 steps to enter and one flight to ascend to 2nd floor

## 2020-10-14 NOTE — PHYSICAL THERAPY INITIAL EVALUATION ADULT - TRANSFER TRAINING, PT EVAL
Pt will be able to transfer from bed to chair/sit to stand with moderate PT assist using rolling walker by discharge

## 2020-10-14 NOTE — OCCUPATIONAL THERAPY INITIAL EVALUATION ADULT - PLANNED THERAPY INTERVENTIONS, OT EVAL
IADL retraining/cognitive, visual perceptual/neuromuscular re-education/strengthening/stretching/transfer training/ADL retraining/balance training/bed mobility training/fine motor coordination training/motor coordination training/ROM

## 2020-10-14 NOTE — PHYSICAL THERAPY INITIAL EVALUATION ADULT - IMPAIRMENTS FOUND, PT EVAL
tone/visual motor/gait, locomotion, and balance/cranial and peripheral nerve integrity/gross motor/muscle strength/ROM/reflex integrity/sensory integrity/aerobic capacity/endurance/posture

## 2020-10-14 NOTE — PROGRESS NOTE ADULT - ASSESSMENT
69 years old M with PMHx of emergent IA mechanical thrombectomy of left ADEOLA/MCA on 8/27/2020 due to acute stroke and s/p loop recorder placement presenting with R sided facial droop, weakness, and dysarthria. Found to have recurrent acute ischemic stroke in previous left MCA territory and admitted to MICU for monitoring.    #Recurrent acute ischemic stroke in previous left MCA territory  - Repeat CTH today showed acute infarct in left basal ganglia  - Currently on Integrilin drip  - Per neurology, stop Integrilin drip at 2000 tonight  - Load with Plavix 300mg, per neurology  - Q4H neuro checks  - JANET from 9/2 showed no thrombus  - EP interrogated loop recorder today -> no events noted  - Neurology f/u    #Hx of CVA SP TPA and Endovascular therapy (8/27)`    #Misc  Diet: NPO with tube feeds  GI PPx: Protonix  DVT PPx: SCD's  Activity: bedrest  Dispo: MICU for now     69 years old M with PMHx of emergent IA mechanical thrombectomy of left ADEOLA/MCA on 8/27/2020 due to acute stroke and s/p loop recorder placement presenting with R sided facial droop, weakness, and dysarthria. Found to have recurrent acute ischemic stroke in previous left MCA territory and admitted to MICU for monitoring.    #Recurrent acute ischemic stroke in previous left MCA territory  - Repeat CTH today showed acute infarct in left basal ganglia  - Currently on Integrilin drip  - Per neurology, stop Integrilin drip at 2000 tonight  - Load with Plavix 300mg, per neurology  - Q4H neuro checks  - JANET from 9/2 showed no thrombus  - EP interrogated loop recorder today -> no events noted  - Neurology f/u    #Hx of CVA SP TPA and Endovascular therapy (8/27)`    #Misc  Diet: NPO with tube feeds  GI PPx: Protonix  DVT PPx: Lovenox  Activity: bedrest  Dispo: MICU for now

## 2020-10-14 NOTE — PHYSICAL THERAPY INITIAL EVALUATION ADULT - BALANCE DISTURBANCE, IDENTIFIED IMPAIRMENT CONTRIBUTE, REHAB EVAL
decreased strength/abnormal muscle tone/decreased ROM/impaired motor control/impaired postural control/decreased sensation

## 2020-10-14 NOTE — ED ADULT NURSE REASSESSMENT NOTE - NIH STROKE SCALE: 9. BEST LANGUAGE, QM
(1) Mild-to-moderate aphasia; some obvious loss of fluency or facility of comprehension, w/o significant limitation on ideas expressed or form of expression. Reduction of speech and/or comprehension, however, makes conversation about provided material difficult or impossible.  For example, in conversation about provided materials, examiner can identify picture or naming card content from patient's response.
(2) Severe aphasia; all communication is through fragmentary expression; great need for inference, questioning, and guessing by the listener.  Range of information that can be exchanged is limited; listener carries burden of communication.  Examiner cannot identify materials provided from patient response.

## 2020-10-14 NOTE — CONSULT NOTE ADULT - ASSESSMENT
IMPRESSION:    recurrent acute ischemic stroke in previous left MCA territory.  hx of CVA SP TPA and Endovascular therapy (8/27)`    PLAN:    CNS: No depressants  FU with Neuro. repeat CTH, if -ve start plavix. q4NC    HEENT: Oral care.     PULMONARY:  HOB @ 45 degrees.  Aspiration precautions.     CARDIOVASCULAR: JANET no thrombus(septemeber 2), loop recorder f/u EP.    GI: GI prophylaxis. Failed speech and swallow, NGT for now.  Bowel regimen     RENAL:  Follow up lytes.  Correct as needed    INFECTIOUS DISEASE: Follow up cultures    HEMATOLOGICAL:  DVT prophylaxis.    ENDOCRINE:  Follow up FS.  Insulin protocol if needed.    MUSCULOSKELETAL:  Bed rest    if CTH -ve, downgrade to stroke unit once off integrelin. IMPRESSION:    recurrent acute ischemic stroke in previous left MCA territory.  hx of CVA SP TPA and Endovascular therapy (8/27)`    PLAN:    CNS: No depressants  FU with Neuro. repeat CTH, if -ve start plavix. q4NC    HEENT: Oral care.     PULMONARY:  HOB @ 45 degrees.  Aspiration precautions.     CARDIOVASCULAR: JANET no thrombus (Septemeber 2), loop recorder f/u EP.    GI: GI prophylaxis. Failed speech and swallow, NGT for now.  Bowel regimen     RENAL:  Follow up lytes.  Correct as needed    INFECTIOUS DISEASE: Follow up cultures    HEMATOLOGICAL:  DVT prophylaxis.    ENDOCRINE:  Follow up FS.  Insulin protocol if needed.    MUSCULOSKELETAL:  Bed rest    if CTH -ve, downgrade to stroke unit once off integrelin.

## 2020-10-14 NOTE — ED ADULT NURSE REASSESSMENT NOTE - NIH STROKE SCALE: 6B. MOTOR LEG, RIGHT, QM
(3) No effort against gravity; leg falls to bed immediately
(3) No effort against gravity; leg falls to bed immediately

## 2020-10-14 NOTE — CONSULT NOTE ADULT - SUBJECTIVE AND OBJECTIVE BOX
Patient is a 69y old  Male who presents with a chief complaint of     HPI:  69 years old M with PMHx  emergent IA mechanical thrombectomy of left ADEOLA/MCA on 8/27/2020 presenting with R sided facial droop, weakness, and dysarthria. He had been following up with Dr. Bhatt and Dr. Conner since his CVA,  last seen one week ago with significant neurological improvement. He had mild dysarthria and RUE tremors but no drifting noted at that time. Patient was eating a sandwich around 9 pm and started having worsening dysarthria and weakness so his wife brought him to the ED. On presentation NIHSS ~ 20, stroke code/NI alert was called NIH alert called, but due last admission and use of TPA, he was not a candidate for tpa on this admission.  CTH showed Wedge-shaped area of hypoattenuation within the left frontal and parietal lobes consistent with prior left MCA territory infarct. CT perfusion showed findings consistent with likely occlusions of the left middle cerebral artery M2 and M3 branches. Corresponding large area of ischemia within the left MCA territory measuring 113 cc, with core infarct of 12 cc.  Pt now moving RLE, able to say few words.  Patient was admitted for ICU monitoring.       HR: 80 (10-13-20 @ 23:08) (68 - 109)  BP: 142/71 (10-13-20 @ 23:08) (123/77 - 151/53)  RR: 18 (10-13-20 @ 23:08) (18 - 20)  SpO2: 97% (10-13-20 @ 23:08) (96% - 100%)        (13 Oct 2020 23:23)      PAST MEDICAL & SURGICAL HISTORY:  Cerebrovascular accident (CVA)    GERD (gastroesophageal reflux disease)    S/P tonsillectomy    H/O bilateral hip replacements    H/O skin graft  upper body  legs        SOCIAL HX:   Smoking     former                     ETOH     -ve                       Other  -ve    FAMILY HISTORY:  No pertinent family history in first degree relatives    :  No known cardiovacular family hisotry     Review Of Systems:     CONSTITUTIONAL:   no fever   no chills.  no weight gain   no weight loss    EYES:   no discharge,   no pain  no redness,   no visual changes.    ENT:   Ears: no ear pain and no hearing problems.  Nose: no nasal congestion and no nasal drainage.  Mouth/Throat: no dysphagia,  no hoarseness and no throat pain.  Neck: no lumps, no pain, no stiffness and no swollen glands.     CARDIOVASCULAR:   no chest pain,   no swelling  no palpitaions  no syncope    RESPIRATORY:  no SOB,  no wheezing ,  no respiratory difficulty  no sputum production    GASTROINTESTINAL:   no abdominal pain,   no constipation,   no diarrhea,   no vomiting.    GENITOURINARY:  no dysuria,   no frequency,   no urgency  no hematuria.    MUSCULOSKELETAL:   no back pain,   no musculoskeletal pain,  no weakness.    SKIN:   no jaundice,   no lesions,   no pruritis,   no rashes.    NEURO:   awake  severe dysarthia  Right hemiplegia    PSYCHIATRIC:   not agitated  no apparent harm to self.    ALLERGIC/IMMUNOLOGIC:   No active allergic or immunologic issues      Allergies    amoxicillin (Rash)    Intolerances          PHYSICAL EXAM    ICU Vital Signs Last 24 Hrs  T(C): 36.6 (14 Oct 2020 08:00), Max: 36.6 (14 Oct 2020 08:00)  T(F): 97.9 (14 Oct 2020 08:00), Max: 97.9 (14 Oct 2020 08:00)  HR: 68 (14 Oct 2020 09:00) (62 - 109)  BP: 134/69 (14 Oct 2020 09:00) (122/69 - 157/93)  BP(mean): 88 (14 Oct 2020 09:00) (88 - 108)  ABP: --  ABP(mean): --  RR: 21 (14 Oct 2020 09:00) (18 - 29)  SpO2: 100% (14 Oct 2020 09:00) (95% - 100%)      CONSTITUTIONAL:  Well nourished.  NAD    ENT:      Airway patent,   Mouth with normal mucosa.   No thrush    EYES:   pupils equal,   round and reactive to light.    CARDIAC:   Normal rate,   Regular rhythm.    Heart sounds S1, S2.   No edema      Vascular:   normal systolic impulse  no bruits    RESPIRATORY:   No wheezing   Normal chest expansion  No use of accessory muscles    GASTROINTESTINAL:  Abdomen soft   Non-tender,   No guarding,   + BS    GENITOURINARY  normal genitalia for sex  no edema    MUSCULOSKELETAL:   Range of motion is not limited,  Nno clubbing, cyanosis    NEUROLOGICAL:   Awake   right hemiplegia    SKIN:   Skin normal color for race,   Warm and dry  No evidence of rash.    PSYCHIATRIC:   Normal mood and affect.   No apparent risk to self or others.    HEME LYMPH:   No splenomegaly.  No cervical  lymphadenopathy.  No inguinal lymphadenopathy            10-13-20 @ 07:01  -  10-14-20 @ 07:00  --------------------------------------------------------  IN:    Eptifbatide: 35.2 mL    sodium chloride 0.9%: 425 mL  Total IN: 460.2 mL    OUT:    Voided (mL): 410 mL  Total OUT: 410 mL    Total NET: 50.2 mL      10-14-20 @ 07:01  -  10-14-20 @ 11:26  --------------------------------------------------------  IN:    Eptifbatide: 7 mL    sodium chloride 0.9%: 100 mL  Total IN: 107 mL    OUT:  Total OUT: 0 mL    Total NET: 107 mL          LABS:                          13.7   7.55  )-----------( 193      ( 14 Oct 2020 04:37 )             39.9                                               10-14    139  |  106  |  15  ----------------------------<  119<H>  4.2   |  25  |  1.1    Ca    9.3      14 Oct 2020 04:37  Phos  3.4     10-14  Mg     1.8     10-14    TPro  6.8  /  Alb  4.2  /  TBili  0.2  /  DBili  x   /  AST  24  /  ALT  17  /  AlkPhos  75  10-13      PT/INR - ( 14 Oct 2020 04:37 )   PT: 12.60 sec;   INR: 1.10 ratio         PTT - ( 14 Oct 2020 04:37 )  PTT:29.7 sec                                           CARDIAC MARKERS ( 13 Oct 2020 22:04 )  x     / <0.01 ng/mL / x     / x     / x                                                LIVER FUNCTIONS - ( 13 Oct 2020 22:04 )  Alb: 4.2 g/dL / Pro: 6.8 g/dL / ALK PHOS: 75 U/L / ALT: 17 U/L / AST: 24 U/L / GGT: x                                                                                                                                       X-Rays reviewed:                                                                                    ECHO    CXR interpreted by me:    MEDICATIONS  (STANDING):  chlorhexidine 4% Liquid 1 Application(s) Topical <User Schedule>  eptifibatide Infusion 75 mg/100 mL 1 MICROgram(s)/kG/Min (7.04 mL/Hr) IV Continuous <Continuous>  pantoprazole  Injectable 40 milliGRAM(s) IV Push daily  sodium chloride 0.9%. 1000 milliLiter(s) (100 mL/Hr) IV Continuous <Continuous>    MEDICATIONS  (PRN):

## 2020-10-15 LAB
ALBUMIN SERPL ELPH-MCNC: 3.9 G/DL — SIGNIFICANT CHANGE UP (ref 3.5–5.2)
ALP SERPL-CCNC: 63 U/L — SIGNIFICANT CHANGE UP (ref 30–115)
ALT FLD-CCNC: 16 U/L — SIGNIFICANT CHANGE UP (ref 0–41)
ANION GAP SERPL CALC-SCNC: 10 MMOL/L — SIGNIFICANT CHANGE UP (ref 7–14)
AST SERPL-CCNC: 37 U/L — SIGNIFICANT CHANGE UP (ref 0–41)
BASOPHILS # BLD AUTO: 0.02 K/UL — SIGNIFICANT CHANGE UP (ref 0–0.2)
BASOPHILS NFR BLD AUTO: 0.2 % — SIGNIFICANT CHANGE UP (ref 0–1)
BILIRUB SERPL-MCNC: 0.9 MG/DL — SIGNIFICANT CHANGE UP (ref 0.2–1.2)
BUN SERPL-MCNC: 11 MG/DL — SIGNIFICANT CHANGE UP (ref 10–20)
CALCIUM SERPL-MCNC: 9.1 MG/DL — SIGNIFICANT CHANGE UP (ref 8.5–10.1)
CHLORIDE SERPL-SCNC: 107 MMOL/L — SIGNIFICANT CHANGE UP (ref 98–110)
CO2 SERPL-SCNC: 22 MMOL/L — SIGNIFICANT CHANGE UP (ref 17–32)
CREAT SERPL-MCNC: 1 MG/DL — SIGNIFICANT CHANGE UP (ref 0.7–1.5)
EOSINOPHIL # BLD AUTO: 0.13 K/UL — SIGNIFICANT CHANGE UP (ref 0–0.7)
EOSINOPHIL NFR BLD AUTO: 1.5 % — SIGNIFICANT CHANGE UP (ref 0–8)
GLUCOSE SERPL-MCNC: 113 MG/DL — HIGH (ref 70–99)
HCT VFR BLD CALC: 41.7 % — LOW (ref 42–52)
HGB BLD-MCNC: 14.5 G/DL — SIGNIFICANT CHANGE UP (ref 14–18)
IMM GRANULOCYTES NFR BLD AUTO: 0.2 % — SIGNIFICANT CHANGE UP (ref 0.1–0.3)
LYMPHOCYTES # BLD AUTO: 0.83 K/UL — LOW (ref 1.2–3.4)
LYMPHOCYTES # BLD AUTO: 9.4 % — LOW (ref 20.5–51.1)
MAGNESIUM SERPL-MCNC: 1.6 MG/DL — LOW (ref 1.8–2.4)
MCHC RBC-ENTMCNC: 32.3 PG — HIGH (ref 27–31)
MCHC RBC-ENTMCNC: 34.8 G/DL — SIGNIFICANT CHANGE UP (ref 32–37)
MCV RBC AUTO: 92.9 FL — SIGNIFICANT CHANGE UP (ref 80–94)
MONOCYTES # BLD AUTO: 0.76 K/UL — HIGH (ref 0.1–0.6)
MONOCYTES NFR BLD AUTO: 8.6 % — SIGNIFICANT CHANGE UP (ref 1.7–9.3)
NEUTROPHILS # BLD AUTO: 7.03 K/UL — HIGH (ref 1.4–6.5)
NEUTROPHILS NFR BLD AUTO: 80.1 % — HIGH (ref 42.2–75.2)
NRBC # BLD: 0 /100 WBCS — SIGNIFICANT CHANGE UP (ref 0–0)
PLATELET # BLD AUTO: 187 K/UL — SIGNIFICANT CHANGE UP (ref 130–400)
POTASSIUM SERPL-MCNC: 3.8 MMOL/L — SIGNIFICANT CHANGE UP (ref 3.5–5)
POTASSIUM SERPL-SCNC: 3.8 MMOL/L — SIGNIFICANT CHANGE UP (ref 3.5–5)
PROT SERPL-MCNC: 6.2 G/DL — SIGNIFICANT CHANGE UP (ref 6–8)
RBC # BLD: 4.49 M/UL — LOW (ref 4.7–6.1)
RBC # FLD: 10.9 % — LOW (ref 11.5–14.5)
SODIUM SERPL-SCNC: 139 MMOL/L — SIGNIFICANT CHANGE UP (ref 135–146)
WBC # BLD: 8.79 K/UL — SIGNIFICANT CHANGE UP (ref 4.8–10.8)
WBC # FLD AUTO: 8.79 K/UL — SIGNIFICANT CHANGE UP (ref 4.8–10.8)

## 2020-10-15 PROCEDURE — 99232 SBSQ HOSP IP/OBS MODERATE 35: CPT

## 2020-10-15 RX ORDER — ASPIRIN/CALCIUM CARB/MAGNESIUM 324 MG
81 TABLET ORAL DAILY
Refills: 0 | Status: DISCONTINUED | OUTPATIENT
Start: 2020-10-15 | End: 2020-10-20

## 2020-10-15 RX ORDER — PANTOPRAZOLE SODIUM 20 MG/1
40 TABLET, DELAYED RELEASE ORAL DAILY
Refills: 0 | Status: DISCONTINUED | OUTPATIENT
Start: 2020-10-15 | End: 2020-10-20

## 2020-10-15 RX ORDER — MAGNESIUM SULFATE 500 MG/ML
2 VIAL (ML) INJECTION
Refills: 0 | Status: COMPLETED | OUTPATIENT
Start: 2020-10-15 | End: 2020-10-15

## 2020-10-15 RX ORDER — CLOPIDOGREL BISULFATE 75 MG/1
75 TABLET, FILM COATED ORAL DAILY
Refills: 0 | Status: DISCONTINUED | OUTPATIENT
Start: 2020-10-15 | End: 2020-10-20

## 2020-10-15 RX ORDER — ATORVASTATIN CALCIUM 80 MG/1
80 TABLET, FILM COATED ORAL AT BEDTIME
Refills: 0 | Status: DISCONTINUED | OUTPATIENT
Start: 2020-10-15 | End: 2020-10-20

## 2020-10-15 RX ADMIN — Medication 25 GRAM(S): at 10:42

## 2020-10-15 RX ADMIN — Medication 25 GRAM(S): at 09:33

## 2020-10-15 RX ADMIN — CLOPIDOGREL BISULFATE 75 MILLIGRAM(S): 75 TABLET, FILM COATED ORAL at 11:45

## 2020-10-15 RX ADMIN — PANTOPRAZOLE SODIUM 40 MILLIGRAM(S): 20 TABLET, DELAYED RELEASE ORAL at 11:45

## 2020-10-15 RX ADMIN — ATORVASTATIN CALCIUM 80 MILLIGRAM(S): 80 TABLET, FILM COATED ORAL at 21:19

## 2020-10-15 RX ADMIN — ENOXAPARIN SODIUM 40 MILLIGRAM(S): 100 INJECTION SUBCUTANEOUS at 21:21

## 2020-10-15 RX ADMIN — CHLORHEXIDINE GLUCONATE 1 APPLICATION(S): 213 SOLUTION TOPICAL at 06:48

## 2020-10-15 RX ADMIN — Medication 81 MILLIGRAM(S): at 11:45

## 2020-10-15 NOTE — SWALLOW BEDSIDE ASSESSMENT ADULT - SWALLOW EVAL: DIAGNOSIS
+poor secretion management, dep pharyngeal suctioning provided, +wet cough and voice, +overt s/s of penetration/aspiration w/ small ice trials X3

## 2020-10-15 NOTE — PHARMACOTHERAPY INTERVENTION NOTE - COMMENTS
d/w neurocrit team, pt not on clopidogrel 75mg daily, received load 300mg yesterday (10/14 ~5:30pm)  -will start clopidogrel 75mg & ASA 81mg daily

## 2020-10-15 NOTE — PROGRESS NOTE ADULT - ASSESSMENT
Impression:  68 yo M who had a L MCA stroke about 6 weeks ago and was treated here and underwent L MCA thrombectomy and IV tPA. He presented 10/13 due to recurrent L MCA syndrome with an initial NIHSS of 23. He was not a tPA candidate given his recent large stroke. His discharge NIHSS from the last hospital stay was 1 for mild aphasia only. Recent stroke work up including a negative JANET and ILR placement. He is currently being monitored in ICU with persistent right sided weakness and severe expressive aphasia and dysarthria with NIHSS of 19. Repeat cth completed 10/14 revealed left basal ganglia acute infarct and a Stable subacute infarcts in the left frontal and parietal lobes and posterior insular cortex.          Neuro critical care attending note will follow

## 2020-10-15 NOTE — PROGRESS NOTE ADULT - ASSESSMENT
IMPRESSION:    recurrent acute ischemic stroke in previous left MCA territory.  new left basal ganglia stroke(on CTH 10/14)  hx of CVA SP TPA and Endovascular therapy (8/27)`    PLAN:    CNS: No depressants  FU with Neuro. start on plavix 75 daily(s/p 300 mg loading on 10/14/2020)    HEENT: Oral care.     PULMONARY:  HOB @ 45 degrees.  Aspiration precautions.     CARDIOVASCULAR: JANET no thrombus (Septemeber 2), loop recorder(no event). avoid hypotension.  hold anti-hypertensive.    GI: GI prophylaxis.  NGT for now.  Bowel regimen     RENAL:  Follow up lytes.  Correct as needed    INFECTIOUS DISEASE: Follow up cultures    HEMATOLOGICAL:  DVT prophylaxis.    ENDOCRINE:  Follow up FS.  Insulin protocol if needed.    MUSCULOSKELETAL:  Bed rest    Downgrade to stroke unit

## 2020-10-15 NOTE — CONSULT NOTE ADULT - SUBJECTIVE AND OBJECTIVE BOX
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 He had been following up with Dr. Bhatt and Dr. Conner since his CVA,  last seen one week ago with significant neurological improvement. He had mild dysarthria and RUE tremors but no drifting noted at that time. Patient was eating a sandwich around 9 pm and started having worsening dysarthria and weakness so his wife brought him to the ED. On presentation NIHSS ~ 20, stroke code/NI alert was called NIH alert called, but due last admission and use of TPA, he was not a candidate for tpa on this admission.  CTH showed Wedge-shaped area of hypoattenuation within the left frontal and parietal lobes consistent with prior left MCA territory infarct. CT perfusion showed findings consistent with likely occlusions of the left middle cerebral artery M2 and M3 branches. Corresponding large area of ischemia within the left MCA territory measuring 113 cc, with core infarct of 12 cc.  Pt now moving RLE, able to say few words.  Patient was admitted for ICU monitoring.     T(C): --  HR: 80 (10-13-20 @ 23:08) (68 - 109)  BP: 142/71 (10-13-20 @ 23:08) (123/77 - 151/53)  RR: 18 (10-13-20 @ 23:08) (18 - 20)  SpO2: 97% (10-13-20 @ 23:08) (96% - 100%)          PAST MEDICAL & SURGICAL HISTORY:  Cerebrovascular accident (CVA)    GERD (gastroesophageal reflux disease)    S/P tonsillectomy    H/O bilateral hip replacements    H/O skin graft  upper body  legs        Hospital Course:    TODAY'S SUBJECTIVE & REVIEW OF SYMPTOMS:     Constitutional WNL   Cardio WNL   Resp WNL   GI WNL  Heme WNL  Endo WNL  Skin WNL  MSK WNL  Neuro right hemiparesis / dysarthria  Cognitive WNL  Psych WNL      MEDICATIONS  (STANDING):  aspirin  chewable 81 milliGRAM(s) Oral daily  atorvastatin 80 milliGRAM(s) Oral at bedtime  chlorhexidine 4% Liquid 1 Application(s) Topical <User Schedule>  clopidogrel Tablet 75 milliGRAM(s) Oral daily  enoxaparin Injectable 40 milliGRAM(s) SubCutaneous at bedtime  pantoprazole   Suspension 40 milliGRAM(s) Oral daily    MEDICATIONS  (PRN):      FAMILY HISTORY:  No pertinent family history in first degree relatives        Allergies    amoxicillin (Rash)    Intolerances        SOCIAL HISTORY:    [  ] Etoh  [  ] Smoking  [  ] Substance abuse     Home Environment:  [  ] Home Alone  [x  ] Lives with Family  [  ] Home Health Aid    Dwelling:  [  ] Apartment  [x  ] Private House  [  ] Adult Home  [  ] Skilled Nursing Facility      [  ] Short Term  [  ] Long Term  [x  ] Stairs       Elevator [  ]    FUNCTIONAL STATUS PTA: (Check all that apply)  Ambulation: [ x  ]Independent    [  ] Dependent     [  ] Non-Ambulatory  Assistive Device: [  ] SA Cane  [  ]  Q Cane  [  ] Walker  [  ]  Wheelchair  ADL : [x  ] Independent  [  ]  Dependent       Vital Signs Last 24 Hrs  T(C): 36.4 (15 Oct 2020 12:00), Max: 36.7 (14 Oct 2020 16:00)  T(F): 97.6 (15 Oct 2020 12:00), Max: 98.1 (14 Oct 2020 16:00)  HR: 60 (15 Oct 2020 12:00) (56 - 76)  BP: 139/77 (15 Oct 2020 12:00) (98/64 - 148/67)  BP(mean): 98 (15 Oct 2020 12:00) (75 - 107)  RR: 19 (15 Oct 2020 12:00) (14 - 31)  SpO2: 99% (15 Oct 2020 12:48) (93% - 100%)      PHYSICAL EXAM: Awake & Alert  GENERAL: NAD  HEAD:  Normocephalic  CHEST/LUNG: Clear   HEART: S1S2+  ABDOMEN: Soft, Nontender  EXTREMITIES:  no calf tenderness    NERVOUS SYSTEM:  Cranial Nerves 2-12 intact [  ] Abnormal  [x  ]dysarthria  ROM: WFL all extremities [  ]  Abnormal [x  ]limited right side  Motor Strength: WFL all extremities  [  ]  Abnormal [ x ]limited right side 0-1/5   Sensation: intact to light touch [x  ] Abnormal [  ]    FUNCTIONAL STATUS:  Bed Mobility: Independent [  ]  Supervision [  ]  Needs Assistance [ x ]  N/A [  ]  Transfers: Independent [  ]  Supervision [  ]  Needs Assistance [x  ]  N/A [  ]   Ambulation: Independent [  ]  Supervision [  ]  Needs Assistance [  ]  N/A [  ]  ADL: Independent [  ] Requires Assistance [  ] N/A [  ]      LABS:                        14.5   8.79  )-----------( 187      ( 15 Oct 2020 04:41 )             41.7     10-15    139  |  107  |  11  ----------------------------<  113<H>  3.8   |  22  |  1.0    Ca    9.1      15 Oct 2020 04:41  Phos  3.4     10-14  Mg     1.6     10-15    TPro  6.2  /  Alb  3.9  /  TBili  0.9  /  DBili  x   /  AST  37  /  ALT  16  /  AlkPhos  63  10-15    PT/INR - ( 14 Oct 2020 04:37 )   PT: 12.60 sec;   INR: 1.10 ratio         PTT - ( 14 Oct 2020 04:37 )  PTT:29.7 sec      RADIOLOGY & ADDITIONAL STUDIES:

## 2020-10-15 NOTE — PROGRESS NOTE ADULT - SUBJECTIVE AND OBJECTIVE BOX
1. Brief Presentation: Code Stroke, L MCA syndrome    2. 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 Relevant brief History:   70 yo M who had a L MCA stroke about 6 weeks ago and was treated here and underwent L MCA thrombectomy and IV tPA. He presents tonight due to recurrent L MCA syndrome, viz. aphasia, gaze preference, and right hemiparesis that started around 9 pm. He was a pre-notification by EMS. Code NI alert was called upon arrival due to the presence of cortical signs consistent with large vessel occlusion. He was not a tPA candidate given his recent large stroke. His discharge NIHSS from the last hospital stay was 1 for mild aphasia only. Recent stroke work up including a negative JANET and ILR placement        4.Last 24 hour updates:  Right sided dense weakness and Aphasia deficits persists. No acute events. Hemodynamically stable  S/p CTH 10/14 Interval development of left basal ganglia hypoattenuation compatible with acute infarct.Stable subacute infarcts in the left frontal and parietal lobes and posterior insular cortex.        5.Medications:  chlorhexidine 4% Liquid 1 Application(s) Topical <User Schedule>  enoxaparin Injectable 40 milliGRAM(s) SubCutaneous at bedtime  pantoprazole  Injectable 40 milliGRAM(s) IV Push daily      6.Ancillary Mangement:  Chest PT[]  Head of bed >35 [x]  Out of bed to chair []  PT/OT/ST []  Spirometry[]  DVT prophylaxis[X]      7.Neuro Exam:  Mentation: Awake, alert, oriented to person, place, and time , severely dysarthric and aphasic  Cranial Nerves:  	II – vff  	III/IV/VI – Pupils 3 mm. PERRL.   	V – facial sensation is decreased on the right  	VII – Right Facial palsy is present.  	VIII – No nystagmus.  	IX/X – Symmetric palate rise. Uvula midline.  	XI – Hemiplegic R SCM.  Motor: Right hemiplegia, 0/5 power in arm and leg.   Sensory: Severely diminished right hemisensory loss  Reflexes: 2+ generally.  Cerebellum: No dysmetria. Gait deferred.  Upgoing right toe      NIHSS  NIH STROKE SCALE  Item	                                                        Score  1 a.	Level of Consciousness	               	0  1 b. LOC Questions	                                    2  1 c.	LOC Commands	                               	0  2.	Best Gaze	                                    0  3.	Visual	                                                0  4.	Facial Palsy	                                    2  5 a.	Motor Arm - Left	                        0  5 b.	Motor Arm - Right	                        4  6 a.	Motor Leg - Left	                        0  6 b.	Motor Leg - Right	                        4  7.	Limb Ataxia	                                    0  8.	Sensory	                                                2  9.	Language	                                    2  10.	Dysarthria	                                    2  11.	Extinction and Inattention  	            1  ______________________________________  TOTAL	                                                        23 (initial presentation) --->18---> 19      mRS:  0 No symptoms at all  1 No significant disability despite symptoms; able to carry out all usual duties and activities without assistance  2 Slight disability; unable to carry out all previous activities, but able to look after own affairs  3 Moderate disability; requiring some help, but able to walk without assistance  4 Moderately severe disability; unable to walk without assistance and unable to attend to own bodily needs without assistance  5 Severe disability; bedridden, incontinent and requiring constant nursing care and attention  6 Dead      Last CTH:  < from: CT Head No Cont (10.14.20 @ 14:33) >  FINDINGS:    Stable wedge-shaped hypodensity within the left frontal and parietal lobes and posterior insula compatible with subacute infarcts (as seen on the MRI 8/31/2020).    New hypoattenuation within the left caudate and lentiform nuclei compatible with acute infarct.    There is mild focal mass effect without midline shift.    There is no evidence of acute intracranial hemorrhage.    The calvarium is intact. There is mild mucosal thickening within the right ethmoid sinus.    IMPRESSION:  Since the CT head performed on the previous day:    1.  Interval development of left basal ganglia hypoattenuation compatible with acute infarct.    2.  Stable subacute infarcts in the left frontal and parietal lobes and posterior insular cortex.    3.  No significant mass effect. No acute intracranial hemorrhage.      HI LIMON M.D., ATTENDING RADIOLOGIST  This document has been electronically signed. Oct 14 2020  3:19PM    < end of copied text >        Last CTA  CTA neck:  The aortic arch, origin of the great vessels and subclavian arteries are unremarkable.    The bilateral common carotid arteries and internal carotid arteries are unremarkable without significant stenosis seen.    The vertebral arteries are codominant. No significant vertebral artery stenosis is noted.    CTA head:  The distal segments of the internal carotid arteries are unremarkable. There are multifocal occlusions of the left MCA branches: superior M2 and M3 branches (series 4, image [id number]). The right ADEOLA and MCA are patent. The left ADEOLA is patent.    The distal vertebral arteries, basilar artery and posterior cerebral arteries are patent.        Last CTP:    < from: CT Perfusion w/ Maps w/ IV Cont (10.13.20 @ 22:50) >  Findings:      CT perfusion:  The cerebral blood volume and time to peak images demonstrate increased increased time to peak in the left MCA territory measuring 113 cc. There is a decrease in total cerebral blood flow volume of 12 cc in the left MCA territory. There is a mismatch volume of101 cc suggesting ischemic penumbra.    Other:  Aeration of wedge-shaped area of hypoattenuation within the left MCA territory, consistent with prior infarct. Multilevel degenerative changes of the cervical spine. Trace retrolisthesis of C2 on C3. Mucosal thickening of bilateral ethmoid sinuses.    IMPRESSION:    1- Occlusion of the left superior division of the MCA  (602/93-88)    2-occlusion of the left M3 branch of the MCA, leading to the area of chronic infarct          8.CVS:  Vital Signs Last 24 Hrs  T(C): 36.5 (14 Oct 2020 23:00), Max: 36.8 (14 Oct 2020 12:00)  T(F): 97.7 (14 Oct 2020 23:00), Max: 98.2 (14 Oct 2020 12:00)  HR: 70 (15 Oct 2020 04:00) (56 - 82)  BP: 134/65 (15 Oct 2020 04:00) (98/64 - 148/67)  BP(mean): 94 (15 Oct 2020 04:00) (75 - 107)  RR: 23 (15 Oct 2020 04:00) (14 - 31)  SpO2: 100% (15 Oct 2020 04:00) (93% - 100%)          Last Echo:  < from: JANET w/Probe Placement (09.02.20 @ 09:55) >  Summary:   1. Left ventricular ejection fraction, by visual estimation, is 60 to 65%.   2. Normal left atrial size.   3. Normal right atrial size.   4. Mild mitral valve regurgitation.   5. Color flowdoppler and intravenous injection of agitated saline demonstrates the presence of an intact intra atrial septum.   6. No left atrial appendage thrombus and normal left atrial appendage velocities.    < end of copied text >        Last EKG:  < from: 12 Lead ECG (10.13.20 @ 23:21) >  Ventricular Rate 73 BPM    Atrial Rate 73 BPM    P-R Interval 174 ms    QRS Duration 108 ms    Q-T Interval 410 ms    QTC Calculation(Bazett) 451 ms    P Axis 75 degrees    R Axis 80 degrees    T Axis 36 degrees    Diagnosis Line Normal sinus rhythm with sinus arrhythmia  Septal infarct , age undetermined  Abnormal ECG    Confirmed by Gonzalo Haile ([id number]) on 10/14/2020 7:33:48 AM    < end of copied text >        9.Respiratory:    Chest Xray:  < from: Xray Chest 1 View-PORTABLE IMMEDIATE (Xray Chest 1 View-PORTABLE IMMEDIATE .) (10.14.20 @ 16:28) >  Findings:    Support devices: An NG tube is in place with its tip extending below the diaphragm to the level of the gastric fundus. A loop recorder is again noted. EKG leads are in place.    Cardiac/mediastinum/hilum: Theheart size and pulmonary vascular markings are within normal limits.    Lung parenchyma/Pleura: Lungs are clear of infiltrate. The costophrenic angles are clear. No pneumothorax.    Skeleton/soft tissues: Unchanged    Impression:    The NG tube is inplace with its tip at the level of the gastric fundus.      CARMEN SHIPMAN M.D., ATTENDING RADIOLOGIST  This document has been electronically signed. Oct 14 2020  5:11PM    < end of copied text >        10.GI:  Prophylaxis    GI: Protonix 40 mg q 24   LIVER FUNCTIONS - ( 13 Oct 2020 22:04 )  Alb: 4.2 g/dL / Pro: 6.8 g/dL / ALK PHOS: 75 U/L / ALT: 17 U/L / AST: 24 U/L / GGT: x             11.Renal/Fluids/Electrolytes:    10-14    139  |  106  |  15  ----------------------------<  119<H>  4.2   |  25  |  1.1    Ca    9.3      14 Oct 2020 04:37  Phos  3.4     10-14  Mg     1.8     10-14          I&O's Detail    13 Oct 2020 07:01  -  14 Oct 2020 07:00  --------------------------------------------------------  IN:    Eptifbatide: 35.2 mL    sodium chloride 0.9%: 425 mL  Total IN: 460.2 mL    OUT:    Voided (mL): 410 mL  Total OUT: 410 mL    Total NET: 50.2 mL      14 Oct 2020 07:01  -  15 Oct 2020 04:29  --------------------------------------------------------  IN:    Eptifbatide: 91 mL    sodium chloride 0.9%: 300 mL  Total IN: 391 mL    OUT:    Voided (mL): 1500 mL  Total OUT: 1500 mL    Total NET: -1109 mL          12.ID:  TMax:  Vital Signs Last 24 Hrs  T(C): 36.5 (14 Oct 2020 23:00), Max: 36.8 (14 Oct 2020 12:00)  T(F): 97.7 (14 Oct 2020 23:00), Max: 98.2 (14 Oct 2020 12:00)  HR: 70 (15 Oct 2020 04:00) (56 - 82)  BP: 134/65 (15 Oct 2020 04:00) (98/64 - 148/67)  BP(mean): 94 (15 Oct 2020 04:00) (75 - 107)  RR: 23 (15 Oct 2020 04:00) (14 - 31)  SpO2: 100% (15 Oct 2020 04:00) (93% - 100%)          13.Hematology:                        13.7   7.55  )-----------( 193      ( 14 Oct 2020 04:37 )             39.9       PT/INR - ( 14 Oct 2020 04:37 )   PT: 12.60 sec;   INR: 1.10 ratio         PTT - ( 14 Oct 2020 04:37 )  PTT:29.7 sec          DVT Prophylaxis  Lovenox[]   Heparin[]   Venodyne[]   SCD's[x]

## 2020-10-15 NOTE — SPEECH LANGUAGE PATHOLOGY EVALUATION - SLP PERTINENT HISTORY OF CURRENT PROBLEM
Pt admitted with R sided weakness, dysarthria. CTH (-) for acute changes, wedge shaped hypoattenuation L frontal and parietal lobes consistent with prior L MCA. Acute recurrent L MCA syndrome. Known to SLP dept during previous admission, recs for regular and thin liquids

## 2020-10-15 NOTE — PROGRESS NOTE ADULT - SUBJECTIVE AND OBJECTIVE BOX
Patient is a 69y old  Male who presents with a chief complaint of Acute CVA (14 Oct 2020 19:20)        Over Night Events: s/p CT head showed new basal ganglia stroke. now off integrillin drip, on plavix.        ROS:     All ROS are negative except HPI         PHYSICAL EXAM    ICU Vital Signs Last 24 Hrs  T(C): 36.5 (15 Oct 2020 08:00), Max: 36.8 (14 Oct 2020 12:00)  T(F): 97.7 (15 Oct 2020 08:00), Max: 98.2 (14 Oct 2020 12:00)  HR: 70 (15 Oct 2020 08:00) (56 - 76)  BP: 133/69 (15 Oct 2020 08:00) (98/64 - 148/67)  BP(mean): 88 (15 Oct 2020 08:00) (75 - 107)  RR: 21 (15 Oct 2020 08:00) (14 - 31)  SpO2: 97% (15 Oct 2020 08:00) (93% - 100%)      CONSTITUTIONAL:  Well nourished.  NAD    ENT:   Airway patent,   Mouth with normal mucosa.   No thrush    EYES:   Pupils equal,   Round and reactive to light.    CARDIAC:   Normal rate,   Regular rhythm.    No edema      Vascular:  Normal systolic impulse  No Carotid bruits    RESPIRATORY:  ronchi   No wheezing  Bilateral BS  Normal chest expansion  Not tachypneic,  No use of accessory muscles    GASTROINTESTINAL:  Abdomen soft,   Non-tender,   No guarding,   + BS      NEUROLOGICAL:   Awake, Alert, follows commands, dysarthia.  motor deficits right side weakness 0/5.    SKIN:   Skin normal color for race,   Warm and dry and intact.   No evidence of rash.        10-14-20 @ 07:01  -  10-15-20 @ 07:00  --------------------------------------------------------  IN:    Eptifbatide: 91 mL    sodium chloride 0.9%: 300 mL  Total IN: 391 mL    OUT:    Voided (mL): 1975 mL  Total OUT: 1975 mL    Total NET: -1584 mL          LABS:                            14.5   8.79  )-----------( 187      ( 15 Oct 2020 04:41 )             41.7                                               10-15    139  |  107  |  11  ----------------------------<  113<H>  3.8   |  22  |  1.0    Ca    9.1      15 Oct 2020 04:41  Phos  3.4     10-14  Mg     1.6     10-15    TPro  6.2  /  Alb  3.9  /  TBili  0.9  /  DBili  x   /  AST  37  /  ALT  16  /  AlkPhos  63  10-15      PT/INR - ( 14 Oct 2020 04:37 )   PT: 12.60 sec;   INR: 1.10 ratio         PTT - ( 14 Oct 2020 04:37 )  PTT:29.7 sec                                           CARDIAC MARKERS ( 13 Oct 2020 22:04 )  x     / <0.01 ng/mL / x     / x     / x                                                LIVER FUNCTIONS - ( 15 Oct 2020 04:41 )  Alb: 3.9 g/dL / Pro: 6.2 g/dL / ALK PHOS: 63 U/L / ALT: 16 U/L / AST: 37 U/L / GGT: x                                                                                                                                       MEDICATIONS  (STANDING):  chlorhexidine 4% Liquid 1 Application(s) Topical <User Schedule>  enoxaparin Injectable 40 milliGRAM(s) SubCutaneous at bedtime  magnesium sulfate  IVPB 2 Gram(s) IV Intermittent every 2 hours  pantoprazole  Injectable 40 milliGRAM(s) IV Push daily    MEDICATIONS  (PRN):      New X-rays reviewed:                                                                                  ECHO    CXR interpreted by me:    no opacity, NGT ok.

## 2020-10-15 NOTE — CHART NOTE - NSCHARTNOTEFT_GEN_A_CORE
ICU Transfer Note    Transfer from: MICU  Transfer to: Acute stroke unit  Accepting physician:      ICU COURSE: 68 yo M who had a L MCA stroke about 6 weeks ago and was treated here and underwent L MCA thrombectomy and IV tPA. He presented on 10/13 due to recurrent L MCA syndrome with an initial NIHSS of 23. He was not a tPA candidate given his recent large stroke. His discharge NIHSS from the last hospital stay was 1 for mild aphasia only. Recent stroke work up including a negative JANET and ILR placement. He is currently being monitored in ICU with persistent right sided weakness and severe expressive aphasia and dysarthria with NIHSS of 19. Repeat cth completed 10/14 revealed left basal ganglia acute infarct and a Stable subacute infarcts in the left frontal and parietal lobes and posterior insular cortex. Patient had been on Integrillin drip, which was stopped yesterday. Patient is stable for downgrade to acute stroke unit.    ASSESSMENT & PLAN: 68 yo M who had a L MCA stroke about 6 weeks ago and was treated here and underwent L MCA thrombectomy and IV tPA. He presented on 10/13 due to recurrent L MCA syndrome with an initial NIHSS of 23. He was not a tPA candidate given his recent large stroke. Patient had been on Integrillin drip, which was stopped yesterday. Patient is s/p loading with Plavix 300mg on 10/14. Patient is stable for downgrade to acute stroke unit.    #Recurrent L MCA syndrome  #L basal ganglia acute infarct  - S/p loading with Plavix 300mg on 10/14  - Continue ASA 81 mg daily.   - Continue Plavix 75 mg PO daily.   - Continue Lipitor 80 mg PO qhs  - Maintain systolic blood pressure 120-160  - Neuro checks Q4H  - Avoid hypotension  - Hold anti-hypertensives  - JANET showed no thrombus on September 2  - S/p interrogation of loop recorder with no events  - F/u with neuro     #Misc  Diet: NPO with tube feeds  GI PPx: Protonix  DVT PPx: Lovenox  Activity: bedrest, F/u PT  Dispo: downgrade to acute stroke unit     For Follow-Up:    Maintain -160    Subjective: Patient seen and examined at bedside this AM. No overnight events.    Vital Signs Last 24 Hrs  T(C): 36.4 (15 Oct 2020 12:00), Max: 36.7 (14 Oct 2020 16:00)  T(F): 97.6 (15 Oct 2020 12:00), Max: 98.1 (14 Oct 2020 16:00)  HR: 60 (15 Oct 2020 12:00) (56 - 76)  BP: 139/77 (15 Oct 2020 12:00) (98/64 - 148/67)  BP(mean): 98 (15 Oct 2020 12:00) (75 - 107)  RR: 19 (15 Oct 2020 12:00) (14 - 31)  SpO2: 99% (15 Oct 2020 12:48) (93% - 100%)    Physical exam:  General: in NAD, dysarthric  CV: S1/S2 present, no M/R/G  RESP: no wheezing, bilateral breath sounds  ABD: soft, non-tender, non-distended  EXTR: no LE edema  NEURO: alert, 1/5 strength in RUE/RLE, right-sided sensory loss, 5/5 strength in LUE/LLE    I&O's Summary    14 Oct 2020 07:01  -  15 Oct 2020 07:00  --------------------------------------------------------  IN: 391 mL / OUT: 1975 mL / NET: -1584 mL    15 Oct 2020 07:01  -  15 Oct 2020 15:20  --------------------------------------------------------  IN: 610 mL / OUT: 0 mL / NET: 610 mL          MEDICATIONS  (STANDING):  aspirin  chewable 81 milliGRAM(s) Oral daily  atorvastatin 80 milliGRAM(s) Oral at bedtime  chlorhexidine 4% Liquid 1 Application(s) Topical <User Schedule>  clopidogrel Tablet 75 milliGRAM(s) Oral daily  enoxaparin Injectable 40 milliGRAM(s) SubCutaneous at bedtime  pantoprazole   Suspension 40 milliGRAM(s) Oral daily    MEDICATIONS  (PRN):        LABS                                            14.5                  Neurophils% (auto):   80.1   (10-15 @ 04:41):    8.79 )-----------(187          Lymphocytes% (auto):  9.4                                           41.7                   Eosinphils% (auto):   1.5      Manual%: Neutrophils x    ; Lymphocytes x    ; Eosinophils x    ; Bands%: x    ; Blasts x                                    139    |  107    |  11                  Calcium: 9.1   / iCa: x      (10-15 @ 04:41)    ----------------------------<  113       Magnesium: 1.6                              3.8     |  22     |  1.0              Phosphorous: x        TPro  6.2    /  Alb  3.9    /  TBili  0.9    /  DBili  x      /  AST  37     /  ALT  16     /  AlkPhos  63     15 Oct 2020 04:41 ICU Transfer Note    Transfer from: MICU  Transfer to: Acute stroke unit  Accepting physician:      ICU COURSE: 70 yo M who had a L MCA stroke about 6 weeks ago and was treated here and underwent L MCA thrombectomy and IV tPA. He presented on 10/13 due to recurrent L MCA syndrome with an initial NIHSS of 23. He was not a tPA candidate given his recent large stroke. His discharge NIHSS from the last hospital stay was 1 for mild aphasia only. Recent stroke work up including a negative JANET and ILR placement. He is currently being monitored in ICU with persistent right sided weakness and severe expressive aphasia and dysarthria with NIHSS of 19. Repeat cth completed 10/14 revealed left basal ganglia acute infarct and a Stable subacute infarcts in the left frontal and parietal lobes and posterior insular cortex. Patient had been on Integrillin drip, which was stopped yesterday. Patient is stable for downgrade to acute stroke unit.    ASSESSMENT & PLAN: 70 yo M who had a L MCA stroke about 6 weeks ago and was treated here and underwent L MCA thrombectomy and IV tPA. He presented on 10/13 due to recurrent L MCA syndrome with an initial NIHSS of 23. He was not a tPA candidate given his recent large stroke. Patient had been on Integrillin drip, which was stopped yesterday. Patient is s/p loading with Plavix 300mg on 10/14. Patient is stable for downgrade to acute stroke unit.    #Recurrent L MCA syndrome  #L basal ganglia acute infarct  - S/p loading with Plavix 300mg on 10/14  - Continue ASA 81 mg daily.   - Continue Plavix 75 mg PO daily.   - Continue Lipitor 80 mg PO qhs  - Maintain systolic blood pressure 120-160  - Neuro checks Q4H  - Avoid hypotension  - Hold anti-hypertensives  - JANET showed no thrombus on September 2  - S/p interrogation of loop recorder with no events  - F/u with neuro     #Misc  Diet: NPO with tube feeds  GI PPx: Protonix  DVT PPx: Lovenox  Activity: bedrest, F/u PT  Dispo: downgrade to acute stroke unit     For Follow-Up:    Maintain -160    Subjective: Patient seen and examined at bedside this AM. No overnight events.    Vital Signs Last 24 Hrs  T(C): 36.4 (15 Oct 2020 12:00), Max: 36.7 (14 Oct 2020 16:00)  T(F): 97.6 (15 Oct 2020 12:00), Max: 98.1 (14 Oct 2020 16:00)  HR: 60 (15 Oct 2020 12:00) (56 - 76)  BP: 139/77 (15 Oct 2020 12:00) (98/64 - 148/67)  BP(mean): 98 (15 Oct 2020 12:00) (75 - 107)  RR: 19 (15 Oct 2020 12:00) (14 - 31)  SpO2: 99% (15 Oct 2020 12:48) (93% - 100%)    Physical exam:  General: in NAD, dysarthric  CV: S1/S2 present, no M/R/G  RESP: no wheezing, bilateral breath sounds  ABD: soft, non-tender, non-distended  EXTR: no LE edema  NEURO: alert, 1/5 strength in RUE/RLE, right-sided sensory loss, 5/5 strength in LUE/LLE    I&O's Summary    14 Oct 2020 07:01  -  15 Oct 2020 07:00  --------------------------------------------------------  IN: 391 mL / OUT: 1975 mL / NET: -1584 mL    15 Oct 2020 07:01  -  15 Oct 2020 15:20  --------------------------------------------------------  IN: 610 mL / OUT: 0 mL / NET: 610 mL          MEDICATIONS  (STANDING):  aspirin  chewable 81 milliGRAM(s) Oral daily  atorvastatin 80 milliGRAM(s) Oral at bedtime  chlorhexidine 4% Liquid 1 Application(s) Topical <User Schedule>  clopidogrel Tablet 75 milliGRAM(s) Oral daily  enoxaparin Injectable 40 milliGRAM(s) SubCutaneous at bedtime  pantoprazole   Suspension 40 milliGRAM(s) Oral daily    MEDICATIONS  (PRN):        LABS                                            14.5                  Neurophils% (auto):   80.1   (10-15 @ 04:41):    8.79 )-----------(187          Lymphocytes% (auto):  9.4                                           41.7                   Eosinphils% (auto):   1.5      Manual%: Neutrophils x    ; Lymphocytes x    ; Eosinophils x    ; Bands%: x    ; Blasts x                                    139    |  107    |  11                  Calcium: 9.1   / iCa: x      (10-15 @ 04:41)    ----------------------------<  113       Magnesium: 1.6                              3.8     |  22     |  1.0              Phosphorous: x        TPro  6.2    /  Alb  3.9    /  TBili  0.9    /  DBili  x      /  AST  37     /  ALT  16     /  AlkPhos  63     15 Oct 2020 04:41    Sign out given to Dr. Frank ([id number] [id number]) at 1757 on 10/15/2020

## 2020-10-15 NOTE — CHART NOTE - NSCHARTNOTEFT_GEN_A_CORE
Addendum:  Continue ASA 81 mg daily.   Plavix 75 mg PO daily.   Lipitor 80 mg PO qhs  Keep blood pressure 120-160    Jan Anthony NP  [phone]

## 2020-10-15 NOTE — CONSULT NOTE ADULT - ASSESSMENT
IMPRESSION: Rehab of left CVA / R HP / dysarthria    PRECAUTIONS: [  ] Cardiac  [  ] Respiratory  [  ] Seizures [  ] Contact Isolation  [  ] Droplet Isolation  [  ] Other    Weight Bearing Status:     RECOMMENDATION:    Out of Bed to Chair     DVT/Decubiti Prophylaxis    REHAB PLAN:     [x   ] Bedside P/T 3-5 times a week   [x   ]   Bedside O/T  2-3 times a week             [   ] No Rehab Therapy Indicated                   [  x ]  Speech Therapy   Conditioning/ROM                                    ADL  Bed Mobility                                               Conditioning/ROM  Transfers                                                     Bed Mobility  Sitting /Standing Balance                         Transfers                                        Gait Training                                               Sitting/Standing Balance  Stair Training [   ]Applicable                    Home equipment Eval                                                                        Splinting  [   ] Only      GOALS:   ADL   [ x  ]   Independent                    Transfers  [ x  ] Independent                          Ambulation  [  x ] Independent     [   x ] With device                            [   ]  CG                                                         [   ]  CG                                                                  [   ] CG                            [    ] Min A                                                   [   ] Min A                                                              [   ] Min  A          DISCHARGE PLAN:   [   ]  Good candidate for Intensive Rehabilitation/Hospital based                                             Will tolerate 3hrs Intensive Rehab Daily                                       [    ]  Short Term Rehab in Skilled Nursing Facility                                       [    ]  Home with Outpatient or  services                                         [  x  ]  Possible Candidate for Intensive Hospital based Rehab

## 2020-10-16 LAB
ALBUMIN SERPL ELPH-MCNC: 3.8 G/DL — SIGNIFICANT CHANGE UP (ref 3.5–5.2)
ALP SERPL-CCNC: 67 U/L — SIGNIFICANT CHANGE UP (ref 30–115)
ALT FLD-CCNC: 15 U/L — SIGNIFICANT CHANGE UP (ref 0–41)
ANION GAP SERPL CALC-SCNC: 11 MMOL/L — SIGNIFICANT CHANGE UP (ref 7–14)
AST SERPL-CCNC: 29 U/L — SIGNIFICANT CHANGE UP (ref 0–41)
BASOPHILS # BLD AUTO: 0.02 K/UL — SIGNIFICANT CHANGE UP (ref 0–0.2)
BASOPHILS NFR BLD AUTO: 0.2 % — SIGNIFICANT CHANGE UP (ref 0–1)
BILIRUB SERPL-MCNC: 0.6 MG/DL — SIGNIFICANT CHANGE UP (ref 0.2–1.2)
BUN SERPL-MCNC: 13 MG/DL — SIGNIFICANT CHANGE UP (ref 10–20)
CALCIUM SERPL-MCNC: 9 MG/DL — SIGNIFICANT CHANGE UP (ref 8.5–10.1)
CHLORIDE SERPL-SCNC: 104 MMOL/L — SIGNIFICANT CHANGE UP (ref 98–110)
CO2 SERPL-SCNC: 23 MMOL/L — SIGNIFICANT CHANGE UP (ref 17–32)
CREAT SERPL-MCNC: 1 MG/DL — SIGNIFICANT CHANGE UP (ref 0.7–1.5)
EOSINOPHIL # BLD AUTO: 0.2 K/UL — SIGNIFICANT CHANGE UP (ref 0–0.7)
EOSINOPHIL NFR BLD AUTO: 2.3 % — SIGNIFICANT CHANGE UP (ref 0–8)
GLUCOSE BLDC GLUCOMTR-MCNC: 232 MG/DL — HIGH (ref 70–99)
GLUCOSE SERPL-MCNC: 155 MG/DL — HIGH (ref 70–99)
HCT VFR BLD CALC: 44.3 % — SIGNIFICANT CHANGE UP (ref 42–52)
HGB BLD-MCNC: 15.1 G/DL — SIGNIFICANT CHANGE UP (ref 14–18)
IMM GRANULOCYTES NFR BLD AUTO: 0.2 % — SIGNIFICANT CHANGE UP (ref 0.1–0.3)
LYMPHOCYTES # BLD AUTO: 1.03 K/UL — LOW (ref 1.2–3.4)
LYMPHOCYTES # BLD AUTO: 11.9 % — LOW (ref 20.5–51.1)
MAGNESIUM SERPL-MCNC: 2 MG/DL — SIGNIFICANT CHANGE UP (ref 1.8–2.4)
MCHC RBC-ENTMCNC: 31.9 PG — HIGH (ref 27–31)
MCHC RBC-ENTMCNC: 34.1 G/DL — SIGNIFICANT CHANGE UP (ref 32–37)
MCV RBC AUTO: 93.5 FL — SIGNIFICANT CHANGE UP (ref 80–94)
MONOCYTES # BLD AUTO: 0.78 K/UL — HIGH (ref 0.1–0.6)
MONOCYTES NFR BLD AUTO: 9 % — SIGNIFICANT CHANGE UP (ref 1.7–9.3)
NEUTROPHILS # BLD AUTO: 6.58 K/UL — HIGH (ref 1.4–6.5)
NEUTROPHILS NFR BLD AUTO: 76.4 % — HIGH (ref 42.2–75.2)
NRBC # BLD: 0 /100 WBCS — SIGNIFICANT CHANGE UP (ref 0–0)
PLATELET # BLD AUTO: 211 K/UL — SIGNIFICANT CHANGE UP (ref 130–400)
POTASSIUM SERPL-MCNC: 4.3 MMOL/L — SIGNIFICANT CHANGE UP (ref 3.5–5)
POTASSIUM SERPL-SCNC: 4.3 MMOL/L — SIGNIFICANT CHANGE UP (ref 3.5–5)
PROT SERPL-MCNC: 6.3 G/DL — SIGNIFICANT CHANGE UP (ref 6–8)
RBC # BLD: 4.74 M/UL — SIGNIFICANT CHANGE UP (ref 4.7–6.1)
RBC # FLD: 11.1 % — LOW (ref 11.5–14.5)
SODIUM SERPL-SCNC: 138 MMOL/L — SIGNIFICANT CHANGE UP (ref 135–146)
WBC # BLD: 8.63 K/UL — SIGNIFICANT CHANGE UP (ref 4.8–10.8)
WBC # FLD AUTO: 8.63 K/UL — SIGNIFICANT CHANGE UP (ref 4.8–10.8)

## 2020-10-16 PROCEDURE — 99233 SBSQ HOSP IP/OBS HIGH 50: CPT

## 2020-10-16 PROCEDURE — 99232 SBSQ HOSP IP/OBS MODERATE 35: CPT

## 2020-10-16 RX ORDER — SODIUM CHLORIDE 9 MG/ML
1000 INJECTION INTRAMUSCULAR; INTRAVENOUS; SUBCUTANEOUS
Refills: 0 | Status: DISCONTINUED | OUTPATIENT
Start: 2020-10-16 | End: 2020-10-18

## 2020-10-16 RX ADMIN — PANTOPRAZOLE SODIUM 40 MILLIGRAM(S): 20 TABLET, DELAYED RELEASE ORAL at 11:31

## 2020-10-16 RX ADMIN — Medication 81 MILLIGRAM(S): at 11:31

## 2020-10-16 RX ADMIN — CLOPIDOGREL BISULFATE 75 MILLIGRAM(S): 75 TABLET, FILM COATED ORAL at 11:31

## 2020-10-16 RX ADMIN — ATORVASTATIN CALCIUM 80 MILLIGRAM(S): 80 TABLET, FILM COATED ORAL at 21:42

## 2020-10-16 RX ADMIN — SODIUM CHLORIDE 100 MILLILITER(S): 9 INJECTION INTRAMUSCULAR; INTRAVENOUS; SUBCUTANEOUS at 16:56

## 2020-10-16 RX ADMIN — CHLORHEXIDINE GLUCONATE 1 APPLICATION(S): 213 SOLUTION TOPICAL at 05:32

## 2020-10-16 RX ADMIN — ENOXAPARIN SODIUM 40 MILLIGRAM(S): 100 INJECTION SUBCUTANEOUS at 21:42

## 2020-10-16 NOTE — DIETITIAN INITIAL EVALUATION ADULT. - ORAL INTAKE PTA/DIET HISTORY
PTA per wife pt eats well, on a healthy diet because he is considered a "health nut." exercise daily. Likes snacks at times. Stopped taking vitamins and protein shakes (used to). NKFA. UBW around 185# or below 200# per wife, unsure trend, but pt has been actively trying to lose weight gradually.

## 2020-10-16 NOTE — DIETITIAN INITIAL EVALUATION ADULT. - OTHER INFO
p/w R side facial droop, weakness, dysarthria. CT found new basal ganglia stroke. Recurrent L MCA stroke due to M2/M3 occlusion. neuro to follow.

## 2020-10-16 NOTE — PROGRESS NOTE ADULT - SUBJECTIVE AND OBJECTIVE BOX
Stroke Progress Note:    NYDIA VALVERDE    1. Chief Complaint: dysarthria and dense right hemiplegia    HPI:  69 years old M with PMHx  emergent IA mechanical thrombectomy of left ADEOLA/MCA on 2020 presenting with R sided facial droop, weakness, and dysarthria. He had been following up with Dr. Bhatt and Dr. Conner since his CVA,  last seen one week ago with significant neurological improvement. He had mild dysarthria and RUE tremors but no drifting noted at that time. Patient was eating a sandwich around 9 pm and started having worsening dysarthria and weakness so his wife brought him to the ED. On presentation NIHSS ~ 20, stroke code/NI alert was called NIH alert called, but due last admission and use of TPA, he was not a candidate for tpa on this admission.  CTH showed Wedge-shaped area of hypoattenuation within the left frontal and parietal lobes consistent with prior left MCA territory infarct. CT perfusion showed findings consistent with likely occlusions of the left middle cerebral artery M2 and M3 branches. Corresponding large area of ischemia within the left MCA territory measuring 113 cc, with core infarct of 12 cc.  Pt now moving RLE, able to say few words.  Patient was admitted for ICU monitoring.     T(C): --  HR: 80 (10-13-20 @ 23:08) (68 - 109)  BP: 142/71 (10-13-20 @ 23:08) (123/77 - 151/53)  RR: 18 (10-13-20 @ 23:08) (18 - 20)  SpO2: 97% (10-13-20 @ 23:08) (96% - 100%)        (13 Oct 2020 23:23)      2. Relevant PMH:   Prior ischemic stroke/TIA[ ], Afib [ ], CAD [ ], HTN [ ], DLD [ ], DM [ ], PVD [ ], Obesity [ ],   Sedentary lifestyle [ ], CHF [ ], ENMNAUEL [ ], Cancer Hx [ ].    3. Social History: Smoking [ ], Drug Use [ ], Alcohol Use [ ], Other [ ]    4. Possible Location of Stroke:    5. Relevant Brain Tissue Imaging: < from: CT Head No Cont (10.14.20 @ 14:33) >    EXAM:  CT BRAIN            PROCEDURE DATE:  10/14/2020            INTERPRETATION:  Clinical History / Reason for exam: Stroke follow-up    Technique: Noncontrast head CT. Contiguous CT axial images of the head from the base to the vertex.    COMPARISON: CT head and perfusion study from the prior day. MRI brain 2020.    FINDINGS:    Stable wedge-shaped hypodensity within the left frontal and parietal lobes and posterior insula compatible with subacute infarcts (as seen on the MRI 2020).    New hypoattenuation within the left caudate and lentiform nuclei compatible with acute infarct.    There is mild focal mass effect without midline shift.    There is no evidence of acute intracranial hemorrhage.    The calvarium is intact. There is mild mucosal thickening within the right ethmoid sinus.    IMPRESSION:  Since the CT head performed on the previous day:    1.  Interval development of left basal ganglia hypoattenuation compatible with acute infarct.    2.  Stable subacute infarcts in the left frontal and parietal lobes and posterior insular cortex.    3.  No significant mass effect. No acute intracranial hemorrhage.              < end of copied text >      6. Relevant Cerebrovascular Imaging:         CT Perfusion w/ Maps w/ IV Cont:   EXAM:  CT PERFUSION W MAPS IC            PROCEDURE DATE:  10/13/2020            INTERPRETATION:  Clinical History / Reason for exam: Stroke code. Right-sided facial droop and weakness.    Technique: CT perfusion of the brain was performed along with CTA of the head and neck after the intravenous administration of 100 cc Optiray 350 contrast. Sagittal, coronal and axial reformatted images were obtained as well as 3-D MIP and volume rendered images.    Comparison: Noncontrast CT head performed earlier the same day and CT perfusion 2020.    Findings:    CTA neck:  The aortic arch, origin of the great vessels and subclavian arteries are unremarkable.    The bilateral common carotid arteries and internal carotid arteries are unremarkable without significant stenosis seen.    The vertebral arteries are codominant. No significant vertebral artery stenosis is noted.    CTA head:  The distal segments of the internal carotid arteries are unremarkable. There are multifocal occlusions of the left MCA branches: superior M2 and M3 branches (series 4, image [id number]). The right ADEOLA and MCA are patent. The left ADEOLA is patent.    The distal vertebral arteries, basilar artery and posterior cerebral arteries are patent.    CT perfusion:  The cerebral blood volume and time to peak images demonstrate increased increased time to peak in the left MCA territory measuring 113 cc. There is a decrease in total cerebral blood flow volume of 12 cc in the left MCA territory. There is a mismatch volume of101 cc suggesting ischemic penumbra.    Other:  Aeration of wedge-shaped area of hypoattenuation within the left MCA territory, consistent with prior infarct. Multilevel degenerative changes of the cervical spine. Trace retrolisthesis of C2 on C3. Mucosal thickening of bilateral ethmoid sinuses.    IMPRESSION:    1- Occlusion of the left superior division of the MCA  (602/93-88)    2-occlusion of the left M3 branch of the MCA, leading to the area of chronic infarct    The above were added to the preliminary report and discussed with Dr. Helms    The follwoing is the preliminary report:    Findings consistent with likely occlusions of the left middle cerebral artery M2 and M3 branches. Corresponding large area of ischemia within the left MCA territory measuring 113 cc, with core infarct of 12 cc (mismatch volume/penumbra of 101 cc).    Dr. Torey Nicolas discussed preliminary findings with YORDAN WOLF on 10/13/2020 11:20 PM with readback.              TOREY NICOLAS M.D., RESIDENT RADIOLOGIST  This document has been electronically signed.  CALVIN BARTH M.D., ATTENDING RADIOLOGIST  This document has been electronically signed. Oct 13 2020 11:47PM (10-13-20 @ 22:50)         7. Relevant blood tests: Direct LDL: 62 mg/dL [4 - 129] (10-14-20 @ 04:37)  Direct LDL: 65 mg/dL [4 - 129] (10-13-20 @ 22:04)       8. Relevant cardiac rhythm monitorin. Relevant Cardiac Structure: (TTE/JANET +/-):[ ]No intracardiac thrombus/[ ] no vegetation/[ ]no [place]/EF:    Home Medications:      MEDICATIONS  (STANDING):  aspirin  chewable 81 milliGRAM(s) Oral daily  atorvastatin 80 milliGRAM(s) Oral at bedtime  chlorhexidine 4% Liquid 1 Application(s) Topical <User Schedule>  clopidogrel Tablet 75 milliGRAM(s) Oral daily  enoxaparin Injectable 40 milliGRAM(s) SubCutaneous at bedtime  pantoprazole   Suspension 40 milliGRAM(s) Oral daily  sodium chloride 0.9%. 1000 milliLiter(s) (100 mL/Hr) IV Continuous <Continuous>      10. PT/OT/Speech/Rehab/S&Sw/ Cognitive eval results and recommendations:    11. Exam:    Vital Signs Last 24 Hrs  T(C): 36.6 (16 Oct 2020 16:56), Max: 37.4 (15 Oct 2020 21:35)  T(F): 97.9 (16 Oct 2020 16:56), Max: 99.3 (15 Oct 2020 21:35)  HR: 65 (16 Oct 2020 16:56) (56 - 69)  BP: 98/53 (16 Oct 2020 16:56) (98/53 - 132/66)  BP(mean): --  RR: 18 (16 Oct 2020 16:56) (18 - 18)  SpO2: 95% (16 Oct 2020 16:56) (93% - 97%)    12.   NIH STROKE SCALE  Item	                                                        Score  1 a.	Level of Consciousness	               	0  1 b. LOC Questions	                                2  1 c.	LOC Commands	                               	0  2.	Best Gaze	                                        0  3.	Visual	                                                0  4.	Facial Palsy	                                        1  5 a.	Motor Arm - Left	                                0  5 b.	Motor Arm - Right	                        4  6 a.	Motor Leg - Left	                                0  6 b.	Motor Leg - Right	                                3  7.	Limb Ataxia	                                        0  8.	Sensory	                                                1  9.	Language	                                        1  10.	Dysarthria	                                        1  11.	Extinction and Inattention  	        0  ______________________________________  TOTAL	                                                        13    Total NIHSS on admission:      NIHSS yesterday:      NIHSS today: 13    mRS:  0 No symptoms at all  1 No significant disability despite symptoms; able to carry out all usual duties and activities without assistance  2 Slight disability; unable to carry out all previous activities, but able to look after own affairs  3 Moderate disability; requiring some help, but able to walk without assistance  4 Moderately severe disability; unable to walk without assistance and unable to attend to own bodily needs without assistance  5 Severe disability; bedridden, incontinent and requiring constant nursing care and attention  6 Dead      13. Impression:68 yo M who had a L MCA stroke about 6 weeks ago and was treated here and underwent L MCA thrombectomy and IV tPA. He presented 10/13 due to recurrent L MCA syndrome with an initial NIHSS of 23. He was not a tPA candidate given his recent large stroke. His discharge NIHSS from the last hospital stay was 1 for mild aphasia only. Recent stroke work up including a negative JANET and ILR placement. He is currently being monitored in ICU with persistent right sided weakness and severe expressive aphasia and dysarthria with NIHSS of 19. Repeat cth completed 10/14 revealed left basal ganglia acute infarct and a Stable subacute infarcts in the left frontal and parietal lobes and posterior insular cortex.        15. Suggestions:   Continue Aspirin, Plavix, Statin  Repeat 2D echo  Speech/PT/OT/Rehab      Plan discussed with neuroattending and medical team

## 2020-10-16 NOTE — DIETITIAN INITIAL EVALUATION ADULT. - REASON INDICATOR FOR ASSESSMENT
Pt found to have NG for tube feed started 10/15. Pt is currently on Osmolite 1.0 at 360ml q6hr (NG) and tolerating per RN. Skin intact. No edema. LBM unknown at this time. Contacted Wife on [phone].

## 2020-10-16 NOTE — DIETITIAN INITIAL EVALUATION ADULT. - CONTINUE CURRENT NUTRITION CARE PLAN
pt to meet and tolerat e>85% of estimated kcal/protein via TF upon f/u in 4 days. Enteral nutrition. Coordinaiton of care. RD to monitor diet order, energy intake, NFPF (EN tolerance, electrolytes, BM). pt to have 1BM/day.

## 2020-10-16 NOTE — DIETITIAN INITIAL EVALUATION ADULT. - OTHER CALCULATIONS
ABW: 85.9kg ---  CALORIES: 0433-5262 kcal/day (MSJ x 1.2-1.3);   PROTEIN: 86-95 g/day (1.0-1.1 g/kg of ABW);   FLUID: 1ml/kcal

## 2020-10-16 NOTE — PROGRESS NOTE ADULT - SUBJECTIVE AND OBJECTIVE BOX
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 He had been following up with Dr. Bhatt and Dr. Conner since his CVA,  last seen one week ago with significant neurological improvement. He had mild dysarthria and RUE tremors but no drifting noted at that time. Patient was eating a sandwich around 9 pm and started having worsening dysarthria and weakness so his wife brought him to the ED. On presentation NIHSS ~ 20, stroke code/NI alert was called NIH alert called, but due last admission and use of TPA, he was not a candidate for tpa on this admission.  CTH showed Wedge-shaped area of hypoattenuation within the left frontal and parietal lobes consistent with prior left MCA territory infarct. CT perfusion showed findings consistent with likely occlusions of the left middle cerebral artery M2 and M3 branches. Corresponding large area of ischemia within the left MCA territory measuring 113 cc, with core infarct of 12 cc.  Pt now moving RLE, able to say few words.  Patient was admitted for ICU monitoring.     T(C): --  HR: 80 (10-13-20 @ 23:08) (68 - 109)  BP: 142/71 (10-13-20 @ 23:08) (123/77 - 151/53)  RR: 18 (10-13-20 @ 23:08) (18 - 20)  SpO2: 97% (10-13-20 @ 23:08) (96% - 100%)        (13 Oct 2020 23:23)    Currently admitted to medicine with the primary diagnosis of CVA (cerebral vascular accident)       Today is hospital day 3d.     INTERVAL HPI / OVERNIGHT EVENTS:  Patient was examined and seen at bedside. This morning he is resting comfortably in bed and reports no new issues or overnight events.     ROS: Otherwise unremarkable     PAST MEDICAL & SURGICAL HISTORY  Cerebrovascular accident (CVA)    GERD (gastroesophageal reflux disease)    S/P tonsillectomy    H/O bilateral hip replacements    H/O skin graft  upper body  legs      ALLERGIES  amoxicillin (Rash)    MEDICATIONS  STANDING MEDICATIONS  aspirin  chewable 81 milliGRAM(s) Oral daily  atorvastatin 80 milliGRAM(s) Oral at bedtime  chlorhexidine 4% Liquid 1 Application(s) Topical <User Schedule>  clopidogrel Tablet 75 milliGRAM(s) Oral daily  enoxaparin Injectable 40 milliGRAM(s) SubCutaneous at bedtime  pantoprazole   Suspension 40 milliGRAM(s) Oral daily    PRN MEDICATIONS    VITALS:  T(F): 99.1  HR: 56  BP: 120/72  RR: 18  SpO2: 93%    PHYSICAL EXAM  GEN: NAD, Resting comfortably in bed  PULM: Clear to auscultation bilaterally, No wheezes  CVS: Regular rate and rhythm, S1-S2, no murmurs  ABD: Soft, non-tender, non-distended, no guarding  EXT: No edema  NEURO: A&Ox3, expressive aphasia, apraxia, right upper and lower paralysis, loss of sensation.     NG tube in place     LABS                        15.1   8.63  )-----------( 211      ( 16 Oct 2020 07:25 )             44.3     10-16    138  |  104  |  13  ----------------------------<  155<H>  4.3   |  23  |  1.0    Ca    9.0      16 Oct 2020 07:25  Mg     2.0     10-16    TPro  6.3  /  Alb  3.8  /  TBili  0.6  /  DBili  x   /  AST  29  /  ALT  15  /  AlkPhos  67  10-16                ASSESSMENT & PLAN    70 yo M who had a L MCA stroke about 6 weeks ago and was treated here and underwent L MCA thrombectomy and IV tPA. He presented on 10/13 due to recurrent L MCA syndrome with an initial NIHSS of 23. He was not a tPA candidate given his recent large stroke. Patient had been on Integrillin drip, which was stopped yesterday. Patient is s/p loading with Plavix 300mg on 10/14. Patient downgraded to acute stroke unit yesterday from ICU.    #Recurrent L MCA syndrome  #L basal ganglia acute infarct  - Continue aspirin, plavix, lipitor  - Maintain systolic blood pressure 120-160  - Neuro checks Q4H  - JANET showed no thrombus on September 2  - S/p interrogation of loop recorder with no events  - Neurology recs pending    #Misc  Diet: NPO with tube feeds  GI PPx: Protonix  DVT PPx: Lovenox  Activity: bedrest, F/u PT  Dispo: inpatient rehab

## 2020-10-16 NOTE — DIETITIAN INITIAL EVALUATION ADULT. - ENTERAL
Rec: (1) Please change current feeding to JEVITY 1.2 at 280ml q4hr. This regimen gives a total of 1995 kcal/ 92g protein/ 1360mL free water, additional flushes per LIP or suggestive of at least 55ml each pre and post flushes.  (2) if pt to have improved mental status, consult SLP for possible oral intake.

## 2020-10-17 LAB
ALBUMIN SERPL ELPH-MCNC: 3.5 G/DL — SIGNIFICANT CHANGE UP (ref 3.5–5.2)
ALP SERPL-CCNC: 56 U/L — SIGNIFICANT CHANGE UP (ref 30–115)
ALT FLD-CCNC: 13 U/L — SIGNIFICANT CHANGE UP (ref 0–41)
ANION GAP SERPL CALC-SCNC: 11 MMOL/L — SIGNIFICANT CHANGE UP (ref 7–14)
AST SERPL-CCNC: 21 U/L — SIGNIFICANT CHANGE UP (ref 0–41)
BASOPHILS # BLD AUTO: 0.03 K/UL — SIGNIFICANT CHANGE UP (ref 0–0.2)
BASOPHILS NFR BLD AUTO: 0.5 % — SIGNIFICANT CHANGE UP (ref 0–1)
BILIRUB SERPL-MCNC: 1.1 MG/DL — SIGNIFICANT CHANGE UP (ref 0.2–1.2)
BUN SERPL-MCNC: 14 MG/DL — SIGNIFICANT CHANGE UP (ref 10–20)
CALCIUM SERPL-MCNC: 8.7 MG/DL — SIGNIFICANT CHANGE UP (ref 8.5–10.1)
CHLORIDE SERPL-SCNC: 107 MMOL/L — SIGNIFICANT CHANGE UP (ref 98–110)
CO2 SERPL-SCNC: 23 MMOL/L — SIGNIFICANT CHANGE UP (ref 17–32)
CREAT SERPL-MCNC: 1 MG/DL — SIGNIFICANT CHANGE UP (ref 0.7–1.5)
EOSINOPHIL # BLD AUTO: 0.38 K/UL — SIGNIFICANT CHANGE UP (ref 0–0.7)
EOSINOPHIL NFR BLD AUTO: 6 % — SIGNIFICANT CHANGE UP (ref 0–8)
GLUCOSE SERPL-MCNC: 99 MG/DL — SIGNIFICANT CHANGE UP (ref 70–99)
HCT VFR BLD CALC: 41.1 % — LOW (ref 42–52)
HGB BLD-MCNC: 14.2 G/DL — SIGNIFICANT CHANGE UP (ref 14–18)
IMM GRANULOCYTES NFR BLD AUTO: 0.2 % — SIGNIFICANT CHANGE UP (ref 0.1–0.3)
LYMPHOCYTES # BLD AUTO: 1.48 K/UL — SIGNIFICANT CHANGE UP (ref 1.2–3.4)
LYMPHOCYTES # BLD AUTO: 23.5 % — SIGNIFICANT CHANGE UP (ref 20.5–51.1)
MAGNESIUM SERPL-MCNC: 1.8 MG/DL — SIGNIFICANT CHANGE UP (ref 1.8–2.4)
MCHC RBC-ENTMCNC: 32.4 PG — HIGH (ref 27–31)
MCHC RBC-ENTMCNC: 34.5 G/DL — SIGNIFICANT CHANGE UP (ref 32–37)
MCV RBC AUTO: 93.8 FL — SIGNIFICANT CHANGE UP (ref 80–94)
MONOCYTES # BLD AUTO: 0.87 K/UL — HIGH (ref 0.1–0.6)
MONOCYTES NFR BLD AUTO: 13.8 % — HIGH (ref 1.7–9.3)
NEUTROPHILS # BLD AUTO: 3.54 K/UL — SIGNIFICANT CHANGE UP (ref 1.4–6.5)
NEUTROPHILS NFR BLD AUTO: 56 % — SIGNIFICANT CHANGE UP (ref 42.2–75.2)
NRBC # BLD: 0 /100 WBCS — SIGNIFICANT CHANGE UP (ref 0–0)
PLATELET # BLD AUTO: 201 K/UL — SIGNIFICANT CHANGE UP (ref 130–400)
POTASSIUM SERPL-MCNC: 4.4 MMOL/L — SIGNIFICANT CHANGE UP (ref 3.5–5)
POTASSIUM SERPL-SCNC: 4.4 MMOL/L — SIGNIFICANT CHANGE UP (ref 3.5–5)
PROT SERPL-MCNC: 5.9 G/DL — LOW (ref 6–8)
RBC # BLD: 4.38 M/UL — LOW (ref 4.7–6.1)
RBC # FLD: 11 % — LOW (ref 11.5–14.5)
SODIUM SERPL-SCNC: 141 MMOL/L — SIGNIFICANT CHANGE UP (ref 135–146)
WBC # BLD: 6.31 K/UL — SIGNIFICANT CHANGE UP (ref 4.8–10.8)
WBC # FLD AUTO: 6.31 K/UL — SIGNIFICANT CHANGE UP (ref 4.8–10.8)

## 2020-10-17 PROCEDURE — 99232 SBSQ HOSP IP/OBS MODERATE 35: CPT

## 2020-10-17 PROCEDURE — 99233 SBSQ HOSP IP/OBS HIGH 50: CPT

## 2020-10-17 RX ORDER — SODIUM CHLORIDE 9 MG/ML
1000 INJECTION INTRAMUSCULAR; INTRAVENOUS; SUBCUTANEOUS ONCE
Refills: 0 | Status: COMPLETED | OUTPATIENT
Start: 2020-10-17 | End: 2020-10-17

## 2020-10-17 RX ORDER — NYSTATIN 500MM UNIT
500000 POWDER (EA) MISCELLANEOUS DAILY
Refills: 0 | Status: DISCONTINUED | OUTPATIENT
Start: 2020-10-17 | End: 2020-10-17

## 2020-10-17 RX ORDER — NYSTATIN 500MM UNIT
500000 POWDER (EA) MISCELLANEOUS
Refills: 0 | Status: DISCONTINUED | OUTPATIENT
Start: 2020-10-17 | End: 2020-10-20

## 2020-10-17 RX ADMIN — Medication 500000 UNIT(S): at 17:50

## 2020-10-17 RX ADMIN — Medication 81 MILLIGRAM(S): at 12:46

## 2020-10-17 RX ADMIN — SODIUM CHLORIDE 1000 MILLILITER(S): 9 INJECTION INTRAMUSCULAR; INTRAVENOUS; SUBCUTANEOUS at 20:12

## 2020-10-17 RX ADMIN — CHLORHEXIDINE GLUCONATE 1 APPLICATION(S): 213 SOLUTION TOPICAL at 05:35

## 2020-10-17 RX ADMIN — ENOXAPARIN SODIUM 40 MILLIGRAM(S): 100 INJECTION SUBCUTANEOUS at 21:42

## 2020-10-17 RX ADMIN — ATORVASTATIN CALCIUM 80 MILLIGRAM(S): 80 TABLET, FILM COATED ORAL at 21:42

## 2020-10-17 RX ADMIN — CLOPIDOGREL BISULFATE 75 MILLIGRAM(S): 75 TABLET, FILM COATED ORAL at 12:46

## 2020-10-17 RX ADMIN — PANTOPRAZOLE SODIUM 40 MILLIGRAM(S): 20 TABLET, DELAYED RELEASE ORAL at 12:45

## 2020-10-17 RX ADMIN — SODIUM CHLORIDE 100 MILLILITER(S): 9 INJECTION INTRAMUSCULAR; INTRAVENOUS; SUBCUTANEOUS at 06:09

## 2020-10-17 NOTE — SWALLOW BEDSIDE ASSESSMENT ADULT - SWALLOW EVAL: DIAGNOSIS
Pt with deep audible wet respiratory quality noted, poor secretion management. RN reports frequent oral suctioning. Pt unable to cough or clear throat on command, inconsistently follows simple commands. SLP discussed case with MD Moore: continue NPO with alternate means of nutrition and hydration and consider scopolamine patch if not contraindicated. WILLIE Lackey made aware. Pt with deep audible wet respiratory quality noted, poor secretion management. RN reports frequent oral suctioning. Pt unable to cough or clear throat on command, inconsistently follows simple commands. SLP discussed case with MD Moore: continue NPO with alternate means of nutrition and hydration and consider scopolamine patch if not contraindicated. Pt with thick white coating on tongue, RN Darya

## 2020-10-17 NOTE — PROGRESS NOTE ADULT - SUBJECTIVE AND OBJECTIVE BOX
Stroke Progress Note:    NYDIA VALVERDE    1. 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 He had been following up with Dr. Bhatt and Dr. Conner since his CVA,  last seen one week ago with significant neurological improvement. He had mild dysarthria and RUE tremors but no drifting noted at that time. Patient was eating a sandwich around 9 pm and started having worsening dysarthria and weakness so his wife brought him to the ED. On presentation NIHSS ~ 20, stroke code/NI alert was called NIH alert called, but due last admission and use of TPA, he was not a candidate for tpa on this admission.  CTH showed Wedge-shaped area of hypoattenuation within the left frontal and parietal lobes consistent with prior left MCA territory infarct. CT perfusion showed findings consistent with likely occlusions of the left middle cerebral artery M2 and M3 branches. Corresponding large area of ischemia within the left MCA territory measuring 113 cc, with core infarct of 12 cc.  Pt now moving RLE, able to say few words.  Patient was admitted for ICU monitoring.     T(C): --  HR: 80 (10-13-20 @ 23:08) (68 - 109)  BP: 142/71 (10-13-20 @ 23:08) (123/77 - 151/53)  RR: 18 (10-13-20 @ 23:08) (18 - 20)  SpO2: 97% (10-13-20 @ 23:08) (96% - 100%)        (13 Oct 2020 23:23)      2. Relevant PMH:   Prior ischemic stroke/TIA[ ], Afib [ ], CAD [ ], HTN [ ], DLD [ ], DM [ ], PVD [ ], Obesity [ ],   Sedentary lifestyle [ ], CHF [ ], ENMANUEL [ ], Cancer Hx [ ].    3. Social History: Smoking [ ], Drug Use [ ], Alcohol Use [ ], Other [ ]    4. Possible Location of Stroke:    5. Relevant Brain Tissue Imagin. Relevant Cerebrovascular Imaging:         CT Perfusion w/ Maps w/ IV Cont:   EXAM:  CT PERFUSION W MAPS IC            PROCEDURE DATE:  10/13/2020            INTERPRETATION:  Clinical History / Reason for exam: Stroke code. Right-sided facial droop and weakness.    Technique: CT perfusion of the brain was performed along with CTA of the head and neck after the intravenous administration of 100 cc Optiray 350 contrast. Sagittal, coronal and axial reformatted images were obtained as well as 3-D MIP and volume rendered images.    Comparison: Noncontrast CT head performed earlier the same day and CT perfusion 2020.    Findings:    CTA neck:  The aortic arch, origin of the great vessels and subclavian arteries are unremarkable.    The bilateral common carotid arteries and internal carotid arteries are unremarkable without significant stenosis seen.    The vertebral arteries are codominant. No significant vertebral artery stenosis is noted.    CTA head:  The distal segments of the internal carotid arteries are unremarkable. There are multifocal occlusions of the left MCA branches: superior M2 and M3 branches (series 4, image [id number]). The right ADEOLA and MCA are patent. The left ADEOLA is patent.    The distal vertebral arteries, basilar artery and posterior cerebral arteries are patent.    CT perfusion:  The cerebral blood volume and time to peak images demonstrate increased increased time to peak in the left MCA territory measuring 113 cc. There is a decrease in total cerebral blood flow volume of 12 cc in the left MCA territory. There is a mismatch volume of101 cc suggesting ischemic penumbra.    Other:  Aeration of wedge-shaped area of hypoattenuation within the left MCA territory, consistent with prior infarct. Multilevel degenerative changes of the cervical spine. Trace retrolisthesis of C2 on C3. Mucosal thickening of bilateral ethmoid sinuses.    IMPRESSION:    1- Occlusion of the left superior division of the MCA  (602/93-88)    2-occlusion of the left M3 branch of the MCA, leading to the area of chronic infarct    The above were added to the preliminary report and discussed with Dr. Helms    The follwoing is the preliminary report:    Findings consistent with likely occlusions of the left middle cerebral artery M2 and M3 branches. Corresponding large area of ischemia within the left MCA territory measuring 113 cc, with core infarct of 12 cc (mismatch volume/penumbra of 101 cc).    Dr. Torey Nicolas discussed preliminary findings with YORDAN WOLF on 10/13/2020 11:20 PM with readback.              TOREY NICOLAS M.D., RESIDENT RADIOLOGIST  This document has been electronically signed.  CALVIN BARTH M.D., ATTENDING RADIOLOGIST  This document has been electronically signed. Oct 13 2020 11:47PM (10-13-20 @ 22:50)         7. Relevant blood tests: Direct LDL: 62 mg/dL [4 - 129] (10-14-20 @ 04:37)  Direct LDL: 65 mg/dL [4 - 129] (10-13-20 @ 22:04)       8. Relevant cardiac rhythm monitorin. Relevant Cardiac Structure: (TTE/JANET +/-):[ ]No intracardiac thrombus/[ ] no vegetation/[ ]no [place]/EF:    Home Medications:      MEDICATIONS  (STANDING):  aspirin  chewable 81 milliGRAM(s) Oral daily  atorvastatin 80 milliGRAM(s) Oral at bedtime  chlorhexidine 4% Liquid 1 Application(s) Topical <User Schedule>  clopidogrel Tablet 75 milliGRAM(s) Oral daily  enoxaparin Injectable 40 milliGRAM(s) SubCutaneous at bedtime  nystatin    Suspension 501629 Unit(s) Oral four times a day  pantoprazole   Suspension 40 milliGRAM(s) Oral daily  sodium chloride 0.9%. 1000 milliLiter(s) (100 mL/Hr) IV Continuous <Continuous>      10. PT/OT/Speech/Rehab/S&Sw/ Cognitive eval results and recommendations:    11. Exam:    Vital Signs Last 24 Hrs  T(C): 36.5 (17 Oct 2020 13:39), Max: 37.1 (16 Oct 2020 21:40)  T(F): 97.7 (17 Oct 2020 13:39), Max: 98.8 (16 Oct 2020 21:40)  HR: 67 (17 Oct 2020 13:39) (56 - 67)  BP: 120/64 (17 Oct 2020 13:39) (98/53 - 134/63)  BP(mean): --  RR: 18 (17 Oct 2020 13:39) (18 - 18)  SpO2: 97% (17 Oct 2020 09:41) (95% - 97%)    12.   NIH STROKE SCALE  Item	                                                        Score  1 a.	Level of Consciousness	               	0  1 b. LOC Questions	                                2  1 c.	LOC Commands	                               	0  2.	Best Gaze	                                        0  3.	Visual	                                                1  4.	Facial Palsy	                                        1  5 a.	Motor Arm - Left	                                0  5 b.	Motor Arm - Right	                        4  6 a.	Motor Leg - Left	                                0  6 b.	Motor Leg - Right	                                3  7.	Limb Ataxia	                                        0  8.	Sensory	                                                0  9.	Language	                                        2  10.	Dysarthria	                                        2  11.	Extinction and Inattention  	        0  ______________________________________  TOTAL	                                                        0    Total NIHSS on admission:      NIHSS yesterday:      NIHSS today: 15    mRS:  0 No symptoms at all  1 No significant disability despite symptoms; able to carry out all usual duties and activities without assistance  2 Slight disability; unable to carry out all previous activities, but able to look after own affairs  3 Moderate disability; requiring some help, but able to walk without assistance  4 Moderately severe disability; unable to walk without assistance and unable to attend to own bodily needs without assistance  5 Severe disability; bedridden, incontinent and requiring constant nursing care and attention  6 Dead      13. Impression:70 yo M who had a L MCA stroke about 6 weeks ago and was treated here and underwent L MCA thrombectomy and IV tPA. He presented 10/13 due to recurrent L MCA syndrome with an initial NIHSS of 23. He was not a tPA candidate given his recent large stroke. His discharge NIHSS from the last hospital stay was 1 for mild aphasia only. Recent stroke work up including a negative JANET and ILR placement. He is currently being monitored in ICU with persistent right sided weakness and severe expressive aphasia and dysarthria with NIHSS of 19. Repeat cth completed 10/14 revealed left basal ganglia acute infarct and a Stable subacute infarcts in the left frontal and parietal lobes and posterior insular cortex.        15. Suggestions:   Continue Aspirin, Plavix, Statin  MRI brain NC  Repeat 2D echo  Speech/PT/OT/Rehab  Strict aspirations precautions      Plan discussed with neuroattending and medical team                   Stroke Progress Note:    NYDIA VALVERDE    1. 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 He had been following up with Dr. Bhatt and Dr. Conner since his CVA,  last seen one week ago with significant neurological improvement. He had mild dysarthria and RUE tremors but no drifting noted at that time. Patient was eating a sandwich around 9 pm and started having worsening dysarthria and weakness so his wife brought him to the ED. On presentation NIHSS ~ 20, stroke code/NI alert was called NIH alert called, but due last admission and use of TPA, he was not a candidate for tpa on this admission.  CTH showed Wedge-shaped area of hypoattenuation within the left frontal and parietal lobes consistent with prior left MCA territory infarct. CT perfusion showed findings consistent with likely occlusions of the left middle cerebral artery M2 and M3 branches. Corresponding large area of ischemia within the left MCA territory measuring 113 cc, with core infarct of 12 cc.  Pt now moving RLE, able to say few words.  Patient was admitted for ICU monitoring.     T(C): --  HR: 80 (10-13-20 @ 23:08) (68 - 109)  BP: 142/71 (10-13-20 @ 23:08) (123/77 - 151/53)  RR: 18 (10-13-20 @ 23:08) (18 - 20)  SpO2: 97% (10-13-20 @ 23:08) (96% - 100%)        (13 Oct 2020 23:23)      2. Relevant PMH:   Prior ischemic stroke/TIA[ ], Afib [ ], CAD [ ], HTN [ ], DLD [ ], DM [ ], PVD [ ], Obesity [ ],   Sedentary lifestyle [ ], CHF [ ], ENMANUEL [ ], Cancer Hx [ ].    3. Social History: Smoking [ ], Drug Use [ ], Alcohol Use [ ], Other [ ]    4. Possible Location of Stroke:    5. Relevant Brain Tissue Imagin. Relevant Cerebrovascular Imaging:         CT Perfusion w/ Maps w/ IV Cont:   EXAM:  CT PERFUSION W MAPS IC            PROCEDURE DATE:  10/13/2020            INTERPRETATION:  Clinical History / Reason for exam: Stroke code. Right-sided facial droop and weakness.    Technique: CT perfusion of the brain was performed along with CTA of the head and neck after the intravenous administration of 100 cc Optiray 350 contrast. Sagittal, coronal and axial reformatted images were obtained as well as 3-D MIP and volume rendered images.    Comparison: Noncontrast CT head performed earlier the same day and CT perfusion 2020.    Findings:    CTA neck:  The aortic arch, origin of the great vessels and subclavian arteries are unremarkable.    The bilateral common carotid arteries and internal carotid arteries are unremarkable without significant stenosis seen.    The vertebral arteries are codominant. No significant vertebral artery stenosis is noted.    CTA head:  The distal segments of the internal carotid arteries are unremarkable. There are multifocal occlusions of the left MCA branches: superior M2 and M3 branches (series 4, image [id number]). The right ADEOLA and MCA are patent. The left ADEOLA is patent.    The distal vertebral arteries, basilar artery and posterior cerebral arteries are patent.    CT perfusion:  The cerebral blood volume and time to peak images demonstrate increased increased time to peak in the left MCA territory measuring 113 cc. There is a decrease in total cerebral blood flow volume of 12 cc in the left MCA territory. There is a mismatch volume of101 cc suggesting ischemic penumbra.    Other:  Aeration of wedge-shaped area of hypoattenuation within the left MCA territory, consistent with prior infarct. Multilevel degenerative changes of the cervical spine. Trace retrolisthesis of C2 on C3. Mucosal thickening of bilateral ethmoid sinuses.    IMPRESSION:    1- Occlusion of the left superior division of the MCA  (602/93-88)    2-occlusion of the left M3 branch of the MCA, leading to the area of chronic infarct    The above were added to the preliminary report and discussed with Dr. Helms    The follwoing is the preliminary report:    Findings consistent with likely occlusions of the left middle cerebral artery M2 and M3 branches. Corresponding large area of ischemia within the left MCA territory measuring 113 cc, with core infarct of 12 cc (mismatch volume/penumbra of 101 cc).    Dr. Torey Nicolas discussed preliminary findings with YORDAN WOLF on 10/13/2020 11:20 PM with readback.              TOREY NICOLAS M.D., RESIDENT RADIOLOGIST  This document has been electronically signed.  CALVIN BARTH M.D., ATTENDING RADIOLOGIST  This document has been electronically signed. Oct 13 2020 11:47PM (10-13-20 @ 22:50)         7. Relevant blood tests: Direct LDL: 62 mg/dL [4 - 129] (10-14-20 @ 04:37)  Direct LDL: 65 mg/dL [4 - 129] (10-13-20 @ 22:04)       8. Relevant cardiac rhythm monitorin. Relevant Cardiac Structure: (TTE/JANET +/-):[ ]No intracardiac thrombus/[ ] no vegetation/[ ]no [place]/EF:    Home Medications:      MEDICATIONS  (STANDING):  aspirin  chewable 81 milliGRAM(s) Oral daily  atorvastatin 80 milliGRAM(s) Oral at bedtime  chlorhexidine 4% Liquid 1 Application(s) Topical <User Schedule>  clopidogrel Tablet 75 milliGRAM(s) Oral daily  enoxaparin Injectable 40 milliGRAM(s) SubCutaneous at bedtime  nystatin    Suspension 479280 Unit(s) Oral four times a day  pantoprazole   Suspension 40 milliGRAM(s) Oral daily  sodium chloride 0.9%. 1000 milliLiter(s) (100 mL/Hr) IV Continuous <Continuous>      10. PT/OT/Speech/Rehab/S&Sw/ Cognitive eval results and recommendations:    11. Exam:    Vital Signs Last 24 Hrs  T(C): 36.5 (17 Oct 2020 13:39), Max: 37.1 (16 Oct 2020 21:40)  T(F): 97.7 (17 Oct 2020 13:39), Max: 98.8 (16 Oct 2020 21:40)  HR: 67 (17 Oct 2020 13:39) (56 - 67)  BP: 120/64 (17 Oct 2020 13:39) (98/53 - 134/63)  BP(mean): --  RR: 18 (17 Oct 2020 13:39) (18 - 18)  SpO2: 97% (17 Oct 2020 09:41) (95% - 97%)    12.   NIH STROKE SCALE  Item	                                                        Score  1 a.	Level of Consciousness	               	0  1 b. LOC Questions	                                2  1 c.	LOC Commands	                               	0  2.	Best Gaze	                                        0  3.	Visual	                                                1  4.	Facial Palsy	                                        1  5 a.	Motor Arm - Left	                                0  5 b.	Motor Arm - Right	                        4  6 a.	Motor Leg - Left	                                0  6 b.	Motor Leg - Right	                                3  7.	Limb Ataxia	                                        0  8.	Sensory	                                                0  9.	Language	                                        2  10.	Dysarthria	                                        2  11.	Extinction and Inattention  	        0  ______________________________________  TOTAL	                                                        0    Total NIHSS on admission:      NIHSS yesterday:      NIHSS today: 15    mRS:  0 No symptoms at all  1 No significant disability despite symptoms; able to carry out all usual duties and activities without assistance  2 Slight disability; unable to carry out all previous activities, but able to look after own affairs  3 Moderate disability; requiring some help, but able to walk without assistance  4 Moderately severe disability; unable to walk without assistance and unable to attend to own bodily needs without assistance  5 Severe disability; bedridden, incontinent and requiring constant nursing care and attention  6 Dead      13. Impression:68 yo M who had a L MCA stroke about 6 weeks ago and was treated here and underwent L MCA thrombectomy and IV tPA. He presented 10/13 due to recurrent L MCA syndrome with an initial NIHSS of 23. He was not a tPA candidate given his recent large stroke. His discharge NIHSS from the last hospital stay was 1 for mild aphasia only. Recent stroke work up including a negative JANET and ILR placement. He is currently being monitored in ICU with persistent right sided weakness and severe expressive aphasia and dysarthria with NIHSS of 19. Repeat cth completed 10/14 revealed left basal ganglia acute infarct and a Stable subacute infarcts in the left frontal and parietal lobes and posterior insular cortex.        15. Suggestions:   Continue Aspirin, Plavix, Statin  MRI brain NC (ensure no infarcts in other territories which would warrant switching to an anticoagulant  Repeat 2D echo  Speech/PT/OT/Rehab  Strict aspirations precautions      Plan discussed with neuroattending and medical team

## 2020-10-17 NOTE — PROGRESS NOTE ADULT - SUBJECTIVE AND OBJECTIVE BOX
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 He had been following up with Dr. Bhatt and Dr. Conner since his CVA,  last seen one week ago with significant neurological improvement. He had mild dysarthria and RUE tremors but no drifting noted at that time. Patient was eating a sandwich around 9 pm and started having worsening dysarthria and weakness so his wife brought him to the ED. On presentation NIHSS ~ 20, stroke code/NI alert was called NIH alert called, but due last admission and use of TPA, he was not a candidate for tpa on this admission.  CTH showed Wedge-shaped area of hypoattenuation within the left frontal and parietal lobes consistent with prior left MCA territory infarct. CT perfusion showed findings consistent with likely occlusions of the left middle cerebral artery M2 and M3 branches. Corresponding large area of ischemia within the left MCA territory measuring 113 cc, with core infarct of 12 cc.  Pt now moving RLE, able to say few words.  Patient was admitted for ICU monitoring.     T(C): --  HR: 80 (10-13-20 @ 23:08) (68 - 109)  BP: 142/71 (10-13-20 @ 23:08) (123/77 - 151/53)  RR: 18 (10-13-20 @ 23:08) (18 - 20)  SpO2: 97% (10-13-20 @ 23:08) (96% - 100%)        (13 Oct 2020 23:23)    Currently admitted to medicine with the primary diagnosis of CVA (cerebral vascular accident)       INTERVAL HPI / OVERNIGHT EVENTS:  Patient was examined and seen at bedside. This morning he is resting comfortably in bed and reports no new issues or overnight events.     ROS: Otherwise unremarkable     PAST MEDICAL & SURGICAL HISTORY  Cerebrovascular accident (CVA)    GERD (gastroesophageal reflux disease)    S/P tonsillectomy    H/O bilateral hip replacements    H/O skin graft  upper body  legs      ALLERGIES  amoxicillin (Rash)      PHYSICAL EXAM  GEN: NAD, Resting comfortably in bed, +NGT  PULM: Decreased BS bilaterally,   CVS: Regular rate and rhythm, S1-S2, no murmurs  ABD: Soft, non-tender, non-distended, no guarding  EXT: No edema  NEURO: A&Ox3,  aphasia, apraxia, right upper and lower paralysis, loss of sensation, RUE 0/5, RLE 1-/5 prox and 0/5 distally.     tele - no events             MEDICATIONS  (STANDING):  aspirin  chewable 81 milliGRAM(s) Oral daily  atorvastatin 80 milliGRAM(s) Oral at bedtime  chlorhexidine 4% Liquid 1 Application(s) Topical <User Schedule>  clopidogrel Tablet 75 milliGRAM(s) Oral daily  enoxaparin Injectable 40 milliGRAM(s) SubCutaneous at bedtime  pantoprazole   Suspension 40 milliGRAM(s) Oral daily  sodium chloride 0.9%. 1000 milliLiter(s) (100 mL/Hr) IV Continuous <Continuous>    MEDICATIONS  (PRN):    Home Medications:    Vital Signs Last 24 Hrs  T(C): 37 (17 Oct 2020 11:21), Max: 37.1 (16 Oct 2020 21:40)  T(F): 98.6 (17 Oct 2020 11:21), Max: 98.8 (16 Oct 2020 21:40)  HR: 61 (17 Oct 2020 05:32) (56 - 69)  BP: 104/58 (17 Oct 2020 05:32) (98/53 - 134/63)  BP(mean): --  RR: 18 (17 Oct 2020 05:32) (18 - 18)  SpO2: 97% (17 Oct 2020 05:27) (95% - 97%)  CAPILLARY BLOOD GLUCOSE        LABS:                        14.2   6.31  )-----------( 201      ( 17 Oct 2020 06:56 )             41.1     10-17    141  |  107  |  14  ----------------------------<  99  4.4   |  23  |  1.0    Ca    8.7      17 Oct 2020 06:56  Mg     1.8     10-17    TPro  5.9<L>  /  Alb  3.5  /  TBili  1.1  /  DBili  x   /  AST  21  /  ALT  13  /  AlkPhos  56  10-17    LIVER FUNCTIONS - ( 17 Oct 2020 06:56 )  Alb: 3.5 g/dL / Pro: 5.9 g/dL / ALK PHOS: 56 U/L / ALT: 13 U/L / AST: 21 U/L / GGT: x                           Consultant Notes Reviewed:  [x ] YES  [ ] NO  Care Discussed with Consultants/Other Providers/ Housestaff [ x] YES  [ ] NO  Radiology, labs, new studies personally reviewed.      ASSESSMENT & PLAN    68 yo M who had a L MCA stroke about 6 weeks ago and was treated here and underwent L MCA thrombectomy and IV tPA. He presented on 10/13 due to recurrent L MCA syndrome with an initial NIHSS of 23. He was not a tPA candidate given his recent large stroke.  Patient is s/p loading with Plavix 300mg on 10/14. Patient downgraded to acute stroke  from ICU.    #Recurrent L MCA CVA  #L basal ganglia acute infarct  - Continue aspirin, plavix (new), lipitor  - Maintain systolic blood pressure 120-160  - Neuro checks Q4H  - JANET showed no thrombus on September 2  - S/p interrogation of loop recorder with no events  - Neurology recommends MRI brain    #Dysphagia  -might need PEG  -having difficult time managing his secretions    #Hyperlipidemia  -statin    #Misc  Diet: NPO with tube feeds  GI PPx: Protonix  DVT PPx: Lovenox  Activity: , F/u PT  Dispo: TBD     High risk pt.  #Progress Note Handoff  Pending (specify):  Consults____Clinical improvement and stability__x___Tests___MRI, TTE_____PT____x____  Pt/Family discussion: Pt informed and agrees with the current plan  Disposition: Home______/SNF_______/4A______?To be determined_____x___    My note supersedes the residents note should a discrepancy arise.

## 2020-10-18 LAB
ALBUMIN SERPL ELPH-MCNC: 3.4 G/DL — LOW (ref 3.5–5.2)
ALP SERPL-CCNC: 56 U/L — SIGNIFICANT CHANGE UP (ref 30–115)
ALT FLD-CCNC: 12 U/L — SIGNIFICANT CHANGE UP (ref 0–41)
ANION GAP SERPL CALC-SCNC: 7 MMOL/L — SIGNIFICANT CHANGE UP (ref 7–14)
AST SERPL-CCNC: 18 U/L — SIGNIFICANT CHANGE UP (ref 0–41)
BASOPHILS # BLD AUTO: 0.04 K/UL — SIGNIFICANT CHANGE UP (ref 0–0.2)
BASOPHILS NFR BLD AUTO: 0.6 % — SIGNIFICANT CHANGE UP (ref 0–1)
BILIRUB SERPL-MCNC: 0.6 MG/DL — SIGNIFICANT CHANGE UP (ref 0.2–1.2)
BUN SERPL-MCNC: 12 MG/DL — SIGNIFICANT CHANGE UP (ref 10–20)
CALCIUM SERPL-MCNC: 8.9 MG/DL — SIGNIFICANT CHANGE UP (ref 8.5–10.1)
CHLORIDE SERPL-SCNC: 106 MMOL/L — SIGNIFICANT CHANGE UP (ref 98–110)
CO2 SERPL-SCNC: 25 MMOL/L — SIGNIFICANT CHANGE UP (ref 17–32)
CREAT SERPL-MCNC: 0.8 MG/DL — SIGNIFICANT CHANGE UP (ref 0.7–1.5)
EOSINOPHIL # BLD AUTO: 0.37 K/UL — SIGNIFICANT CHANGE UP (ref 0–0.7)
EOSINOPHIL NFR BLD AUTO: 5.8 % — SIGNIFICANT CHANGE UP (ref 0–8)
GLUCOSE SERPL-MCNC: 140 MG/DL — HIGH (ref 70–99)
HCT VFR BLD CALC: 38.9 % — LOW (ref 42–52)
HGB BLD-MCNC: 13.2 G/DL — LOW (ref 14–18)
IMM GRANULOCYTES NFR BLD AUTO: 0.2 % — SIGNIFICANT CHANGE UP (ref 0.1–0.3)
LYMPHOCYTES # BLD AUTO: 1.29 K/UL — SIGNIFICANT CHANGE UP (ref 1.2–3.4)
LYMPHOCYTES # BLD AUTO: 20.1 % — LOW (ref 20.5–51.1)
MAGNESIUM SERPL-MCNC: 1.8 MG/DL — SIGNIFICANT CHANGE UP (ref 1.8–2.4)
MCHC RBC-ENTMCNC: 31.9 PG — HIGH (ref 27–31)
MCHC RBC-ENTMCNC: 33.9 G/DL — SIGNIFICANT CHANGE UP (ref 32–37)
MCV RBC AUTO: 94 FL — SIGNIFICANT CHANGE UP (ref 80–94)
MONOCYTES # BLD AUTO: 0.82 K/UL — HIGH (ref 0.1–0.6)
MONOCYTES NFR BLD AUTO: 12.8 % — HIGH (ref 1.7–9.3)
NEUTROPHILS # BLD AUTO: 3.88 K/UL — SIGNIFICANT CHANGE UP (ref 1.4–6.5)
NEUTROPHILS NFR BLD AUTO: 60.5 % — SIGNIFICANT CHANGE UP (ref 42.2–75.2)
NRBC # BLD: 0 /100 WBCS — SIGNIFICANT CHANGE UP (ref 0–0)
PLATELET # BLD AUTO: 196 K/UL — SIGNIFICANT CHANGE UP (ref 130–400)
POTASSIUM SERPL-MCNC: 4 MMOL/L — SIGNIFICANT CHANGE UP (ref 3.5–5)
POTASSIUM SERPL-SCNC: 4 MMOL/L — SIGNIFICANT CHANGE UP (ref 3.5–5)
PROT SERPL-MCNC: 5.7 G/DL — LOW (ref 6–8)
RBC # BLD: 4.14 M/UL — LOW (ref 4.7–6.1)
RBC # FLD: 11 % — LOW (ref 11.5–14.5)
SODIUM SERPL-SCNC: 138 MMOL/L — SIGNIFICANT CHANGE UP (ref 135–146)
WBC # BLD: 6.41 K/UL — SIGNIFICANT CHANGE UP (ref 4.8–10.8)
WBC # FLD AUTO: 6.41 K/UL — SIGNIFICANT CHANGE UP (ref 4.8–10.8)

## 2020-10-18 PROCEDURE — 93010 ELECTROCARDIOGRAM REPORT: CPT

## 2020-10-18 PROCEDURE — 99233 SBSQ HOSP IP/OBS HIGH 50: CPT

## 2020-10-18 PROCEDURE — 99232 SBSQ HOSP IP/OBS MODERATE 35: CPT

## 2020-10-18 PROCEDURE — 93306 TTE W/DOPPLER COMPLETE: CPT | Mod: 26

## 2020-10-18 RX ORDER — SODIUM CHLORIDE 9 MG/ML
500 INJECTION INTRAMUSCULAR; INTRAVENOUS; SUBCUTANEOUS ONCE
Refills: 0 | Status: COMPLETED | OUTPATIENT
Start: 2020-10-18 | End: 2020-10-18

## 2020-10-18 RX ORDER — SODIUM CHLORIDE 9 MG/ML
1000 INJECTION INTRAMUSCULAR; INTRAVENOUS; SUBCUTANEOUS
Refills: 0 | Status: DISCONTINUED | OUTPATIENT
Start: 2020-10-18 | End: 2020-10-19

## 2020-10-18 RX ORDER — MIDODRINE HYDROCHLORIDE 2.5 MG/1
5 TABLET ORAL THREE TIMES A DAY
Refills: 0 | Status: DISCONTINUED | OUTPATIENT
Start: 2020-10-18 | End: 2020-10-20

## 2020-10-18 RX ORDER — SODIUM CHLORIDE 9 MG/ML
1000 INJECTION INTRAMUSCULAR; INTRAVENOUS; SUBCUTANEOUS ONCE
Refills: 0 | Status: COMPLETED | OUTPATIENT
Start: 2020-10-18 | End: 2020-10-18

## 2020-10-18 RX ADMIN — ENOXAPARIN SODIUM 40 MILLIGRAM(S): 100 INJECTION SUBCUTANEOUS at 21:11

## 2020-10-18 RX ADMIN — Medication 81 MILLIGRAM(S): at 11:13

## 2020-10-18 RX ADMIN — PANTOPRAZOLE SODIUM 40 MILLIGRAM(S): 20 TABLET, DELAYED RELEASE ORAL at 05:44

## 2020-10-18 RX ADMIN — Medication 500000 UNIT(S): at 00:21

## 2020-10-18 RX ADMIN — CHLORHEXIDINE GLUCONATE 1 APPLICATION(S): 213 SOLUTION TOPICAL at 07:35

## 2020-10-18 RX ADMIN — SODIUM CHLORIDE 2000 MILLILITER(S): 9 INJECTION INTRAMUSCULAR; INTRAVENOUS; SUBCUTANEOUS at 16:54

## 2020-10-18 RX ADMIN — Medication 500000 UNIT(S): at 17:08

## 2020-10-18 RX ADMIN — SODIUM CHLORIDE 100 MILLILITER(S): 9 INJECTION INTRAMUSCULAR; INTRAVENOUS; SUBCUTANEOUS at 15:13

## 2020-10-18 RX ADMIN — CLOPIDOGREL BISULFATE 75 MILLIGRAM(S): 75 TABLET, FILM COATED ORAL at 11:13

## 2020-10-18 RX ADMIN — MIDODRINE HYDROCHLORIDE 5 MILLIGRAM(S): 2.5 TABLET ORAL at 21:11

## 2020-10-18 RX ADMIN — Medication 500000 UNIT(S): at 05:44

## 2020-10-18 RX ADMIN — ATORVASTATIN CALCIUM 80 MILLIGRAM(S): 80 TABLET, FILM COATED ORAL at 21:11

## 2020-10-18 RX ADMIN — SODIUM CHLORIDE 1000 MILLILITER(S): 9 INJECTION INTRAMUSCULAR; INTRAVENOUS; SUBCUTANEOUS at 15:14

## 2020-10-18 RX ADMIN — Medication 500000 UNIT(S): at 11:13

## 2020-10-18 NOTE — PROGRESS NOTE ADULT - SUBJECTIVE AND OBJECTIVE BOX
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 He had been following up with Dr. Bhatt and Dr. Conner since his CVA,  last seen one week ago with significant neurological improvement. He had mild dysarthria and RUE tremors but no drifting noted at that time. Patient was eating a sandwich around 9 pm and started having worsening dysarthria and weakness so his wife brought him to the ED. On presentation NIHSS ~ 20, stroke code/NI alert was called NIH alert called, but due last admission and use of TPA, he was not a candidate for tpa on this admission.  CTH showed Wedge-shaped area of hypoattenuation within the left frontal and parietal lobes consistent with prior left MCA territory infarct. CT perfusion showed findings consistent with likely occlusions of the left middle cerebral artery M2 and M3 branches. Corresponding large area of ischemia within the left MCA territory measuring 113 cc, with core infarct of 12 cc.  Pt now moving RLE, able to say few words.  Patient was admitted for ICU monitoring.     T(C): --  HR: 80 (10-13-20 @ 23:08) (68 - 109)  BP: 142/71 (10-13-20 @ 23:08) (123/77 - 151/53)  RR: 18 (10-13-20 @ 23:08) (18 - 20)  SpO2: 97% (10-13-20 @ 23:08) (96% - 100%)        (13 Oct 2020 23:23)    Currently admitted to medicine with the primary diagnosis of CVA (cerebral vascular accident)       Today is hospital day 5d.     INTERVAL HPI / OVERNIGHT EVENTS:  Patient was examined and seen at bedside. This morning he is resting comfortably in bed and reports no new issues or overnight events.     ROS: Otherwise unremarkable     PAST MEDICAL & SURGICAL HISTORY  Cerebrovascular accident (CVA)    GERD (gastroesophageal reflux disease)    S/P tonsillectomy    H/O bilateral hip replacements    H/O skin graft  upper body  legs      ALLERGIES  amoxicillin (Rash)    MEDICATIONS  STANDING MEDICATIONS  aspirin  chewable 81 milliGRAM(s) Oral daily  atorvastatin 80 milliGRAM(s) Oral at bedtime  chlorhexidine 4% Liquid 1 Application(s) Topical <User Schedule>  clopidogrel Tablet 75 milliGRAM(s) Oral daily  enoxaparin Injectable 40 milliGRAM(s) SubCutaneous at bedtime  nystatin    Suspension 754663 Unit(s) Oral four times a day  pantoprazole   Suspension 40 milliGRAM(s) Oral daily  sodium chloride 0.9%. 1000 milliLiter(s) IV Continuous <Continuous>    PRN MEDICATIONS    VITALS:  T(F): 97.5  HR: 77  BP: 118/85  RR: 18  SpO2: 99%    PHYSICAL EXAM  GEN: NAD, Resting comfortably in bed  PULM: Clear to auscultation bilaterally, No wheezes  CVS: Regular rate and rhythm, S1-S2, no murmurs  ABD: Soft, non-tender, non-distended, no guarding  EXT: No edema  NEURO: A&Ox3, expressive aphasia + dysarthria, R upper and lower hemiparesis; strength 1/5    LABS                        14.2   6.31  )-----------( 201      ( 17 Oct 2020 06:56 )             41.1     10-17    141  |  107  |  14  ----------------------------<  99  4.4   |  23  |  1.0    Ca    8.7      17 Oct 2020 06:56  Mg     1.8     10-17    TPro  5.9<L>  /  Alb  3.5  /  TBili  1.1  /  DBili  x   /  AST  21  /  ALT  13  /  AlkPhos  56  10-17        Assessment    68 yo M who had a L MCA stroke about 6 weeks ago and was treated here and underwent L MCA thrombectomy and IV tPA. He presented 10/13 due to recurrent L MCA syndrome with an initial NIHSS of 23. He was not a tPA candidate given his recent large stroke. His discharge NIHSS from the last hospital stay was 1 for mild aphasia only. Recent stroke work up including a negative JANET and ILR placement. He is currently being monitored in stroke unit with persistent right sided weakness and severe expressive aphasia with NIHSS of 15. Repeat cth completed 10/14 revealed left basal ganglia acute infarct and a Stable subacute infarcts in the left frontal and parietal lobes and posterior insular cortex. No acute overnight events, MRI brain was not completed since patient has excessive oral secretions and is unable to lay flat owing to aspiration risk.      #Recurrent L MCA syndrome  #L basal ganglia acute infarct  - Continue aspirin, plavix, lipitor  - Maintain systolic blood pressure 120-160  - Neuro checks Q4H  - JANET showed no thrombus on September 2  - S/p interrogation of loop recorder with no events  - follow up MRI, echo  `  #Misc  Diet: NPO with tube feeds; arpiration precautions  GI PPx: Protonix  DVT PPx: Lovenox  Activity: bedrest, F/u PT  Dispo: inpatient rehab

## 2020-10-18 NOTE — PROGRESS NOTE ADULT - ASSESSMENT
Impression:  68 yo M who had a L MCA stroke about 6 weeks ago and was treated here and underwent L MCA thrombectomy and IV tPA. He presented 10/13 due to recurrent L MCA syndrome with an initial NIHSS of 23. He was not a tPA candidate given his recent large stroke. His discharge NIHSS from the last hospital stay was 1 for mild aphasia only. Recent stroke work up including a negative JANET and ILR placement. He is currently being monitored in stroke unit with persistent right sided weakness and severe expressive aphasia with NIHSS of 15. Repeat cth completed 10/14 revealed left basal ganglia acute infarct and a Stable subacute infarcts in the left frontal and parietal lobes and posterior insular cortex. No acute overnight events, MRI brain was not completed since patient has excessive oral secretions and is unable to lay flat owing to aspiration risk.        15. Suggestions:   -Maintain q 4 neuro checks  -Continue Aspirin and Plavix,   -Continue Lipitor 80 mg q 24  -Follow up pending MRI brain NC   -Follow up pending 2D echo  -Maintain Strict aspirations precautions        Disposition: Continue stroke unit

## 2020-10-18 NOTE — PROGRESS NOTE ADULT - SUBJECTIVE AND OBJECTIVE BOX
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 He had been following up with Dr. Bhatt and Dr. Conner since his CVA,  last seen one week ago with significant neurological improvement. He had mild dysarthria and RUE tremors but no drifting noted at that time. Patient was eating a sandwich around 9 pm and started having worsening dysarthria and weakness so his wife brought him to the ED. On presentation NIHSS ~ 20, stroke code/NI alert was called NIH alert called, but due last admission and use of TPA, he was not a candidate for tpa on this admission.  CTH showed Wedge-shaped area of hypoattenuation within the left frontal and parietal lobes consistent with prior left MCA territory infarct. CT perfusion showed findings consistent with likely occlusions of the left middle cerebral artery M2 and M3 branches. Corresponding large area of ischemia within the left MCA territory measuring 113 cc, with core infarct of 12 cc.  Pt now moving RLE, able to say few words.  Patient was admitted for ICU monitoring.         Currently admitted to medicine with the primary diagnosis of CVA (cerebral vascular accident)       INTERVAL HPI / OVERNIGHT EVENTS:  Patient was examined and seen at bedside. This morning he is resting comfortably in bed and reports no new issues or overnight events. No complaints. Denies CP, SOB, AP, new weakness.    ROS: remainder ROS unremarkable    PAST MEDICAL & SURGICAL HISTORY  Cerebrovascular accident (CVA)    GERD (gastroesophageal reflux disease)    S/P tonsillectomy    H/O bilateral hip replacements    H/O skin graft  upper body  legs      ALLERGIES  amoxicillin (Rash)      PHYSICAL EXAM  GEN: NAD, NAD, awake, alert, +NGT  PULM: Decreased BS bilaterally,   CVS: Regular rate and rhythm, S1-S2, no murmurs  ABD: Soft, non-tender, non-distended, no guarding  EXT: No edema  NEURO: mod-sev aphasia,  loss of sensation on Rt, RUE 0/5, RLE 1-/5 prox and 0/5 distally.     tele - no events            MEDICATIONS  (STANDING):  aspirin  chewable 81 milliGRAM(s) Oral daily  atorvastatin 80 milliGRAM(s) Oral at bedtime  chlorhexidine 4% Liquid 1 Application(s) Topical <User Schedule>  clopidogrel Tablet 75 milliGRAM(s) Oral daily  enoxaparin Injectable 40 milliGRAM(s) SubCutaneous at bedtime  nystatin    Suspension 914265 Unit(s) Oral four times a day  pantoprazole   Suspension 40 milliGRAM(s) Oral daily  sodium chloride 0.9%. 1000 milliLiter(s) (100 mL/Hr) IV Continuous <Continuous>    MEDICATIONS  (PRN):    Home Medications:    Vital Signs Last 24 Hrs  T(C): 35.7 (18 Oct 2020 07:47), Max: 37.7 (17 Oct 2020 23:57)  T(F): 96.3 (18 Oct 2020 07:47), Max: 99.8 (17 Oct 2020 23:57)  HR: 69 (18 Oct 2020 07:47) (65 - 77)  BP: 123/68 (18 Oct 2020 07:47) (93/55 - 123/68)  BP(mean): --  RR: 18 (18 Oct 2020 07:47) (18 - 18)  SpO2: 95% (18 Oct 2020 07:47) (95% - 99%)  CAPILLARY BLOOD GLUCOSE        LABS:                        13.2   6.41  )-----------( 196      ( 18 Oct 2020 06:32 )             38.9     10-18    138  |  106  |  12  ----------------------------<  140<H>  4.0   |  25  |  0.8    Ca    8.9      18 Oct 2020 06:32  Mg     1.8     10-18    TPro  5.7<L>  /  Alb  3.4<L>  /  TBili  0.6  /  DBili  x   /  AST  18  /  ALT  12  /  AlkPhos  56  10-18    LIVER FUNCTIONS - ( 18 Oct 2020 06:32 )  Alb: 3.4 g/dL / Pro: 5.7 g/dL / ALK PHOS: 56 U/L / ALT: 12 U/L / AST: 18 U/L / GGT: x             Consultant Notes Reviewed:  [x ] YES  [ ] NO  Care Discussed with Consultants/Other Providers/ Housestaff [ x] YES  [ ] NO  Radiology, labs, new studies personally reviewed.      ASSESSMENT & PLAN    70 yo M who had a L MCA stroke about 6 weeks ago and was treated here and underwent L MCA thrombectomy and IV tPA. He presented on 10/13 due to recurrent L MCA syndrome with an initial NIHSS of 23. He was not a tPA candidate given his recent large stroke.  Patient is s/p loading with Plavix 300mg on 10/14. Patient downgraded to acute stroke  from ICU.    #Recurrent L MCA CVA  #L basal ganglia acute infarct  - Continue aspirin, plavix (new), lipitor  - Maintain systolic blood pressure 120-160  - Neuro checks Q4H  - JANET showed no thrombus on September 2  - S/p interrogation of loop recorder with no events  - Neurology recommends MRI brain but not urgent. Would wait as pt having difficult time managing secretions    #Dysphagia  -might need PEG  -having difficult time managing his secretions, suction PRN  -would avoid scopolamine due to anticholinergic effect i.e confusion  -S&S f/u    #Hyperlipidemia  -statin    #Misc  Diet: NPO with tube feeds  GI PPx: Protonix  DVT PPx: Lovenox  Activity: , F/u PT  Dispo: TBD     High risk pt.  #Progress Note Handoff  Pending (specify):  Consults_S&S___Clinical improvement and stability__x___Tests___MRI, TTE_____PT____x____  Pt/Family discussion: Pt informed and agrees with the current plan  Disposition: Home______/SNF_______/4A______?To be determined_____x___    My note supersedes the residents note should a discrepancy arise.

## 2020-10-18 NOTE — CHART NOTE - NSCHARTNOTEFT_GEN_A_CORE
Informed by RN that patient not sent to MRI overnight due to secretions, could not tolerate lying flat Informed by RN that patient not sent to MRI overnight due to secretions, could not tolerate lying flat; d/c order for now. Informed by RN that patient not sent to MRI overnight due to secretions, could not tolerate lying flat; d/c order for now due to risk of aspiration.

## 2020-10-18 NOTE — PROGRESS NOTE ADULT - SUBJECTIVE AND OBJECTIVE BOX
Chief Complaint: Right sided weakness        HPI:  69 years old M with PMHx  emergent IA mechanical thrombectomy of left ADEOLA/MCA on 8/27/2020 presenting with R sided facial droop, weakness, and dysarthria. He had been following up with Dr. Bhatt and Dr. Conner since his CVA,  last seen one week ago with significant neurological improvement. He had mild dysarthria and RUE tremors but no drifting noted at that time. Patient was eating a sandwich around 9 pm and started having worsening dysarthria and weakness so his wife brought him to the ED. On presentation NIHSS ~ 20, stroke code/NI alert was called NIH alert called, but due last admission and use of TPA, he was not a candidate for tpa on this admission.  CTH showed Wedge-shaped area of hypoattenuation within the left frontal and parietal lobes consistent with prior left MCA territory infarct. CT perfusion showed findings consistent with likely occlusions of the left middle cerebral artery M2 and M3 branches. Corresponding large area of ischemia within the left MCA territory measuring 113 cc, with core infarct of 12 cc.  Pt now moving RLE, able to say few words.  Patient was admitted for ICU monitoring.         Relevant PMH:  [x] Prior ischemic stroke/TIA  [] Afib  []CAD  []HTN  []DLD  []DM []PVD []Obesity [] Sedintary lifestyle []CHF  []ENMANUEL  []Cancer Hx         Social History: [] Smoking []  Drug Use: []   Alcohol Use:   [] Other:      Possible Location of Stroke:  Left MCA territory cva      Possible Cause of Stroke:  Unknown at this time, will have a better understanding post stroke workup.      Relevant Cerebral Imaging:  < from: CT Head No Cont (10.14.20 @ 14:33) >  FINDINGS:    Stable wedge-shaped hypodensity within the left frontal and parietal lobes and posterior insula compatible with subacute infarcts (as seen on the MRI 8/31/2020).    New hypoattenuation within the left caudate and lentiform nuclei compatible with acute infarct.    There is mild focal mass effect without midline shift.    There is no evidence of acute intracranial hemorrhage.    The calvarium is intact. There is mild mucosal thickening within the right ethmoid sinus.    IMPRESSION:  Since the CT head performed on the previous day:    1.  Interval development of left basal ganglia hypoattenuation compatible with acute infarct.    2.  Stable subacute infarcts in the left frontal and parietal lobes and posterior insular cortex.    3.  No significant mass effect. No acute intracranial hemorrhage.    HI LIMON M.D., ATTENDING RADIOLOGIST  This document has been electronically signed. Oct 14 2020  3:19PM    < end of copied text >        Relevant Cervicocerebral Imaging:  CT Perfusion w/ Maps w/ IV Cont:   EXAM:  CT PERFUSION W MAPS IC            PROCEDURE DATE:  10/13/2020      IMPRESSION:    1- Occlusion of the left superior division of the MCA  (602/93-88)    2-occlusion of the left M3 branch of the MCA, leading to the area of chronic infarct    The above were added to the preliminary report and discussed with Dr. Helms    The follwoing is the preliminary report:    Findings consistent with likely occlusions of the left middle cerebral artery M2 and M3 branches. Corresponding large area of ischemia within the left MCA territory measuring 113 cc, with core infarct of 12 cc (mismatch volume/penumbra of 101 cc).    Dr. Torey Nicolas discussed preliminary findings with YORDAN WOLF on 10/13/2020 11:20 PM with readback.      TOREY NICOLAS M.D., RESIDENT RADIOLOGIST  This document has been electronically signed.  CALVIN BARTH M.D., ATTENDING RADIOLOGIST  This document has been electronically signed. Oct 13 2020 11:47PM (10-13-20 @ 22:50)          Relevant blood tests:  Direct LDL: 62 mg/dL [4 - 129] (10-14-20 @ 04:37)            Relevant cardiac rhythm monitoring:  `< from: 12 Lead ECG (10.13.20 @ 23:21) >  Ventricular Rate 73 BPM    Atrial Rate 73 BPM    P-R Interval 174 ms    QRS Duration 108 ms    Q-T Interval 410 ms    QTC Calculation(Bazett) 451 ms    P Axis 75 degrees    R Axis 80 degrees    T Axis 36 degrees    Diagnosis Line Normal sinus rhythm with sinus arrhythmia  Septal infarct , age undetermined  Abnormal ECG    Confirmed by Gonzalo Haile ([id number]) on 10/14/2020 7:33:48 AM    < end of copied text >            Relevant Cardiac Structure:(TTE/JANET +/-):[]No intracardiac thrombus/[] no vegetation/[]no [place]/EF:  eo< from: JANET w/Probe Placement (09.02.20 @ 09:55) >  Summary:   1. Left ventricular ejection fraction, by visual estimation, is 60 to 65%.   2. Normal left atrial size.   3. Normal right atrial size.   4. Mild mitral valve regurgitation.   5. Color flowdoppler and intravenous injection of agitated saline demonstrates the presence of an intact intra atrial septum.   6. No left atrial appendage thrombus and normal left atrial appendage velocities.      < end of copied text >            MEDICATIONS  (STANDING):  aspirin  chewable 81 milliGRAM(s) Oral daily  atorvastatin 80 milliGRAM(s) Oral at bedtime  chlorhexidine 4% Liquid 1 Application(s) Topical <User Schedule>  clopidogrel Tablet 75 milliGRAM(s) Oral daily  enoxaparin Injectable 40 milliGRAM(s) SubCutaneous at bedtime  nystatin    Suspension [id number] Unit(s) Oral four times a day  pantoprazole   Suspension 40 milliGRAM(s) Oral daily  sodium chloride 0.9%. 1000 milliLiter(s) (100 mL/Hr) IV Continuous <Continuous>      PT/OT/Speech/Rehab/S&Swr: completed    Exam:  Patient is awake and alert, severely aphasic follows commands  CN: Right facial droop        Severe aphasia and dysarthria  Power: RUE 0/5 RLE 1/5             LUE 5/5  LLE 5/5  Sensory: Decreased on the right side  No neglect      Vital Signs Last 24 Hrs  T(C): 37.7 (17 Oct 2020 23:57), Max: 37.7 (17 Oct 2020 23:57)  T(F): 99.8 (17 Oct 2020 23:57), Max: 99.8 (17 Oct 2020 23:57)  HR: 65 (17 Oct 2020 23:57) (59 - 67)  BP: 116/64 (17 Oct 2020 23:57) (93/55 - 134/63)  BP(mean): --  RR: 18 (17 Oct 2020 23:57) (18 - 18)  SpO2: 95% (17 Oct 2020 23:57) (95% - 97%)        NIHSS  LOC:       1a:  0   1b(Questions):  0         1c(Instructions):   0          Best Gaze: 0  Visual: 0  Motor:                 RUE: 4    RLE:  4   LUE:  0   LLE: 0    FACE:  2   Limb Ataxia: 0  Sensory:    1   Language:   2    Dysarthria:  2        Extinction and Inattention: 0      NIHSS today: 15        mRS:  0 No symptoms at all  1 No significant disability despite symptoms; able to carry out all usual duties and activities without assistance  2 Slight disability; unable to carry out all previous activities, but able to look after own affairs  3 Moderate disability; requiring some help, but able to walk without assistance  4 Moderately severe disability; unable to walk without assistance and unable to attend to own bodily needs without assistance  5 Severe disability; bedridden, incontinent and requiring constant nursing care and attention  6 Dead

## 2020-10-19 ENCOUNTER — TRANSCRIPTION ENCOUNTER (OUTPATIENT)
Age: 70
End: 2020-10-19

## 2020-10-19 LAB
ALBUMIN SERPL ELPH-MCNC: 3.4 G/DL — LOW (ref 3.5–5.2)
ALP SERPL-CCNC: 58 U/L — SIGNIFICANT CHANGE UP (ref 30–115)
ALT FLD-CCNC: 13 U/L — SIGNIFICANT CHANGE UP (ref 0–41)
ANION GAP SERPL CALC-SCNC: 9 MMOL/L — SIGNIFICANT CHANGE UP (ref 7–14)
AST SERPL-CCNC: 19 U/L — SIGNIFICANT CHANGE UP (ref 0–41)
BASOPHILS # BLD AUTO: 0.03 K/UL — SIGNIFICANT CHANGE UP (ref 0–0.2)
BASOPHILS NFR BLD AUTO: 0.4 % — SIGNIFICANT CHANGE UP (ref 0–1)
BILIRUB SERPL-MCNC: 0.5 MG/DL — SIGNIFICANT CHANGE UP (ref 0.2–1.2)
BUN SERPL-MCNC: 11 MG/DL — SIGNIFICANT CHANGE UP (ref 10–20)
CALCIUM SERPL-MCNC: 8.8 MG/DL — SIGNIFICANT CHANGE UP (ref 8.5–10.1)
CHLORIDE SERPL-SCNC: 106 MMOL/L — SIGNIFICANT CHANGE UP (ref 98–110)
CO2 SERPL-SCNC: 25 MMOL/L — SIGNIFICANT CHANGE UP (ref 17–32)
CREAT SERPL-MCNC: 0.9 MG/DL — SIGNIFICANT CHANGE UP (ref 0.7–1.5)
EOSINOPHIL # BLD AUTO: 0.33 K/UL — SIGNIFICANT CHANGE UP (ref 0–0.7)
EOSINOPHIL NFR BLD AUTO: 4.8 % — SIGNIFICANT CHANGE UP (ref 0–8)
GLUCOSE SERPL-MCNC: 98 MG/DL — SIGNIFICANT CHANGE UP (ref 70–99)
HCT VFR BLD CALC: 36.8 % — LOW (ref 42–52)
HGB BLD-MCNC: 12.5 G/DL — LOW (ref 14–18)
IMM GRANULOCYTES NFR BLD AUTO: 0.3 % — SIGNIFICANT CHANGE UP (ref 0.1–0.3)
LYMPHOCYTES # BLD AUTO: 1.32 K/UL — SIGNIFICANT CHANGE UP (ref 1.2–3.4)
LYMPHOCYTES # BLD AUTO: 19.4 % — LOW (ref 20.5–51.1)
MAGNESIUM SERPL-MCNC: 1.8 MG/DL — SIGNIFICANT CHANGE UP (ref 1.8–2.4)
MCHC RBC-ENTMCNC: 32.1 PG — HIGH (ref 27–31)
MCHC RBC-ENTMCNC: 34 G/DL — SIGNIFICANT CHANGE UP (ref 32–37)
MCV RBC AUTO: 94.4 FL — HIGH (ref 80–94)
MONOCYTES # BLD AUTO: 0.84 K/UL — HIGH (ref 0.1–0.6)
MONOCYTES NFR BLD AUTO: 12.3 % — HIGH (ref 1.7–9.3)
NEUTROPHILS # BLD AUTO: 4.28 K/UL — SIGNIFICANT CHANGE UP (ref 1.4–6.5)
NEUTROPHILS NFR BLD AUTO: 62.8 % — SIGNIFICANT CHANGE UP (ref 42.2–75.2)
NRBC # BLD: 0 /100 WBCS — SIGNIFICANT CHANGE UP (ref 0–0)
PLATELET # BLD AUTO: 195 K/UL — SIGNIFICANT CHANGE UP (ref 130–400)
POTASSIUM SERPL-MCNC: 4.4 MMOL/L — SIGNIFICANT CHANGE UP (ref 3.5–5)
POTASSIUM SERPL-SCNC: 4.4 MMOL/L — SIGNIFICANT CHANGE UP (ref 3.5–5)
PROT SERPL-MCNC: 5.7 G/DL — LOW (ref 6–8)
RBC # BLD: 3.9 M/UL — LOW (ref 4.7–6.1)
RBC # FLD: 11 % — LOW (ref 11.5–14.5)
SODIUM SERPL-SCNC: 140 MMOL/L — SIGNIFICANT CHANGE UP (ref 135–146)
WBC # BLD: 6.82 K/UL — SIGNIFICANT CHANGE UP (ref 4.8–10.8)
WBC # FLD AUTO: 6.82 K/UL — SIGNIFICANT CHANGE UP (ref 4.8–10.8)

## 2020-10-19 PROCEDURE — 99232 SBSQ HOSP IP/OBS MODERATE 35: CPT

## 2020-10-19 PROCEDURE — 99233 SBSQ HOSP IP/OBS HIGH 50: CPT

## 2020-10-19 PROCEDURE — 71045 X-RAY EXAM CHEST 1 VIEW: CPT | Mod: 26

## 2020-10-19 RX ORDER — ATORVASTATIN CALCIUM 80 MG/1
1 TABLET, FILM COATED ORAL
Qty: 0 | Refills: 0 | DISCHARGE
Start: 2020-10-19

## 2020-10-19 RX ORDER — CLOPIDOGREL BISULFATE 75 MG/1
75 TABLET, FILM COATED ORAL DAILY
Refills: 0 | Status: DISCONTINUED | OUTPATIENT
Start: 2020-10-20 | End: 2020-10-23

## 2020-10-19 RX ORDER — MIDODRINE HYDROCHLORIDE 2.5 MG/1
1 TABLET ORAL
Qty: 0 | Refills: 0 | DISCHARGE
Start: 2020-10-19

## 2020-10-19 RX ORDER — SODIUM CHLORIDE 9 MG/ML
500 INJECTION INTRAMUSCULAR; INTRAVENOUS; SUBCUTANEOUS ONCE
Refills: 0 | Status: COMPLETED | OUTPATIENT
Start: 2020-10-19 | End: 2020-10-19

## 2020-10-19 RX ORDER — ENOXAPARIN SODIUM 100 MG/ML
40 INJECTION SUBCUTANEOUS
Qty: 0 | Refills: 0 | DISCHARGE
Start: 2020-10-19

## 2020-10-19 RX ORDER — ASPIRIN/CALCIUM CARB/MAGNESIUM 324 MG
1 TABLET ORAL
Qty: 0 | Refills: 0 | DISCHARGE
Start: 2020-10-19

## 2020-10-19 RX ORDER — PANTOPRAZOLE SODIUM 20 MG/1
40 TABLET, DELAYED RELEASE ORAL DAILY
Refills: 0 | Status: DISCONTINUED | OUTPATIENT
Start: 2020-10-20 | End: 2020-10-29

## 2020-10-19 RX ORDER — CLOPIDOGREL BISULFATE 75 MG/1
1 TABLET, FILM COATED ORAL
Qty: 0 | Refills: 0 | DISCHARGE
Start: 2020-10-19

## 2020-10-19 RX ORDER — FLUDROCORTISONE ACETATE 0.1 MG/1
0.2 TABLET ORAL DAILY
Refills: 0 | Status: DISCONTINUED | OUTPATIENT
Start: 2020-10-19 | End: 2020-10-20

## 2020-10-19 RX ORDER — PANTOPRAZOLE SODIUM 20 MG/1
1 TABLET, DELAYED RELEASE ORAL
Qty: 0 | Refills: 0 | DISCHARGE
Start: 2020-10-19

## 2020-10-19 RX ORDER — NYSTATIN 500MM UNIT
500000 POWDER (EA) MISCELLANEOUS
Refills: 0 | Status: DISCONTINUED | OUTPATIENT
Start: 2020-10-20 | End: 2020-10-30

## 2020-10-19 RX ORDER — FLUDROCORTISONE ACETATE 0.1 MG/1
0.2 TABLET ORAL DAILY
Refills: 0 | Status: DISCONTINUED | OUTPATIENT
Start: 2020-10-19 | End: 2020-10-19

## 2020-10-19 RX ORDER — SODIUM CHLORIDE 9 MG/ML
1000 INJECTION INTRAMUSCULAR; INTRAVENOUS; SUBCUTANEOUS
Refills: 0 | Status: DISCONTINUED | OUTPATIENT
Start: 2020-10-19 | End: 2020-10-20

## 2020-10-19 RX ORDER — ENOXAPARIN SODIUM 100 MG/ML
40 INJECTION SUBCUTANEOUS AT BEDTIME
Refills: 0 | Status: DISCONTINUED | OUTPATIENT
Start: 2020-10-20 | End: 2020-10-27

## 2020-10-19 RX ORDER — NYSTATIN 500MM UNIT
5 POWDER (EA) MISCELLANEOUS
Qty: 0 | Refills: 0 | DISCHARGE
Start: 2020-10-19

## 2020-10-19 RX ORDER — ASPIRIN/CALCIUM CARB/MAGNESIUM 324 MG
81 TABLET ORAL DAILY
Refills: 0 | Status: DISCONTINUED | OUTPATIENT
Start: 2020-10-20 | End: 2020-10-30

## 2020-10-19 RX ADMIN — MIDODRINE HYDROCHLORIDE 5 MILLIGRAM(S): 2.5 TABLET ORAL at 05:57

## 2020-10-19 RX ADMIN — ENOXAPARIN SODIUM 40 MILLIGRAM(S): 100 INJECTION SUBCUTANEOUS at 22:05

## 2020-10-19 RX ADMIN — Medication 500000 UNIT(S): at 18:49

## 2020-10-19 RX ADMIN — MIDODRINE HYDROCHLORIDE 5 MILLIGRAM(S): 2.5 TABLET ORAL at 18:49

## 2020-10-19 RX ADMIN — Medication 81 MILLIGRAM(S): at 12:13

## 2020-10-19 RX ADMIN — Medication 500000 UNIT(S): at 00:14

## 2020-10-19 RX ADMIN — ATORVASTATIN CALCIUM 80 MILLIGRAM(S): 80 TABLET, FILM COATED ORAL at 22:04

## 2020-10-19 RX ADMIN — MIDODRINE HYDROCHLORIDE 5 MILLIGRAM(S): 2.5 TABLET ORAL at 12:13

## 2020-10-19 RX ADMIN — Medication 500000 UNIT(S): at 12:13

## 2020-10-19 RX ADMIN — CLOPIDOGREL BISULFATE 75 MILLIGRAM(S): 75 TABLET, FILM COATED ORAL at 12:13

## 2020-10-19 RX ADMIN — Medication 500000 UNIT(S): at 05:56

## 2020-10-19 RX ADMIN — PANTOPRAZOLE SODIUM 40 MILLIGRAM(S): 20 TABLET, DELAYED RELEASE ORAL at 05:57

## 2020-10-19 RX ADMIN — FLUDROCORTISONE ACETATE 0.2 MILLIGRAM(S): 0.1 TABLET ORAL at 18:49

## 2020-10-19 RX ADMIN — SODIUM CHLORIDE 1000 MILLILITER(S): 9 INJECTION INTRAMUSCULAR; INTRAVENOUS; SUBCUTANEOUS at 16:02

## 2020-10-19 NOTE — PROGRESS NOTE ADULT - SUBJECTIVE AND OBJECTIVE BOX
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 He had been following up with Dr. Bhatt and Dr. Conner since his CVA,  last seen one week ago with significant neurological improvement. He had mild dysarthria and RUE tremors but no drifting noted at that time. Patient was eating a sandwich around 9 pm and started having worsening dysarthria and weakness so his wife brought him to the ED. On presentation NIHSS ~ 20, stroke code/NI alert was called NIH alert called, but due last admission and use of TPA, he was not a candidate for tpa on this admission.  CTH showed Wedge-shaped area of hypoattenuation within the left frontal and parietal lobes consistent with prior left MCA territory infarct. CT perfusion showed findings consistent with likely occlusions of the left middle cerebral artery M2 and M3 branches. Corresponding large area of ischemia within the left MCA territory measuring 113 cc, with core infarct of 12 cc.  Pt now moving RLE, able to say few words.  Patient was admitted for ICU monitoring.     T(C): --  HR: 80 (10-13-20 @ 23:08) (68 - 109)  BP: 142/71 (10-13-20 @ 23:08) (123/77 - 151/53)  RR: 18 (10-13-20 @ 23:08) (18 - 20)  SpO2: 97% (10-13-20 @ 23:08) (96% - 100%)        (13 Oct 2020 23:23)    Currently admitted to medicine with the primary diagnosis of CVA (cerebral vascular accident)       Today is hospital day 6d.     INTERVAL HPI / OVERNIGHT EVENTS:  Patient was examined and seen at bedside. This morning he is resting comfortably in bed and reports no new issues or overnight events.     ROS: Otherwise unremarkable     PAST MEDICAL & SURGICAL HISTORY  Cerebrovascular accident (CVA)    GERD (gastroesophageal reflux disease)    S/P tonsillectomy    H/O bilateral hip replacements    H/O skin graft  upper body  legs      ALLERGIES  amoxicillin (Rash)    MEDICATIONS  STANDING MEDICATIONS  aspirin  chewable 81 milliGRAM(s) Oral daily  atorvastatin 80 milliGRAM(s) Oral at bedtime  chlorhexidine 4% Liquid 1 Application(s) Topical <User Schedule>  clopidogrel Tablet 75 milliGRAM(s) Oral daily  enoxaparin Injectable 40 milliGRAM(s) SubCutaneous at bedtime  midodrine. 5 milliGRAM(s) Oral three times a day  nystatin    Suspension 567552 Unit(s) Oral four times a day  pantoprazole   Suspension 40 milliGRAM(s) Oral daily  sodium chloride 0.9%. 1000 milliLiter(s) IV Continuous <Continuous>    PRN MEDICATIONS    VITALS:  T(F): 99.2  HR: 51  BP: 118/65  RR: 18  SpO2: 94%      LABS                        13.2   6.41  )-----------( 196      ( 18 Oct 2020 06:32 )             38.9     10-18    138  |  106  |  12  ----------------------------<  140<H>  4.0   |  25  |  0.8    Ca    8.9      18 Oct 2020 06:32  Mg     1.8     10-18    TPro  5.7<L>  /  Alb  3.4<L>  /  TBili  0.6  /  DBili  x   /  AST  18  /  ALT  12  /  AlkPhos  56  10-18                    PHYSICAL EXAM  GEN: NAD, Resting comfortably in bed  PULM: Clear to auscultation bilaterally, No wheezes  CVS: Regular rate and rhythm, S1-S2, no murmurs  ABD: Soft, non-tender, non-distended, no guarding  EXT: No edema  NEURO: A&Ox3, expressive aphasia + dysarthria, R upper and lower hemiparesis; strength 1/5          Assessment    68 yo M who had a L MCA stroke about 6 weeks ago and was treated here and underwent L MCA thrombectomy and IV tPA. He presented 10/13 due to recurrent L MCA syndrome with an initial NIHSS of 23. He was not a tPA candidate given his recent large stroke. His discharge NIHSS from the last hospital stay was 1 for mild aphasia only. Recent stroke work up including a negative JANET and ILR placement. He is currently being monitored in stroke unit with persistent right sided weakness and severe expressive aphasia with NIHSS of 15. Repeat cth completed 10/14 revealed left basal ganglia acute infarct and a Stable subacute infarcts in the left frontal and parietal lobes and posterior insular cortex. No acute overnight events, MRI brain was not completed since patient has excessive oral secretions and is unable to lay flat owing to aspiration risk.       #Recurrent L MCA syndrome  #L basal ganglia acute infarct  - Severe dysphagia; aphasia + apraxia somewhat improved; profound right hemiparesis (0-1/5)  - Continue aspirin, plavix, lipitor  - Maintain systolic blood pressure 120-160  - Neuro checks Q4H  - JANET showed no thrombus on September 2  - S/p interrogation of loop recorder with no events  - MRI not performed as patient is aspiration risk at this time due to inability to manage oral secretions  - May need PEG     #DLD  Atorvastatin 80 mg      #Misc  Diet: NGT in place  GI PPx: Protonix  DVT PPx: Lovenox  Activity: bedrest, F/u PT  Dispo: inpatient rehab HPI:  69 years old M with PMHx  emergent IA mechanical thrombectomy of left ADEOLA/MCA on 8/27/2020 presenting with R sided facial droop, weakness, and dysarthria. He had been following up with Dr. Bhatt and Dr. Conner since his CVA,  last seen one week ago with significant neurological improvement. He had mild dysarthria and RUE tremors but no drifting noted at that time. Patient was eating a sandwich around 9 pm and started having worsening dysarthria and weakness so his wife brought him to the ED. On presentation NIHSS ~ 20, stroke code/NI alert was called NIH alert called, but due last admission and use of TPA, he was not a candidate for tpa on this admission.  CTH showed Wedge-shaped area of hypoattenuation within the left frontal and parietal lobes consistent with prior left MCA territory infarct. CT perfusion showed findings consistent with likely occlusions of the left middle cerebral artery M2 and M3 branches. Corresponding large area of ischemia within the left MCA territory measuring 113 cc, with core infarct of 12 cc.  Pt now moving RLE, able to say few words.  Patient was admitted for ICU monitoring.     T(C): --  HR: 80 (10-13-20 @ 23:08) (68 - 109)  BP: 142/71 (10-13-20 @ 23:08) (123/77 - 151/53)  RR: 18 (10-13-20 @ 23:08) (18 - 20)  SpO2: 97% (10-13-20 @ 23:08) (96% - 100%)        (13 Oct 2020 23:23)    Currently admitted to medicine with the primary diagnosis of CVA (cerebral vascular accident)       Today is hospital day 6d.     INTERVAL HPI / OVERNIGHT EVENTS:  Patient was examined and seen at bedside. This morning he is resting comfortably in bed and reports no new issues or overnight events.     ROS: Otherwise unremarkable     PAST MEDICAL & SURGICAL HISTORY  Cerebrovascular accident (CVA)    GERD (gastroesophageal reflux disease)    S/P tonsillectomy    H/O bilateral hip replacements    H/O skin graft  upper body  legs      ALLERGIES  amoxicillin (Rash)    MEDICATIONS  STANDING MEDICATIONS  aspirin  chewable 81 milliGRAM(s) Oral daily  atorvastatin 80 milliGRAM(s) Oral at bedtime  chlorhexidine 4% Liquid 1 Application(s) Topical <User Schedule>  clopidogrel Tablet 75 milliGRAM(s) Oral daily  enoxaparin Injectable 40 milliGRAM(s) SubCutaneous at bedtime  midodrine. 5 milliGRAM(s) Oral three times a day  nystatin    Suspension [id number] Unit(s) Oral four times a day  pantoprazole   Suspension 40 milliGRAM(s) Oral daily  sodium chloride 0.9%. 1000 milliLiter(s) IV Continuous <Continuous>    PRN MEDICATIONS    VITALS:  T(F): 99.2  HR: 51  BP: 118/65  RR: 18  SpO2: 94%      LABS                        13.2   6.41  )-----------( 196      ( 18 Oct 2020 06:32 )             38.9     10-18    138  |  106  |  12  ----------------------------<  140<H>  4.0   |  25  |  0.8    Ca    8.9      18 Oct 2020 06:32  Mg     1.8     10-18    TPro  5.7<L>  /  Alb  3.4<L>  /  TBili  0.6  /  DBili  x   /  AST  18  /  ALT  12  /  AlkPhos  56  10-18                    PHYSICAL EXAM  GEN: NAD, Resting comfortably in bed  PULM: Clear to auscultation bilaterally, No wheezes  CVS: Regular rate and rhythm, S1-S2, no murmurs  ABD: Soft, non-tender, non-distended, no guarding  EXT: No edema  NEURO: A&Ox3, expressive aphasia + dysarthria, R upper and lower hemiparesis; strength 1/5          Assessment    68 yo M who had a L MCA stroke about 6 weeks ago and was treated here and underwent L MCA thrombectomy and IV tPA. He presented 10/13 due to recurrent L MCA syndrome with an initial NIHSS of 23. He was not a tPA candidate given his recent large stroke. His discharge NIHSS from the last hospital stay was 1 for mild aphasia only. Recent stroke work up including a negative JANET and ILR placement. He is currently being monitored in stroke unit with persistent right sided weakness and severe expressive aphasia with NIHSS of 15. Repeat cth completed 10/14 revealed left basal ganglia acute infarct and a Stable subacute infarcts in the left frontal and parietal lobes and posterior insular cortex. No acute overnight events, MRI brain was not completed since patient has excessive oral secretions and is unable to lay flat owing to aspiration risk.       #Recurrent L MCA syndrome  #L basal ganglia acute infarct  - Severe dysphagia; aphasia + apraxia somewhat improved; profound right hemiparesis (0-1/5)  - Continue aspirin, plavix, lipitor  - Maintain systolic blood pressure 120-160  - Neuro checks Q4H  - JANET showed no thrombus on September 2; TTE 10/18 unrevealing  - S/p interrogation of loop recorder with no events  - MRI not performed as patient is aspiration risk at this time due to inability to manage oral secretions  - May need PEG     #DLD  Atorvastatin 80 mg      #Misc  Diet: NGT in place  GI PPx: Protonix  DVT PPx: Lovenox  Activity: bedrest, F/u PT  Dispo: inpatient rehab

## 2020-10-19 NOTE — DISCHARGE NOTE PROVIDER - CARE PROVIDER_API CALL
LIBAN RODGERS  Endocrinology, Diabetes and Metabolism  [facility] [place] [address]  Phone: [phone]  Fax: [phone]  Established Patient  Follow Up Time: Routine    Ben Olvera  EEG/EPILEPSY  [address] [facility], [address]  [place] [address]  Phone: [phone]  Fax: [phone]  Established Patient  Follow Up Time: Routine   LIBAN RODGERS  Endocrinology, Diabetes and Metabolism  [address] [place] [address]  Phone: [phone]  Fax: [phone]  Established Patient  Follow Up Time: 1 week    Ben Olvera  EEG/EPILEPSY  [address] [place] [address], [address]  [place] [address]  Phone: [phone]  Fax: [phone]  Established Patient  Follow Up Time: 1 week    Manas Fleming)  Cardiac Electrophysiology; Cardiovascular Disease; [facility]  [address] [place] [address], [address] [place] [address]  Phone: [phone]  Fax: [phone]  Follow Up Time: 1 month

## 2020-10-19 NOTE — DISCHARGE NOTE PROVIDER - NSDCQMSTROKERISK_NEU_ALL_CORE
High cholesterol/History of a stroke or TIA Carotid stenosis/High cholesterol/History of a stroke or TIA

## 2020-10-19 NOTE — H&P ADULT - HISTORY OF PRESENT ILLNESS
This is a 69 year old male with PMHx of b/l hip replacements, recent mechanical thrombectomy of left ADEOLA/MCA stroke (8/27/2020) admitted to [facility] on 10/13/2020 after he developed new right sided facial droop, weakness, and dysarthria. In the ED, his NIHSS was 20 and stroke code was called. However, patient did not receive tPA because of recent stroke history. Initial CTH showed wedge shaped area hypoattenuation within the left frontal and parietal lobes consistent with prior Left MCA territory infarct. CT perfusion showed left MCA M2 and M3 occlusions. Repeat CTH on 10/14/2020 showed interval development of left basal ganglia hypoattenuation compatible with acute infarct. Patient was admitted to ICU for closer monitoring and downgraded to medicine on 10/15/2020. Patient's loop recorder was interrogated 10/14 and did not demonstrate any events. Patient failed bedside swallow evaluation and remains NPO with feedings through NGT requiring frequent suctioning to clean saline in oral cavity. He was started on midodrine 10mg TID for orthostatic hypotension. Patient seen and examined by physiatry and deemed an appropriate candidate for acute rehabilitation consisting of 3 hours of therapy a day.     Of note during his first stroke on 8/27/2020, JANET was performed and did not demonstrate thrombus or cardiac abnormality to suggest cardiac origin for CVA. This is a 69 year old male with PMHx of b/l hip replacements, recent mechanical thrombectomy of left ADEOLA/MCA stroke (8/27/2020) admitted to [facility] on 10/13/2020 after he developed new right sided facial droop, weakness, and dysarthria. In the ED, his NIHSS was 20 and stroke code was called. However, patient did not receive tPA because of recent stroke history. Initial CTH showed wedge shaped area hypoattenuation within the left frontal and parietal lobes consistent with prior Left MCA territory infarct. CT perfusion showed left MCA M2 and M3 occlusions. Repeat CTH on 10/14/2020 showed interval development of left basal ganglia hypoattenuation compatible with acute infarct. Patient was admitted to ICU for closer monitoring and downgraded to medicine on 10/15/2020. Patient's loop recorder was interrogated 10/14 and did not demonstrate any events. Patient failed bedside swallow evaluation and remains NPO with feedings through NGT requiring frequent suctioning to clean saline in oral cavity. He was started on midodrine 10mg TID for orthostatic hypotension. Patient received fluids and was then started on Florinef 0.2mg daily for continued hypotension. His BP improved the following day and denies lightheadedness or dizziness. Patient seen and examined by physiatry and deemed an appropriate candidate for acute rehabilitation consisting of 3 hours of therapy a day.     Of note during his first stroke on 8/27/2020, JANET was performed and did not demonstrate thrombus or cardiac abnormality to suggest cardiac origin for CVA.

## 2020-10-19 NOTE — DISCHARGE NOTE PROVIDER - PROVIDER TOKENS
PROVIDER:[TOKEN:[[id number]],FOLLOWUP:[Routine],ESTABLISHEDPATIENT:[T]],PROVIDER:[TOKEN:[[id number]],FOLLOWUP:[Routine],ESTABLISHEDPATIENT:[T]] PROVIDER:[TOKEN:[[id number]],FOLLOWUP:[1 week],ESTABLISHEDPATIENT:[T]],PROVIDER:[TOKEN:[[id number]],FOLLOWUP:[1 week],ESTABLISHEDPATIENT:[T]],PROVIDER:[TOKEN:[[id number]],FOLLOWUP:[1 month]]

## 2020-10-19 NOTE — DISCHARGE NOTE PROVIDER - NSDCCPCAREPLAN_GEN_ALL_CORE_FT
PRINCIPAL DISCHARGE DIAGNOSIS  Diagnosis: CVA (cerebral vascular accident)  Assessment and Plan of Treatment: You have been diagnosed with stroke which is a condition that develops when part of the brain doesn't get enough blood and oxygen   - Take your blood thinners and cholesterol medication as prescribed. Do not skip doses and do not run low on your medication. call your doctor if you need refills   - If you have diabetes, take your diabetes medication as prescribed. Check your blood sugar level every day and contact your doctor if the levels arr high as your medication may need to be re-adjusted or some medication may need to be added   - If you have hypertension, take your blood pressure medication as prescribed. Measure your blood pressure with a cuff every day and write down the values. Call your doctors if the values are high despite medication as he may adjust your medication   - Avoid smoking and do not let anyone smoke next to you. If you are a smoker and find it hard to smoke seek help or call your doctors for referrals for counselling programs   - Follow up with your doctor as outpatient. he may prescribe regular lab work to keep track of your cholesterol and sugar levels in your blood. Do not skip appointments and always be compliant wit your doctor's advise  - Call 911 or report to the emergency department if you have sudden onset severe headache, vision problems such as blurry or double vision or vision that is going dark, vertigo, numbness or weakness anywhere in your body, slurred speech or difficulty walking       PRINCIPAL DISCHARGE DIAGNOSIS  Diagnosis: CVA (cerebral vascular accident)  Assessment and Plan of Treatment: You have been diagnosed with stroke which is a condition that develops when part of the brain doesn't get enough blood and oxygen   - Take your blood thinners and cholesterol medication as prescribed. Do not skip doses and do not run low on your medication. call your doctor if you need refills   - Plavix was added to ASA and Lipitor increased back to 80mg  - please obtain MRI brain on 4A once pt able to swallow oral secretions  -follow up with Dr. Connell in 1-2wks  -if pt continues to fail S&S, will need PEG

## 2020-10-19 NOTE — PROGRESS NOTE ADULT - ASSESSMENT
Left MCA ischemic stroke, Rt hemiplegia, severe dysphagia, probable aphasia and apraxia. Good candidate for acute inpatient rehab. Can tolerate and benefit from 3 hr a day acute rehab program.

## 2020-10-19 NOTE — SWALLOW BEDSIDE ASSESSMENT ADULT - PHARYNGEAL PHASE
Cough post oral intake/Wet vocal quality post oral intake
Wet vocal quality post oral intake/Cough post oral intake
Wet vocal quality post oral intake/Cough post oral intake

## 2020-10-19 NOTE — SWALLOW BEDSIDE ASSESSMENT ADULT - SWALLOW EVAL: FUNCTIONAL LEVEL AT TIME OF EVAL
persistent wet voice & cough at baseline, per RN pt requires frequent suctioning aphasic, persistent wet voice & cough at baseline, per RN pt requires frequent suctioning

## 2020-10-19 NOTE — H&P ADULT - NSHPLABSRESULTS_GEN_ALL_CORE
12.5   6.82  )-----------( 195      ( 19 Oct 2020 05:33 )             36.8     10-19    140  |  106  |  11  ----------------------------<  98  4.4   |  25  |  0.9    Ca    8.8      19 Oct 2020 05:33  Mg     1.8     10-19    TPro  5.7<L>  /  Alb  3.4<L>  /  TBili  0.5  /  DBili  x   /  AST  19  /  ALT  13  /  AlkPhos  58  10-19                CT Head No Cont:   EXAM:  CT BRAIN            PROCEDURE DATE:  10/14/2020            INTERPRETATION:  Clinical History / Reason for exam: Stroke follow-up    Technique: Noncontrast head CT. Contiguous CT axial images of the head from the base to the vertex.    COMPARISON: CT head and perfusion study from the prior day. MRI brain 8/31/2020.    FINDINGS:    Stable wedge-shaped hypodensity within the left frontal and parietal lobes and posterior insula compatible with subacute infarcts (as seen on the MRI 8/31/2020).    New hypoattenuation within the left caudate and lentiform nuclei compatible with acute infarct.    There is mild focal mass effect without midline shift.    There is no evidence of acute intracranial hemorrhage.    The calvarium is intact. There is mild mucosal thickening within the right ethmoid sinus.    IMPRESSION:  Since the CT head performed on the previous day:    1.  Interval development of left basal ganglia hypoattenuation compatible with acute infarct.    2.  Stable subacute infarcts in the left frontal and parietal lobes and posterior insular cortex.    3.  No significant mass effect. No acute intracranial hemorrhage.                HI LIMON M.D., ATTENDING RADIOLOGIST  This document has been electronically signed. Oct 14 2020  3:19PM (10-14-20 @ 14:33)

## 2020-10-19 NOTE — H&P ADULT - NSHPREVIEWOFSYSTEMS_GEN_ALL_CORE
REVIEW OF SYSTEMS  Constitutional: No fever, No Chills, No fatigue  HEENT: No eye pain, No visual disturbances, No difficulty hearing  Pulm: occasional coughs to clear salive,  No shortness of breath  Cardio: No chest pain, No palpitations  GI:  No abdominal pain, No nausea, No vomiting, No diarrhea, No constipation  : No dysuria, No frequency,  Neuro: No headaches, Worsened right sided weakness.   Skin: , No rashes, No lesions   MSK: No joint pain, No joint swelling, No muscle pain, Decreased movement on right side   Psych:  No depression REVIEW OF SYSTEMS  Constitutional: No fever, No Chills, No fatigue  HEENT: No eye pain, No visual disturbances, No difficulty hearing  Pulm: occasional coughs to clear saliva,  No shortness of breath  Cardio: No chest pain, No palpitations  GI:  No abdominal pain, No nausea, No vomiting, No diarrhea, No constipation  : No dysuria, No frequency,  Neuro: No headaches, Worsened right sided weakness.   Skin: , No rashes, No lesions   MSK: No joint pain, No joint swelling, No muscle pain, Decreased movement on right side   Psych:  No depression

## 2020-10-19 NOTE — PROGRESS NOTE ADULT - ASSESSMENT
13. Impression:  70 yo M who had a L MCA stroke about 6 weeks ago and was treated here and underwent L MCA thrombectomy and IV tPA. He presented 10/13 due to recurrent L MCA syndrome with an initial NIHSS of 23. He was not a tPA candidate given his recent large stroke. His discharge NIHSS from the last hospital stay was 1 for mild aphasia only. Recent stroke work up including a negative JANET and ILR placement. He is currently being monitored in stroke unit with persistent right sided weakness and severe expressive aphasia with NIHSS of 15. Repeat cth completed 10/14 revealed left basal ganglia acute infarct and a Stable subacute infarcts in the left frontal and parietal lobes and posterior insular cortex. No acute overnight events, MRI brain was not completed since patient has excessive oral secretions and is unable to lay flat owing to aspiration risk. Can do MRI brain non urgently due to evolving stroke on CTH.       15. Suggestions:   -Maintain q 4 neuro checks  -Continue Aspirin and Plavix,   -Continue Lipitor 80 mg q 24  -Follow up pending 2D echo  -Maintain Strict aspirations precautions        Disposition: As per rehab.    Jan Anthony NP  [phone]

## 2020-10-19 NOTE — PROGRESS NOTE ADULT - SUBJECTIVE AND OBJECTIVE BOX
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 He had been following up with Dr. Bhatt and Dr. Conner since his CVA,  last seen one week ago with significant neurological improvement. He had mild dysarthria and RUE tremors but no drifting noted at that time. Patient was eating a sandwich around 9 pm and started having worsening dysarthria and weakness so his wife brought him to the ED. On presentation NIHSS ~ 20, stroke code/NI alert was called NIH alert called, but due last admission and use of TPA, he was not a candidate for tpa on this admission.  CTH showed Wedge-shaped area of hypoattenuation within the left frontal and parietal lobes consistent with prior left MCA territory infarct. CT perfusion showed findings consistent with likely occlusions of the left middle cerebral artery M2 and M3 branches. Corresponding large area of ischemia within the left MCA territory measuring 113 cc, with core infarct of 12 cc.  Pt now moving RLE, able to say few words.  Patient was admitted for ICU monitoring.     Currently admitted to medicine with the primary diagnosis of CVA (cerebral vascular accident)       INTERVAL HPI / OVERNIGHT EVENTS:  Patient was examined and seen at bedside. This morning he is resting comfortably in bed and reports no new issues or overnight events. No complaints. Denies CP, SOB, AP, new weakness. Decreased oral secretions as per nursing.    ROS: remainder ROS unremarkable    PAST MEDICAL & SURGICAL HISTORY  Cerebrovascular accident (CVA)    GERD (gastroesophageal reflux disease)    S/P tonsillectomy    H/O bilateral hip replacements    H/O skin graft  upper body  legs      ALLERGIES  amoxicillin (Rash)      PHYSICAL EXAM  GEN: NAD, NAD, awake, alert, +NGT  PULM: Decreased BS bilaterally,   CVS: Regular rate and rhythm, S1-S2, no murmurs  ABD: Soft, non-tender, non-distended, no guarding  EXT: No edema  NEURO: mod-sev aphasia,  loss of sensation on Rt, RUE 0/5, RLE 1+/5 prox and 1-/5 distally.     tele - no events            MEDICATIONS  (STANDING):  aspirin  chewable 81 milliGRAM(s) Oral daily  atorvastatin 80 milliGRAM(s) Oral at bedtime  chlorhexidine 4% Liquid 1 Application(s) Topical <User Schedule>  clopidogrel Tablet 75 milliGRAM(s) Oral daily  enoxaparin Injectable 40 milliGRAM(s) SubCutaneous at bedtime  midodrine. 5 milliGRAM(s) Oral three times a day  nystatin    Suspension 155093 Unit(s) Oral four times a day  pantoprazole   Suspension 40 milliGRAM(s) Oral daily  sodium chloride 0.9%. 1000 milliLiter(s) (50 mL/Hr) IV Continuous <Continuous>    MEDICATIONS  (PRN):    Home Medications:  aspirin 81 mg oral tablet, chewable: 1 tab(s) orally once a day (19 Oct 2020 12:44)  atorvastatin 80 mg oral tablet: 1 tab(s) orally once a day (at bedtime) (19 Oct 2020 12:44)  clopidogrel 75 mg oral tablet: 1 tab(s) orally once a day (19 Oct 2020 12:44)  enoxaparin: 40 milligram(s) subcutaneous once a day (19 Oct 2020 12:44)  midodrine 5 mg oral tablet: 1 tab(s) orally 3 times a day (19 Oct 2020 12:44)  nystatin 100,000 units/mL oral suspension: 5 milliliter(s) orally 4 times a day (19 Oct 2020 12:44)  pantoprazole 40 mg oral granule, delayed release: 1 tab(s) orally once a day (19 Oct 2020 12:44)    Vital Signs Last 24 Hrs  T(C): 36.6 (19 Oct 2020 10:49), Max: 37.3 (19 Oct 2020 04:02)  T(F): 97.9 (19 Oct 2020 10:49), Max: 99.2 (19 Oct 2020 04:02)  HR: 52 (19 Oct 2020 10:49) (50 - 77)  BP: 116/61 (19 Oct 2020 10:49) (98/61 - 127/66)  BP(mean): 65 (18 Oct 2020 14:27) (65 - 65)  RR: 18 (19 Oct 2020 10:49) (18 - 20)  SpO2: 95% (19 Oct 2020 10:49) (94% - 98%)  CAPILLARY BLOOD GLUCOSE        LABS:                        12.5   6.82  )-----------( 195      ( 19 Oct 2020 05:33 )             36.8     10-19    140  |  106  |  11  ----------------------------<  98  4.4   |  25  |  0.9    Ca    8.8      19 Oct 2020 05:33  Mg     1.8     10-19    TPro  5.7<L>  /  Alb  3.4<L>  /  TBili  0.5  /  DBili  x   /  AST  19  /  ALT  13  /  AlkPhos  58  10-19    LIVER FUNCTIONS - ( 19 Oct 2020 05:33 )  Alb: 3.4 g/dL / Pro: 5.7 g/dL / ALK PHOS: 58 U/L / ALT: 13 U/L / AST: 19 U/L / GGT: x                           Consultant Notes Reviewed:  [x ] YES  [ ] NO  Care Discussed with Consultants/Other Providers/ Housestaff [ x] YES  [ ] NO  Radiology, labs, new studies personally reviewed.            ASSESSMENT & PLAN    68 yo M who had a L MCA stroke about 6 weeks ago and was treated here and underwent L MCA thrombectomy and IV tPA. He presented on 10/13 due to recurrent L MCA syndrome with an initial NIHSS of 23. He was not a tPA candidate given his recent large stroke.  Patient is s/p loading with Plavix 300mg on 10/14. Patient downgraded to acute stroke  from ICU.    #Recurrent L MCA CVA  #L basal ganglia acute infarct  - Continue aspirin, plavix (new), lipitor  - Maintain systolic blood pressure 120-160  - Neuro checks Q4H  - JANET showed no thrombus on September 2  - S/p interrogation of loop recorder with no events  - Neurology recommends MRI brain at some point but not urgent. Would wait until pt is capable of better handling the secretions.    #Dysphagia  -might need PEG  -having difficult time managing his secretions, suction PRN  -would avoid scopolamine due to anticholinergic effect i.e confusion  -S&S f/u    #Hyperlipidemia  -statin    #Misc  Diet: NPO with tube feeds  GI PPx: Protonix  DVT PPx: Lovenox  Activity: , F/u PT  Dispo: 4A    -D/w spouse in details. Spouse asked to speak to the neuro team. Gave message to neuro PA. 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 He had been following up with Dr. Bhatt and Dr. Conner since his CVA,  last seen one week ago with significant neurological improvement. He had mild dysarthria and RUE tremors but no drifting noted at that time. Patient was eating a sandwich around 9 pm and started having worsening dysarthria and weakness so his wife brought him to the ED. On presentation NIHSS ~ 20, stroke code/NI alert was called NIH alert called, but due last admission and use of TPA, he was not a candidate for tpa on this admission.  CTH showed Wedge-shaped area of hypoattenuation within the left frontal and parietal lobes consistent with prior left MCA territory infarct. CT perfusion showed findings consistent with likely occlusions of the left middle cerebral artery M2 and M3 branches. Corresponding large area of ischemia within the left MCA territory measuring 113 cc, with core infarct of 12 cc.  Pt now moving RLE, able to say few words.  Patient was admitted for ICU monitoring.     Currently admitted to medicine with the primary diagnosis of CVA (cerebral vascular accident)       INTERVAL HPI / OVERNIGHT EVENTS:  Patient was examined and seen at bedside. This morning he is resting comfortably in bed and reports no new issues or overnight events. No complaints. Denies CP, SOB, AP, new weakness. Decreased oral secretions as per nursing.    ROS: remainder ROS unremarkable    PAST MEDICAL & SURGICAL HISTORY  Cerebrovascular accident (CVA)    GERD (gastroesophageal reflux disease)    S/P tonsillectomy    H/O bilateral hip replacements    H/O skin graft  upper body  legs      ALLERGIES  amoxicillin (Rash)      PHYSICAL EXAM  GEN: NAD, NAD, awake, alert, +NGT  PULM: Decreased BS bilaterally,   CVS: Regular rate and rhythm, S1-S2, no murmurs  ABD: Soft, non-tender, non-distended, no guarding  EXT: No edema  NEURO: mod-sev aphasia,  loss of sensation on Rt, RUE 0/5, RLE 1+/5 prox and 1-/5 distally.     tele - no events            MEDICATIONS  (STANDING):  aspirin  chewable 81 milliGRAM(s) Oral daily  atorvastatin 80 milliGRAM(s) Oral at bedtime  chlorhexidine 4% Liquid 1 Application(s) Topical <User Schedule>  clopidogrel Tablet 75 milliGRAM(s) Oral daily  enoxaparin Injectable 40 milliGRAM(s) SubCutaneous at bedtime  midodrine. 5 milliGRAM(s) Oral three times a day  nystatin    Suspension 754883 Unit(s) Oral four times a day  pantoprazole   Suspension 40 milliGRAM(s) Oral daily  sodium chloride 0.9%. 1000 milliLiter(s) (50 mL/Hr) IV Continuous <Continuous>    MEDICATIONS  (PRN):    Home Medications:  aspirin 81 mg oral tablet, chewable: 1 tab(s) orally once a day (19 Oct 2020 12:44)  atorvastatin 80 mg oral tablet: 1 tab(s) orally once a day (at bedtime) (19 Oct 2020 12:44)  clopidogrel 75 mg oral tablet: 1 tab(s) orally once a day (19 Oct 2020 12:44)  enoxaparin: 40 milligram(s) subcutaneous once a day (19 Oct 2020 12:44)  midodrine 5 mg oral tablet: 1 tab(s) orally 3 times a day (19 Oct 2020 12:44)  nystatin 100,000 units/mL oral suspension: 5 milliliter(s) orally 4 times a day (19 Oct 2020 12:44)  pantoprazole 40 mg oral granule, delayed release: 1 tab(s) orally once a day (19 Oct 2020 12:44)    Vital Signs Last 24 Hrs  T(C): 36.6 (19 Oct 2020 10:49), Max: 37.3 (19 Oct 2020 04:02)  T(F): 97.9 (19 Oct 2020 10:49), Max: 99.2 (19 Oct 2020 04:02)  HR: 52 (19 Oct 2020 10:49) (50 - 77)  BP: 116/61 (19 Oct 2020 10:49) (98/61 - 127/66)  BP(mean): 65 (18 Oct 2020 14:27) (65 - 65)  RR: 18 (19 Oct 2020 10:49) (18 - 20)  SpO2: 95% (19 Oct 2020 10:49) (94% - 98%)  CAPILLARY BLOOD GLUCOSE        LABS:                        12.5   6.82  )-----------( 195      ( 19 Oct 2020 05:33 )             36.8     10-19    140  |  106  |  11  ----------------------------<  98  4.4   |  25  |  0.9    Ca    8.8      19 Oct 2020 05:33  Mg     1.8     10-19    TPro  5.7<L>  /  Alb  3.4<L>  /  TBili  0.5  /  DBili  x   /  AST  19  /  ALT  13  /  AlkPhos  58  10-19    LIVER FUNCTIONS - ( 19 Oct 2020 05:33 )  Alb: 3.4 g/dL / Pro: 5.7 g/dL / ALK PHOS: 58 U/L / ALT: 13 U/L / AST: 19 U/L / GGT: x                           Consultant Notes Reviewed:  [x ] YES  [ ] NO  Care Discussed with Consultants/Other Providers/ Housestaff [ x] YES  [ ] NO  Radiology, labs, new studies personally reviewed.            ASSESSMENT & PLAN    70 yo M who had a L MCA stroke about 6 weeks ago and was treated here and underwent L MCA thrombectomy and IV tPA. He presented on 10/13 due to recurrent L MCA syndrome with an initial NIHSS of 23. He was not a tPA candidate given his recent large stroke.  Patient is s/p loading with Plavix 300mg on 10/14. Patient downgraded to acute stroke  from ICU.    #Recurrent L MCA CVA  #L basal ganglia acute infarct  - Continue aspirin, plavix (new), lipitor  - Neuro checks Q4H  - JANET showed no thrombus on September 2  - S/p interrogation of loop recorder with no events  - Neurology recommends MRI brain at some point but not urgent. Would wait until pt is capable of better handling the secretions.  - neuro cleared pt for 4A    #Dysphagia  -might need PEG  -having difficult time managing his secretions, suction PRN  -would avoid scopolamine due to anticholinergic effect i.e confusion  -S&S f/u    #Hyperlipidemia  -statin    #Misc  Diet: NPO with tube feeds  GI PPx: Protonix  DVT PPx: Lovenox  Activity: , F/u PT  Dispo: 4A    -D/w spouse in details. Spouse asked to speak to the neuro team. Gave message to neuro PA.

## 2020-10-19 NOTE — H&P ADULT - NSHPSOCIALHISTORY_GEN_ALL_CORE
Denies history of tobacco or alcohol use  Worked as , exposed to fumes during 9/11    Lives with wife in 2 floor PH with one flight of stairs to enter  After first stroke on 8/27/2020 patient was able to take staircase, communicate, and ambulate independently.

## 2020-10-19 NOTE — DISCHARGE NOTE PROVIDER - HOSPITAL COURSE
68 yo M who had a L MCA stroke about 6 weeks ago and was treated here and underwent L MCA thrombectomy and IV tPA. On discharge NIHSS 1 for mild aphasia only. Presented 10/13 due to recurrent L MCA syndrome with an initial NIHSS of 23. He was not a tPA candidate given his recent large stroke.  Recent stroke work up including a negative JANET and ILR placement. Has persistent right sided weakness and severe expressive aphasia with NIHSS of 15. Repeat cth completed 10/14 revealed left basal ganglia acute infarct and a Stable subacute infarcts in the left frontal and parietal lobes and posterior insular cortex. No acute overnight events, MRI brain was not completed since patient has excessive oral secretions and is unable to lay flat owing to aspiration risk. Patient needs intensive rehab for right facial, upper extremity, and lower extremity weakness, as well as expressive aphasia + dysarthria. Admitted for inpatient rehab 4A.       Other notes:     #Dysphagia  Patient NPO unable to clear secretions.   Speech and swallow follow up  May need PEG     #HLD  Continue statin

## 2020-10-19 NOTE — DISCHARGE NOTE PROVIDER - NSDCMRMEDTOKEN_GEN_ALL_CORE_FT
aspirin 81 mg oral tablet, chewable: 1 tab(s) orally once a day  atorvastatin 80 mg oral tablet: 1 tab(s) orally once a day (at bedtime)  clopidogrel 75 mg oral tablet: 1 tab(s) orally once a day  enoxaparin: 40 milligram(s) subcutaneous once a day  midodrine 5 mg oral tablet: 1 tab(s) orally 3 times a day  nystatin 100,000 units/mL oral suspension: 5 milliliter(s) orally 4 times a day  pantoprazole 40 mg oral granule, delayed release: 1 tab(s) orally once a day

## 2020-10-19 NOTE — SWALLOW BEDSIDE ASSESSMENT ADULT - NS SPL SWALLOW CLINIC TRIAL FT
+significant overt s/s of penetration/aspiration w/ ice, suspected to be grossly aspirating secretions. not appropriate for further po trials
+significant overt s/s of penetration/aspiration w/ ice, suspected to be grossly aspirating secretions. not appropriate for further po trials
suspected pharyngeal dysphagia

## 2020-10-19 NOTE — PROGRESS NOTE ADULT - SUBJECTIVE AND OBJECTIVE BOX
Chief Complaint: Right sided weakness        HPI:  69 years old M with PMHx  emergent IA mechanical thrombectomy of left ADEOLA/MCA on 8/27/2020 presenting with R sided facial droop, weakness, and dysarthria. He had been following up with Dr. Bhatt and Dr. Conner since his CVA,  last seen one week ago with significant neurological improvement. He had mild dysarthria and RUE tremors but no drifting noted at that time. Patient was eating a sandwich around 9 pm and started having worsening dysarthria and weakness so his wife brought him to the ED. On presentation NIHSS ~ 20, stroke code/NI alert was called NIH alert called, but due last admission and use of TPA, he was not a candidate for tpa on this admission.  CTH showed Wedge-shaped area of hypoattenuation within the left frontal and parietal lobes consistent with prior left MCA territory infarct. CT perfusion showed findings consistent with likely occlusions of the left middle cerebral artery M2 and M3 branches. Corresponding large area of ischemia within the left MCA territory measuring 113 cc, with core infarct of 12 cc.  Pt now moving RLE, able to say few words.  Patient was admitted for ICU monitoring.         Relevant PMH:  [x] Prior ischemic stroke/TIA  [] Afib  []CAD  []HTN  []DLD  []DM []PVD []Obesity [] Sedintary lifestyle []CHF  []ENMANUEL  []Cancer Hx         Social History: [] Smoking []  Drug Use: []   Alcohol Use:   [] Other:      Possible Location of Stroke:  Left MCA territory cva      Possible Cause of Stroke:  Unknown at this time, will have a better understanding post stroke workup.      Relevant Cerebral Imaging:  < from: CT Head No Cont (10.14.20 @ 14:33) >  FINDINGS:    Stable wedge-shaped hypodensity within the left frontal and parietal lobes and posterior insula compatible with subacute infarcts (as seen on the MRI 8/31/2020).    New hypoattenuation within the left caudate and lentiform nuclei compatible with acute infarct.    There is mild focal mass effect without midline shift.    There is no evidence of acute intracranial hemorrhage.    The calvarium is intact. There is mild mucosal thickening within the right ethmoid sinus.    IMPRESSION:  Since the CT head performed on the previous day:    1.  Interval development of left basal ganglia hypoattenuation compatible with acute infarct.    2.  Stable subacute infarcts in the left frontal and parietal lobes and posterior insular cortex.    3.  No significant mass effect. No acute intracranial hemorrhage.    HI LIMON M.D., ATTENDING RADIOLOGIST  This document has been electronically signed. Oct 14 2020  3:19PM    < end of copied text >        Relevant Cervicocerebral Imaging:  CT Perfusion w/ Maps w/ IV Cont:   EXAM:  CT PERFUSION W MAPS IC            PROCEDURE DATE:  10/13/2020      IMPRESSION:    1- Occlusion of the left superior division of the MCA  (602/93-88)    2-occlusion of the left M3 branch of the MCA, leading to the area of chronic infarct    The above were added to the preliminary report and discussed with Dr. Helms    The follwoing is the preliminary report:    Findings consistent with likely occlusions of the left middle cerebral artery M2 and M3 branches. Corresponding large area of ischemia within the left MCA territory measuring 113 cc, with core infarct of 12 cc (mismatch volume/penumbra of 101 cc).    Dr. Torey Nicolas discussed preliminary findings with YORDAN WOLF on 10/13/2020 11:20 PM with readback.      TOREY NICOLAS M.D., RESIDENT RADIOLOGIST  This document has been electronically signed.  CALVIN BARTH M.D., ATTENDING RADIOLOGIST  This document has been electronically signed. Oct 13 2020 11:47PM (10-13-20 @ 22:50)          Relevant blood tests:  Direct LDL: 62 mg/dL [4 - 129] (10-14-20 @ 04:37)            Relevant cardiac rhythm monitoring:  `< from: 12 Lead ECG (10.13.20 @ 23:21) >  Ventricular Rate 73 BPM    Atrial Rate 73 BPM    P-R Interval 174 ms    QRS Duration 108 ms    Q-T Interval 410 ms    QTC Calculation(Bazett) 451 ms    P Axis 75 degrees    R Axis 80 degrees    T Axis 36 degrees    Diagnosis Line Normal sinus rhythm with sinus arrhythmia  Septal infarct , age undetermined  Abnormal ECG    Confirmed by Gonzalo Haile ([id number]) on 10/14/2020 7:33:48 AM    < end of copied text >            Relevant Cardiac Structure:(TTE/JANET +/-):[]No intracardiac thrombus/[] no vegetation/[]no [place]/EF:  eo< from: JANET w/Probe Placement (09.02.20 @ 09:55) >  Summary:   1. Left ventricular ejection fraction, by visual estimation, is 60 to 65%.   2. Normal left atrial size.   3. Normal right atrial size.   4. Mild mitral valve regurgitation.   5. Color flowdoppler and intravenous injection of agitated saline demonstrates the presence of an intact intra atrial septum.   6. No left atrial appendage thrombus and normal left atrial appendage velocities.              Home Medications:      MEDICATIONS  (STANDING):  aspirin  chewable 81 milliGRAM(s) Oral daily  atorvastatin 80 milliGRAM(s) Oral at bedtime  chlorhexidine 4% Liquid 1 Application(s) Topical <User Schedule>  clopidogrel Tablet 75 milliGRAM(s) Oral daily  enoxaparin Injectable 40 milliGRAM(s) SubCutaneous at bedtime  midodrine. 5 milliGRAM(s) Oral three times a day  nystatin    Suspension 029529 Unit(s) Oral four times a day  pantoprazole   Suspension 40 milliGRAM(s) Oral daily  sodium chloride 0.9%. 1000 milliLiter(s) (50 mL/Hr) IV Continuous <Continuous>      10. PT/OT/Speech/Rehab/S&Sw/ Cognitive eval results and recommendations:  pending  11. Exam:    Vital Signs Last 24 Hrs  T(C): 36.4 (19 Oct 2020 08:17), Max: 37.3 (19 Oct 2020 04:02)  T(F): 97.6 (19 Oct 2020 08:17), Max: 99.2 (19 Oct 2020 04:02)  HR: 63 (19 Oct 2020 08:17) (50 - 77)  BP: 126/65 (19 Oct 2020 08:17) (98/61 - 127/66)  BP(mean): 65 (18 Oct 2020 14:27) (65 - 65)  RR: 18 (19 Oct 2020 08:17) (18 - 20)  SpO2: 95% (19 Oct 2020 08:17) (94% - 98%)    12.       NIHSS  LOC:       1a:  0   1b(Questions):  0         1c(Instructions):   0          Best Gaze: 0  Visual: 0  Motor:                 RUE: 4    RLE:  4   LUE:  0   LLE: 0    FACE:  2   Limb Ataxia: 0  Sensory:    1   Language:   2    Dysarthria:  2        Extinction and Inattention: 0      NIHSS today: 15      mRS:4  0 No symptoms at all  1 No significant disability despite symptoms; able to carry out all usual duties and activities without assistance  2 Slight disability; unable to carry out all previous activities, but able to look after own affairs  3 Moderate disability; requiring some help, but able to walk without assistance  4 Moderately severe disability; unable to walk without assistance and unable to attend to own bodily needs without assistance  5 Severe disability; bedridden, incontinent and requiring constant nursing care and attention  6 Dead

## 2020-10-19 NOTE — SWALLOW BEDSIDE ASSESSMENT ADULT - ORAL PREPARATORY PHASE
Within functional limits
Within functional limits
Bolus falls into anterior sulcus/Anterior loss of bolus/Bolus falls into right lateral sulci

## 2020-10-19 NOTE — SWALLOW BEDSIDE ASSESSMENT ADULT - SLP PERTINENT HISTORY OF CURRENT PROBLEM
Pt admitted with R sided weakness, dysarthria. CTH (-) for acute changes, wedge shaped hypoattenuation L frontal and parietal lobes consistent with prior L MCA. Acute recurrent L MCA syndrome. Known to SLP dept during previous admission, recs for regular and thin liquids. CTH 10/14: interval dev of L BG hypoattenuation.  Pt continues to require frequent suctioning. Per chart, scopalamine patch is contraindicated 2' risk for confusion.
Pt admitted with R sided weakness, dysarthria. CTH (-) for acute changes, wedge shaped hypoattenuation L frontal and parietal lobes consistent with prior L MCA. Acute recurrent L MCA syndrome. Known to SLP dept during previous admission, recs for regular and thin liquids.. CTH 10/14: interval dev of L BG hypoattenuation
Pt admitted with R sided weakness, dysarthria. CTH (-) for acute changes, wedge shaped hypoattenuation L frontal and parietal lobes consistent with prior L MCA. Acute recurrent MCA CVA vs seizure vs hypoperfusion. Known to SLP dept during previous admission, recs for regular and thin liquids
Pt admitted with R sided weakness, dysarthria. CTH (-) for acute changes, wedge shaped hypoattenuation L frontal and parietal lobes consistent with prior L MCA. Acute recurrent L MCA syndrome. Known to SLP dept during previous admission, recs for regular and thin liquids

## 2020-10-19 NOTE — H&P ADULT - ASSESSMENT
ASSESSMENT/PLAN    Rehab of       -Pain control: Tylenol PRN    -GI/Bowel Mgmt -      -Bladder management:      -Skin:  -No active issues at this time    -FEN   - Diet -    - Dysphagia             Precautions / PROPHYLAXIS:      - Falls    - Ortho: Weight bearing status:       - DVT prophylaxis:      MEDICAL PROGNOSIS: GOOD            REHAB POTENTIAL: GOOD             ESTIMATED DISPOSITION: HOME WITH HOME CARE                ELOS:  [     ] 7-14    [    ]  14-21    [    ]    Other    THERAPY ORDERS and INITIAL INDIVIDUALIZED PLAN OF CARE:  This initial indivualized interdisciplinary plan of care, which was established by me (the attending physiatrist), is based on elements from the post admission evaluation. The interdisciplinary therapy program is to be at least 3 hrs a day, at least 5 days per week from from physical, occupational and/ or speech therapies as ordered by me below.      [ x  ] P.T. 90 mins /day at least 5 out of 7 days:  [  x ] superficial  modalities prn, [ x  ] A/AAROM, [ x  ] PREs, [ x  ] transfer training,            [ x  ] progressive ambulation, [x   ] stairs                                               [ x  ] O.T. 90 mins. /day at least 5 out of 7 days::  [ x  ] modalities prn, [ x  ]A/AAROM, [ x  ] PREs, functional transfer training, [ x  ] ADLs,              [   ] cognitive/ perceptual eval and training, [   ] splint eval                                                  [   ] S.L.P:  [   ] speech eval, [   ] swallow eval     [   ] Neuropsychology      [   ] Individualized rec. therapy      RATIONALE FOR INPATIENT ADMISSION - Patient demonstrates the following: (check all that apply)  [X] Medically appropriate for rehabilitation admission  [X] Has attainable rehab goals with an appropriate initial discharge plan  [X] Has rehabilitation potential (expected to make a significant improvement within a reasonable period of time)  [X] Requires close medical management by a rehab physician, rehab nursing care,  and comprehensive interdisciplinary team (including PT, OT)       ASSESSMENT/PLAN  This is a 69 year old male with hx b/l hip replacements, recent mechanical thrombectomy of left ADEOLA/MCA stroke (8/27/2020)   Rehab of L CVA (MCA, ADEOLA and basal ganglia)    Start 3 hours of acute rehabilitation   #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 Requires frequent speech therapy    #DLD  Atorvastatin 80 mg    #Orthostatic Hypotension  - Midodrine 10mg TID, continue to monitor  - Given 500cc prior to discharge from [facility]  -Pain control: Tylenol PRN    -GI/Bowel Mgmt -  Miralax PRN at night    -Bladder management: no issues     -Skin: No active issues at this time  Keep right arm elevated in bed    -FEN   - Diet -  Dysphagia - NPO with feeds via NGT       Precautions / PROPHYLAXIS:      - Falls precautions  - Aspiration  precautions    - DVT prophylaxis: lovenox      MEDICAL PROGNOSIS: GOOD            REHAB POTENTIAL: GOOD             ESTIMATED DISPOSITION: HOME WITH HOME CARE                ELOS:  [     ] 7-14    [ x   ]  14-21    [    ]    Other    THERAPY ORDERS and INITIAL INDIVIDUALIZED PLAN OF CARE:  This initial indivualized interdisciplinary plan of care, which was established by me (the attending physiatrist), is based on elements from the post admission evaluation. The interdisciplinary therapy program is to be at least 3 hrs a day, at least 5 days per week from from physical, occupational and/ or speech therapies as ordered by me below.      [ x  ] P.T. 90 mins /day at least 5 out of 7 days:  [  x ] superficial  modalities prn, [ x  ] A/AAROM, [ x  ] PREs, [ x  ] transfer training,            [ x  ] progressive ambulation, [x   ] stairs                                               [ x  ] O.T. 90 mins. /day at least 5 out of 7 days::  [ x  ] modalities prn, [ x  ]A/AAROM, [ x  ] PREs, functional transfer training, [ x  ] ADLs,              [   ] cognitive/ perceptual eval and training, [   ] splint eval                                                  [x ] S.L.P:  [ x ] speech eval, [ x  ] swallow eval     [ x  ] Neuropsychology      [ x ] Individualized rec. therapy      RATIONALE FOR INPATIENT ADMISSION - Patient demonstrates the following: (check all that apply)  [X] Medically appropriate for rehabilitation admission  [X] Has attainable rehab goals with an appropriate initial discharge plan  [X] Has rehabilitation potential (expected to make a significant improvement within a reasonable period of time)  [X] Requires close medical management by a rehab physician, rehab nursing care,  and comprehensive interdisciplinary team (including PT, OT)       ASSESSMENT/PLAN  This is a 69 year old male with hx b/l hip replacements, recent mechanical thrombectomy of left ADEOLA/MCA stroke (8/27/2020)   Rehab of L CVA (MCA, ADEOLA and basal ganglia)    Start 3 hours of acute rehabilitation   #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 Requires frequent speech therapy    #DLD  Atorvastatin 80 mg    #Orthostatic Hypotension  - Midodrine 10mg TID, Florinef 0.2mg daily continue to monitor  - Calorie count, and monitor fluid intkae    -Pain control: Tylenol PRN    -GI/Bowel Mgmt -  Miralax PRN at night    -Bladder management: no issues     -Skin: No active issues at this time  Keep right arm elevated in bed    -FEN   - Diet -  Dysphagia - NPO with feeds via NGT       Precautions / PROPHYLAXIS:      - Falls precautions  - Aspiration  precautions    - DVT prophylaxis: lovenox      MEDICAL PROGNOSIS: GOOD            REHAB POTENTIAL: GOOD             ESTIMATED DISPOSITION: HOME WITH HOME CARE                ELOS:  [     ] 7-14    [ x   ]  14-21    [    ]    Other    THERAPY ORDERS and INITIAL INDIVIDUALIZED PLAN OF CARE:  This initial indivualized interdisciplinary plan of care, which was established by me (the attending physiatrist), is based on elements from the post admission evaluation. The interdisciplinary therapy program is to be at least 3 hrs a day, at least 5 days per week from from physical, occupational and/ or speech therapies as ordered by me below.      [ x  ] P.T. 90 mins /day at least 5 out of 7 days:  [  x ] superficial  modalities prn, [ x  ] A/AAROM, [ x  ] PREs, [ x  ] transfer training,            [ x  ] progressive ambulation, [x   ] stairs                                               [ x  ] O.T. 90 mins. /day at least 5 out of 7 days::  [ x  ] modalities prn, [ x  ]A/AAROM, [ x  ] PREs, functional transfer training, [ x  ] ADLs,              [   ] cognitive/ perceptual eval and training, [   ] splint eval                                                  [x ] S.L.P:  [ x ] speech eval, [ x  ] swallow eval     [ x  ] Neuropsychology      [ x ] Individualized rec. therapy      RATIONALE FOR INPATIENT ADMISSION - Patient demonstrates the following: (check all that apply)  [X] Medically appropriate for rehabilitation admission  [X] Has attainable rehab goals with an appropriate initial discharge plan  [X] Has rehabilitation potential (expected to make a significant improvement within a reasonable period of time)  [X] Requires close medical management by a rehab physician, rehab nursing care,  and comprehensive interdisciplinary team (including PT, OT)       ASSESSMENT/PLAN  This is a 69 year old male with hx b/l hip replacements, recent mechanical thrombectomy of left ADEOLA/MCA stroke (8/27/2020)   Rehab of L CVA (MCA, ADEOLA and basal ganglia)    Start 3 hours of acute rehabilitation   #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    # Severe dysphagia; aphasia, right hemiparesis    # severe aphasia/ dysarthria    - Continue aspirin, plavix, lipitor  - Maintain systolic blood pressure 120-160  - JANET showed no thrombus on September 2; TTE 10/18 unrevealing  - S/p interrogation of loop recorder with no events  - MRI not performed as patient is aspiration risk at this time due to inability to manage oral secretions  - PT/OT/ST/Neuropsych 3 hrs daily    #DLD  Atorvastatin 80 mg    #Orthostatic Hypotension  - Midodrine 10mg TID, Florinef 0.2mg daily continue to monitor  - Calorie count, and monitor fluid intake    -Pain control: Tylenol PRN    -GI/Bowel Mgmt -  Miralax PRN at night    -Bladder management: no issues     -Skin: No active issues at this time  Keep right arm elevated in bed    -FEN   - Diet -  Dysphagia - NPO with feeds via NGT. SLP F/U      Precautions / PROPHYLAXIS:      - Falls precautions  - Aspiration  precautions    - DVT prophylaxis: lovenox      MEDICAL PROGNOSIS: GOOD            REHAB POTENTIAL: GOOD             ESTIMATED DISPOSITION: HOME WITH HOME CARE                ELOS:  [     ] 7-14    [ x   ]  14-21    [    ]    Other    THERAPY ORDERS and INITIAL INDIVIDUALIZED PLAN OF CARE:  This initial indivualized interdisciplinary plan of care, which was established by me (the attending physiatrist), is based on elements from the post admission evaluation. The interdisciplinary therapy program is to be at least 3 hrs a day, at least 5 days per week from from physical, occupational and/ or speech therapies as ordered by me below.      [ x  ] P.T. 90 mins /day at least 5 out of 7 days:  [  x ] superficial  modalities prn, [ x  ] A/AAROM, [ x  ] PREs, [ x  ] transfer training,            [ x  ] progressive ambulation, [x   ] stairs                                               [ x  ] O.T. 90 mins. /day at least 5 out of 7 days::  [ x  ] modalities prn, [ x  ]A/AAROM, [ x  ] PREs, functional transfer training, [ x  ] ADLs,              [x   ] cognitive/ perceptual eval and training, [   ] splint eval                                                  [x ] S.L.P:  [ x ] speech eval, [ x  ] swallow eval     [ x  ] Neuropsychology      [ x ] Individualized rec. therapy      RATIONALE FOR INPATIENT ADMISSION - Patient demonstrates the following: (check all that apply)  [X] Medically appropriate for rehabilitation admission  [X] Has attainable rehab goals with an appropriate initial discharge plan  [X] Has rehabilitation potential (expected to make a significant improvement within a reasonable period of time)  [X] Requires close medical management by a rehab physician, rehab nursing care,  and comprehensive interdisciplinary team (including PT, OT)

## 2020-10-19 NOTE — SWALLOW BEDSIDE ASSESSMENT ADULT - ADDITIONAL RECOMMENDATIONS
consider scopolamine patch if not contraindicated
frequent oral care;  Pt will likely require nonoral nutrition/hydration for an extended period of time.

## 2020-10-19 NOTE — DISCHARGE NOTE PROVIDER - CARE PROVIDERS DIRECT ADDRESSES
,DirectAddress_Unknown,[email or web address] ,DirectAddress_Unknown,[email or web address]

## 2020-10-19 NOTE — PROGRESS NOTE ADULT - SUBJECTIVE AND OBJECTIVE BOX
Patient is a 69y old  Male who presents with a chief complaint of stroke (19 Oct 2020 09:58)      HPI:  69 years old M with PMHx  emergent IA mechanical thrombectomy of left ADEOLA/MCA on 8/27/2020 presenting with R sided facial droop, weakness, and dysarthria. He had been following up with Dr. Bhatt and Dr. Conner since his CVA,  last seen one week ago with significant neurological improvement. He had mild dysarthria and RUE tremors but no drifting noted at that time. Patient was eating a sandwich around 9 pm and started having worsening dysarthria and weakness so his wife brought him to the ED. On presentation NIHSS ~ 20, stroke code/NI alert was called NIH alert called, but due last admission and use of TPA, he was not a candidate for tpa on this admission.  CTH showed Wedge-shaped area of hypoattenuation within the left frontal and parietal lobes consistent with prior left MCA territory infarct. CT perfusion showed findings consistent with likely occlusions of the left middle cerebral artery M2 and M3 branches. Corresponding large area of ischemia within the left MCA territory measuring 113 cc, with core infarct of 12 cc.  Pt now moving RLE, able to say few words.  Patient was admitted for ICU monitoring.     T(C): --  HR: 80 (10-13-20 @ 23:08) (68 - 109)  BP: 142/71 (10-13-20 @ 23:08) (123/77 - 151/53)  RR: 18 (10-13-20 @ 23:08) (18 - 20)  SpO2: 97% (10-13-20 @ 23:08) (96% - 100%)        (13 Oct 2020 23:23)      CURRENT STATUS:  He is now stable and participating in therapy. He wax able to stand with mod assistance and put some weight through his rt leg, and required max assist to stand- pivot transfer. He is NPO for dysphagia      [  PHYSICAL EXAM    Vital Signs Last 24 Hrs  T(C): 36.4 (19 Oct 2020 08:17), Max: 37.3 (19 Oct 2020 04:02)  T(F): 97.6 (19 Oct 2020 08:17), Max: 99.2 (19 Oct 2020 04:02)  HR: 63 (19 Oct 2020 08:17) (50 - 77)  BP: 126/65 (19 Oct 2020 08:17) (98/61 - 127/66)  BP(mean): 65 (18 Oct 2020 14:27) (65 - 65)  RR: 18 (19 Oct 2020 08:17) (18 - 20)  SpO2: 95% (19 Oct 2020 08:17) (94% - 98%)    Constitutional - NAD, Comfortable. NGT in place.  Abdomen -  Soft, NTND  Extremities - No C/C/E, No calf tenderness   Neurologic Exam -                    Cognitive - Awake, Alert, follows command well,      Communication - sparse speech but appropriate and clear     Motor - No focal deficits                    LEFT    UE -  wfl                    RIGHT UE -  0/5.                     LEFT    LE - wfl                    RIGHT LE -  weak extensor synergy     Reflexes - symmetric  Psychiatric - Mood stable, Affect WNL      aspirin  chewable 81 milliGRAM(s) Oral daily  atorvastatin 80 milliGRAM(s) Oral at bedtime  chlorhexidine 4% Liquid 1 Application(s) Topical <User Schedule>  clopidogrel Tablet 75 milliGRAM(s) Oral daily  enoxaparin Injectable 40 milliGRAM(s) SubCutaneous at bedtime  midodrine. 5 milliGRAM(s) Oral three times a day  nystatin    Suspension 837243 Unit(s) Oral four times a day  pantoprazole   Suspension 40 milliGRAM(s) Oral daily  sodium chloride 0.9%. 1000 milliLiter(s) IV Continuous <Continuous>      RECENT LABS/IMAGING                        12.5   6.82  )-----------( 195      ( 19 Oct 2020 05:33 )             36.8     10-19    140  |  106  |  11  ----------------------------<  98  4.4   |  25  |  0.9    Ca    8.8      19 Oct 2020 05:33  Mg     1.8     10-19    TPro  5.7<L>  /  Alb  3.4<L>  /  TBili  0.5  /  DBili  x   /  AST  19  /  ALT  13  /  AlkPhos  58  10-19          ASSESSMENT/PLAN

## 2020-10-20 ENCOUNTER — TRANSCRIPTION ENCOUNTER (OUTPATIENT)
Age: 70
End: 2020-10-20

## 2020-10-20 ENCOUNTER — INPATIENT (INPATIENT)
Facility: HOSPITAL | Age: 70
LOS: 9 days | Discharge: ANOTHER HOSPITAL | End: 2020-10-30
Attending: PHYSICAL MEDICINE & REHABILITATION | Admitting: PHYSICAL MEDICINE & REHABILITATION
Payer: MEDICARE

## 2020-10-20 VITALS
SYSTOLIC BLOOD PRESSURE: 125 MMHG | RESPIRATION RATE: 18 BRPM | DIASTOLIC BLOOD PRESSURE: 58 MMHG | HEART RATE: 65 BPM | TEMPERATURE: 97 F

## 2020-10-20 VITALS
TEMPERATURE: 98 F | RESPIRATION RATE: 18 BRPM | SYSTOLIC BLOOD PRESSURE: 123 MMHG | HEART RATE: 61 BPM | DIASTOLIC BLOOD PRESSURE: 61 MMHG

## 2020-10-20 DIAGNOSIS — Z90.89 ACQUIRED ABSENCE OF OTHER ORGANS: Chronic | ICD-10-CM

## 2020-10-20 DIAGNOSIS — Z96.643 PRESENCE OF ARTIFICIAL HIP JOINT, BILATERAL: Chronic | ICD-10-CM

## 2020-10-20 DIAGNOSIS — Z94.5 SKIN TRANSPLANT STATUS: Chronic | ICD-10-CM

## 2020-10-20 LAB
ALBUMIN SERPL ELPH-MCNC: 3.2 G/DL — LOW (ref 3.5–5.2)
ALP SERPL-CCNC: 61 U/L — SIGNIFICANT CHANGE UP (ref 30–115)
ALT FLD-CCNC: 13 U/L — SIGNIFICANT CHANGE UP (ref 0–41)
ANION GAP SERPL CALC-SCNC: 7 MMOL/L — SIGNIFICANT CHANGE UP (ref 7–14)
AST SERPL-CCNC: 20 U/L — SIGNIFICANT CHANGE UP (ref 0–41)
BASOPHILS # BLD AUTO: 0.03 K/UL — SIGNIFICANT CHANGE UP (ref 0–0.2)
BASOPHILS NFR BLD AUTO: 0.5 % — SIGNIFICANT CHANGE UP (ref 0–1)
BILIRUB SERPL-MCNC: 0.5 MG/DL — SIGNIFICANT CHANGE UP (ref 0.2–1.2)
BUN SERPL-MCNC: 10 MG/DL — SIGNIFICANT CHANGE UP (ref 10–20)
CALCIUM SERPL-MCNC: 8.7 MG/DL — SIGNIFICANT CHANGE UP (ref 8.5–10.1)
CHLORIDE SERPL-SCNC: 103 MMOL/L — SIGNIFICANT CHANGE UP (ref 98–110)
CO2 SERPL-SCNC: 28 MMOL/L — SIGNIFICANT CHANGE UP (ref 17–32)
CREAT SERPL-MCNC: 0.9 MG/DL — SIGNIFICANT CHANGE UP (ref 0.7–1.5)
EOSINOPHIL # BLD AUTO: 0.25 K/UL — SIGNIFICANT CHANGE UP (ref 0–0.7)
EOSINOPHIL NFR BLD AUTO: 3.9 % — SIGNIFICANT CHANGE UP (ref 0–8)
GLUCOSE SERPL-MCNC: 143 MG/DL — HIGH (ref 70–99)
HCT VFR BLD CALC: 35.8 % — LOW (ref 42–52)
HGB BLD-MCNC: 12.5 G/DL — LOW (ref 14–18)
IMM GRANULOCYTES NFR BLD AUTO: 0.2 % — SIGNIFICANT CHANGE UP (ref 0.1–0.3)
LYMPHOCYTES # BLD AUTO: 1.12 K/UL — LOW (ref 1.2–3.4)
LYMPHOCYTES # BLD AUTO: 17.4 % — LOW (ref 20.5–51.1)
MAGNESIUM SERPL-MCNC: 1.8 MG/DL — SIGNIFICANT CHANGE UP (ref 1.8–2.4)
MCHC RBC-ENTMCNC: 32.6 PG — HIGH (ref 27–31)
MCHC RBC-ENTMCNC: 34.9 G/DL — SIGNIFICANT CHANGE UP (ref 32–37)
MCV RBC AUTO: 93.2 FL — SIGNIFICANT CHANGE UP (ref 80–94)
MONOCYTES # BLD AUTO: 0.72 K/UL — HIGH (ref 0.1–0.6)
MONOCYTES NFR BLD AUTO: 11.2 % — HIGH (ref 1.7–9.3)
NEUTROPHILS # BLD AUTO: 4.32 K/UL — SIGNIFICANT CHANGE UP (ref 1.4–6.5)
NEUTROPHILS NFR BLD AUTO: 66.8 % — SIGNIFICANT CHANGE UP (ref 42.2–75.2)
NRBC # BLD: 0 /100 WBCS — SIGNIFICANT CHANGE UP (ref 0–0)
PLATELET # BLD AUTO: 197 K/UL — SIGNIFICANT CHANGE UP (ref 130–400)
POTASSIUM SERPL-MCNC: 3.8 MMOL/L — SIGNIFICANT CHANGE UP (ref 3.5–5)
POTASSIUM SERPL-SCNC: 3.8 MMOL/L — SIGNIFICANT CHANGE UP (ref 3.5–5)
PROT SERPL-MCNC: 5.7 G/DL — LOW (ref 6–8)
RBC # BLD: 3.84 M/UL — LOW (ref 4.7–6.1)
RBC # FLD: 10.9 % — LOW (ref 11.5–14.5)
SODIUM SERPL-SCNC: 138 MMOL/L — SIGNIFICANT CHANGE UP (ref 135–146)
WBC # BLD: 6.45 K/UL — SIGNIFICANT CHANGE UP (ref 4.8–10.8)
WBC # FLD AUTO: 6.45 K/UL — SIGNIFICANT CHANGE UP (ref 4.8–10.8)

## 2020-10-20 PROCEDURE — 99232 SBSQ HOSP IP/OBS MODERATE 35: CPT

## 2020-10-20 PROCEDURE — 93010 ELECTROCARDIOGRAM REPORT: CPT

## 2020-10-20 PROCEDURE — 71045 X-RAY EXAM CHEST 1 VIEW: CPT | Mod: 26

## 2020-10-20 PROCEDURE — 99239 HOSP IP/OBS DSCHRG MGMT >30: CPT

## 2020-10-20 RX ORDER — ATORVASTATIN CALCIUM 80 MG/1
80 TABLET, FILM COATED ORAL AT BEDTIME
Refills: 0 | Status: DISCONTINUED | OUTPATIENT
Start: 2020-10-20 | End: 2020-10-30

## 2020-10-20 RX ORDER — FLUDROCORTISONE ACETATE 0.1 MG/1
0.2 TABLET ORAL
Qty: 0 | Refills: 0 | DISCHARGE
Start: 2020-10-20

## 2020-10-20 RX ORDER — CHLORPROMAZINE HCL 10 MG
1 TABLET ORAL
Qty: 0 | Refills: 0 | DISCHARGE
Start: 2020-10-20

## 2020-10-20 RX ORDER — SODIUM CHLORIDE 9 MG/ML
1000 INJECTION INTRAMUSCULAR; INTRAVENOUS; SUBCUTANEOUS
Refills: 0 | Status: DISCONTINUED | OUTPATIENT
Start: 2020-10-20 | End: 2020-10-21

## 2020-10-20 RX ORDER — FLUDROCORTISONE ACETATE 0.1 MG/1
2 TABLET ORAL
Qty: 0 | Refills: 0 | DISCHARGE
Start: 2020-10-20

## 2020-10-20 RX ORDER — BACLOFEN 100 %
5 POWDER (GRAM) MISCELLANEOUS ONCE
Refills: 0 | Status: COMPLETED | OUTPATIENT
Start: 2020-10-20 | End: 2020-10-20

## 2020-10-20 RX ORDER — MIDODRINE HYDROCHLORIDE 2.5 MG/1
5 TABLET ORAL
Refills: 0 | Status: DISCONTINUED | OUTPATIENT
Start: 2020-10-20 | End: 2020-10-30

## 2020-10-20 RX ORDER — MIDODRINE HYDROCHLORIDE 2.5 MG/1
5 TABLET ORAL
Refills: 0 | Status: DISCONTINUED | OUTPATIENT
Start: 2020-10-20 | End: 2020-10-20

## 2020-10-20 RX ORDER — ATORVASTATIN CALCIUM 80 MG/1
80 TABLET, FILM COATED ORAL AT BEDTIME
Refills: 0 | Status: DISCONTINUED | OUTPATIENT
Start: 2020-10-20 | End: 2020-10-20

## 2020-10-20 RX ORDER — FLUDROCORTISONE ACETATE 0.1 MG/1
0.2 TABLET ORAL
Refills: 0 | Status: DISCONTINUED | OUTPATIENT
Start: 2020-10-21 | End: 2020-10-30

## 2020-10-20 RX ORDER — CHLORPROMAZINE HCL 10 MG
10 TABLET ORAL EVERY 6 HOURS
Refills: 0 | Status: DISCONTINUED | OUTPATIENT
Start: 2020-10-20 | End: 2020-10-20

## 2020-10-20 RX ADMIN — SODIUM CHLORIDE 50 MILLILITER(S): 9 INJECTION INTRAMUSCULAR; INTRAVENOUS; SUBCUTANEOUS at 00:48

## 2020-10-20 RX ADMIN — Medication 500000 UNIT(S): at 23:05

## 2020-10-20 RX ADMIN — SODIUM CHLORIDE 50 MILLILITER(S): 9 INJECTION INTRAMUSCULAR; INTRAVENOUS; SUBCUTANEOUS at 13:11

## 2020-10-20 RX ADMIN — MIDODRINE HYDROCHLORIDE 5 MILLIGRAM(S): 2.5 TABLET ORAL at 06:15

## 2020-10-20 RX ADMIN — FLUDROCORTISONE ACETATE 0.2 MILLIGRAM(S): 0.1 TABLET ORAL at 06:15

## 2020-10-20 RX ADMIN — CHLORHEXIDINE GLUCONATE 1 APPLICATION(S): 213 SOLUTION TOPICAL at 06:17

## 2020-10-20 RX ADMIN — CLOPIDOGREL BISULFATE 75 MILLIGRAM(S): 75 TABLET, FILM COATED ORAL at 12:40

## 2020-10-20 RX ADMIN — ATORVASTATIN CALCIUM 80 MILLIGRAM(S): 80 TABLET, FILM COATED ORAL at 21:44

## 2020-10-20 RX ADMIN — Medication 500000 UNIT(S): at 12:40

## 2020-10-20 RX ADMIN — SODIUM CHLORIDE 50 MILLILITER(S): 9 INJECTION INTRAMUSCULAR; INTRAVENOUS; SUBCUTANEOUS at 19:21

## 2020-10-20 RX ADMIN — Medication 500000 UNIT(S): at 00:48

## 2020-10-20 RX ADMIN — Medication 81 MILLIGRAM(S): at 12:40

## 2020-10-20 RX ADMIN — MIDODRINE HYDROCHLORIDE 5 MILLIGRAM(S): 2.5 TABLET ORAL at 12:40

## 2020-10-20 RX ADMIN — Medication 500000 UNIT(S): at 06:15

## 2020-10-20 RX ADMIN — ENOXAPARIN SODIUM 40 MILLIGRAM(S): 100 INJECTION SUBCUTANEOUS at 21:43

## 2020-10-20 RX ADMIN — PANTOPRAZOLE SODIUM 40 MILLIGRAM(S): 20 TABLET, DELAYED RELEASE ORAL at 06:16

## 2020-10-20 RX ADMIN — Medication 5 MILLIGRAM(S): at 21:42

## 2020-10-20 NOTE — PROGRESS NOTE ADULT - ATTENDING COMMENTS
Patient seen and examined independently earlier today. Case discussed with housestaff, nursing, case mgmt, neuro Dr. Gonzalez. I agree with most of the resident's note, physical exam, and plan except as below. Patient without specific complaints. Denies CP, SOB, AP. Remainder ROS unremarkable. Cont ASA, Lipitor, added Plavix this admission. ILR w/out afib. Neuro recommends to repeat TTE and cont PT. Needs S&S f/u. NGT feeds for now. High risk pt.    Gen: NAD, AA0x3, +NGT, +dysarthria  HEENT: EOMI, no LAD  CV: nl S1 S2  Resp: decreased BS b/l  GI: NT/ND/S +BS  MS: no c/c/e, +pulses  Neuro: RUE 1-/5, RLE 0/5, +reflexes    tele - no events    #Progress Note Handoff  Pending (specify):  Consults____Clinical improvement and stability__x__Tests_____PT___x_  Pt/Family discussion: Pt informed and agrees with the current plan  Disposition: Home______SNF_______4A______To be determined__x__    My note supersedes the residents note should a discrepancy arise.
Patient was discussed and examined at bedside on 10/20/2020
Seen and examined with the pulmonary fellow at the bed side.  Impression and plan as outlined above.
Patient seen and examined with QUINCY Edwards and agree with above except as noted.  Patients history, notes, labs, imaging, vitals and meds reviewed by me personally.    Plan as above
Examined at bedside on 10/15/2020  Interogation data of event monitoring pending  continue dual antiplatelets
Patient seen and examined and agree with above except as noted.  Patients history, notes, labs, imaging, vitals and meds reviewed personally.  No new complaints still unable to swallow and has difficulty with secretions.  No movement RUE and slight proximal movement in RLE, speech moderately dysarthric.  Some difficulty answering simple questions struggling to find words    Plan as above

## 2020-10-20 NOTE — PROGRESS NOTE ADULT - NSHPATTENDINGPLANDISCUSS_GEN_ALL_CORE
Housestaff, nursing, case mgmt, neuro Dr. Gonzalez
Housestaff, nursing, case mgmt, neuro Dr. Gonzalez, spouse
Housestaff, nursing, case mgmt, neuro Dr. Hall
Housestaff, nursing, case mgmt, neuro Dr. Hall
Medical team and patient's son at bedside
ICU team
Medical team in 3E
Dr. Montiel

## 2020-10-20 NOTE — CHART NOTE - NSCHARTNOTEFT_GEN_A_CORE
Registered Dietitian Follow-Up    ***Scroll to the bottom for RD recommendation***    Patient Profile Reviewed                           Yes [x]   No []  Nutrition History Previously Obtained        Yes [x]  No []          PERTINENT SUBJECTIVE INFORMATION:  - spoken with RN yesterday and did not report any feeding concern  - pt is working with PT at the bedside, following commands well  - still has NG tube, dysphagia. SLP follow-up. Spoken with SLP, likely PEG.   - Last RD recs not taken x1.        PERTINENT MEDICAL INFORMATIONS:  (1) Recurrent L MCA CVA, c/w meds, neuro follow  (2) Dysphagia - might need PEG. SPEECH to follow  (3) HLD on statin.  (4) f/u PT.  Possible 4A          DIET ORDER:   OSMOLITE 1.0 at 360ml q6hr (NGT)  = 1500 kcal/ 63g protein        ANTHROPOMETRICS:  - Ht.  188cm  - Wt.   (10/14): 85.8kg - no new weight  - BMI. 24.3         PERTINENT LAB DATA: 10/20: h/h 12.5/35.8, Glucose 143, Albumin 3.2  PERTINENT MEDS:  aspirin, clopidogrel, enoxaparin, atorvastatin, protonix,       PHYSICAL FINDINGS  - APPEARANCE:         seems alert and oriented, no edema noted  - GI FUNCTION:        LBM 10/19  - TUBES:                     NG  - ORAL/MOUTH:      Dysphagia, NPO  - SKIN:                        Skin intact        NUTRITION REQUIREMENTS  WEIGHT USED:                          ABW 85.8kg  ESTIMATED ENERGY NEEDS:       CONTINUE [x  ]      ADJUST [  ] - from 10/16 note    ESTIMATED ENERGY NEEDS:         6488-2062 kcal/day (MSJ x 1.2-1.3)  ESTIMATED PROTEIN NEEDS:        86-95 g/day (1.0-1.1 g/kg of ABW)  ESTIMATED FLUID NEEDS:             1ml/kcal    CURRENT NUTRIENT NEEDS:    1500 kcal/ 63g protein  = not meeting since admission          [ x ] PREVIOUS NUTRITION DIAGNOSIS:   (1) inadequate protein-energy intake            [ x ] ONGOING        [  ] RESOLVED        PATIENT INTERVENTION:    [  ] ORAL        [x ] EN/TF     GOAL/EXPECTED OUTCOME:     pt to meet and tolerate >85% of estimated kcal/protein via TF upon f/u in 3 days. Pt to have 1BM/day.  INDICATOR/MONITORING:       RD to monitor diet order, energy intake, body composition, nutrition focused physical findings (s/s, BM, EN tolerance)  NUTRITION INTERVENTION:       Enteral nutrition        RECS: (1) Same recs as previously, please activate either previous or current new diet order I sent. (2) Which is --> Please change current feeding to JEVITY 1.2 at 280ml q4hr. This regimen gives a total of 1995 kcal/ 92g protein/ 1360mL free water, additional flushes per LIP or suggestive of at least 55ml each pre and post flushes.

## 2020-10-20 NOTE — PROGRESS NOTE ADULT - SUBJECTIVE AND OBJECTIVE BOX
Stroke Progress Note:    NYDIA VALVERDE    1. 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 He had been following up with Dr. Bhatt and Dr. Conner since his CVA,  last seen one week ago with significant neurological improvement. He had mild dysarthria and RUE tremors but no drifting noted at that time. Patient was eating a sandwich around 9 pm and started having worsening dysarthria and weakness so his wife brought him to the ED. On presentation NIHSS ~ 20, stroke code/NI alert was called NIH alert called, but due last admission and use of TPA, he was not a candidate for tpa on this admission.  CTH showed Wedge-shaped area of hypoattenuation within the left frontal and parietal lobes consistent with prior left MCA territory infarct. CT perfusion showed findings consistent with likely occlusions of the left middle cerebral artery M2 and M3 branches. Corresponding large area of ischemia within the left MCA territory measuring 113 cc, with core infarct of 12 cc.  Pt now moving RLE, able to say few words.  Patient was admitted for ICU monitoring.     T(C): --  HR: 80 (10-13-20 @ 23:08) (68 - 109)  BP: 142/71 (10-13-20 @ 23:08) (123/77 - 151/53)  RR: 18 (10-13-20 @ 23:08) (18 - 20)  SpO2: 97% (10-13-20 @ 23:08) (96% - 100%)        (13 Oct 2020 23:23)      2. Relevant PMH:   Prior ischemic stroke/TIA[ ], Afib [ ], CAD [ ], HTN [ ], DLD [ ], DM [ ], PVD [ ], Obesity [ ],   Sedentary lifestyle [ ], CHF [ ], ENMANUEL [ ], Cancer Hx [ ].    3. Social History: Smoking [ ], Drug Use [ ], Alcohol Use [ ], Other [ ]    4. Possible Location of Stroke:    5. Relevant Brain Tissue Imagin. Relevant Cerebrovascular Imaging:         CT Perfusion w/ Maps w/ IV Cont:   EXAM:  CT PERFUSION W MAPS IC            PROCEDURE DATE:  10/13/2020            INTERPRETATION:  Clinical History / Reason for exam: Stroke code. Right-sided facial droop and weakness.    Technique: CT perfusion of the brain was performed along with CTA of the head and neck after the intravenous administration of 100 cc Optiray 350 contrast. Sagittal, coronal and axial reformatted images were obtained as well as 3-D MIP and volume rendered images.    Comparison: Noncontrast CT head performed earlier the same day and CT perfusion 2020.    Findings:    CTA neck:  The aortic arch, origin of the great vessels and subclavian arteries are unremarkable.    The bilateral common carotid arteries and internal carotid arteries are unremarkable without significant stenosis seen.    The vertebral arteries are codominant. No significant vertebral artery stenosis is noted.    CTA head:  The distal segments of the internal carotid arteries are unremarkable. There are multifocal occlusions of the left MCA branches: superior M2 and M3 branches (series 4, image [id number]). The right ADEOLA and MCA are patent. The left ADEOLA is patent.    The distal vertebral arteries, basilar artery and posterior cerebral arteries are patent.    CT perfusion:  The cerebral blood volume and time to peak images demonstrate increased increased time to peak in the left MCA territory measuring 113 cc. There is a decrease in total cerebral blood flow volume of 12 cc in the left MCA territory. There is a mismatch volume of101 cc suggesting ischemic penumbra.    Other:  Aeration of wedge-shaped area of hypoattenuation within the left MCA territory, consistent with prior infarct. Multilevel degenerative changes of the cervical spine. Trace retrolisthesis of C2 on C3. Mucosal thickening of bilateral ethmoid sinuses.    IMPRESSION:    1- Occlusion of the left superior division of the MCA  (602/93-88)    2-occlusion of the left M3 branch of the MCA, leading to the area of chronic infarct    The above were added to the preliminary report and discussed with Dr. Helms    The follwoing is the preliminary report:    Findings consistent with likely occlusions of the left middle cerebral artery M2 and M3 branches. Corresponding large area of ischemia within the left MCA territory measuring 113 cc, with core infarct of 12 cc (mismatch volume/penumbra of 101 cc).    Dr. Torey Nicolas discussed preliminary findings with YORDAN WOLF on 10/13/2020 11:20 PM with readback.        TOREY NICOLAS M.D., RESIDENT RADIOLOGIST  This document has been electronically signed.  CALVIN BARTH M.D., ATTENDING RADIOLOGIST  This document has been electronically signed. Oct 13 2020 11:47PM (10-13-20 @ 22:50)         7. Relevant blood tests: Direct LDL: 62 mg/dL [4 - 129] (10-14-20 @ 04:37)  Direct LDL: 65 mg/dL [4 - 129] (10-13-20 @ 22:04)       8. Relevant cardiac rhythm monitorin. Relevant Cardiac Structure: (TTE/JANET +/-):[ ]No intracardiac thrombus/[ ] no vegetation/[ ]no [place]/EF:    Home Medications:  aspirin 81 mg oral tablet, chewable: 1 tab(s) orally once a day (19 Oct 2020 12:44)  atorvastatin 80 mg oral tablet: 1 tab(s) orally once a day (at bedtime) (19 Oct 2020 12:44)  clopidogrel 75 mg oral tablet: 1 tab(s) orally once a day (19 Oct 2020 12:44)  enoxaparin: 40 milligram(s) subcutaneous once a day (19 Oct 2020 12:44)  fludrocortisone 0.1 mg oral tablet: 2 tab(s) orally once a day (20 Oct 2020 10:33)  midodrine 5 mg oral tablet: 1 tab(s) orally 3 times a day (19 Oct 2020 12:44)  nystatin 100,000 units/mL oral suspension: 5 milliliter(s) orally 4 times a day (19 Oct 2020 12:44)  pantoprazole 40 mg oral granule, delayed release: 1 tab(s) orally once a day (19 Oct 2020 12:44)      MEDICATIONS  (STANDING):  aspirin  chewable 81 milliGRAM(s) Oral daily  atorvastatin 80 milliGRAM(s) Oral at bedtime  chlorhexidine 4% Liquid 1 Application(s) Topical <User Schedule>  clopidogrel Tablet 75 milliGRAM(s) Oral daily  enoxaparin Injectable 40 milliGRAM(s) SubCutaneous at bedtime  fludroCORTISONE 0.2 milliGRAM(s) Oral daily  midodrine. 5 milliGRAM(s) Oral three times a day  nystatin    Suspension 651060 Unit(s) Oral four times a day  pantoprazole   Suspension 40 milliGRAM(s) Oral daily  sodium chloride 0.9%. 1000 milliLiter(s) (50 mL/Hr) IV Continuous <Continuous>      10. PT/OT/Speech/Rehab/S&Sw/ Cognitive eval results and recommendations:    11. Exam:    Vital Signs Last 24 Hrs  T(C): 36.3 (20 Oct 2020 05:11), Max: 36.9 (20 Oct 2020 01:47)  T(F): 97.4 (20 Oct 2020 05:11), Max: 98.4 (20 Oct 2020 01:47)  HR: 65 (20 Oct 2020 05:46) (64 - 71)  BP: 132/65 (20 Oct 2020 05:46) (94/54 - 137/80)  BP(mean): --  RR: 18 (20 Oct 2020 05:46) (18 - 18)  SpO2: 96% (20 Oct 2020 05:46) (94% - 98%)    12.   NIH STROKE SCALE  Item	                                                        Score  1 a.	Level of Consciousness	               	0  1 b. LOC Questions	                                2  1 c.	LOC Commands	                               	0  2.	Best Gaze	                                        0  3.	Visual	                                                1  4.	Facial Palsy	                                        0  5 a.	Motor Arm - Left	                                0  5 b.	Motor Arm - Right	                        4  6 a.	Motor Leg - Left	                                0  6 b.	Motor Leg - Right	                                4  7.	Limb Ataxia	                                        0  8.	Sensory	                                                0  9.	Language	                                        1  10.	Dysarthria	                                        2  11.	Extinction and Inattention  	        0  ______________________________________  TOTAL	                                                        0    Total NIHSS on admission:      NIHSS yesterday:      NIHSS today: 14    mRS:  0 No symptoms at all  1 No significant disability despite symptoms; able to carry out all usual duties and activities without assistance  2 Slight disability; unable to carry out all previous activities, but able to look after own affairs  3 Moderate disability; requiring some help, but able to walk without assistance  4 Moderately severe disability; unable to walk without assistance and unable to attend to own bodily needs without assistance  5 Severe disability; bedridden, incontinent and requiring constant nursing care and attention  6 Dead      13. Impression: 70 yo M who had a L MCA stroke about 6 weeks ago and was treated here and underwent L MCA thrombectomy and IV tPA. He presented 10/13 due to recurrent L MCA syndrome with an initial NIHSS of 23. He was not a tPA candidate given his recent large stroke. His discharge NIHSS from the last hospital stay was 1 for mild aphasia only. Recent stroke work up including a negative JANET and ILR placement. He is currently being monitored in stroke unit with persistent right sided weakness and severe expressive aphasia with NIHSS of 15. Repeat cth completed 10/14 revealed left basal ganglia acute infarct and  stable subacute infarcts in the left frontal and parietal lobes and posterior insular cortex. No acute overnight events, MRI brain was not completed since patient has excessive oral secretions and is unable to lay flat owing to aspiration risk.      15. Suggestions:   -If BP is stable, may go to acute rehab  -Continue Aspirin and Plavix,   -Continue Lipitor 80 mg q 24  -Follow up pending 2D echo  -Maintain Strict aspirations precautions          Plan discussed with neuroattending and medical team

## 2020-10-20 NOTE — DISCHARGE NOTE NURSING/CASE MANAGEMENT/SOCIAL WORK - NSDCPEPTSTRK_GEN_ALL_CORE
Stroke support groups for patients, families, and friends/Stroke warning signs and symptoms/Prescribed medications/Risk factors for stroke/Call 911 for stroke/Stroke education booklet/Signs and symptoms of stroke/Need for follow up after discharge

## 2020-10-20 NOTE — DISCHARGE NOTE NURSING/CASE MANAGEMENT/SOCIAL WORK - PATIENT PORTAL LINK FT
You can access the FollowMyHealth Patient Portal offered by [facility] by registering at the following website: [email or web address]. By joining [facility]’s FollowMyHealth portal, you will also be able to view your health information using other applications (apps) compatible with our system.

## 2020-10-20 NOTE — PROGRESS NOTE ADULT - SUBJECTIVE AND OBJECTIVE BOX
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 He had been following up with Dr. Bhatt and Dr. Conner since his CVA,  last seen one week ago with significant neurological improvement. He had mild dysarthria and RUE tremors but no drifting noted at that time. Patient was eating a sandwich around 9 pm and started having worsening dysarthria and weakness so his wife brought him to the ED. On presentation NIHSS ~ 20, stroke code/NI alert was called NIH alert called, but due last admission and use of TPA, he was not a candidate for tpa on this admission.  CTH showed Wedge-shaped area of hypoattenuation within the left frontal and parietal lobes consistent with prior left MCA territory infarct. CT perfusion showed findings consistent with likely occlusions of the left middle cerebral artery M2 and M3 branches. Corresponding large area of ischemia within the left MCA territory measuring 113 cc, with core infarct of 12 cc.  Pt now moving RLE, able to say few words.  Patient was admitted for ICU monitoring.     Currently admitted to medicine with the primary diagnosis of CVA (cerebral vascular accident)       INTERVAL HPI / OVERNIGHT EVENTS:  Patient was examined and seen at bedside. This morning he is resting comfortably in bed and reports no new issues or overnight events. No complaints. Denies CP, SOB, AP, new weakness. Decreased oral secretions as per nursing.    ROS: remainder ROS unremarkable    PAST MEDICAL & SURGICAL HISTORY  Cerebrovascular accident (CVA)    GERD (gastroesophageal reflux disease)    S/P tonsillectomy    H/O bilateral hip replacements    H/O skin graft  upper body  legs      ALLERGIES  amoxicillin (Rash)      PHYSICAL EXAM  GEN: NAD, NAD, awake, alert, +NGT  PULM: Decreased BS bilaterally,   CVS: Regular rate and rhythm, S1-S2, no murmurs  ABD: Soft, non-tender, non-distended, no guarding  EXT: No edema  NEURO: mod-sev aphasia,  loss of sensation on Rt, RUE 0/5, RLE 1+/5 prox and 1-/5 distally.     tele - no events            MEDICATIONS  (STANDING):  aspirin  chewable 81 milliGRAM(s) Oral daily  atorvastatin 80 milliGRAM(s) Oral at bedtime  chlorhexidine 4% Liquid 1 Application(s) Topical <User Schedule>  clopidogrel Tablet 75 milliGRAM(s) Oral daily  enoxaparin Injectable 40 milliGRAM(s) SubCutaneous at bedtime  fludroCORTISONE 0.2 milliGRAM(s) Oral daily  midodrine. 5 milliGRAM(s) Oral three times a day  nystatin    Suspension 034518 Unit(s) Oral four times a day  pantoprazole   Suspension 40 milliGRAM(s) Oral daily  sodium chloride 0.9%. 1000 milliLiter(s) (50 mL/Hr) IV Continuous <Continuous>    MEDICATIONS  (PRN):    Home Medications:  aspirin 81 mg oral tablet, chewable: 1 tab(s) orally once a day (19 Oct 2020 12:44)  atorvastatin 80 mg oral tablet: 1 tab(s) orally once a day (at bedtime) (19 Oct 2020 12:44)  clopidogrel 75 mg oral tablet: 1 tab(s) orally once a day (19 Oct 2020 12:44)  enoxaparin: 40 milligram(s) subcutaneous once a day (19 Oct 2020 12:44)  fludrocortisone 0.1 mg oral tablet: 2 tab(s) orally once a day (20 Oct 2020 10:33)  midodrine 5 mg oral tablet: 1 tab(s) orally 3 times a day (19 Oct 2020 12:44)  nystatin 100,000 units/mL oral suspension: 5 milliliter(s) orally 4 times a day (19 Oct 2020 12:44)  pantoprazole 40 mg oral granule, delayed release: 1 tab(s) orally once a day (19 Oct 2020 12:44)    Vital Signs Last 24 Hrs  T(C): 36.3 (20 Oct 2020 05:11), Max: 36.9 (20 Oct 2020 01:47)  T(F): 97.4 (20 Oct 2020 05:11), Max: 98.4 (20 Oct 2020 01:47)  HR: 65 (20 Oct 2020 05:46) (64 - 71)  BP: 132/65 (20 Oct 2020 05:46) (94/54 - 137/80)  BP(mean): --  RR: 18 (20 Oct 2020 05:46) (18 - 18)  SpO2: 96% (20 Oct 2020 05:46) (94% - 98%)  CAPILLARY BLOOD GLUCOSE        LABS:                        12.5   6.45  )-----------( 197      ( 20 Oct 2020 06:44 )             35.8     10-20    138  |  103  |  10  ----------------------------<  143<H>  3.8   |  28  |  0.9    Ca    8.7      20 Oct 2020 06:44  Mg     1.8     10-20    TPro  5.7<L>  /  Alb  3.2<L>  /  TBili  0.5  /  DBili  x   /  AST  20  /  ALT  13  /  AlkPhos  61  10-20    LIVER FUNCTIONS - ( 20 Oct 2020 06:44 )  Alb: 3.2 g/dL / Pro: 5.7 g/dL / ALK PHOS: 61 U/L / ALT: 13 U/L / AST: 20 U/L / GGT: x                           Consultant Notes Reviewed:  [x ] YES  [ ] NO  Care Discussed with Consultants/Other Providers/ Housestaff [ x] YES  [ ] NO  Radiology, labs, new studies personally reviewed.                    ASSESSMENT & PLAN    70 yo M who had a L MCA stroke about 6 weeks ago and was treated here and underwent L MCA thrombectomy and IV tPA. He presented on 10/13 due to recurrent L MCA syndrome with an initial NIHSS of 23. He was not a tPA candidate given his recent large stroke.  Patient is s/p loading with Plavix 300mg on 10/14. Patient downgraded to acute stroke  from ICU.    #Recurrent L MCA CVA  #L basal ganglia acute infarct  - Continue aspirin, plavix (new), lipitor  - Neuro checks Q4H  - JANET showed no thrombus on September 2  - S/p interrogation of loop recorder with no events  - Neurology recommends MRI brain at some point but not urgent. Would wait until pt is capable of better handling the secretions.  - neuro cleared pt for 4A  -BP better on midodrine fludrocortisone    #Dysphagia  -might need PEG  -having difficult time managing his secretions, suction PRN  -would avoid scopolamine due to anticholinergic effect i.e confusion  -S&S f/u    #Hyperlipidemia  -statin    #Misc  Diet: NPO with tube feeds  GI PPx: Protonix  DVT PPx: Lovenox  Activity: , F/u PT  Dispo: 4A

## 2020-10-21 ENCOUNTER — NON-APPOINTMENT (OUTPATIENT)
Age: 70
End: 2020-10-21

## 2020-10-21 LAB
ALBUMIN SERPL ELPH-MCNC: 3.2 G/DL — LOW (ref 3.5–5.2)
ALP SERPL-CCNC: 63 U/L — SIGNIFICANT CHANGE UP (ref 30–115)
ALT FLD-CCNC: 18 U/L — SIGNIFICANT CHANGE UP (ref 0–41)
ANION GAP SERPL CALC-SCNC: 8 MMOL/L — SIGNIFICANT CHANGE UP (ref 7–14)
APPEARANCE UR: ABNORMAL
AST SERPL-CCNC: 25 U/L — SIGNIFICANT CHANGE UP (ref 0–41)
BACTERIA # UR AUTO: ABNORMAL
BASOPHILS # BLD AUTO: 0.03 K/UL — SIGNIFICANT CHANGE UP (ref 0–0.2)
BASOPHILS NFR BLD AUTO: 0.5 % — SIGNIFICANT CHANGE UP (ref 0–1)
BILIRUB SERPL-MCNC: 0.4 MG/DL — SIGNIFICANT CHANGE UP (ref 0.2–1.2)
BILIRUB UR-MCNC: NEGATIVE — SIGNIFICANT CHANGE UP
BUN SERPL-MCNC: 11 MG/DL — SIGNIFICANT CHANGE UP (ref 10–20)
CALCIUM SERPL-MCNC: 8.5 MG/DL — SIGNIFICANT CHANGE UP (ref 8.5–10.1)
CHLORIDE SERPL-SCNC: 102 MMOL/L — SIGNIFICANT CHANGE UP (ref 98–110)
CO2 SERPL-SCNC: 27 MMOL/L — SIGNIFICANT CHANGE UP (ref 17–32)
COLOR SPEC: YELLOW — SIGNIFICANT CHANGE UP
CREAT SERPL-MCNC: 0.8 MG/DL — SIGNIFICANT CHANGE UP (ref 0.7–1.5)
DIFF PNL FLD: NEGATIVE — SIGNIFICANT CHANGE UP
EOSINOPHIL # BLD AUTO: 0.36 K/UL — SIGNIFICANT CHANGE UP (ref 0–0.7)
EOSINOPHIL NFR BLD AUTO: 5.6 % — SIGNIFICANT CHANGE UP (ref 0–8)
EPI CELLS # UR: 0 /HPF — SIGNIFICANT CHANGE UP (ref 0–5)
GLUCOSE SERPL-MCNC: 130 MG/DL — HIGH (ref 70–99)
GLUCOSE UR QL: NEGATIVE — SIGNIFICANT CHANGE UP
HCT VFR BLD CALC: 37.2 % — LOW (ref 42–52)
HGB BLD-MCNC: 12.7 G/DL — LOW (ref 14–18)
HYALINE CASTS # UR AUTO: 12 /LPF — HIGH (ref 0–7)
IMM GRANULOCYTES NFR BLD AUTO: 0.2 % — SIGNIFICANT CHANGE UP (ref 0.1–0.3)
KETONES UR-MCNC: NEGATIVE — SIGNIFICANT CHANGE UP
LEUKOCYTE ESTERASE UR-ACNC: ABNORMAL
LYMPHOCYTES # BLD AUTO: 1.14 K/UL — LOW (ref 1.2–3.4)
LYMPHOCYTES # BLD AUTO: 17.6 % — LOW (ref 20.5–51.1)
MAGNESIUM SERPL-MCNC: 1.9 MG/DL — SIGNIFICANT CHANGE UP (ref 1.8–2.4)
MCHC RBC-ENTMCNC: 31.9 PG — HIGH (ref 27–31)
MCHC RBC-ENTMCNC: 34.1 G/DL — SIGNIFICANT CHANGE UP (ref 32–37)
MCV RBC AUTO: 93.5 FL — SIGNIFICANT CHANGE UP (ref 80–94)
MONOCYTES # BLD AUTO: 0.8 K/UL — HIGH (ref 0.1–0.6)
MONOCYTES NFR BLD AUTO: 12.4 % — HIGH (ref 1.7–9.3)
NEUTROPHILS # BLD AUTO: 4.12 K/UL — SIGNIFICANT CHANGE UP (ref 1.4–6.5)
NEUTROPHILS NFR BLD AUTO: 63.7 % — SIGNIFICANT CHANGE UP (ref 42.2–75.2)
NITRITE UR-MCNC: NEGATIVE — SIGNIFICANT CHANGE UP
NRBC # BLD: 0 /100 WBCS — SIGNIFICANT CHANGE UP (ref 0–0)
PH UR: 7 — SIGNIFICANT CHANGE UP (ref 5–8)
PLATELET # BLD AUTO: 215 K/UL — SIGNIFICANT CHANGE UP (ref 130–400)
POTASSIUM SERPL-MCNC: 3.8 MMOL/L — SIGNIFICANT CHANGE UP (ref 3.5–5)
POTASSIUM SERPL-SCNC: 3.8 MMOL/L — SIGNIFICANT CHANGE UP (ref 3.5–5)
PROT SERPL-MCNC: 5.4 G/DL — LOW (ref 6–8)
PROT UR-MCNC: NEGATIVE — SIGNIFICANT CHANGE UP
RBC # BLD: 3.98 M/UL — LOW (ref 4.7–6.1)
RBC # FLD: 10.8 % — LOW (ref 11.5–14.5)
RBC CASTS # UR COMP ASSIST: 5 /HPF — HIGH (ref 0–4)
SODIUM SERPL-SCNC: 137 MMOL/L — SIGNIFICANT CHANGE UP (ref 135–146)
SP GR SPEC: 1.01 — SIGNIFICANT CHANGE UP (ref 1.01–1.03)
UROBILINOGEN FLD QL: ABNORMAL
WBC # BLD: 6.46 K/UL — SIGNIFICANT CHANGE UP (ref 4.8–10.8)
WBC # FLD AUTO: 6.46 K/UL — SIGNIFICANT CHANGE UP (ref 4.8–10.8)
WBC UR QL: 172 /HPF — HIGH (ref 0–5)

## 2020-10-21 RX ORDER — CHLORPROMAZINE HCL 10 MG
10 TABLET ORAL EVERY 6 HOURS
Refills: 0 | Status: DISCONTINUED | OUTPATIENT
Start: 2020-10-21 | End: 2020-10-30

## 2020-10-21 RX ADMIN — FLUDROCORTISONE ACETATE 0.2 MILLIGRAM(S): 0.1 TABLET ORAL at 06:27

## 2020-10-21 RX ADMIN — Medication 500000 UNIT(S): at 23:18

## 2020-10-21 RX ADMIN — Medication 10 MILLIGRAM(S): at 11:43

## 2020-10-21 RX ADMIN — CLOPIDOGREL BISULFATE 75 MILLIGRAM(S): 75 TABLET, FILM COATED ORAL at 11:43

## 2020-10-21 RX ADMIN — MIDODRINE HYDROCHLORIDE 5 MILLIGRAM(S): 2.5 TABLET ORAL at 11:43

## 2020-10-21 RX ADMIN — Medication 500000 UNIT(S): at 17:38

## 2020-10-21 RX ADMIN — PANTOPRAZOLE SODIUM 40 MILLIGRAM(S): 20 TABLET, DELAYED RELEASE ORAL at 11:43

## 2020-10-21 RX ADMIN — MIDODRINE HYDROCHLORIDE 5 MILLIGRAM(S): 2.5 TABLET ORAL at 17:38

## 2020-10-21 RX ADMIN — ATORVASTATIN CALCIUM 80 MILLIGRAM(S): 80 TABLET, FILM COATED ORAL at 22:37

## 2020-10-21 RX ADMIN — Medication 500000 UNIT(S): at 11:43

## 2020-10-21 RX ADMIN — Medication 500000 UNIT(S): at 06:26

## 2020-10-21 RX ADMIN — Medication 10 MILLIGRAM(S): at 17:38

## 2020-10-21 RX ADMIN — MIDODRINE HYDROCHLORIDE 5 MILLIGRAM(S): 2.5 TABLET ORAL at 06:27

## 2020-10-21 RX ADMIN — ENOXAPARIN SODIUM 40 MILLIGRAM(S): 100 INJECTION SUBCUTANEOUS at 22:37

## 2020-10-21 RX ADMIN — Medication 81 MILLIGRAM(S): at 11:43

## 2020-10-21 NOTE — PROGRESS NOTE ADULT - SUBJECTIVE AND OBJECTIVE BOX
Patient is a 69y old  Male who presents with a chief complaint of Left CVA (dominant side)    HPI:  This is a 69 year old male with PMHx of b/l hip replacements, recent mechanical thrombectomy of left ADEOLA/MCA stroke (2020) admitted to [facility] on 10/13/2020 after he developed new right sided facial droop, weakness, and dysarthria. In the ED, his NIHSS was 20 and stroke code was called. However, patient did not receive tPA because of recent stroke history. Initial CTH showed wedge shaped area hypoattenuation within the left frontal and parietal lobes consistent with prior Left MCA territory infarct. CT perfusion showed left MCA M2 and M3 occlusions. Repeat CTH on 10/14/2020 showed interval development of left basal ganglia hypoattenuation compatible with acute infarct. Patient was admitted to ICU for closer monitoring and downgraded to medicine on 10/15/2020. Patient's loop recorder was interrogated 10/14 and did not demonstrate any events. Patient failed bedside swallow evaluation and remains NPO with feedings through NGT requiring frequent suctioning to clean saline in oral cavity. He was started on midodrine 10mg TID for orthostatic hypotension. Patient received fluids and was then started on Florinef 0.2mg daily for continued hypotension. His BP improved the following day and denies lightheadedness or dizziness. Patient seen and examined by physiatry and deemed an appropriate candidate for acute rehabilitation consisting of 3 hours of therapy a day.     Of note during his first stroke on 2020, JANET was performed and did not demonstrate thrombus or cardiac abnormality to suggest cardiac origin for CVA.  (19 Oct 2020 14:10)    I examined the patient and reviewed the chart. There have been no significant changes since my history and physical except where documented below.    REVIEW OF SYSTEMS   HEENT: Hiccups, saliva secretions.   Pulm: occasional cough,  No shortness of breath  Cardio: No chest pain,  GI:    No diarrhea, No constipation  : No dysuria, No frequency  Neuro: Right sided weakness  MSK: Denies pain      PHYSICAL EXAM    Vital Signs Last 24 Hrs  T(C): 36.3 (21 Oct 2020 05:25), Max: 36.7 (20 Oct 2020 17:55)  T(F): 97.3 (21 Oct 2020 05:25), Max: 98 (20 Oct 2020 17:55)  HR: 71 (21 Oct 2020 05:25) (61 - 79)  BP: 130/68 (21 Oct 2020 05:25) (115/77 - 130/68) - improved BP  BP(mean): --  RR: 18 (20 Oct 2020 20:29) (18 - 18)  SpO2: 96% (20 Oct 2020 22:23) (94% - 96%)    General: NAD, Resting Comfortable,                                  HEENT: NC/AT, EOM I, PERRLA, Normal Conjunctivae.. NGT in right nostril  Cardio: RRR                              Pulm: No Respiratory Distress,  Lungs with bilateral rhonchi                       Abdomen: ND/NT, Soft                                              MSK: No joint swelling, decreased ROM in the right upper and lower extremity.                                         Ext: No C/C/E, No calf tenderness    Skin: intact                                                                   Neurological Examination:  Cognitive: [  x  ] AAO x 3   [    ]  other                                                                      Attention:  [ x   ] intact   [    ]  other                            Mood/Affect:  [   x ] wnl    [    ]  other                                                                             Communication:  [    ]Fluent,  No dysarthria,   [   x ] other dysarthria, apraxia   CN II - XII  [    ] intact   [  x  ] other decrease function in clearing saliva  Coordination:   [    ] FTN/HTS intact   [    x] other    normal on left, unable to assess on right due to hemiparesis                                                                   Sensory: [    ]Intact to light touch   [  x  ] other     decreased rt leg                                                                                    Tone:  [    ]  wnl,   [    x]  other decreased tone in RUE .     Motor    LEFT    UE: [ x  ] WNL.  [   ] other:  RIGHT UE:  [   ] WNL.  [ x  ] other: shoulder 1/5, elbow 0/5. wrist 0/5, fingers 0/5  LEFT    LE:  [  x ] WNL.  [   ] other:  RIGHT LE:  [   ] WNL.  [ x  ] other:  Hip 3-/5 extensor synergy, knee 0/5. ankle and great toe 0/5    Reflex:  [    ]  symmetric,  [  x  ]  other: Right bicep 3+otherwise reflexes normal        Meds  aspirin  chewable 81 milliGRAM(s) Enteral Tube daily  atorvastatin 80 milliGRAM(s) Oral at bedtime  chlorproMAZINE    Tablet 10 milliGRAM(s) Oral every 6 hours PRN  clopidogrel Tablet 75 milliGRAM(s) Oral daily  enoxaparin Injectable 40 milliGRAM(s) SubCutaneous at bedtime  fludroCORTISONE 0.2 milliGRAM(s) Oral <User Schedule>  midodrine. 5 milliGRAM(s) Oral <User Schedule>  nystatin    Suspension 806165 Unit(s) Oral four times a day  pantoprazole   Suspension 40 milliGRAM(s) Enteral Tube daily  sodium chloride 0.9%. 1000 milliLiter(s) IV Continuous <Continuous>      RECENT LABS/IMAGING - reviwed                        12.7   6.46  )-----------( 215      ( 21 Oct 2020 07:13 )             37.2     10-21    137  |  102  |  11  ----------------------------<  130<H>  3.8   |  27  |  0.8    Ca    8.5      21 Oct 2020 07:13  Mg     1.9     10-21    TPro  5.4<L>  /  Alb  3.2<L>  /  TBili  0.4  /  DBili  x   /  AST  25  /  ALT  18  /  AlkPhos  63  10-21      Urinalysis Basic - ( 21 Oct 2020 04:25 )    Color: Yellow / Appearance: Slightly Turbid / S.015 / pH: x  Gluc: x / Ketone: Negative  / Bili: Negative / Urobili: 3 mg/dL   Blood: x / Protein: Negative / Nitrite: Negative   Leuk Esterase: Large / RBC: 5 /HPF /  /HPF   Sq Epi: x / Non Sq Epi: 0 /HPF / Bacteria: Moderate        ASSESSMENT/PLAN:    Rehab of   Patient requires 3 hrs daily of interdisciplinary inptient rehab.         Patient is a 69y old  Male who presents with a chief complaint of Left CVA (dominant side)    HPI:  This is a 69 year old male with PMHx of b/l hip replacements, recent mechanical thrombectomy of left ADEOLA/MCA stroke (2020) admitted to [facility] on 10/13/2020 after he developed new right sided facial droop, weakness, and dysarthria. In the ED, his NIHSS was 20 and stroke code was called. However, patient did not receive tPA because of recent stroke history. Initial CTH showed wedge shaped area hypoattenuation within the left frontal and parietal lobes consistent with prior Left MCA territory infarct. CT perfusion showed left MCA M2 and M3 occlusions. Repeat CTH on 10/14/2020 showed interval development of left basal ganglia hypoattenuation compatible with acute infarct. Patient was admitted to ICU for closer monitoring and downgraded to medicine on 10/15/2020. Patient's loop recorder was interrogated 10/14 and did not demonstrate any events. Patient failed bedside swallow evaluation and remains NPO with feedings through NGT requiring frequent suctioning to clean saline in oral cavity. He was started on midodrine 10mg TID for orthostatic hypotension. Patient received fluids and was then started on Florinef 0.2mg daily for continued hypotension. His BP improved the following day and denies lightheadedness or dizziness. Patient seen and examined by physiatry and deemed an appropriate candidate for acute rehabilitation consisting of 3 hours of therapy a day.     Of note during his first stroke on 2020, JANET was performed and did not demonstrate thrombus or cardiac abnormality to suggest cardiac origin for CVA.  (19 Oct 2020 14:10). He reports only being left with residual dysarthria.    I examined the patient and reviewed the chart. There have been no significant changes since my history and physical except where documented below.    CLOF:  He requires mod assist to stand and max assist to transfer and take a few steps. He is NPO and having difficulty managing his own secretions. He has severe expressive aphasia/ apraxia and moderate receptive deficits    REVIEW OF SYSTEMS   HEENT: Hiccups, drooling saliva   Pulm: occasional cough,  No shortness of breath  Cardio: No chest pain,  GI:    No diarrhea, No constipation  : No dysuria, No frequency  Neuro: Right sided weakness  MSK: Denies pain      PHYSICAL EXAM    Vital Signs Last 24 Hrs  T(C): 36.3 (21 Oct 2020 05:25), Max: 36.7 (20 Oct 2020 17:55)  T(F): 97.3 (21 Oct 2020 05:25), Max: 98 (20 Oct 2020 17:55)  HR: 71 (21 Oct 2020 05:25) (61 - 79)  BP: 130/68 (21 Oct 2020 05:25) (115/77 - 130/68) - improved BP  BP(mean): --  RR: 18 (20 Oct 2020 20:29) (18 - 18)  SpO2: 96% (20 Oct 2020 22:23) (94% - 96%)    General: NAD, Resting Comfortable,                                  HEENT: NC/AT, EOM I, PERRLA, Normal Conjunctivae.. NGT in right nostril  Cardio: RRR                              Pulm: No Respiratory Distress,  Lungs with bilateral rhonchi                       Abdomen: ND/NT, Soft                                              MSK: No joint swelling, decreased ROM in the right upper and lower extremity.                                         Ext: No C/C/E, No calf tenderness    Skin: intact                                                                   Neurological Examination:  Cognitive: [  x  ] AAO x 3   [    ]  other                                                                      Attention:  [ x   ] intact   [    ]  other                            Mood/Affect:  [   x ] wnl    [    ]  other                                                                             Communication:  [    ]Fluent,  No dysarthria,   [   x ] other dysarthria, apraxia   CN II - XII  [    ] intact   [  x  ] other decrease function in clearing saliva  Coordination:   [    ] FTN/HTS intact   [    x] other    normal on left, unable to assess on right due to hemiparesis                                                                   Sensory: [    ]Intact to light touch   [  x  ] other     decreased rt leg                                                                                    Tone:  [    ]  wnl,   [    x]  other decreased tone in RUE .     Motor    LEFT    UE: [ x  ] WNL.  [   ] other:  RIGHT UE:  [   ] WNL.  [ x  ] other: shoulder 1/5, elbow 0/5. wrist 0/5, fingers 0/5  LEFT    LE:  [  x ] WNL.  [   ] other:  RIGHT LE:  [   ] WNL.  [ x  ] other:  Hip 2-/5 extensor synergy, knee 0/5. ankle and great toe 0/5    Reflex:  [    ]  symmetric,  [  x  ]  other: Right bicep 3+otherwise reflexes normal        Meds  aspirin  chewable 81 milliGRAM(s) Enteral Tube daily  atorvastatin 80 milliGRAM(s) Oral at bedtime  chlorproMAZINE    Tablet 10 milliGRAM(s) Oral every 6 hours PRN  clopidogrel Tablet 75 milliGRAM(s) Oral daily  enoxaparin Injectable 40 milliGRAM(s) SubCutaneous at bedtime  fludroCORTISONE 0.2 milliGRAM(s) Oral <User Schedule>  midodrine. 5 milliGRAM(s) Oral <User Schedule>  nystatin    Suspension 914623 Unit(s) Oral four times a day  pantoprazole   Suspension 40 milliGRAM(s) Enteral Tube daily  sodium chloride 0.9%. 1000 milliLiter(s) IV Continuous <Continuous>      RECENT LABS/IMAGING - reviwed                        12.7   6.46  )-----------( 215      ( 21 Oct 2020 07:13 )             37.2     10-21    137  |  102  |  11  ----------------------------<  130<H>  3.8   |  27  |  0.8    Ca    8.5      21 Oct 2020 07:13  Mg     1.9     10-21    TPro  5.4<L>  /  Alb  3.2<L>  /  TBili  0.4  /  DBili  x   /  AST  25  /  ALT  18  /  AlkPhos  63  10      Urinalysis Basic - ( 21 Oct 2020 04:25 )    Color: Yellow / Appearance: Slightly Turbid / S.015 / pH: x  Gluc: x / Ketone: Negative  / Bili: Negative / Urobili: 3 mg/dL   Blood: x / Protein: Negative / Nitrite: Negative   Leuk Esterase: Large / RBC: 5 /HPF /  /HPF   Sq Epi: x / Non Sq Epi: 0 /HPF / Bacteria: Moderate

## 2020-10-21 NOTE — PROGRESS NOTE ADULT - ASSESSMENT
Spoke with patient’s son at bedside and later with his wife and sister-in-law. Patient is  with 2 sons, 1 daughter, and 4 grandchildren, and he is a retired . Family reported that there were no cognitive concerns prior to the first stroke about two months ago and patient was previously independent in managing all IADLs. Son denied any history of problems with mood or substance use. However, currently, son reported observing sadness and frustration about significant impairment in speech and mobility. Patient has also been struggling with falling asleep and staying asleep. After the first stroke, son reported mild difficulties with word-finding and mild dysarthria, as well as some difficulties with math calculation. Patient also had difficulty moving his left leg and left arm. Currently, after patient’s second stroke, son reported difficulty moving his right leg and right arm. Family also reported significant problems and concerns with speech and difficulty swallowing and wanted a speech therapy follow-up.  Rehabilitation process and daily schedule discussed in terms of therapy, family education and discharge planning.

## 2020-10-21 NOTE — PROGRESS NOTE ADULT - ASSESSMENT
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 side)    # Severe dysphagia; aphasia, right hemiparesis    # severe aphasia/ dysarthria    - Continue aspirin, plavix, lipitor  - Maintain systolic blood pressure 120-160  - JANET showed no thrombus on September 2; TTE 10/18 unrevealing  - S/p interrogation of loop recorder with no events  - MRI not performed as patient is aspiration risk at this time due to inability to manage oral secretions  - PT/OT/ST/Neuropsych 3 hrs daily    #DLD  Atorvastatin 80 mg    #Orthostatic Hypotension - improved  - Midodrine 10mg TID, Florinef 0.2mg daily continue to monitor  - Calorie count, and monitor fluid intake    #Hiccups  - Thorazine PRN     -Pain control: Tylenol PRN    -GI/Bowel Mgmt -  Miralax PRN at night    -Bladder management: no issues     -Skin: No active issues at this time  Keep right arm elevated in bed    -FEN   - Diet -  Dysphagia - NPO with feeds via NGT. SLP F/U      Precautions / PROPHYLAXIS:      - Falls precautions  - Aspiration  precautions    - DVT prophylaxis: lovenox         ASSESSMENT/PLAN  This is a 69 year old male with hx b/l hip replacements, recent mechanical thrombectomy of left ADEOLA/MCA stroke (8/27/2020)   Rehab of acute MCAL CVA     Start 3 hours of acute rehabilitation daily    # Severe dysphagia - NPO. Difficulty managing secretions. Will likely require pEG placement    # severe aphasia/ dysarthria - ST    - Continue aspirin, plavix, lipitor  - Maintain systolic blood pressure 120-160  - JANET showed no thrombus on September 2; TTE 10/18 unrevealing  - S/p interrogation of loop recorder with no events  - MRI not performed as patient is aspiration risk at this time due to inability to manage oral secretions  - PT/OT/ST/Neuropsych 3 hrs daily    #DLD  Atorvastatin 80 mg    #Orthostatic Hypotension - improved  - Midodrine 10mg TID, Florinef 0.2mg daily continue to monitor    #Hiccups  - Thorazine PRN     -Pain control: Tylenol PRN    -GI/Bowel Mgmt -  Miralax PRN at night    -Bladder management: no issues     -Skin: No active issues at this time  Keep right arm elevated in bed    -FEN   - Diet -  Dysphagia - NPO with feeds via NGT. SLP F/U      Precautions / PROPHYLAXIS:      - Falls precautions  - Aspiration  precautions    - DVT prophylaxis: lovenox

## 2020-10-22 PROCEDURE — 99222 1ST HOSP IP/OBS MODERATE 55: CPT

## 2020-10-22 RX ORDER — SCOPALAMINE 1 MG/3D
1 PATCH, EXTENDED RELEASE TRANSDERMAL
Refills: 0 | Status: DISCONTINUED | OUTPATIENT
Start: 2020-10-22 | End: 2020-10-30

## 2020-10-22 RX ADMIN — Medication 10 MILLIGRAM(S): at 17:12

## 2020-10-22 RX ADMIN — MIDODRINE HYDROCHLORIDE 5 MILLIGRAM(S): 2.5 TABLET ORAL at 13:05

## 2020-10-22 RX ADMIN — FLUDROCORTISONE ACETATE 0.2 MILLIGRAM(S): 0.1 TABLET ORAL at 06:56

## 2020-10-22 RX ADMIN — MIDODRINE HYDROCHLORIDE 5 MILLIGRAM(S): 2.5 TABLET ORAL at 17:12

## 2020-10-22 RX ADMIN — Medication 81 MILLIGRAM(S): at 13:05

## 2020-10-22 RX ADMIN — SCOPALAMINE 1 PATCH: 1 PATCH, EXTENDED RELEASE TRANSDERMAL at 18:40

## 2020-10-22 RX ADMIN — Medication 500000 UNIT(S): at 21:21

## 2020-10-22 RX ADMIN — ATORVASTATIN CALCIUM 80 MILLIGRAM(S): 80 TABLET, FILM COATED ORAL at 21:21

## 2020-10-22 RX ADMIN — ENOXAPARIN SODIUM 40 MILLIGRAM(S): 100 INJECTION SUBCUTANEOUS at 21:21

## 2020-10-22 RX ADMIN — SCOPALAMINE 1 PATCH: 1 PATCH, EXTENDED RELEASE TRANSDERMAL at 14:45

## 2020-10-22 RX ADMIN — MIDODRINE HYDROCHLORIDE 5 MILLIGRAM(S): 2.5 TABLET ORAL at 06:56

## 2020-10-22 RX ADMIN — Medication 500000 UNIT(S): at 06:56

## 2020-10-22 RX ADMIN — Medication 10 MILLIGRAM(S): at 00:20

## 2020-10-22 RX ADMIN — CLOPIDOGREL BISULFATE 75 MILLIGRAM(S): 75 TABLET, FILM COATED ORAL at 13:05

## 2020-10-22 RX ADMIN — Medication 500000 UNIT(S): at 17:12

## 2020-10-22 RX ADMIN — Medication 500000 UNIT(S): at 13:06

## 2020-10-22 RX ADMIN — PANTOPRAZOLE SODIUM 40 MILLIGRAM(S): 20 TABLET, DELAYED RELEASE ORAL at 13:06

## 2020-10-22 NOTE — CONSULT NOTE ADULT - SUBJECTIVE AND OBJECTIVE BOX
Gastroenterology Consultation:    Patient is a 69y old  Male who presents with a chief complaint of Left CVA (22 Oct 2020 14:36)      Admitted on: 10-20-20  HPI:  This is a 69 year old male with PMHx of b/l hip replacements, recent mechanical thrombectomy of left ADEOLA/MCA stroke (8/27/2020) admitted to [facility] on 10/13/2020 after he developed new right sided facial droop, weakness, and dysarthria. In the ED, his NIHSS was 20 and stroke code was called. However, patient did not receive tPA because of recent stroke history. Initial CTH showed wedge shaped area hypoattenuation within the left frontal and parietal lobes consistent with prior Left MCA territory infarct. CT perfusion showed left MCA M2 and M3 occlusions. Repeat CTH on 10/14/2020 showed interval development of left basal ganglia hypoattenuation compatible with acute infarct. Patient was admitted to ICU for closer monitoring and downgraded to medicine on 10/15/2020. Patient's loop recorder was interrogated 10/14 and did not demonstrate any events. Patient failed bedside swallow evaluation and remains NPO with feedings through NGT requiring frequent suctioning to clean saline in oral cavity. He was started on midodrine 10mg TID for orthostatic hypotension. Patient received fluids and was then started on Florinef 0.2mg daily for continued hypotension. His BP improved the following day and denies lightheadedness or dizziness. Patient seen and examined by physiatry and deemed an appropriate candidate for acute rehabilitation consisting of 3 hours of therapy a day.     Of note during his first stroke on 8/27/2020, JANET was performed and did not demonstrate thrombus or cardiac abnormality to suggest cardiac origin for CVA.        Prior records Reviewed (Y/N): Y  History obtained from person other than patient (Y/N): N    Prior EGD: None  Prior Colonoscopy:  None      PAST MEDICAL & SURGICAL HISTORY:  Cerebrovascular accident (CVA)    GERD (gastroesophageal reflux disease)    S/P tonsillectomy    H/O bilateral hip replacements    H/O skin graft  upper body  legs        FAMILY HISTORY:  No pertinent family history in first degree relatives        Social History:  Tobacco: denied  Alcohol: denied  Drugs: denied    Home Medications:  aspirin 81 mg oral tablet, chewable: 1 tab(s) orally once a day (19 Oct 2020 12:44)  atorvastatin 80 mg oral tablet: 1 tab(s) orally once a day (at bedtime) (19 Oct 2020 12:44)  chlorproMAZINE 10 mg oral tablet: 1 tab(s) orally every 6 hours, As needed, hiccups (20 Oct 2020 14:35)  clopidogrel 75 mg oral tablet: 1 tab(s) orally once a day (19 Oct 2020 12:44)  enoxaparin: 40 milligram(s) subcutaneous once a day (19 Oct 2020 12:44)  fludrocortisone 0.1 mg oral tablet: 2 tab(s) orally once a day (20 Oct 2020 10:33)  midodrine 5 mg oral tablet: 1 tab(s) orally 3 times a day (19 Oct 2020 12:44)  nystatin 100,000 units/mL oral suspension: 5 milliliter(s) orally 4 times a day (19 Oct 2020 12:44)  pantoprazole 40 mg oral granule, delayed release: 1 tab(s) orally once a day (19 Oct 2020 12:44)    MEDICATIONS  (STANDING):  aspirin  chewable 81 milliGRAM(s) Enteral Tube daily  atorvastatin 80 milliGRAM(s) Oral at bedtime  clopidogrel Tablet 75 milliGRAM(s) Oral daily  enoxaparin Injectable 40 milliGRAM(s) SubCutaneous at bedtime  fludroCORTISONE 0.2 milliGRAM(s) Oral <User Schedule>  midodrine. 5 milliGRAM(s) Oral <User Schedule>  nystatin    Suspension 121507 Unit(s) Oral four times a day  pantoprazole   Suspension 40 milliGRAM(s) Enteral Tube daily  scopolamine 1 mG/72 Hr(s) Patch 1 Patch Transdermal every 72 hours    MEDICATIONS  (PRN):  chlorproMAZINE    Tablet 10 milliGRAM(s) Oral every 6 hours PRN hiccups      Allergies  amoxicillin (Rash)      Review of Systems: ( patient can shake his head and write on a piece of paper)   Constitutional:  No Fever, No Chills  ENT/Mouth:  No Hearing Changes,  No Difficulty Swallowing  Eyes:  No Eye Pain, No Vision Changes  Cardiovascular:  No Chest Pain, No Palpitations  Respiratory:  No Cough, No Dyspnea  Gastrointestinal:  As described in HPI  Musculoskeletal:  No Joint Swelling, No Back Pain  Skin:  No Skin Lesions, No Jaundice  Neuro:  No Syncope, No Dizziness  Heme/Lymph:  No Bruising, No Bleeding.          Physical Examination:  T(C): 35.9 (10-22-20 @ 13:44), Max: 36.1 (10-22-20 @ 05:32)  HR: 61 (10-22-20 @ 13:44) (61 - 68)  BP: 107/63 (10-22-20 @ 13:44) (107/57 - 125/59)  RR: 18 (10-22-20 @ 13:44) (18 - 18)  SpO2: 93% (10-22-20 @ 07:12) (93% - 94%)      10-20-20 @ 07:01  -  10-21-20 @ 07:00  --------------------------------------------------------  IN: 1790 mL / OUT: 1700 mL / NET: 90 mL    10-21-20 @ 07:01  -  10-22-20 @ 07:00  --------------------------------------------------------  IN: 3005 mL / OUT: 475 mL / NET: 2530 mL        Constitutional: No acute distress.  Eyes:. Conjunctivae are clear, Sclera is non-icteric.  Ears Nose and Throat: The external ears are normal appearing,  Oral mucosa is pink and moist.  Respiratory:  No signs of respiratory distress. Lung sounds are clear bilaterally.  Cardiovascular:  S1 S2, Regular rate and rhythm.  GI: on NG-tube feeding, Abdomen is soft, symmetric, and non-tender without distention. There are no visible lesions. Bowel sounds are present and normoactive in all four quadrants. No masses, hepatomegaly, or splenomegaly are noted.   Neuro: No Tremor, No involuntary movements, left sided weakness and dyslexia.   Skin: No rashes, No Jaundice.          Data: (reviewed by attending)                        12.7   6.46  )-----------( 215      ( 21 Oct 2020 07:13 )             37.2     Hgb Trend:  12.7  10-21-20 @ 07:13  12.5  10-20-20 @ 06:44        10-21    137  |  102  |  11  ----------------------------<  130<H>  3.8   |  27  |  0.8    Ca    8.5      21 Oct 2020 07:13  Mg     1.9     10-21    TPro  5.4<L>  /  Alb  3.2<L>  /  TBili  0.4  /  DBili  x   /  AST  25  /  ALT  18  /  AlkPhos  63  10-21    Liver panel trend:  TBili 0.4   /   AST 25   /   ALT 18   /   AlkP 63   /   Tptn 5.4   /   Alb 3.2    /   DBili --      10-21  TBili 0.5   /   AST 20   /   ALT 13   /   AlkP 61   /   Tptn 5.7   /   Alb 3.2    /   DBili --      10-20  TBili 0.5   /   AST 19   /   ALT 13   /   AlkP 58   /   Tptn 5.7   /   Alb 3.4    /   DBili --      10-19  TBili 0.6   /   AST 18   /   ALT 12   /   AlkP 56   /   Tptn 5.7   /   Alb 3.4    /   DBili --      10-18  TBili 1.1   /   AST 21   /   ALT 13   /   AlkP 56   /   Tptn 5.9   /   Alb 3.5    /   DBili --      10-17  TBili 0.6   /   AST 29   /   ALT 15   /   AlkP 67   /   Tptn 6.3   /   Alb 3.8    /   DBili --      10-16  TBili 0.9   /   AST 37   /   ALT 16   /   AlkP 63   /   Tptn 6.2   /   Alb 3.9    /   DBili --      10-15  TBili 0.2   /   AST 24   /   ALT 17   /   AlkP 75   /   Tptn 6.8   /   Alb 4.2    /   DBili --      10-13           Gastroenterology Consultation:    Patient is a 69y old  Male who presents with a chief complaint of Left CVA (22 Oct 2020 14:36)      Admitted on: 10-20-20  HPI:  This is a 69 year old male with PMHx of b/l hip replacements, recent mechanical thrombectomy of left ADEOLA/MCA stroke (8/27/2020) admitted to [facility] on 10/13/2020 after he developed new right sided facial droop, weakness, and dysarthria. In the ED, his NIHSS was 20 and stroke code was called. However, patient did not receive tPA because of recent stroke history. Initial CTH showed wedge shaped area hypoattenuation within the left frontal and parietal lobes consistent with prior Left MCA territory infarct. CT perfusion showed left MCA M2 and M3 occlusions. Repeat CTH on 10/14/2020 showed interval development of left basal ganglia hypoattenuation compatible with acute infarct. Patient was admitted to ICU for closer monitoring and downgraded to medicine on 10/15/2020. Patient's loop recorder was interrogated 10/14 and did not demonstrate any events. Patient failed bedside swallow evaluation and remains NPO with feedings through NGT requiring frequent suctioning to clean saline in oral cavity. He was started on midodrine 10mg TID for orthostatic hypotension. Patient received fluids and was then started on Florinef 0.2mg daily for continued hypotension. His BP improved the following day and denies lightheadedness or dizziness. Patient seen and examined by physiatry and deemed an appropriate candidate for acute rehabilitation consisting of 3 hours of therapy a day.     Of note during his first stroke on 8/27/2020, JANET was performed and did not demonstrate thrombus or cardiac abnormality to suggest cardiac origin for CVA.        Prior records Reviewed (Y/N): Y  History obtained from person other than patient (Y/N): N    Prior EGD: None  Prior Colonoscopy:  None      PAST MEDICAL & SURGICAL HISTORY:  Cerebrovascular accident (CVA)    GERD (gastroesophageal reflux disease)    S/P tonsillectomy    H/O bilateral hip replacements    H/O skin graft  upper body  legs        FAMILY HISTORY:  No pertinent family history in first degree relatives        Social History:  Tobacco: denied  Alcohol: denied  Drugs: denied    Home Medications:  aspirin 81 mg oral tablet, chewable: 1 tab(s) orally once a day (19 Oct 2020 12:44)  atorvastatin 80 mg oral tablet: 1 tab(s) orally once a day (at bedtime) (19 Oct 2020 12:44)  chlorproMAZINE 10 mg oral tablet: 1 tab(s) orally every 6 hours, As needed, hiccups (20 Oct 2020 14:35)  clopidogrel 75 mg oral tablet: 1 tab(s) orally once a day (19 Oct 2020 12:44)  enoxaparin: 40 milligram(s) subcutaneous once a day (19 Oct 2020 12:44)  fludrocortisone 0.1 mg oral tablet: 2 tab(s) orally once a day (20 Oct 2020 10:33)  midodrine 5 mg oral tablet: 1 tab(s) orally 3 times a day (19 Oct 2020 12:44)  nystatin 100,000 units/mL oral suspension: 5 milliliter(s) orally 4 times a day (19 Oct 2020 12:44)  pantoprazole 40 mg oral granule, delayed release: 1 tab(s) orally once a day (19 Oct 2020 12:44)    MEDICATIONS  (STANDING):  aspirin  chewable 81 milliGRAM(s) Enteral Tube daily  atorvastatin 80 milliGRAM(s) Oral at bedtime  clopidogrel Tablet 75 milliGRAM(s) Oral daily  enoxaparin Injectable 40 milliGRAM(s) SubCutaneous at bedtime  fludroCORTISONE 0.2 milliGRAM(s) Oral <User Schedule>  midodrine. 5 milliGRAM(s) Oral <User Schedule>  nystatin    Suspension 627689 Unit(s) Oral four times a day  pantoprazole   Suspension 40 milliGRAM(s) Enteral Tube daily  scopolamine 1 mG/72 Hr(s) Patch 1 Patch Transdermal every 72 hours    MEDICATIONS  (PRN):  chlorproMAZINE    Tablet 10 milliGRAM(s) Oral every 6 hours PRN hiccups      Allergies  amoxicillin (Rash)      Review of Systems: ( patient can shake his head and write on a piece of paper)   Constitutional:  No Fever, No Chills  ENT/Mouth:  No Hearing Changes,  No Difficulty Swallowing  Eyes:  No Eye Pain, No Vision Changes  Cardiovascular:  No Chest Pain, No Palpitations  Respiratory:  No Cough, No Dyspnea  Gastrointestinal:  As described in HPI  Musculoskeletal:  No Joint Swelling, No Back Pain  Skin:  No Skin Lesions, No Jaundice  Neuro:  No Syncope, No Dizziness  Heme/Lymph:  No Bruising, No Bleeding.          Physical Examination:  T(C): 35.9 (10-22-20 @ 13:44), Max: 36.1 (10-22-20 @ 05:32)  HR: 61 (10-22-20 @ 13:44) (61 - 68)  BP: 107/63 (10-22-20 @ 13:44) (107/57 - 125/59)  RR: 18 (10-22-20 @ 13:44) (18 - 18)  SpO2: 93% (10-22-20 @ 07:12) (93% - 94%)      10-20-20 @ 07:01  -  10-21-20 @ 07:00  --------------------------------------------------------  IN: 1790 mL / OUT: 1700 mL / NET: 90 mL    10-21-20 @ 07:01  -  10-22-20 @ 07:00  --------------------------------------------------------  IN: 3005 mL / OUT: 475 mL / NET: 2530 mL        Constitutional: No acute distress.  Eyes:. Conjunctivae are clear, Sclera is non-icteric.  Ears Nose and Throat: The external ears are normal appearing,  Oral mucosa is pink and moist. NGT  Respiratory:  No signs of respiratory distress. Lung sounds are clear bilaterally.  Cardiovascular:  S1 S2, Regular rate and rhythm.  GI: on NG-tube feeding, Abdomen is soft, symmetric, and non-tender without distention. There are no visible lesions. Bowel sounds are present and normoactive in all four quadrants. No masses, hepatomegaly, or splenomegaly are noted.   Neuro: No Tremor, No involuntary movements, left sided weakness and dyslexia.   Skin: No rashes, No Jaundice.          Data: (reviewed by attending)                        12.7   6.46  )-----------( 215      ( 21 Oct 2020 07:13 )             37.2     Hgb Trend:  12.7  10-21-20 @ 07:13  12.5  10-20-20 @ 06:44        10-21    137  |  102  |  11  ----------------------------<  130<H>  3.8   |  27  |  0.8    Ca    8.5      21 Oct 2020 07:13  Mg     1.9     10-21    TPro  5.4<L>  /  Alb  3.2<L>  /  TBili  0.4  /  DBili  x   /  AST  25  /  ALT  18  /  AlkPhos  63  10-21    Liver panel trend:  TBili 0.4   /   AST 25   /   ALT 18   /   AlkP 63   /   Tptn 5.4   /   Alb 3.2    /   DBili --      10-21  TBili 0.5   /   AST 20   /   ALT 13   /   AlkP 61   /   Tptn 5.7   /   Alb 3.2    /   DBili --      10-20  TBili 0.5   /   AST 19   /   ALT 13   /   AlkP 58   /   Tptn 5.7   /   Alb 3.4    /   DBili --      10-19  TBili 0.6   /   AST 18   /   ALT 12   /   AlkP 56   /   Tptn 5.7   /   Alb 3.4    /   DBili --      10-18  TBili 1.1   /   AST 21   /   ALT 13   /   AlkP 56   /   Tptn 5.9   /   Alb 3.5    /   DBili --      10-17  TBili 0.6   /   AST 29   /   ALT 15   /   AlkP 67   /   Tptn 6.3   /   Alb 3.8    /   DBili --      10-16  TBili 0.9   /   AST 37   /   ALT 16   /   AlkP 63   /   Tptn 6.2   /   Alb 3.9    /   DBili --      10-15  TBili 0.2   /   AST 24   /   ALT 17   /   AlkP 75   /   Tptn 6.8   /   Alb 4.2    /   DBili --      10-13

## 2020-10-22 NOTE — PROGRESS NOTE ADULT - ASSESSMENT
ASSESSMENT/PLAN  This is a 69 year old male with hx b/l hip replacements, recent mechanical thrombectomy of left ADEOLA/MCA thrombus (8/27/2020), with mild residual deficits, now presents with new left MCA stroke  Rehab of acute MCA CVA stroke     #Right hemiplegia - PT/OT    # Severe dysphagia - NPO. Difficulty managing secretions. Will likely require pEG placement as above. GI consult pending    # severe aphasia/ dysarthria - ST    - Continue aspirin, plavix, lipitor  - Maintain systolic blood pressure 120-160  - JANET showed no thrombus on September 2; TTE 10/18 unrevealing  - S/p interrogation of loop recorder with no events  - MRI not performed as patient is aspiration risk at this time due to inability to manage oral secretions  - PT/OT/ST/Neuropsych 3 hrs daily    #DLD  Atorvastatin 80 mg    #Orthostatic Hypotension - improved  - Midodrine 10mg TID, Florinef 0.2mg daily continue to monitor    #Hiccups  - Thorazine PRN     -Pain control: Tylenol PRN    -GI/Bowel Mgmt -  Miralax PRN at night    -Bladder management: no issues     -Skin: No active issues at this time    -FEN   - Diet/ Severe Dysphagia - NPO with feeds via NGT. SLP F/U.       Precautions / PROPHYLAXIS:      - Falls precautions  - Aspiration  precautions    - DVT prophylaxis: lovenox

## 2020-10-22 NOTE — PROGRESS NOTE ADULT - SUBJECTIVE AND OBJECTIVE BOX
Patient is a 69y old  Male who presents with a chief complaint of Left CVA (dominant side)    HPI:  This is a 69 year old male with PMHx of b/l hip replacements, recent mechanical thrombectomy of left ADEOLA/MCA stroke (2020) admitted to [facility] on 10/13/2020 after he developed new right sided facial droop, weakness, and dysarthria. In the ED, his NIHSS was 20 and stroke code was called. However, patient did not receive tPA because of recent stroke history. Initial CTH showed wedge shaped area hypoattenuation within the left frontal and parietal lobes consistent with prior Left MCA territory infarct. CT perfusion showed left MCA M2 and M3 occlusions. Repeat CTH on 10/14/2020 showed interval development of left basal ganglia hypoattenuation compatible with acute infarct. Patient was admitted to ICU for closer monitoring and downgraded to medicine on 10/15/2020. Patient's loop recorder was interrogated 10/14 and did not demonstrate any events. Patient failed bedside swallow evaluation and remains NPO with feedings through NGT requiring frequent suctioning to clean saline in oral cavity. He was started on midodrine 10mg TID for orthostatic hypotension. Patient received fluids and was then started on Florinef 0.2mg daily for continued hypotension. His BP improved the following day and denies lightheadedness or dizziness. Patient seen and examined by physiatry and deemed an appropriate candidate for acute rehabilitation consisting of 3 hours of therapy a day.     Of note during his first stroke on 2020, JANET was performed and did not demonstrate thrombus or cardiac abnormality to suggest cardiac origin for CVA.  (19 Oct 2020 14:10). He reports only being left with residual dysarthria.    CLOF:  He requires mod assist to stand and max assist to transfer and take a few steps. He is NPO and having difficulty managing his own secretions. He has severe expressive aphasia/ apraxia and moderate receptive deficits    Subjective:    The patient was examined by me this morning and was medically stable, with no new complaints. He is using his Yankhauer catheter to suction his oral secretions. NGT is in place. It was discussed with him and his wife that he will require a PEG tube placed as he is not a candidate for oral feeds and will likely not be in the near future. They agreed and a GI consult was requested    REVIEW OF SYSTEMS   HEENT: Hiccups, drooling saliva   Pulm: occasional cough,  No shortness of breath  Cardio: No chest pain,  GI:    No diarrhea, No constipation  : No dysuria, No frequency  Neuro: Right sided weakness  MSK: Denies pain      PHYSICAL EXAM    Vital Signs Last 24 Hrs  T(C): 35.9 (22 Oct 2020 13:44), Max: 36.1 (22 Oct 2020 05:32)  T(F): 96.7 (22 Oct 2020 13:44), Max: 97 (22 Oct 2020 05:32)  HR: 61 (22 Oct 2020 13:44) (61 - 68)  BP: 107/63 (22 Oct 2020 13:44) (107/57 - 125/59)  BP(mean): --  RR: 18 (22 Oct 2020 13:44) (18 - 18)  SpO2: 93% (22 Oct 2020 07:12) (93% - 94%)    General: NAD, Resting Comfortable,                                  HEENT: NC/AT, EOM I, PERRLA, Normal Conjunctivae.. NGT in right nostril  Cardio: RRR                              Pulm: No Respiratory Distress,  Lungs with bilateral rhonchi                       Abdomen: ND/NT, Soft                                              MSK: No joint swelling, decreased ROM in the right upper and lower extremity.                                         Ext: No C/C/E, No calf tenderness    Skin: intact                                                                   Neurological Examination:  Cognitive: [  x  ] AAO x 3  with choices   [    ]  other                                                                      Attention:  [ x   ] intact   [    ]  other                            Mood/Affect:  [   x ] wnl    [    ]  other                                                                             Communication:  [    ]Fluent,  No dysarthria,   [   x ] other dysarthria, apraxia, sever expressive aphasia  CN II - XII  [    ] intact   [  x  ] other decrease function in clearing saliva  Coordination:   [    ] FTN/HTS intact   [    x] other    normal on left, unable to assess on right due to hemiparesis                                                                   Sensory: [    ]Intact to light touch   [  x  ] other     decreased rt leg                                                                                    Tone:  [    ]  wnl,   [    x]  other decreased tone in RUE .     Motor    LEFT    UE: [ x  ] WNL.  [   ] other:  RIGHT UE:  [   ] WNL.  [ x  ] other: shoulder 1/5, elbow 0/5. wrist 0/5, fingers 0/5  LEFT    LE:  [  x ] WNL.  [   ] other:  RIGHT LE:  [   ] WNL.  [ x  ] other:  strong extensor synergy. Trace isolated movement    Reflex:  [    ]  symmetric,  [  x  ]  other: Right bicep 3+otherwise reflexes normal        Meds  aspirin  chewable 81 milliGRAM(s) Enteral Tube daily  atorvastatin 80 milliGRAM(s) Oral at bedtime  chlorproMAZINE    Tablet 10 milliGRAM(s) Oral every 6 hours PRN  clopidogrel Tablet 75 milliGRAM(s) Oral daily  enoxaparin Injectable 40 milliGRAM(s) SubCutaneous at bedtime  fludroCORTISONE 0.2 milliGRAM(s) Oral <User Schedule>  midodrine. 5 milliGRAM(s) Oral <User Schedule>  nystatin    Suspension 434100 Unit(s) Oral four times a day  pantoprazole   Suspension 40 milliGRAM(s) Enteral Tube daily  sodium chloride 0.9%. 1000 milliLiter(s) IV Continuous <Continuous>      RECENT LABS/IMAGING - reviwed                        12.7   6.46  )-----------( 215      ( 21 Oct 2020 07:13 )             37.2     10-    137  |  102  |  11  ----------------------------<  130<H>  3.8   |  27  |  0.8    Ca    8.5      21 Oct 2020 07:13  Mg     1.9     10-    TPro  5.4<L>  /  Alb  3.2<L>  /  TBili  0.4  /  DBili  x   /  AST  25  /  ALT  18  /  AlkPhos  63  10-21      Urinalysis Basic - ( 21 Oct 2020 04:25 )    Color: Yellow / Appearance: Slightly Turbid / S.015 / pH: x  Gluc: x / Ketone: Negative  / Bili: Negative / Urobili: 3 mg/dL   Blood: x / Protein: Negative / Nitrite: Negative   Leuk Esterase: Large / RBC: 5 /HPF /  /HPF   Sq Epi: x / Non Sq Epi: 0 /HPF / Bacteria: Moderate

## 2020-10-23 DIAGNOSIS — I63.9 CEREBRAL INFARCTION, UNSPECIFIED: ICD-10-CM

## 2020-10-23 DIAGNOSIS — Z45.09 ENCOUNTER FOR ADJUSTMENT AND MANAGEMENT OF OTHER CARDIAC DEVICE: ICD-10-CM

## 2020-10-23 DIAGNOSIS — R13.10 DYSPHAGIA, UNSPECIFIED: ICD-10-CM

## 2020-10-23 DIAGNOSIS — I69.353 HEMIPLEGIA AND HEMIPARESIS FOLLOWING CEREBRAL INFARCTION AFFECTING RIGHT NON-DOMINANT SIDE: ICD-10-CM

## 2020-10-23 DIAGNOSIS — Z74.01 BED CONFINEMENT STATUS: ICD-10-CM

## 2020-10-23 DIAGNOSIS — E78.5 HYPERLIPIDEMIA, UNSPECIFIED: ICD-10-CM

## 2020-10-23 DIAGNOSIS — I63.512 CEREBRAL INFARCTION DUE TO UNSPECIFIED OCCLUSION OR STENOSIS OF LEFT MIDDLE CEREBRAL ARTERY: ICD-10-CM

## 2020-10-23 DIAGNOSIS — I69.990 APRAXIA FOLLOWING UNSPECIFIED CEREBROVASCULAR DISEASE: ICD-10-CM

## 2020-10-23 DIAGNOSIS — R39.81 FUNCTIONAL URINARY INCONTINENCE: ICD-10-CM

## 2020-10-23 DIAGNOSIS — Z73.6 LIMITATION OF ACTIVITIES DUE TO DISABILITY: ICD-10-CM

## 2020-10-23 DIAGNOSIS — R29.720 NIHSS SCORE 20: ICD-10-CM

## 2020-10-23 DIAGNOSIS — R47.02 DYSPHASIA: ICD-10-CM

## 2020-10-23 PROCEDURE — 71045 X-RAY EXAM CHEST 1 VIEW: CPT | Mod: 26,76

## 2020-10-23 RX ADMIN — SCOPALAMINE 1 PATCH: 1 PATCH, EXTENDED RELEASE TRANSDERMAL at 21:15

## 2020-10-23 RX ADMIN — Medication 500000 UNIT(S): at 13:00

## 2020-10-23 RX ADMIN — Medication 500000 UNIT(S): at 05:18

## 2020-10-23 RX ADMIN — MIDODRINE HYDROCHLORIDE 5 MILLIGRAM(S): 2.5 TABLET ORAL at 17:56

## 2020-10-23 RX ADMIN — MIDODRINE HYDROCHLORIDE 5 MILLIGRAM(S): 2.5 TABLET ORAL at 05:17

## 2020-10-23 RX ADMIN — ATORVASTATIN CALCIUM 80 MILLIGRAM(S): 80 TABLET, FILM COATED ORAL at 21:30

## 2020-10-23 RX ADMIN — Medication 500000 UNIT(S): at 21:30

## 2020-10-23 RX ADMIN — Medication 500000 UNIT(S): at 17:56

## 2020-10-23 RX ADMIN — MIDODRINE HYDROCHLORIDE 5 MILLIGRAM(S): 2.5 TABLET ORAL at 13:00

## 2020-10-23 RX ADMIN — ENOXAPARIN SODIUM 40 MILLIGRAM(S): 100 INJECTION SUBCUTANEOUS at 21:30

## 2020-10-23 RX ADMIN — FLUDROCORTISONE ACETATE 0.2 MILLIGRAM(S): 0.1 TABLET ORAL at 05:18

## 2020-10-23 RX ADMIN — SCOPALAMINE 1 PATCH: 1 PATCH, EXTENDED RELEASE TRANSDERMAL at 06:23

## 2020-10-23 RX ADMIN — Medication 81 MILLIGRAM(S): at 13:00

## 2020-10-23 RX ADMIN — PANTOPRAZOLE SODIUM 40 MILLIGRAM(S): 20 TABLET, DELAYED RELEASE ORAL at 13:00

## 2020-10-23 NOTE — CHART NOTE - NSCHARTNOTEFT_GEN_A_CORE
Called from rehab team to assess for being off Plavix 5 days for PEG placement.       Suggestion:  Can be off Plavix 5 days for PEG placement provided he is kept on ASA.     Jan Anthony NP  [id number]

## 2020-10-23 NOTE — PROGRESS NOTE ADULT - SUBJECTIVE AND OBJECTIVE BOX
Patient is a 69y old  Male who presents with a chief complaint of Left CVA (dominant side)    HPI:  This is a 69 year old male with PMHx of b/l hip replacements, recent mechanical thrombectomy of left ADEOLA/MCA stroke (2020) admitted to [facility] on 10/13/2020 after he developed new right sided facial droop, weakness, and dysarthria. In the ED, his NIHSS was 20 and stroke code was called. However, patient did not receive tPA because of recent stroke history. Initial CTH showed wedge shaped area hypoattenuation within the left frontal and parietal lobes consistent with prior Left MCA territory infarct. CT perfusion showed left MCA M2 and M3 occlusions. Repeat CTH on 10/14/2020 showed interval development of left basal ganglia hypoattenuation compatible with acute infarct. Patient was admitted to ICU for closer monitoring and downgraded to medicine on 10/15/2020. Patient's loop recorder was interrogated 10/14 and did not demonstrate any events. Patient failed bedside swallow evaluation and remains NPO with feedings through NGT requiring frequent suctioning to clean saline in oral cavity. He was started on midodrine 10mg TID for orthostatic hypotension. Patient received fluids and was then started on Florinef 0.2mg daily for continued hypotension. His BP improved the following day and denies lightheadedness or dizziness. Patient seen and examined by physiatry and deemed an appropriate candidate for acute rehabilitation consisting of 3 hours of therapy a day.     Of note during his first stroke on 2020, JANET was performed and did not demonstrate thrombus or cardiac abnormality to suggest cardiac origin for CVA.  (19 Oct 2020 14:10). He reports only being left with residual dysarthria.    CLOF:  He requires mod assist to stand and max assist to transfer and take a few steps. He is NPO and having difficulty managing his own secretions. He has severe expressive aphasia/ apraxia and moderate receptive deficits    Subjective:    The patient was examined in gym during rounds this morning with Dr. Larsen and rehab team. Patient remains medically stable, with no new complaints. Scopolamine patch was started on patient yesterday, with no noted improvement in drooling saliva yet, will continue to monitor.  He is using his Yankhauer catheter to suction his oral secretions. NGT is in place. Patient and wife agreeable to patient receiving a PEG tube as he is not a candidate for oral feeds and will likely not be in the near future. GI recs appreciated with plan to place peg on Tuesday 10/27. Neuro NP recs appreciated this morning. Discontinued plavix this morning, will hold plavix for 5 days and plan to resume after peg placement, patient remains on ASA and lovenox 40 sc qhs.     REVIEW OF SYSTEMS   HEENT: Hiccups, drooling saliva   Pulm: occasional cough,  No shortness of breath  Cardio: No chest pain,  GI:    No diarrhea, No constipation  : No dysuria, No frequency  Neuro: Right sided weakness  MSK: Denies pain    PHYSICAL EXAM    Vital Signs Last 24 Hrs  T(C): 36 (23 Oct 2020 04:48), Max: 36.3 (22 Oct 2020 20:34)  T(F): 96.8 (23 Oct 2020 04:48), Max: 97.3 (22 Oct 2020 20:34)  HR: 61 (23 Oct 2020 04:48) (61 - 89)  BP: 108/56 (23 Oct 2020 04:48) (107/63 - 119/60)  BP(mean): --  RR: 18 (23 Oct 2020 04:48) (18 - 18)  SpO2: 95% (22 Oct 2020 20:34) (95% - 95%)    General: NAD, Resting Comfortable, sitting upright at edge of PT mat                               HEENT: NC/AT, EOM I, PERRLA, Normal Conjunctivae.. NGT in right nostril  Cardio: RRR                              Pulm: No Respiratory Distress,  Lungs with bilateral rhonchi. Breathing comfortably and saturating well on RA                     Abdomen: ND/NT, Soft                                              MSK: No joint swelling, decreased ROM in the right upper and lower extremity.                                         Ext: No C/C/E, No calf tenderness    Skin: intact                                                                   Neurological Examination:  Cognitive: [  x  ] AAO x 3  with choices   [    ]  other                                                                      Attention:  [ x   ] intact   [    ]  other                            Mood/Affect:  [   x ] wnl    [    ]  other                                                                             Communication:  [    ]Fluent,  No dysarthria,   [   x ] other dysarthria, apraxia, sever expressive aphasia  CN II - XII  [    ] intact   [  x  ] other decrease function in clearing saliva  Coordination:   [    ] FTN/HTS intact   [    x] other    normal on left, unable to assess on right due to hemiparesis                                                                   Sensory: [    ]Intact to light touch   [  x  ] other     decreased rt leg                                                                                    Tone:  [    ]  wnl,   [    x]  other decreased tone in RUE .     Motor    LEFT    UE: [ x  ] WNL.  [   ] other:  RIGHT UE:  [   ] WNL.  [ x  ] other: shoulder 1/5, elbow 0/5. wrist 0/5, fingers 0/5  LEFT    LE:  [  x ] WNL.  [   ] other:  RIGHT LE:  [   ] WNL.  [ x  ] other:  strong extensor synergy. Trace isolated movement    Reflex:  [    ]  symmetric,  [  x  ]  other: Right bicep 3+otherwise reflexes normal        Meds  MEDICATIONS  (STANDING):  aspirin  chewable 81 milliGRAM(s) Enteral Tube daily  atorvastatin 80 milliGRAM(s) Oral at bedtime  enoxaparin Injectable 40 milliGRAM(s) SubCutaneous at bedtime  fludroCORTISONE 0.2 milliGRAM(s) Oral <User Schedule>  midodrine. 5 milliGRAM(s) Oral <User Schedule>  nystatin    Suspension 333534 Unit(s) Oral four times a day  pantoprazole   Suspension 40 milliGRAM(s) Enteral Tube daily  scopolamine 1 mG/72 Hr(s) Patch 1 Patch Transdermal every 72 hours    MEDICATIONS  (PRN):  chlorproMAZINE    Tablet 10 milliGRAM(s) Oral every 6 hours PRN hiccups      RECENT LABS/IMAGING - reviewed                       12.7   6.46  )-----------( 215      ( 21 Oct 2020 07:13 )             37.2     10-    137  |  102  |  11  ----------------------------<  130<H>  3.8   |  27  |  0.8    Ca    8.5      21 Oct 2020 07:13  Mg     1.9     10-    TPro  5.4<L>  /  Alb  3.2<L>  /  TBili  0.4  /  DBili  x   /  AST  25  /  ALT  18  /  AlkPhos  63  10      Urinalysis Basic - ( 21 Oct 2020 04:25 )    Color: Yellow / Appearance: Slightly Turbid / S.015 / pH: x  Gluc: x / Ketone: Negative  / Bili: Negative / Urobili: 3 mg/dL   Blood: x / Protein: Negative / Nitrite: Negative   Leuk Esterase: Large / RBC: 5 /HPF /  /HPF   Sq Epi: x / Non Sq Epi: 0 /HPF / Bacteria: Moderate

## 2020-10-23 NOTE — CHART NOTE - NSCHARTNOTEFT_GEN_A_CORE
pt  is on NG tube feeds for dyspahgia , rn found that tube was partially out,  Ng tube  inserted back to level 62  and x-ray verified position of NG tube  and  can resume tube feedings  .

## 2020-10-23 NOTE — CHART NOTE - NSCHARTNOTEFT_GEN_A_CORE
This is a 69 year old male with PMHx of b/l hip replacements, recent mechanical thrombectomy of left ADEOLA/MCA stroke (8/27/2020) admitted to [facility] on 10/13/2020 after he developed new right sided facial droop, weakness, and dysarthria. In the ED, his NIHSS was 20 and stroke code was called. However, patient did not receive tPA because of recent stroke history. Initial CTH showed wedge shaped area hypoattenuation within the left frontal and parietal lobes consistent with prior Left MCA territory infarct. CT perfusion showed left MCA M2 and M3 occlusions. Repeat CTH on 10/14/2020 showed interval development of left basal ganglia hypoattenuation compatible with acute infarct. Patient was admitted to ICU for closer monitoring and downgraded to medicine on 10/15/2020. Patient's loop recorder was interrogated 10/14 and did not demonstrate any events. Patient failed bedside swallow evaluation and remains NPO with feedings through NGT requiring frequent suctioning to clean saline in oral cavity. He was started on midodrine 10mg TID for orthostatic hypotension. Patient received fluids and was then started on Florinef 0.2mg daily for continued hypotension. His BP improved the following day and denies lightheadedness or dizziness. Patient seen and examined by physiatry and deemed an appropriate candidate for acute rehabilitation consisting of 3 hours of therapy a day.     Of note during his first stroke on 8/27/2020, JANET was performed and did not demonstrate thrombus or cardiac abnormality to suggest cardiac origin for CVA.      The patient is now on ASA 81mg po and Plavix 75mg po daily (was only on ASA 81mg after the 1st CVA); however he needs PEG placement and Plavix needs to be held x 5 days before the procedure.  Neurology follow up is appreciated; they gave approval as long as ASA is continued, which it will be. Plavix was d/c'd  today, last dose was yesterday, 10/22/2020.    The patient was seen by GI and PEG was scheduled for 10/27. However, his wife, Alesia, was an endoscopy nurse at the [facility] and worked with Dr. Balderas for many years, and would prefer  that Dr. Balderas place the PEG. Alesia stated that she already spoke to Dr. Balderas, and Alesia said Dr. Balderas agreed to place the PEG this Wednesday, 10/28/2020. Writer notified GI fellow (spectra [phone]) and also left a message with Dr. Balderas's service to confirm that she will do the procedure on 10/28.

## 2020-10-23 NOTE — PROGRESS NOTE ADULT - NUTRITIONAL ASSESSMENT
Diet, NPO with Tube Feed:   Tube Feeding Modality: Nasogastric  Jevity 1.2 Benjamin  Bolus  Total Volume of Bolus (mL):  420  Total # of Feeds: 4  Tube Feed Frequency: Every 6 hours   Tube Feed Start Time: 23:00  Bolus Feed Rate (mL per Hour): 420   Bolus Feed Duration (in Hours): 1  Bolus Feed Instructions:  Flushes: 83mL pre/post feeds or per LIP (10-21-20 @ 12:33) [Pending Verification By Attending]  Diet, NPO with Tube Feed:   Tube Feeding Modality: Nasogastric  Jevity 1.2 Benjamin  Total Volume for 24 Hours (mL): 1680  Bolus  Total Volume of Bolus (mL):  420  Tube Feed Frequency: Every 6 hours   Tube Feed Start Time: 06:00  Bolus Feed Rate (mL per Hour): 220   Bolus Feed Duration (in Hours): 1 (10-21-20 @ 11:24) [Active]

## 2020-10-23 NOTE — PROGRESS NOTE ADULT - ASSESSMENT
ASSESSMENT/PLAN  This is a 69 year old male with hx b/l hip replacements, recent mechanical thrombectomy of left ADEOLA/MCA thrombus (8/27/2020), with mild residual deficits, now presents with new left MCA stroke  Rehab of acute MCA CVA stroke     #Right hemiplegia - PT/OT    # Severe dysphagia with drooling saliva - NPO. Difficulty managing secretions. c/w Yankhauer catheter suction and scopolamine patch, continue to monitor. GI and neuro recs appreciated. Pending PEG placement by GI for Tuesday 10/27. Will hold plavix for 5 days and plan to resume after peg placement, patient remains on ASA and lovenox 40 sc qhs.    # severe aphasia/ dysarthria - ST  - Continue aspirin, lipitor. HOLD plavix for 5 days (10/23-10/27)  - Maintain systolic blood pressure 120-160  - JANET showed no thrombus on September 2; TTE 10/18 unrevealing  - S/p interrogation of loop recorder with no events  - MRI not performed as patient is aspiration risk at this time due to inability to manage oral secretions  - PT/OT/ST/Neuropsych 3 hrs daily    #DLD  Atorvastatin 80 mg    #Orthostatic Hypotension - improved  - Midodrine 10mg TID, Florinef 0.2mg daily continue to monitor    #Hiccups  - Thorazine PRN     -Pain control: Tylenol PRN    -GI/Bowel Mgmt -  Miralax PRN at night    -Bladder management: no issues     -Skin: No active issues at this time    -FEN   - Diet/ Severe Dysphagia - NPO with feeds via NGT. SLP F/U.     Precautions / PROPHYLAXIS:      - Falls precautions  - Aspiration  precautions  - DVT prophylaxis: lovenox   ASSESSMENT/PLAN  This is a 69 year old male with hx b/l hip replacements, recent mechanical thrombectomy of left ADEOLA/MCA thrombus (8/27/2020), with mild residual deficits, now presents with new left MCA stroke  Rehab of acute MCA CVA stroke:    #Right hemiplegia - PT/OT    # Severe dysphagia with drooling saliva - NPO. Difficulty managing secretions. c/w Yankhauer catheter suction and scopolamine patch, continue to monitor. GI and neuro recs appreciated. Pending PEG placement by GI for Tuesday 10/27. Will hold plavix for 5 days and plan to resume after peg placement, patient remains on ASA and lovenox 40 sc qhs.    # severe aphasia/ dysarthria - ST      - Continue aspirin, lipitor. HOLD plavix for 5 days (10/23-10/27)  - Maintain systolic blood pressure 120-160  - JANET showed no thrombus on September 2; TTE 10/18 unrevealing  - S/p interrogation of loop recorder with no events  - MRI not performed as patient is aspiration risk at this time due to inability to manage oral secretions  - PT/OT/ST/Neuropsych 3 hrs daily    #DLD  Atorvastatin 80 mg    #Hypotension - improved  - Midodrine 10mg TID, Florinef 0.2mg daily continue to monitor    #Hiccups  - Thorazine PRN     -Pain control: Tylenol PRN    -GI/Bowel Mgmt -  Miralax PRN at night    -Bladder management: no issues     -Skin: No active issues at this time    -FEN   - Diet/ Severe Dysphagia - NPO with feeds via NGT. SLP F/U.     Precautions / PROPHYLAXIS:      - Falls precautions  - Aspiration  precautions  - DVT prophylaxis: lovenox   ASSESSMENT/PLAN  This is a 69 year old male with hx b/l hip replacements, recent mechanical thrombectomy of left ADEOLA/MCA thrombus (8/27/2020), with mild residual deficits, now presents with new left MCA stroke  Rehab of acute MCA CVA stroke:    #Right hemiplegia - PT/OT    # Severe dysphagia with drooling saliva - NPO. Difficulty managing secretions. c/w Yankhauer catheter suction and scopolamine patch, continue to monitor. GI and neuro recs appreciated. Pending PEG placement by GI for Tuesday 10/27. Will hold plavix for 5 days and plan to resume after peg placement, patient remains on ASA and lovenox 40 sc qhs.    # severe aphasia/ dysarthria - ST      - Continue aspirin, lipitor. HOLD plavix for 5 days (10/23-10/27)  - Maintain systolic blood pressure 120-160  - JANET showed no thrombus on September 2; TTE 10/18 unrevealing  - S/p interrogation of loop recorder with no events  - MRI not performed as patient is aspiration risk at this time due to inability to manage oral secretions  - PT/OT/ST/Neuropsych 3 hrs daily    #DLD  Atorvastatin 80 mg    #Hypotension - improved  - Midodrine 10mg TID, Florinef 0.2mg daily continue to monitor    #Hiccups  - Thorazine PRN     #Pyuria/bacturia - moderate. Asymptomatic. Had condom catheter at the time. Recheck U/A and CBC in a few days    -Pain control: Tylenol PRN    -GI/Bowel Mgmt -  Miralax PRN at night    -Bladder management: no issues     -Skin: No active issues at this time    -FEN   - Diet/ Severe Dysphagia - NPO with feeds via NGT. SLP F/U.     Precautions / PROPHYLAXIS:      - Falls precautions  - Aspiration  precautions  - DVT prophylaxis: lovenox

## 2020-10-24 RX ADMIN — Medication 500000 UNIT(S): at 23:50

## 2020-10-24 RX ADMIN — Medication 500000 UNIT(S): at 18:16

## 2020-10-24 RX ADMIN — PANTOPRAZOLE SODIUM 40 MILLIGRAM(S): 20 TABLET, DELAYED RELEASE ORAL at 13:01

## 2020-10-24 RX ADMIN — SCOPALAMINE 1 PATCH: 1 PATCH, EXTENDED RELEASE TRANSDERMAL at 18:16

## 2020-10-24 RX ADMIN — MIDODRINE HYDROCHLORIDE 5 MILLIGRAM(S): 2.5 TABLET ORAL at 18:15

## 2020-10-24 RX ADMIN — Medication 500000 UNIT(S): at 05:50

## 2020-10-24 RX ADMIN — Medication 500000 UNIT(S): at 13:01

## 2020-10-24 RX ADMIN — MIDODRINE HYDROCHLORIDE 5 MILLIGRAM(S): 2.5 TABLET ORAL at 05:49

## 2020-10-24 RX ADMIN — MIDODRINE HYDROCHLORIDE 5 MILLIGRAM(S): 2.5 TABLET ORAL at 13:01

## 2020-10-24 RX ADMIN — Medication 81 MILLIGRAM(S): at 13:01

## 2020-10-24 RX ADMIN — ATORVASTATIN CALCIUM 80 MILLIGRAM(S): 80 TABLET, FILM COATED ORAL at 22:14

## 2020-10-24 RX ADMIN — ENOXAPARIN SODIUM 40 MILLIGRAM(S): 100 INJECTION SUBCUTANEOUS at 22:14

## 2020-10-24 RX ADMIN — FLUDROCORTISONE ACETATE 0.2 MILLIGRAM(S): 0.1 TABLET ORAL at 05:50

## 2020-10-24 RX ADMIN — SCOPALAMINE 1 PATCH: 1 PATCH, EXTENDED RELEASE TRANSDERMAL at 06:45

## 2020-10-24 RX ADMIN — Medication 10 MILLIGRAM(S): at 08:28

## 2020-10-24 NOTE — PROGRESS NOTE ADULT - SUBJECTIVE AND OBJECTIVE BOX
HPI:  This is a 69 year old male with PMHx of b/l hip replacements, recent mechanical thrombectomy of left ADEOLA/MCA stroke (8/27/2020) admitted to [facility] on 10/13/2020 after he developed new right sided facial droop, weakness, and dysarthria. In the ED, his NIHSS was 20 and stroke code was called. However, patient did not receive tPA because of recent stroke history. Initial CTH showed wedge shaped area hypoattenuation within the left frontal and parietal lobes consistent with prior Left MCA territory infarct. CT perfusion showed left MCA M2 and M3 occlusions. Repeat CTH on 10/14/2020 showed interval development of left basal ganglia hypoattenuation compatible with acute infarct. Patient was admitted to ICU for closer monitoring and downgraded to medicine on 10/15/2020. Patient's loop recorder was interrogated 10/14 and did not demonstrate any events. Patient failed bedside swallow evaluation and remains NPO with feedings through NGT requiring frequent suctioning to clean saline in oral cavity. He was started on midodrine 10mg TID for orthostatic hypotension. Patient received fluids and was then started on Florinef 0.2mg daily for continued hypotension. His BP improved the following day and denies lightheadedness or dizziness. Patient seen and examined by physiatry and deemed an appropriate candidate for acute rehabilitation consisting of 3 hours of therapy a day.     Of note during his first stroke on 8/27/2020, JANET was performed and did not demonstrate thrombus or cardiac abnormality to suggest cardiac origin for CVA.  (19 Oct 2020 14:10)    T(C): 36.7 (10-24-20 @ 05:28), Max: 36.9 (10-23-20 @ 21:04)  HR: 71 (10-24-20 @ 05:28) (63 - 71)  BP: 124/63 (10-24-20 @ 05:28) (97/52 - 124/63)  RR: 19 (10-24-20 @ 05:28) (19 - 20)  SpO2: 94% (10-24-20 @ 06:44) (94% - 94%)      Patient was stable overnight and expresses no new complaints     PE:    Alert OOB to wc, NGT  LUNGS- clear  COR- RRR  ABD- SOFT, NT  EXTR- w/o edema  NEURO- stable            PEG next week    Continue acute rehab program.

## 2020-10-25 PROCEDURE — 71045 X-RAY EXAM CHEST 1 VIEW: CPT | Mod: 26

## 2020-10-25 RX ADMIN — Medication 10 MILLIGRAM(S): at 13:30

## 2020-10-25 RX ADMIN — Medication 500000 UNIT(S): at 07:04

## 2020-10-25 RX ADMIN — SCOPALAMINE 1 PATCH: 1 PATCH, EXTENDED RELEASE TRANSDERMAL at 14:12

## 2020-10-25 RX ADMIN — MIDODRINE HYDROCHLORIDE 5 MILLIGRAM(S): 2.5 TABLET ORAL at 13:30

## 2020-10-25 RX ADMIN — ATORVASTATIN CALCIUM 80 MILLIGRAM(S): 80 TABLET, FILM COATED ORAL at 21:31

## 2020-10-25 RX ADMIN — SCOPALAMINE 1 PATCH: 1 PATCH, EXTENDED RELEASE TRANSDERMAL at 14:22

## 2020-10-25 RX ADMIN — SCOPALAMINE 1 PATCH: 1 PATCH, EXTENDED RELEASE TRANSDERMAL at 18:59

## 2020-10-25 RX ADMIN — PANTOPRAZOLE SODIUM 40 MILLIGRAM(S): 20 TABLET, DELAYED RELEASE ORAL at 13:31

## 2020-10-25 RX ADMIN — Medication 500000 UNIT(S): at 17:14

## 2020-10-25 RX ADMIN — ENOXAPARIN SODIUM 40 MILLIGRAM(S): 100 INJECTION SUBCUTANEOUS at 21:31

## 2020-10-25 RX ADMIN — MIDODRINE HYDROCHLORIDE 5 MILLIGRAM(S): 2.5 TABLET ORAL at 17:14

## 2020-10-25 RX ADMIN — Medication 500000 UNIT(S): at 13:31

## 2020-10-25 RX ADMIN — SCOPALAMINE 1 PATCH: 1 PATCH, EXTENDED RELEASE TRANSDERMAL at 12:50

## 2020-10-25 RX ADMIN — Medication 81 MILLIGRAM(S): at 13:30

## 2020-10-25 NOTE — CHART NOTE - NSCHARTNOTEFT_GEN_A_CORE
Registered Dietitian Follow-Up     Patient Profile Reviewed                           Yes [x]   No []     Nutrition History Previously Obtained        Yes [x]  No []       Pertinent Subjective Information: Per family, the patient denies N/V/D/C but c/o episodes of hiccups.      Pertinent Medical Interventions: Presented with right jennifer CVA. Hospital course is complicated by orthostatic hypotension (improved), severe expressive aphasia and severe dysphagia with drooling saliva. Failed bedside swallow evaluation. S/p NG Tube placement; enteral nutrition support was initiated. A PEG Tube placement is scheduled for (10/27).       Diet order: (10/25) TF with Jevity 1.2 at 360mL Q6hrs via NG Tube     Anthropometrics:  - Ht: 6'0"  - Wt: 89.8kg  - %wt change  - BMI: 27 (Overweight)  - IBW: 81kg     Pertinent Lab Data: (10/21) Na-137, K-3.8, CL-102, BUN-11, Cr-0.8, Glucose-130mg/dL, Ca-8.5, H/H-12.7/37.2     Pertinent Meds: Lipitor, Lovenox, Protonix      Physical Findings:  - Appearance: AAOx3; Well nourished  - GI function: Denies N/V/D/C. No bowel movement is reported. Episodes of hiccups are reported.  - Tubes: NG Tube  - Oral/Mouth cavity: Failed bedside swallow evaluation  - Skin: Intact (Saturnino Score-14)     Nutrition Requirements  Weight Used: 89.8kg -Derived from nutrition note (10/21)     Estimated Energy Needs    Continue [x]  Adjust []  Adjusted Energy Recommendations: 1859-2014kcal/day ([id number]-1549 x AF 1.2-1.3) -Derived from nutrition note (10/21)      Estimated Protein Needs    Continue [x]  Adjust []  Adjusted Protein Recommendations: 90-108gm/day (1-1.2grams/kg of admit weight) -Derived from nutrition note (10/21)      Estimated Fluid Needs        Continue [x]  Adjust []  Adjusted Fluid Recommendations: 1859-2014mL/day (1mL/kcal) -Derived from nutrition note (10/21)     Nutrient Intake: Current TF regimen provides 1728kcal, 80gm protein, 1166mL free H2O, meeting 86% estimated calorie but 74% estimated protein needs.     [x] Previous Nutrition Diagnosis: Increased Nutrient Needs             [x] Ongoing          [] Resolved    [] No active nutrition diagnosis identified at this time     Nutrition Diagnostic #1  Problem:  Etiology:  Statement:     Nutrition Diagnostic #2  Problem:  Etiology:  Statement:     Nutrition Intervention:  1.Enteral Nutrition     Goal/Expected Outcome:  1.Meet >85% estimated nutritional needs in 3 days     Indicator/Monitorin.Diet order, energy intake, nutrition focused physical findings, anemia profile    Coordination with MD:  Today I spoke to the patient's physician name Dr. Martinez (pager # [phone]), who states the patient is found to have secretions, fluid in lungs and is at risk for aspiration. I recommended to increase the volume of Jevity 1.2 t to 420mL Q6hrs. However, per physician, the patient's volume was kept at 360mL secondary to secretions, fluid in lungs and risk for aspiration. I then recommended to provide an alternate TF formula that provides a low percentage of free water.    Recommendations:  1.Change TF formula to Osmolite 1.5 at 320mL Q6hrs (1920kcal, 80gm pro, 973mL free H2O)   2.Provide Beneprotein 1pk Q6hrs (100kcal, 24gm pro)

## 2020-10-25 NOTE — PROGRESS NOTE ADULT - ASSESSMENT
ASSESSMENT/PLAN  This is a 69 year old male with hx b/l hip replacements, recent mechanical thrombectomy of left ADEOLA/MCA thrombus (8/27/2020), with mild residual deficits, now presents with new left MCA stroke  Rehab of acute MCA CVA stroke:    #Right hemiplegia - PT/OT    # Severe dysphagia with drooling saliva - NPO. Difficulty managing secretions. c/w Yankhauer catheter suction and scopolamine patch, continue to monitor. GI and neuro recs appreciated. Pending PEG placement by GI for Tuesday 10/27. Will hold plavix for 5 days and plan to resume after peg placement, patient remains on ASA and lovenox 40 sc qhs.    #severe aphasia/ dysarthria - ST  - Continue aspirin, lipitor. HOLD plavix for 5 days (10/23-10/27)  - Maintain systolic blood pressure 120-160  - JANET showed no thrombus on September 2; TTE 10/18 unrevealing  - S/p interrogation of loop recorder with no events  - MRI not performed as patient is aspiration risk at this time due to inability to manage oral secretions  - PT/OT/ST/Neuropsych 3 hrs daily    #Wet coughing and R>L rhonchi  Decreased fluid inputs via NGT today; CXR reviewed and negative, continue to monitor    #DLD  Atorvastatin 80 mg    #Hypotension - improved  - Midodrine 10mg TID, Florinef 0.2mg daily continue to monitor    #Hiccups  - Thorazine PRN     #Pyuria/bacturia - moderate. Asymptomatic. Had condom catheter at the time. Recheck U/A and CBC in a few days    -Pain control: Tylenol PRN    -GI/Bowel Mgmt -  Miralax PRN at night    -Bladder management: no issues     -Skin: No active issues at this time    -FEN   - Diet/ Severe Dysphagia - NPO with feeds via NGT. SLP F/U.       Precautions / PROPHYLAXIS:    - Falls precautions  - Aspiration  precautions  - DVT prophylaxis: lovenox   ASSESSMENT/PLAN  This is a 69 year old male with hx b/l hip replacements, recent mechanical thrombectomy of left ADEOLA/MCA thrombus (8/27/2020), with mild residual deficits, now presents with new left MCA stroke  Rehab of acute  left MCA CVA stroke:    #Right hemiplegia - cont PT/OT    # Severe dysphagia with drooling saliva - NPO. Difficulty managing secretions. c/w Yankhauer catheter suction and scopolamine patch, continue to monitor. GI and neuro recs appreciated. Pending PEG placement by GI for Tuesday 10/27. Will hold plavix for 5 days and plan to resume after peg placement, patient remains on ASA and lovenox 40 sc qhs.    #severe aphasia/ dysarthria - ST    - Continue aspirin, lipitor. HOLD plavix for 5 days (10/23-10/27)  - Maintain systolic blood pressure 120-160  - S/p interrogation of loop recorder with no events  - MRI not performed as patient is aspiration risk at this time due to inability to manage oral secretions  - PT/OT/ST/Neuropsych 3 hrs daily    #Wet coughing and R>L rhonchi  Decreased fluid inputs via NGT today; CXR reviewed and negative, continue to monitor    #DLD  Atorvastatin 80 mg    #Hypotension - improved  - Midodrine 10mg TID, Florinef 0.2mg daily continue to monitor    #Hiccups - improving  - Thorazine PRN     #Pyuria/bacturia - moderate. Asymptomatic. Had condom catheter at the time. Recheck U/A and CBC in a few days    -Pain control: Tylenol PRN    -GI/Bowel Mgmt -  Miralax PRN at night    -Bladder management: no issues     -Skin: No active issues at this time    -FEN   - Diet/ Severe Dysphagia - NPO with feeds via NGT. SLP F/U. PEG scheduled. Tube feed and water volume decreased as above      Precautions / PROPHYLAXIS:    - Falls precautions  - Aspiration  precautions  - DVT prophylaxis: lovenox

## 2020-10-25 NOTE — PROGRESS NOTE ADULT - SUBJECTIVE AND OBJECTIVE BOX
Patient is a 69y old  Male who presents with a chief complaint of Left CVA (dominant side)    HPI:  This is a 69 year old male with PMHx of b/l hip replacements, recent mechanical thrombectomy of left ADEOLA/MCA stroke (2020) admitted to [facility] on 10/13/2020 after he developed new right sided facial droop, weakness, and dysarthria. In the ED, his NIHSS was 20 and stroke code was called. However, patient did not receive tPA because of recent stroke history. Initial CTH showed wedge shaped area hypoattenuation within the left frontal and parietal lobes consistent with prior Left MCA territory infarct. CT perfusion showed left MCA M2 and M3 occlusions. Repeat CTH on 10/14/2020 showed interval development of left basal ganglia hypoattenuation compatible with acute infarct. Patient was admitted to ICU for closer monitoring and downgraded to medicine on 10/15/2020. Patient's loop recorder was interrogated 10/14 and did not demonstrate any events. Patient failed bedside swallow evaluation and remains NPO with feedings through NGT requiring frequent suctioning to clean saline in oral cavity. He was started on midodrine 10mg TID for orthostatic hypotension. Patient received fluids and was then started on Florinef 0.2mg daily for continued hypotension. His BP improved the following day and denies lightheadedness or dizziness. Patient seen and examined by physiatry and deemed an appropriate candidate for acute rehabilitation consisting of 3 hours of therapy a day.     Of note during his first stroke on 2020, JANET was performed and did not demonstrate thrombus or cardiac abnormality to suggest cardiac origin for CVA.  (19 Oct 2020 14:10). He reports only being left with residual dysarthria.    CLOF:  He requires mod assist to stand and max assist to transfer and take a few steps. He is NPO and having difficulty managing his own secretions. He has severe expressive aphasia/ apraxia and moderate receptive deficits    Subjective:    The patient was examined at bedside while receiving OT therapy during rounds this morning with Dr. Larsen. Patient remains medically stable, with no new complaints. On scopolamine patch, with mild improvement in drooling saliva, will continue to monitor.  Patient continues to use his Yankhauer catheter to suction his oral secretions. NGT is in place. Plavix held since 10/23 per neuro recs; patient pending PEG tube placement by GI on Tuesday 10/27; will plan to make him NPO and hold lovenox prior. Plan to resume plavix after peg placement, patient remains on ASA and lovenox 40 sc qhs. Wet cough and RLL>LLL rhonchi noted this morning; jevity dec'd to 360cc with 150cc FWF q6h, CXR reviewed and negative. Will continue to monitor, tolerating therapy well.     REVIEW OF SYSTEMS   HEENT: Hiccups, drooling saliva   Pulm: occasional cough,  No shortness of breath  Cardio: No chest pain,  GI:    No diarrhea, No constipation  : No dysuria, No frequency  Neuro: Right sided weakness  MSK: Denies pain    PHYSICAL EXAM    Vital Signs Last 24 Hrs  T(C): 36.2 (25 Oct 2020 05:59), Max: 36.5 (24 Oct 2020 14:05)  T(F): 97.1 (25 Oct 2020 05:59), Max: 97.7 (24 Oct 2020 14:05)  HR: 57 (25 Oct 2020 05:59) (52 - 60)  BP: 116/57 (25 Oct 2020 05:59) (108/59 - 116/57)  BP(mean): --  RR: 18 (25 Oct 2020 05:59) (18 - 52)  SpO2: 94% (24 Oct 2020 14:34) (94% - 94%)    General: NAD, Resting Comfortable, sitting upright at in bed receiving RUE OT therapy                        HEENT: NC/AT, EOM I, PERRLA, Normal Conjunctivae.. NGT in right nostril  Cardio: RRR                              Pulm: No Respiratory Distress,  Lungs with bilateral rhonchi R>L. Breathing comfortably and saturating well on RA                     Abdomen: ND/NT, Soft                                              MSK: No joint swelling, decreased ROM in the right upper and lower extremity.                                         Ext: No C/C/E, No calf tenderness    Skin: intact                                                                   Neurological Examination:  Cognitive: [  x  ] AAO x 3  with choices   [    ]  other                                                                      Attention:  [ x   ] intact   [    ]  other                            Mood/Affect:  [   x ] wnl    [    ]  other                                                                             Communication:  [    ]Fluent,  No dysarthria,   [   x ] other dysarthria, apraxia, sever expressive aphasia  CN II - XII  [    ] intact   [  x  ] other decrease function in clearing saliva  Coordination:   [    ] FTN/HTS intact   [    x] other    normal on left, unable to assess on right due to hemiparesis                                                                   Sensory: [    ]Intact to light touch   [  x  ] other     decreased rt leg                                                                                    Tone:  [    ]  wnl,   [    x]  other decreased tone in RUE .     Motor    LEFT    UE: [ x  ] WNL.  [   ] other:  RIGHT UE:  [   ] WNL.  [ x  ] other: shoulder 2/5, elbow 1/5. wrist 0/5, fingers 0/5  LEFT    LE:  [  x ] WNL.  [   ] other:  RIGHT LE:  [   ] WNL.  [ x  ] other:  strong extensor synergy. Trace isolated movement    Reflex:  [    ]  symmetric,  [  x  ]  other: Right bicep 3+otherwise reflexes normal        Meds  MEDICATIONS  (STANDING):  aspirin  chewable 81 milliGRAM(s) Enteral Tube daily  atorvastatin 80 milliGRAM(s) Oral at bedtime  enoxaparin Injectable 40 milliGRAM(s) SubCutaneous at bedtime  fludroCORTISONE 0.2 milliGRAM(s) Oral <User Schedule>  midodrine. 5 milliGRAM(s) Oral <User Schedule>  nystatin    Suspension 466002 Unit(s) Oral four times a day  pantoprazole   Suspension 40 milliGRAM(s) Enteral Tube daily  scopolamine 1 mG/72 Hr(s) Patch 1 Patch Transdermal every 72 hours    MEDICATIONS  (PRN):  chlorproMAZINE    Tablet 10 milliGRAM(s) Oral every 6 hours PRN hiccups        RECENT LABS/IMAGING - reviewed                      12.7   6.46  )-----------( 215      ( 21 Oct 2020 07:13 )             37.2     10-21    137  |  102  |  11  ----------------------------<  130<H>  3.8   |  27  |  0.8    Ca    8.5      21 Oct 2020 07:13  Mg     1.9     10-21    TPro  5.4<L>  /  Alb  3.2<L>  /  TBili  0.4  /  DBili  x   /  AST  25  /  ALT  18  /  AlkPhos  63  10    Urinalysis Basic - ( 21 Oct 2020 04:25 )    Color: Yellow / Appearance: Slightly Turbid / S.015 / pH: x  Gluc: x / Ketone: Negative  / Bili: Negative / Urobili: 3 mg/dL   Blood: x / Protein: Negative / Nitrite: Negative   Leuk Esterase: Large / RBC: 5 /HPF /  /HPF   Sq Epi: x / Non Sq Epi: 0 /HPF / Bacteria: Moderate Patient is a 69y old  Male who presents with a chief complaint of Left CVA (dominant side)    HPI:  This is a 69 year old male with PMHx of b/l hip replacements, recent mechanical thrombectomy of left ADEOLA/MCA stroke (2020) admitted to [facility] on 10/13/2020 after he developed new right sided facial droop, weakness, and dysarthria. In the ED, his NIHSS was 20 and stroke code was called. However, patient did not receive tPA because of recent stroke history. Initial CTH showed wedge shaped area hypoattenuation within the left frontal and parietal lobes consistent with prior Left MCA territory infarct. CT perfusion showed left MCA M2 and M3 occlusions. Repeat CTH on 10/14/2020 showed interval development of left basal ganglia hypoattenuation compatible with acute infarct. Patient was admitted to ICU for closer monitoring and downgraded to medicine on 10/15/2020. Patient's loop recorder was interrogated 10/14 and did not demonstrate any events. Patient failed bedside swallow evaluation and remains NPO with feedings through NGT requiring frequent suctioning to clean saline in oral cavity. He was started on midodrine 10mg TID for orthostatic hypotension. Patient received fluids and was then started on Florinef 0.2mg daily for continued hypotension. His BP improved the following day and denies lightheadedness or dizziness. Patient seen and examined by physiatry and deemed an appropriate candidate for acute rehabilitation consisting of 3 hours of therapy a day.     Of note during his first stroke on 2020, JANET was performed and did not demonstrate thrombus or cardiac abnormality to suggest cardiac origin for CVA.  (19 Oct 2020 14:10). He reports only being left with residual dysarthria.    CLOF:  He requires mod assist to stand and max assist to transfer and take a few steps. He is NPO and having difficulty managing his own secretions. He has severe expressive aphasia/ apraxia and moderate receptive deficits    Subjective:    The patient was examined at bedside while receiving OT therapy during rounds this morning with Dr. Larsen. Patient remains medically stable, with no new complaints. On scopolamine patch, with mild improvement in drooling saliva, will continue to monitor.  Patient continues to use his Yankhauer catheter to suction his oral secretions. NGT is in place. Plavix held since 10/23 per neuro recs; patient pending PEG tube placement by GI on Tuesday 10/27; will plan to make him NPO and hold lovenox prior. Plan to resume plavix after peg placement, patient remains on ASA and lovenox 40 sc qhs. Wet cough and RLL>LLL rhonchi noted this morning; jevity dec'd to 360cc with 150cc FWF q6h, CXR reviewed and negative. Will continue to monitor, tolerating therapy well.     REVIEW OF SYSTEMS   HEENT: Hiccups, drooling saliva   Pulm: occasional cough,  No shortness of breath  Cardio: No chest pain,  GI:    No diarrhea, No constipation  : No dysuria, No frequency  Neuro: Right sided weakness  MSK: Denies pain    PHYSICAL EXAM    Vital Signs Last 24 Hrs  T(C): 36.2 (25 Oct 2020 05:59), Max: 36.5 (24 Oct 2020 14:05)  T(F): 97.1 (25 Oct 2020 05:59), Max: 97.7 (24 Oct 2020 14:05)  HR: 57 (25 Oct 2020 05:59) (52 - 60)  BP: 116/57 (25 Oct 2020 05:59) (108/59 - 116/57)  BP(mean): --  RR: 18 (25 Oct 2020 05:59) (18 - 52)  SpO2: 94% (24 Oct 2020 14:34) (94% - 94%)    General: NAD, Resting Comfortable, sitting upright at in bed receiving RUE OT therapy                        HEENT: NC/AT, EOM I, PERRLA, Normal Conjunctivae.. NGT in right nostril  Cardio: RRR                              Pulm: No Respiratory Distress,  Lungs with bilateral rhonchi R>L. Breathing comfortably and saturating well on RA                     Abdomen: ND/NT, Soft                                              MSK: No joint swelling, decreased ROM in the right upper and lower extremity.                                         Ext: No C/C/E, No calf tenderness    Skin: intact                                                                   Neurological Examination:  Cognitive: [  x  ] AAO x 3  with choices   [    ]  other                                                                      Attention:  [ x   ] intact   [    ]  other                            Mood/Affect:  [   x ] wnl    [    ]  other                                                                             Communication:  [    ]Fluent,  No dysarthria,   [   x ] other dysarthria, apraxia, sever expressive aphasia  CN II - XII  [    ] intact   [  x  ] other decrease function in clearing saliva  Coordination:   [    ] FTN/HTS intact   [    x] other    normal on left, unable to assess on right due to hemiparesis                                                                   Sensory: [    ]Intact to light touch   [  x  ] other     decreased rt leg                                                                                    Tone:  [    ]  wnl,   [    x]  other decreased tone in RUE .     Motor    LEFT    UE: [ x  ] WNL.  [   ] other:  RIGHT UE:  [   ] WNL.  [ x  ] other: shoulder 2/5, elbow 1/5. wrist 0/5, fingers 0/5  LEFT    LE:  [  x ] WNL.  [   ] other:  RIGHT LE:  [   ] WNL.  [ x  ] other:  strong extensor synergy. Trace isolated movement    Reflex:  [    ]  symmetric,  [  x  ]  other: Right bicep 3+otherwise reflexes normal        Meds  MEDICATIONS  (STANDING):  aspirin  chewable 81 milliGRAM(s) Enteral Tube daily  atorvastatin 80 milliGRAM(s) Oral at bedtime  enoxaparin Injectable 40 milliGRAM(s) SubCutaneous at bedtime  fludroCORTISONE 0.2 milliGRAM(s) Oral <User Schedule>  midodrine. 5 milliGRAM(s) Oral <User Schedule>  nystatin    Suspension 435495 Unit(s) Oral four times a day  pantoprazole   Suspension 40 milliGRAM(s) Enteral Tube daily  scopolamine 1 mG/72 Hr(s) Patch 1 Patch Transdermal every 72 hours    MEDICATIONS  (PRN):  chlorproMAZINE    Tablet 10 milliGRAM(s) Oral every 6 hours PRN hiccups        RECENT LABS/IMAGING - reviewed                      12.7   6.46  )-----------( 215      ( 21 Oct 2020 07:13 )             37.2     10-21    137  |  102  |  11  ----------------------------<  130<H>  3.8   |  27  |  0.8    Ca    8.5      21 Oct 2020 07:13  Mg     1.9     10-21    TPro  5.4<L>  /  Alb  3.2<L>  /  TBili  0.4  /  DBili  x   /  AST  25  /  ALT  18  /  AlkPhos  63  10    Urinalysis Basic - ( 21 Oct 2020 04:25 )    Color: Yellow / Appearance: Slightly Turbid / S.015 / pH: x  Gluc: x / Ketone: Negative  / Bili: Negative / Urobili: 3 mg/dL   Blood: x / Protein: Negative / Nitrite: Negative   Leuk Esterase: Large / RBC: 5 /HPF /  /HPF   Sq Epi: x / Non Sq Epi: 0 /HPF / Bacteria: Moderate    Recent imaging:  CXR 10/25: IMPRESSION:  No radiographic evidence of acute cardiopulmonary disease.  Enteric tube tip is in the gastric body. Patient is a 69y old  Male who presents with a chief complaint of Left CVA (dominant side)    HPI:  This is a 69 year old male with PMHx of b/l hip replacements, recent mechanical thrombectomy of left ADEOLA/MCA stroke (2020) admitted to [facility] on 10/13/2020 after he developed new right sided facial droop, weakness, and dysarthria. In the ED, his NIHSS was 20 and stroke code was called. However, patient did not receive tPA because of recent stroke history. Initial CTH showed wedge shaped area hypoattenuation within the left frontal and parietal lobes consistent with prior Left MCA territory infarct. CT perfusion showed left MCA M2 and M3 occlusions. Repeat CTH on 10/14/2020 showed interval development of left basal ganglia hypoattenuation compatible with acute infarct. Patient was admitted to ICU for closer monitoring and downgraded to medicine on 10/15/2020. Patient's loop recorder was interrogated 10/14 and did not demonstrate any events. Patient failed bedside swallow evaluation and remains NPO with feedings through NGT requiring frequent suctioning to clean saline in oral cavity. He was started on midodrine 10mg TID for orthostatic hypotension. Patient received fluids and was then started on Florinef 0.2mg daily for continued hypotension. His BP improved the following day and denies lightheadedness or dizziness. Patient seen and examined by physiatry and deemed an appropriate candidate for acute rehabilitation consisting of 3 hours of therapy a day.     Of note during his first stroke on 2020, JANET was performed and did not demonstrate thrombus or cardiac abnormality to suggest cardiac origin for CVA.  (19 Oct 2020 14:10). He reports only being left with residual dysarthria.    CLOF:  He requires mod assist to stand and max assist to transfer and take a few steps. He is NPO and having difficulty managing his own secretions. He has severe expressive aphasia/ apraxia and moderate receptive deficits    Subjective:    The patient was examined at bedside while receiving OT therapy during rounds this morning with Dr. Larsen. Patient remains medically stable, with no new complaints. On scopolamine patch, with mild improvement in drooling saliva, will continue to monitor.  Patient continues to use his Yankhauer catheter to suction his oral secretions. NGT is in place. Plavix held since 10/23 per neuro recs; patient pending PEG tube placement by GI on Tuesday 10/27; will plan to make him NPO and hold lovenox prior. Plan to resume plavix after peg placement, patient remains on ASA and lovenox 40 sc qhs. Wet cough and RLL>LLL rhonchi noted this morning; jevity dec'd to 360cc with 150cc FWF q6h, CXR reviewed and negative. Will continue to monitor, tolerating therapy well.     REVIEW OF SYSTEMS   HEENT: Hiccups - improving, drooling saliva   Pulm: occasional cough,  No shortness of breath  Cardio: No chest pain,  GI:    No diarrhea, No constipation  : No dysuria, No frequency  Neuro: Right sided weakness  MSK: Denies pain    PHYSICAL EXAM    Vital Signs Last 24 Hrs  T(C): 36.2 (25 Oct 2020 05:59), Max: 36.5 (24 Oct 2020 14:05)  T(F): 97.1 (25 Oct 2020 05:59), Max: 97.7 (24 Oct 2020 14:05)  HR: 57 (25 Oct 2020 05:59) (52 - 60)  BP: 116/57 (25 Oct 2020 05:59) (108/59 - 116/57)  BP(mean): --  RR: 18 (25 Oct 2020 05:59) (18 - 52)  SpO2: 94% (24 Oct 2020 14:34) (94% - 94%)    General: NAD, Resting Comfortable, sitting upright at in bed receiving RUE OT therapy                        HEENT: NC/AT, EOM I, PERRLA, Normal Conjunctivae.. NGT in right nostril  Cardio: RRR                              Pulm: No Respiratory Distress,  Lungs with bilateral rhonchi R>L. Breathing comfortably and saturating well on RA                     Abdomen: ND/NT, Soft                                              MSK: No joint swelling, decreased ROM in the right upper and lower extremity.                                         Ext: No C/C/E, No calf tenderness    Skin: intact                                                                   Neurological Examination:  Cognitive: [  x  ] AAO x 3  with choices   [    ]  other                                                                      Attention:  [ x   ] intact   [    ]  other                            Mood/Affect:  [   x ] wnl    [    ]  other                                                                             Communication:  [    ]Fluent   [   x ] other dysarthria, apraxia, severe expressive aphasia  CN II - XII  [    ] intact   [  x  ] other decrease function in clearing saliva  Coordination:   [    ] FTN/HTS intact   [    x] other    normal on left, unable to assess on right due to hemiparesis                                                                   Sensory: [    ]Intact to light touch   [  x  ] other     decreased rt leg                                                                                    Tone:  [    ]  wnl,   [    x]  other decreased tone in RUE .     Motor    LEFT    UE: [ x  ] WNL.  [   ] other:  RIGHT UE:  [   ] WNL.  [ x  ] other: shoulder 2/5, elbow 1/5. wrist 0/5, fingers 0/5  LEFT    LE:  [  x ] WNL.  [   ] other:  RIGHT LE:  [   ] WNL.  [ x  ] other: mod  strong extensor synergy. Trace isolated movement    Reflex:  [    ]  symmetric,  [  x  ]  other: Right bicep 3+otherwise reflexes normal        Meds  MEDICATIONS  (STANDING):  aspirin  chewable 81 milliGRAM(s) Enteral Tube daily  atorvastatin 80 milliGRAM(s) Oral at bedtime  enoxaparin Injectable 40 milliGRAM(s) SubCutaneous at bedtime  fludroCORTISONE 0.2 milliGRAM(s) Oral <User Schedule>  midodrine. 5 milliGRAM(s) Oral <User Schedule>  nystatin    Suspension 836557 Unit(s) Oral four times a day  pantoprazole   Suspension 40 milliGRAM(s) Enteral Tube daily  scopolamine 1 mG/72 Hr(s) Patch 1 Patch Transdermal every 72 hours    MEDICATIONS  (PRN):  chlorproMAZINE    Tablet 10 milliGRAM(s) Oral every 6 hours PRN hiccups        RECENT LABS/IMAGING - reviewed                      12.7   6.46  )-----------( 215      ( 21 Oct 2020 07:13 )             37.2     10-    137  |  102  |  11  ----------------------------<  130<H>  3.8   |  27  |  0.8    Ca    8.5      21 Oct 2020 07:13  Mg     1.9     10-21    TPro  5.4<L>  /  Alb  3.2<L>  /  TBili  0.4  /  DBili  x   /  AST  25  /  ALT  18  /  AlkPhos  63  10-21    Urinalysis Basic - ( 21 Oct 2020 04:25 )    Color: Yellow / Appearance: Slightly Turbid / S.015 / pH: x  Gluc: x / Ketone: Negative  / Bili: Negative / Urobili: 3 mg/dL   Blood: x / Protein: Negative / Nitrite: Negative   Leuk Esterase: Large / RBC: 5 /HPF /  /HPF   Sq Epi: x / Non Sq Epi: 0 /HPF / Bacteria: Moderate    Recent imaging:  CXR 10/25: IMPRESSION:  No radiographic evidence of acute cardiopulmonary disease.  Enteric tube tip is in the gastric body.

## 2020-10-25 NOTE — PROGRESS NOTE ADULT - NUTRITIONAL ASSESSMENT
Diet, NPO with Tube Feed:   Tube Feeding Modality: Nasogastric  Jevity 1.2 Benjamin  Total Volume for 24 Hours (mL): 1440  Bolus  Total Volume of Bolus (mL):  360  Tube Feed Frequency: Every 6 hours   Tube Feed Start Time: 06:00  Bolus Feed Rate (mL per Hour): 420   Bolus Feed Duration (in Hours): 1  Bolus Feed Instructions:  Free water flushes 50cc before and 100cc after each feed (10-25-20 @ 09:41) [Active]

## 2020-10-26 LAB
ALBUMIN SERPL ELPH-MCNC: 3.5 G/DL — SIGNIFICANT CHANGE UP (ref 3.5–5.2)
ALP SERPL-CCNC: 73 U/L — SIGNIFICANT CHANGE UP (ref 30–115)
ALT FLD-CCNC: 22 U/L — SIGNIFICANT CHANGE UP (ref 0–41)
ANION GAP SERPL CALC-SCNC: 8 MMOL/L — SIGNIFICANT CHANGE UP (ref 7–14)
AST SERPL-CCNC: 27 U/L — SIGNIFICANT CHANGE UP (ref 0–41)
BASOPHILS # BLD AUTO: 0.05 K/UL — SIGNIFICANT CHANGE UP (ref 0–0.2)
BASOPHILS NFR BLD AUTO: 0.7 % — SIGNIFICANT CHANGE UP (ref 0–1)
BILIRUB SERPL-MCNC: 1.2 MG/DL — SIGNIFICANT CHANGE UP (ref 0.2–1.2)
BUN SERPL-MCNC: 16 MG/DL — SIGNIFICANT CHANGE UP (ref 10–20)
CALCIUM SERPL-MCNC: 8.9 MG/DL — SIGNIFICANT CHANGE UP (ref 8.5–10.1)
CHLORIDE SERPL-SCNC: 104 MMOL/L — SIGNIFICANT CHANGE UP (ref 98–110)
CO2 SERPL-SCNC: 29 MMOL/L — SIGNIFICANT CHANGE UP (ref 17–32)
CREAT SERPL-MCNC: 1 MG/DL — SIGNIFICANT CHANGE UP (ref 0.7–1.5)
EOSINOPHIL # BLD AUTO: 0.22 K/UL — SIGNIFICANT CHANGE UP (ref 0–0.7)
EOSINOPHIL NFR BLD AUTO: 2.9 % — SIGNIFICANT CHANGE UP (ref 0–8)
GLUCOSE SERPL-MCNC: 97 MG/DL — SIGNIFICANT CHANGE UP (ref 70–99)
HCT VFR BLD CALC: 39.3 % — LOW (ref 42–52)
HGB BLD-MCNC: 13.3 G/DL — LOW (ref 14–18)
IMM GRANULOCYTES NFR BLD AUTO: 0.3 % — SIGNIFICANT CHANGE UP (ref 0.1–0.3)
LYMPHOCYTES # BLD AUTO: 1.35 K/UL — SIGNIFICANT CHANGE UP (ref 1.2–3.4)
LYMPHOCYTES # BLD AUTO: 17.6 % — LOW (ref 20.5–51.1)
MAGNESIUM SERPL-MCNC: 2 MG/DL — SIGNIFICANT CHANGE UP (ref 1.8–2.4)
MCHC RBC-ENTMCNC: 32 PG — HIGH (ref 27–31)
MCHC RBC-ENTMCNC: 33.8 G/DL — SIGNIFICANT CHANGE UP (ref 32–37)
MCV RBC AUTO: 94.5 FL — HIGH (ref 80–94)
MONOCYTES # BLD AUTO: 0.55 K/UL — SIGNIFICANT CHANGE UP (ref 0.1–0.6)
MONOCYTES NFR BLD AUTO: 7.2 % — SIGNIFICANT CHANGE UP (ref 1.7–9.3)
NEUTROPHILS # BLD AUTO: 5.49 K/UL — SIGNIFICANT CHANGE UP (ref 1.4–6.5)
NEUTROPHILS NFR BLD AUTO: 71.3 % — SIGNIFICANT CHANGE UP (ref 42.2–75.2)
NRBC # BLD: 0 /100 WBCS — SIGNIFICANT CHANGE UP (ref 0–0)
PLATELET # BLD AUTO: 264 K/UL — SIGNIFICANT CHANGE UP (ref 130–400)
POTASSIUM SERPL-MCNC: 4.3 MMOL/L — SIGNIFICANT CHANGE UP (ref 3.5–5)
POTASSIUM SERPL-SCNC: 4.3 MMOL/L — SIGNIFICANT CHANGE UP (ref 3.5–5)
PROT SERPL-MCNC: 6 G/DL — SIGNIFICANT CHANGE UP (ref 6–8)
RBC # BLD: 4.16 M/UL — LOW (ref 4.7–6.1)
RBC # FLD: 11 % — LOW (ref 11.5–14.5)
SODIUM SERPL-SCNC: 141 MMOL/L — SIGNIFICANT CHANGE UP (ref 135–146)
WBC # BLD: 7.68 K/UL — SIGNIFICANT CHANGE UP (ref 4.8–10.8)
WBC # FLD AUTO: 7.68 K/UL — SIGNIFICANT CHANGE UP (ref 4.8–10.8)

## 2020-10-26 PROCEDURE — 71045 X-RAY EXAM CHEST 1 VIEW: CPT | Mod: 26

## 2020-10-26 RX ADMIN — Medication 500000 UNIT(S): at 18:13

## 2020-10-26 RX ADMIN — ATORVASTATIN CALCIUM 80 MILLIGRAM(S): 80 TABLET, FILM COATED ORAL at 21:58

## 2020-10-26 RX ADMIN — PANTOPRAZOLE SODIUM 40 MILLIGRAM(S): 20 TABLET, DELAYED RELEASE ORAL at 16:27

## 2020-10-26 RX ADMIN — MIDODRINE HYDROCHLORIDE 5 MILLIGRAM(S): 2.5 TABLET ORAL at 08:55

## 2020-10-26 RX ADMIN — ENOXAPARIN SODIUM 40 MILLIGRAM(S): 100 INJECTION SUBCUTANEOUS at 21:58

## 2020-10-26 RX ADMIN — Medication 81 MILLIGRAM(S): at 13:12

## 2020-10-26 RX ADMIN — MIDODRINE HYDROCHLORIDE 5 MILLIGRAM(S): 2.5 TABLET ORAL at 18:13

## 2020-10-26 RX ADMIN — MIDODRINE HYDROCHLORIDE 5 MILLIGRAM(S): 2.5 TABLET ORAL at 13:12

## 2020-10-26 RX ADMIN — Medication 500000 UNIT(S): at 06:50

## 2020-10-26 RX ADMIN — SCOPALAMINE 1 PATCH: 1 PATCH, EXTENDED RELEASE TRANSDERMAL at 20:08

## 2020-10-26 RX ADMIN — Medication 500000 UNIT(S): at 13:12

## 2020-10-26 RX ADMIN — FLUDROCORTISONE ACETATE 0.2 MILLIGRAM(S): 0.1 TABLET ORAL at 08:54

## 2020-10-26 RX ADMIN — Medication 500000 UNIT(S): at 01:12

## 2020-10-26 NOTE — PROGRESS NOTE ADULT - SUBJECTIVE AND OBJECTIVE BOX
Patient is a 69y old  Male who presents with a chief complaint of Left CVA (dominant side)    HPI:  This is a 69 year old male with PMHx of b/l hip replacements, recent mechanical thrombectomy of left ADEOLA/MCA stroke (2020) admitted to [facility] on 10/13/2020 after he developed new right sided facial droop, weakness, and dysarthria. In the ED, his NIHSS was 20 and stroke code was called. However, patient did not receive tPA because of recent stroke history. Initial CTH showed wedge shaped area hypoattenuation within the left frontal and parietal lobes consistent with prior Left MCA territory infarct. CT perfusion showed left MCA M2 and M3 occlusions. Repeat CTH on 10/14/2020 showed interval development of left basal ganglia hypoattenuation compatible with acute infarct. Patient was admitted to ICU for closer monitoring and downgraded to medicine on 10/15/2020. Patient's loop recorder was interrogated 10/14 and did not demonstrate any events. Patient failed bedside swallow evaluation and remains NPO with feedings through NGT requiring frequent suctioning to clean saline in oral cavity. He was started on midodrine 10mg TID for orthostatic hypotension. Patient received fluids and was then started on Florinef 0.2mg daily for continued hypotension. His BP improved the following day and denies lightheadedness or dizziness. Patient seen and examined by physiatry and deemed an appropriate candidate for acute rehabilitation consisting of 3 hours of therapy a day.     Of note during his first stroke on 2020, JANET was performed and did not demonstrate thrombus or cardiac abnormality to suggest cardiac origin for CVA.  (19 Oct 2020 14:10). He reports only being left with residual dysarthria.    CLOF:  He ambulates 5' x2 with hemicane max assist, and is min-mod assist on parallel bars. He is NPO and having difficulty managing his own secretions. He has severe expressive aphasia/ apraxia and moderate receptive deficits    Subjective:    The patient was examined at during rounds this morning with Dr. Larsen. Patient pulled out his NGT overnight, and a large NGT was placed with CXR reviewed. Pt remains medically stable, reports feeling well, denies new complaints today. On scopolamine patch, with mild improvement in drooling saliva, will continue to monitor.  Patient continues to use his Yankhauer catheter to suction his oral secretions. NGT is in place. Plavix held since 10/23 per neuro recs. Discussed with GI fellow today. Patient pending PEG tube placement by GI Dr. Prakash on Wednesday 10/28. Covid screen sent today, am INR ordered; will plan to make him NPO/NPNGT and hold lovenox prior. Plan to resume plavix after peg placement, patient remains on ASA and lovenox 40 sc qhs. Pt's diet on osmolite 1.5 360cc with 150cc FWF q6h. Will continue to monitor, tolerating therapy well.     REVIEW OF SYSTEMS   HEENT: Hiccups - improving, drooling saliva   Pulm: occasional cough,  No shortness of breath  Cardio: No chest pain,  GI:    No diarrhea, No constipation  : No dysuria, No frequency  Neuro: Right sided weakness  MSK: Denies pain    PHYSICAL EXAM    Vital Signs Last 24 Hrs  T(C): 36 (26 Oct 2020 13:27), Max: 36 (26 Oct 2020 13:27)  T(F): 96.8 (26 Oct 2020 13:27), Max: 96.8 (26 Oct 2020 13:27)  HR: 61 (26 Oct 2020 13:27) (61 - 61)  BP: 107/62 (26 Oct 2020 13:27) (107/62 - 107/62)  BP(mean): --  RR: 19 (26 Oct 2020 13:27) (19 - 19)  SpO2: --    General: NAD, Resting Comfortable, sitting upright at in bed                      HEENT: NC/AT, EOM I, PERRLA, Normal Conjunctivae.. large NGT in place  Cardio: RRR                              Pulm: No Respiratory Distress,  Lungs with bilateral rhonchi R>L. Breathing comfortably and saturating well on RA                     Abdomen: ND/NT, Soft                                              MSK: No joint swelling, decreased ROM in the right upper and lower extremity.                                         Ext: No C/C/E, No calf tenderness    Skin: intact                                                                   Neurological Examination:  Cognitive: [  x  ] AAO x 3  with choices   [    ]  other                                                                      Attention:  [ x   ] intact   [    ]  other                            Mood/Affect:  [   x ] wnl    [    ]  other                                                                             Communication:  [    ]Fluent   [   x ] other dysarthria, apraxia, severe expressive aphasia  CN II - XII  [    ] intact   [  x  ] other decrease function in clearing saliva  Coordination:   [    ] FTN/HTS intact   [    x] other    normal on left, unable to assess on right due to hemiparesis                                                                   Sensory: [    ]Intact to light touch   [  x  ] other     decreased rt leg                                                                                    Tone:  [    ]  wnl,   [    x]  other decreased tone in RUE .     Motor    LEFT    UE: [ x  ] WNL.  [   ] other:  RIGHT UE:  [   ] WNL.  [ x  ] other: shoulder 2/5, elbow 1/5. wrist 0/5, fingers 0/5  LEFT    LE:  [  x ] WNL.  [   ] other:  RIGHT LE:  [   ] WNL.  [ x  ] other: mod  strong extensor synergy. Trace isolated movement    Reflex:  [    ]  symmetric,  [  x  ]  other: Right bicep 3+otherwise reflexes normal        Meds  MEDICATIONS  (STANDING):  aspirin  chewable 81 milliGRAM(s) Enteral Tube daily  atorvastatin 80 milliGRAM(s) Oral at bedtime  enoxaparin Injectable 40 milliGRAM(s) SubCutaneous at bedtime  fludroCORTISONE 0.2 milliGRAM(s) Oral <User Schedule>  midodrine. 5 milliGRAM(s) Oral <User Schedule>  nystatin    Suspension 946620 Unit(s) Oral four times a day  pantoprazole   Suspension 40 milliGRAM(s) Enteral Tube daily  scopolamine 1 mG/72 Hr(s) Patch 1 Patch Transdermal every 72 hours    MEDICATIONS  (PRN):  chlorproMAZINE    Tablet 10 milliGRAM(s) Oral every 6 hours PRN hiccups      RECENT LABS/IMAGING - reviewed                          13.3   7.68  )-----------( 264      ( 26 Oct 2020 07:07 )             39.3     10-26    141  |  104  |  16  ----------------------------<  97  4.3   |  29  |  1.0    Ca    8.9      26 Oct 2020 07:07  Mg     2.0     10-26    TPro  6.0  /  Alb  3.5  /  TBili  1.2  /  DBili  x   /  AST  27  /  ALT  22  /  AlkPhos  73  10-26    Urinalysis Basic - ( 21 Oct 2020 04:25 )    Color: Yellow / Appearance: Slightly Turbid / S.015 / pH: x  Gluc: x / Ketone: Negative  / Bili: Negative / Urobili: 3 mg/dL   Blood: x / Protein: Negative / Nitrite: Negative   Leuk Esterase: Large / RBC: 5 /HPF /  /HPF   Sq Epi: x / Non Sq Epi: 0 /HPF / Bacteria: Moderate    Recent imaging:  CXR 10/25:  Findings:  Support devices: Enteric tube coursing below left hemidiaphragm terminating in the left upper quadrant.  Cardiac/mediastinum/hilum: Stable appearance of the cardiomediastinal silhouette.  Lung parenchyma/Pleura: No focal consolidation, pneumothorax or pleural effusion on frontal view.  Skeleton/soft tissues: Unchanged.  Impression:  Enteric tube coursing below left hemidiaphragm terminating in the left upper quadrant.    CXR 10/23: IMPRESSION:  No radiographic evidence of acute cardiopulmonary disease.  Enteric tube tip is in the gastric body.

## 2020-10-26 NOTE — CHART NOTE - NSCHARTNOTEFT_GEN_A_CORE
Was notified by Nursing staff that the patient's NGT had come out.  Patient seen and evaluated.     S: no acute complaints; pt unsure how tube came out  O: hemodynamics stable; b/l air entry; S1/S2; BS+; abd soft; exp aphasia  A: Stable; dislodged NGT  P: NGT replaced, F/u CXR requested and pending    Case discussed w/ nursing staff

## 2020-10-26 NOTE — PROGRESS NOTE ADULT - ASSESSMENT
ASSESSMENT/PLAN  This is a 69 year old male with hx b/l hip replacements, recent mechanical thrombectomy of left ADEOLA/MCA thrombus (8/27/2020), with mild residual deficits, now presents with new left MCA stroke  Rehab of acute  left MCA CVA stroke:    #Right hemiplegia - cont PT/OT    # Severe dysphagia with drooling saliva - NPO. Difficulty managing secretions. c/w Yankhauer catheter suction and scopolamine patch, continue to monitor. GI and neuro recs appreciated. Plavix held since 10/23 per neuro recs. Discussed with GI fellow today. Patient pending PEG tube placement by GI Dr. Prakash on Wednesday 10/28. Covid screen sent today, am INR ordered; will plan to make him NPO/NPNGT and hold lovenox prior. Plan to resume plavix after peg placement, patient remains on ASA and lovenox 40 sc qhs.     #severe aphasia/ dysarthria - ST  - Continue aspirin, lipitor. HOLD plavix for now until after peg placement (10/23-)  - Maintain systolic blood pressure 120-160  - S/p interrogation of loop recorder with no events  - MRI not performed as patient is aspiration risk at this time due to inability to manage oral secretions  - PT/OT/ST/Neuropsych 3 hrs daily    #Wet coughing and R>L rhonchi  Decreased fluid inputs via NGT (10/25-); CXR reviewed and negative, continue to monitor    #DLD  Atorvastatin 80 mg    #Hypotension - improved  - Midodrine 10mg TID, Florinef 0.2mg daily continue to monitor    #Hiccups - improving  - Thorazine PRN     #Pyuria/bacturia - moderate. Asymptomatic. Had condom catheter at the time. Recheck U/A in a few days. CBC 10/26 wnl, no leukocytosis    -Pain control: Tylenol PRN    -GI/Bowel Mgmt -  Miralax PRN at night    -Bladder management: no issues     -Skin: No active issues at this time    -FEN   - Diet/ Severe Dysphagia - NPO with feeds via NGT. SLP F/U. PEG scheduled. Tube feed and water volume decreased as above      Precautions / PROPHYLAXIS:    - Falls precautions  - Aspiration  precautions  - DVT prophylaxis: lovenox

## 2020-10-26 NOTE — CHART NOTE - NSCHARTNOTEFT_GEN_A_CORE
patients family wants to have PEG placed by dr lópez on Wednesday , plan discussed with the primary team , primary team are keeping the patient for rehab .Patient is 5 days off plavix     REC:   please obtain INR , repeat COVID swab  keep NPO tomorrow after midnight   Hold AC tomorrow after midnight

## 2020-10-26 NOTE — PROGRESS NOTE ADULT - NUTRITIONAL ASSESSMENT
Diet, NPO with Tube Feed:   Tube Feeding Modality: Nasogastric  Osmolite 1.5 Benjamin  Total Volume for 24 Hours (mL): 1280  Bolus  Total Volume of Bolus (mL):  320  Tube Feed Frequency: Every 6 hours   Tube Feed Start Time: 18:00  Bolus Feed Rate (mL per Hour): 53   Bolus Feed Duration (in Hours): 25  Beneprotein ([facility] Only)     Qty per Day:  4 (10-25-20 @ 16:53) [Active]

## 2020-10-27 LAB
ALLERGY+IMMUNOLOGY DIAG STUDY NOTE: SIGNIFICANT CHANGE UP
ANION GAP SERPL CALC-SCNC: 9 MMOL/L — SIGNIFICANT CHANGE UP (ref 7–14)
BLD GP AB SCN SERPL QL: SIGNIFICANT CHANGE UP
BUN SERPL-MCNC: 15 MG/DL — SIGNIFICANT CHANGE UP (ref 10–20)
CALCIUM SERPL-MCNC: 8.9 MG/DL — SIGNIFICANT CHANGE UP (ref 8.5–10.1)
CHLORIDE SERPL-SCNC: 103 MMOL/L — SIGNIFICANT CHANGE UP (ref 98–110)
CO2 SERPL-SCNC: 27 MMOL/L — SIGNIFICANT CHANGE UP (ref 17–32)
CREAT SERPL-MCNC: 0.9 MG/DL — SIGNIFICANT CHANGE UP (ref 0.7–1.5)
DIR ANTIGLOB POLYSPECIFIC INTERPRETATION: SIGNIFICANT CHANGE UP
GLUCOSE SERPL-MCNC: 108 MG/DL — HIGH (ref 70–99)
HCT VFR BLD CALC: 40.5 % — LOW (ref 42–52)
HGB BLD-MCNC: 13.6 G/DL — LOW (ref 14–18)
INR BLD: 1.23 RATIO — SIGNIFICANT CHANGE UP (ref 0.65–1.3)
MCHC RBC-ENTMCNC: 31.7 PG — HIGH (ref 27–31)
MCHC RBC-ENTMCNC: 33.6 G/DL — SIGNIFICANT CHANGE UP (ref 32–37)
MCV RBC AUTO: 94.4 FL — HIGH (ref 80–94)
NRBC # BLD: 0 /100 WBCS — SIGNIFICANT CHANGE UP (ref 0–0)
PLATELET # BLD AUTO: 288 K/UL — SIGNIFICANT CHANGE UP (ref 130–400)
POTASSIUM SERPL-MCNC: 4.4 MMOL/L — SIGNIFICANT CHANGE UP (ref 3.5–5)
POTASSIUM SERPL-SCNC: 4.4 MMOL/L — SIGNIFICANT CHANGE UP (ref 3.5–5)
PROTHROM AB SERPL-ACNC: 14.2 SEC — HIGH (ref 9.95–12.87)
RBC # BLD: 4.29 M/UL — LOW (ref 4.7–6.1)
RBC # FLD: 11 % — LOW (ref 11.5–14.5)
SARS-COV-2 RNA SPEC QL NAA+PROBE: SIGNIFICANT CHANGE UP
SODIUM SERPL-SCNC: 139 MMOL/L — SIGNIFICANT CHANGE UP (ref 135–146)
WBC # BLD: 10.02 K/UL — SIGNIFICANT CHANGE UP (ref 4.8–10.8)
WBC # FLD AUTO: 10.02 K/UL — SIGNIFICANT CHANGE UP (ref 4.8–10.8)

## 2020-10-27 PROCEDURE — 74018 RADEX ABDOMEN 1 VIEW: CPT | Mod: 26

## 2020-10-27 PROCEDURE — 71045 X-RAY EXAM CHEST 1 VIEW: CPT | Mod: 26,77

## 2020-10-27 PROCEDURE — 71045 X-RAY EXAM CHEST 1 VIEW: CPT | Mod: 26

## 2020-10-27 RX ORDER — ENOXAPARIN SODIUM 100 MG/ML
40 INJECTION SUBCUTANEOUS AT BEDTIME
Refills: 0 | Status: DISCONTINUED | OUTPATIENT
Start: 2020-10-28 | End: 2020-10-30

## 2020-10-27 RX ORDER — GABAPENTIN 400 MG/1
300 CAPSULE ORAL AT BEDTIME
Refills: 0 | Status: DISCONTINUED | OUTPATIENT
Start: 2020-10-27 | End: 2020-10-28

## 2020-10-27 RX ORDER — SODIUM CHLORIDE 9 MG/ML
1000 INJECTION, SOLUTION INTRAVENOUS
Refills: 0 | Status: DISCONTINUED | OUTPATIENT
Start: 2020-10-27 | End: 2020-10-28

## 2020-10-27 RX ADMIN — MIDODRINE HYDROCHLORIDE 5 MILLIGRAM(S): 2.5 TABLET ORAL at 05:43

## 2020-10-27 RX ADMIN — Medication 500000 UNIT(S): at 00:04

## 2020-10-27 RX ADMIN — FLUDROCORTISONE ACETATE 0.2 MILLIGRAM(S): 0.1 TABLET ORAL at 05:44

## 2020-10-27 RX ADMIN — Medication 500000 UNIT(S): at 18:53

## 2020-10-27 RX ADMIN — Medication 500000 UNIT(S): at 05:43

## 2020-10-27 RX ADMIN — SCOPALAMINE 1 PATCH: 1 PATCH, EXTENDED RELEASE TRANSDERMAL at 21:33

## 2020-10-27 RX ADMIN — SCOPALAMINE 1 PATCH: 1 PATCH, EXTENDED RELEASE TRANSDERMAL at 07:51

## 2020-10-27 NOTE — PROGRESS NOTE ADULT - SUBJECTIVE AND OBJECTIVE BOX
Patient is a 69y old  Male who presents with a chief complaint of Left CVA (dominant side)    HPI:  This is a 69 year old male with PMHx of b/l hip replacements, recent mechanical thrombectomy of left ADEOLA/MCA stroke (2020) admitted to [facility] on 10/13/2020 after he developed new right sided facial droop, weakness, and dysarthria. In the ED, his NIHSS was 20 and stroke code was called. However, patient did not receive tPA because of recent stroke history. Initial CTH showed wedge shaped area hypoattenuation within the left frontal and parietal lobes consistent with prior Left MCA territory infarct. CT perfusion showed left MCA M2 and M3 occlusions. Repeat CTH on 10/14/2020 showed interval development of left basal ganglia hypoattenuation compatible with acute infarct. Patient was admitted to ICU for closer monitoring and downgraded to medicine on 10/15/2020. Patient's loop recorder was interrogated 10/14 and did not demonstrate any events. Patient failed bedside swallow evaluation and remains NPO with feedings through NGT requiring frequent suctioning to clean saline in oral cavity. He was started on midodrine 10mg TID for orthostatic hypotension. Patient received fluids and was then started on Florinef 0.2mg daily for continued hypotension. His BP improved the following day and denies lightheadedness or dizziness. Patient seen and examined by physiatry and deemed an appropriate candidate for acute rehabilitation consisting of 3 hours of therapy a day.     Of note during his first stroke on 2020, JANET was performed and did not demonstrate thrombus or cardiac abnormality to suggest cardiac origin for CVA.  (19 Oct 2020 14:10). He reports only being left with residual dysarthria.    CLOF:  He ambulates 5' x2 with hemicane max assist w/c follow, and is 10' mod assist on parallel bars. ACE wrapping for R foot drop. He is NPO and having difficulty managing his own secretions. He has severe expressive aphasia/ apraxia and moderate receptive deficits    Subjective:    The patient was examined at during rounds this morning with Dr. Larsen. Patient's large NGT was dislodged during therapy in gym this morning. Plan to replace NGT and perform stat CXR so that pt may receive lunch and dinner feeds, prior to discontinuing NGT tonight and starting IVNS with NPO and hold lovenox pending PEG placement by GI tomorrow. Pt remains medically stable, reports feeling well, denies new complaints today. On scopolamine patch, with mild improvement in drooling saliva, will continue to monitor.  Patient continues to use his Yankhauer catheter to suction his oral secretions. Plavix held since 10/23 per neuro recs. Patient pending PEG tube placement by JESUS ALBERTO Prakash on Wednesday 10/28. Covid screen sent yesterday, confirmed with lab today who said results should finalize by tonight. INR 1.23 today. Plan to resume plavix after peg placement, patient remains on ASA and lovenox 40 sc qhs. Pt's diet on osmolite 1.5 360cc with 150cc FWF q6h. Will continue to monitor, tolerating therapy well.     REVIEW OF SYSTEMS   HEENT: Hiccups - improving, drooling saliva   Pulm: occasional cough,  No shortness of breath  Cardio: No chest pain,  GI:    No diarrhea, No constipation  : No dysuria, No frequency  Neuro: Right sided weakness  MSK: Denies pain    PHYSICAL EXAM    Vital Signs Last 24 Hrs  T(C): 36 (26 Oct 2020 13:27), Max: 36 (26 Oct 2020 13:27)  T(F): 96.8 (26 Oct 2020 13:27), Max: 96.8 (26 Oct 2020 13:27)  HR: 61 (26 Oct 2020 13:27) (61 - 61)  BP: 107/62 (26 Oct 2020 13:27) (107/62 - 107/62)  BP(mean): --  RR: 19 (26 Oct 2020 13:27) (19 - 19)  SpO2: --    General: NAD, Resting Comfortable, sitting upright at in bed                      HEENT: NC/AT, EOM I, PERRLA, Normal Conjunctivae.. large NGT in place  Cardio: RRR                              Pulm: No Respiratory Distress,  Lungs with bilateral rhonchi R>L. Breathing comfortably and saturating well on RA                     Abdomen: ND/NT, Soft                                              MSK: No joint swelling, decreased ROM in the right upper and lower extremity.                                         Ext: No C/C/E, No calf tenderness    Skin: intact                                                                   Neurological Examination:  Cognitive: [  x  ] AAO x 3  with choices   [    ]  other                                                                      Attention:  [ x   ] intact   [    ]  other                            Mood/Affect:  [   x ] wnl    [    ]  other                                                                             Communication:  [    ]Fluent   [   x ] other dysarthria, apraxia, severe expressive aphasia  CN II - XII  [    ] intact   [  x  ] other decrease function in clearing saliva  Coordination:   [    ] FTN/HTS intact   [    x] other    normal on left, unable to assess on right due to hemiparesis                                                                   Sensory: [    ]Intact to light touch   [  x  ] other     decreased rt leg                                                                                    Tone:  [    ]  wnl,   [    x]  other decreased tone in RUE .     Motor    LEFT    UE: [ x  ] WNL.  [   ] other:  RIGHT UE:  [   ] WNL.  [ x  ] other: shoulder 2/5, elbow 1/5. wrist 0/5, fingers 0/5  LEFT    LE:  [  x ] WNL.  [   ] other:  RIGHT LE:  [   ] WNL.  [ x  ] other: mod  strong extensor synergy. Trace isolated movement    Reflex:  [    ]  symmetric,  [  x  ]  other: Right bicep 3+otherwise reflexes normal      MEDICATIONS  (STANDING):  aspirin  chewable 81 milliGRAM(s) Enteral Tube daily  atorvastatin 80 milliGRAM(s) Oral at bedtime  dextrose 5% + sodium chloride 0.45%. 1000 milliLiter(s) (75 mL/Hr) IV Continuous <Continuous>  fludroCORTISONE 0.2 milliGRAM(s) Oral <User Schedule>  gabapentin 300 milliGRAM(s) Oral at bedtime  midodrine. 5 milliGRAM(s) Oral <User Schedule>  nystatin    Suspension 597171 Unit(s) Oral four times a day  pantoprazole   Suspension 40 milliGRAM(s) Enteral Tube daily  scopolamine 1 mG/72 Hr(s) Patch 1 Patch Transdermal every 72 hours    MEDICATIONS  (PRN):  chlorproMAZINE    Tablet 10 milliGRAM(s) Oral every 6 hours PRN hiccups        RECENT LABS/IMAGING - reviewed                          13.3   7.68  )-----------( 264      ( 26 Oct 2020 07:07 )             39.3     10-26    141  |  104  |  16  ----------------------------<  97  4.3   |  29  |  1.0    Ca    8.9      26 Oct 2020 07:07  Mg     2.0     10-26    TPro  6.0  /  Alb  3.5  /  TBili  1.2  /  DBili  x   /  AST  27  /  ALT  22  /  AlkPhos  73  10-26    PT/INR - ( 27 Oct 2020 07:12 )   PT: 14.20 sec;   INR: 1.23 ratio        Urinalysis Basic - ( 21 Oct 2020 04:25 )    Color: Yellow / Appearance: Slightly Turbid / S.015 / pH: x  Gluc: x / Ketone: Negative  / Bili: Negative / Urobili: 3 mg/dL   Blood: x / Protein: Negative / Nitrite: Negative   Leuk Esterase: Large / RBC: 5 /HPF /  /HPF   Sq Epi: x / Non Sq Epi: 0 /HPF / Bacteria: Moderate    Recent imaging:  CXR 10/25:  Findings:  Support devices: Enteric tube coursing below left hemidiaphragm terminating in the left upper quadrant.  Cardiac/mediastinum/hilum: Stable appearance of the cardiomediastinal silhouette.  Lung parenchyma/Pleura: No focal consolidation, pneumothorax or pleural effusion on frontal view.  Skeleton/soft tissues: Unchanged.  Impression:  Enteric tube coursing below left hemidiaphragm terminating in the left upper quadrant.    CXR 10/23: IMPRESSION:  No radiographic evidence of acute cardiopulmonary disease.  Enteric tube tip is in the gastric body. Patient is a 69y old  Male who presents with a chief complaint of Left CVA (dominant side)    HPI:  This is a 69 year old male with PMHx of b/l hip replacements, recent mechanical thrombectomy of left ADEOLA/MCA stroke (2020) admitted to [facility] on 10/13/2020 after he developed new right sided facial droop, weakness, and dysarthria. In the ED, his NIHSS was 20 and stroke code was called. However, patient did not receive tPA because of recent stroke history. Initial CTH showed wedge shaped area hypoattenuation within the left frontal and parietal lobes consistent with prior Left MCA territory infarct. CT perfusion showed left MCA M2 and M3 occlusions. Repeat CTH on 10/14/2020 showed interval development of left basal ganglia hypoattenuation compatible with acute infarct. Patient was admitted to ICU for closer monitoring and downgraded to medicine on 10/15/2020. Patient's loop recorder was interrogated 10/14 and did not demonstrate any events. Patient failed bedside swallow evaluation and remains NPO with feedings through NGT requiring frequent suctioning to clean saline in oral cavity. He was started on midodrine 10mg TID for orthostatic hypotension. Patient received fluids and was then started on Florinef 0.2mg daily for continued hypotension. His BP improved the following day and denies lightheadedness or dizziness. Patient seen and examined by physiatry and deemed an appropriate candidate for acute rehabilitation consisting of 3 hours of therapy a day.     Of note during his first stroke on 2020, JANET was performed and did not demonstrate thrombus or cardiac abnormality to suggest cardiac origin for CVA.  (19 Oct 2020 14:10). He reports only being left with residual dysarthria.    CLOF:  He ambulates 5' x2 with hemicane max assist w/c follow, and is 10' mod assist on parallel bars. ACE wrapping for R foot drop. He is NPO and having difficulty managing his own secretions. He has severe expressive aphasia/ apraxia and moderate receptive deficits    Subjective:    The patient was examined at during rounds this morning with Dr. Larsen. Patient's NGT was dislodged during therapy in gym this morning. Plan to replace NGT when pt returns to floor and perform stat CXR so that pt may receive lunch and dinner feeds, prior to discontinuing NGT tonight and starting IVNS with NPO and hold lovenox pending PEG placement by GI tomorrow. Pt remains medically stable, reports feeling well, denies new complaints today. On scopolamine patch, with mild improvement in drooling saliva, will continue to monitor.  Patient continues to use his Yankhauer catheter to suction his oral secretions. Plavix held since 10/23 per neuro recs. Patient pending PEG tube placement by JESUS ALBERTO Prakash on Wednesday 10/28. Covid screen sent yesterday, confirmed with lab today who said results should finalize by tonight. INR 1.23 today. Plan to resume plavix after peg placement, patient remains on ASA and lovenox 40 sc qhs. Pt's diet on osmolite 1.5 360cc with 150cc FWF q6h. Will continue to monitor, tolerating therapy well.     REVIEW OF SYSTEMS   HEENT: Hiccups - improving, drooling saliva   Pulm: occasional cough,  No shortness of breath  Cardio: No chest pain,  GI:    No diarrhea, No constipation  : No dysuria, No frequency  Neuro: Right sided weakness  MSK: Denies pain    PHYSICAL EXAM    Vital Signs Last 24 Hrs  T(C): 36 (26 Oct 2020 13:27), Max: 36 (26 Oct 2020 13:27)  T(F): 96.8 (26 Oct 2020 13:27), Max: 96.8 (26 Oct 2020 13:27)  HR: 61 (26 Oct 2020 13:27) (61 - 61)  BP: 107/62 (26 Oct 2020 13:27) (107/62 - 107/62)  BP(mean): --  RR: 19 (26 Oct 2020 13:27) (19 - 19)  SpO2: --    General: NAD, Resting Comfortable, sitting upright at in bed                      HEENT: NC/AT, EOM I, PERRLA, Normal Conjunctivae.. large NGT in place  Cardio: RRR                              Pulm: No Respiratory Distress,  Lungs with bilateral rhonchi R>L. Breathing comfortably and saturating well on RA                     Abdomen: ND/NT, Soft                                              MSK: No joint swelling, decreased ROM in the right upper and lower extremity.                                         Ext: No C/C/E, No calf tenderness    Skin: intact                                                                   Neurological Examination:  Cognitive: [  x  ] AAO x 3  with choices   [    ]  other                                                                      Attention:  [ x   ] intact   [    ]  other                            Mood/Affect:  [   x ] wnl    [    ]  other                                                                             Communication:  [    ]Fluent   [   x ] other dysarthria, apraxia, severe expressive aphasia  CN II - XII  [    ] intact   [  x  ] other decrease function in clearing saliva  Coordination:   [    ] FTN/HTS intact   [    x] other    normal on left, unable to assess on right due to hemiparesis                                                                   Sensory: [    ]Intact to light touch   [  x  ] other     decreased rt leg                                                                                    Tone:  [    ]  wnl,   [    x]  other decreased tone in RUE .     Motor    LEFT    UE: [ x  ] WNL.  [   ] other:  RIGHT UE:  [   ] WNL.  [ x  ] other: shoulder 2/5, elbow 1/5. wrist 0/5, fingers 0/5  LEFT    LE:  [  x ] WNL.  [   ] other:  RIGHT LE:  [   ] WNL.  [ x  ] other: mod  strong extensor synergy. Trace isolated movement    Reflex:  [    ]  symmetric,  [  x  ]  other: Right bicep 3+otherwise reflexes normal      MEDICATIONS  (STANDING):  aspirin  chewable 81 milliGRAM(s) Enteral Tube daily  atorvastatin 80 milliGRAM(s) Oral at bedtime  dextrose 5% + sodium chloride 0.45%. 1000 milliLiter(s) (75 mL/Hr) IV Continuous <Continuous>  fludroCORTISONE 0.2 milliGRAM(s) Oral <User Schedule>  gabapentin 300 milliGRAM(s) Oral at bedtime  midodrine. 5 milliGRAM(s) Oral <User Schedule>  nystatin    Suspension 264693 Unit(s) Oral four times a day  pantoprazole   Suspension 40 milliGRAM(s) Enteral Tube daily  scopolamine 1 mG/72 Hr(s) Patch 1 Patch Transdermal every 72 hours    MEDICATIONS  (PRN):  chlorproMAZINE    Tablet 10 milliGRAM(s) Oral every 6 hours PRN hiccups        RECENT LABS/IMAGING - reviewed                 13.3   7.68  )-----------( 264      ( 26 Oct 2020 07:07 )             39.3     10-26    141  |  104  |  16  ----------------------------<  97  4.3   |  29  |  1.0    Ca    8.9      26 Oct 2020 07:07  Mg     2.0     10-26    TPro  6.0  /  Alb  3.5  /  TBili  1.2  /  DBili  x   /  AST  27  /  ALT  22  /  AlkPhos  73  10-26    PT/INR - ( 27 Oct 2020 07:12 )   PT: 14.20 sec;   INR: 1.23 ratio         Urinalysis Basic - ( 21 Oct 2020 04:25 )    Color: Yellow / Appearance: Slightly Turbid / S.015 / pH: x  Gluc: x / Ketone: Negative  / Bili: Negative / Urobili: 3 mg/dL   Blood: x / Protein: Negative / Nitrite: Negative   Leuk Esterase: Large / RBC: 5 /HPF /  /HPF   Sq Epi: x / Non Sq Epi: 0 /HPF / Bacteria: Moderate    Recent imaging:  CXR 10/25:  Findings:  Support devices: Enteric tube coursing below left hemidiaphragm terminating in the left upper quadrant.  Cardiac/mediastinum/hilum: Stable appearance of the cardiomediastinal silhouette.  Lung parenchyma/Pleura: No focal consolidation, pneumothorax or pleural effusion on frontal view.  Skeleton/soft tissues: Unchanged.  Impression:  Enteric tube coursing below left hemidiaphragm terminating in the left upper quadrant.    CXR 10/23: IMPRESSION:  No radiographic evidence of acute cardiopulmonary disease.  Enteric tube tip is in the gastric body. Patient is a 69y old  Male who presents with a chief complaint of Left CVA (dominant side)    HPI:  This is a 69 year old male with PMHx of b/l hip replacements, recent mechanical thrombectomy of left ADEOLA/MCA stroke (2020) admitted to [facility] on 10/13/2020 after he developed new right sided facial droop, weakness, and dysarthria. In the ED, his NIHSS was 20 and stroke code was called. However, patient did not receive tPA because of recent stroke history. Initial CTH showed wedge shaped area hypoattenuation within the left frontal and parietal lobes consistent with prior Left MCA territory infarct. CT perfusion showed left MCA M2 and M3 occlusions. Repeat CTH on 10/14/2020 showed interval development of left basal ganglia hypoattenuation compatible with acute infarct. Patient was admitted to ICU for closer monitoring and downgraded to medicine on 10/15/2020. Patient's loop recorder was interrogated 10/14 and did not demonstrate any events. Patient failed bedside swallow evaluation and remains NPO with feedings through NGT requiring frequent suctioning to clean saline in oral cavity. He was started on midodrine 10mg TID for orthostatic hypotension. Patient received fluids and was then started on Florinef 0.2mg daily for continued hypotension. His BP improved the following day and denies lightheadedness or dizziness. Patient seen and examined by physiatry and deemed an appropriate candidate for acute rehabilitation consisting of 3 hours of therapy a day.     Of note during his first stroke on 2020, JANET was performed and did not demonstrate thrombus or cardiac abnormality to suggest cardiac origin for CVA.  (19 Oct 2020 14:10). He reports only being left with residual dysarthria.    CLOF:  He ambulates 5' x2 with hemicane max assist w/c follow, and is 10' mod assist on parallel bars. ACE wrapping for R foot drop. He is NPO and having difficulty managing his own secretions. He has severe expressive aphasia/ apraxia and moderate receptive deficits    Subjective:    The patient was examined at during rounds this morning with Dr. Larsen. Patient's NGT was dislodged during therapy in gym this morning. Plan to replace NGT when pt returns to floor and perform stat CXR so that pt may receive lunch and dinner feeds, prior to discontinuing NGT tonight and starting IVNS with NPO and hold lovenox pending PEG placement by GI tomorrow. Pt remains medically stable, reports feeling well, denies new complaints today. On scopolamine patch, with mild improvement in drooling saliva, will continue to monitor.  Patient continues to use his Yankhauer catheter to suction his oral secretions. Plavix held since 10/23 per neuro recs. Patient pending PEG tube placement by GI Dr. Balderas on Wednesday 10/28. Covid screen sent yesterday, confirmed with lab today who said results should finalize by tonight. INR 1.23 today. Plan to resume plavix after peg placement, patient remains on ASA and lovenox 40 sc qhs. Pt's diet on osmolite 1.5 360cc with 150cc FWF q6h. Will continue to monitor, tolerating therapy well.     REVIEW OF SYSTEMS   HEENT: Hiccups - improving, drooling saliva   Pulm: occasional cough,  No shortness of breath  Cardio: No chest pain,  GI:    No diarrhea, No constipation  : No dysuria, No frequency  Neuro: Right sided weakness  MSK: Denies pain    PHYSICAL EXAM    Vital Signs Last 24 Hrs  T(C): 36 (26 Oct 2020 13:27), Max: 36 (26 Oct 2020 13:27)  T(F): 96.8 (26 Oct 2020 13:27), Max: 96.8 (26 Oct 2020 13:27)  HR: 61 (26 Oct 2020 13:27) (61 - 61)  BP: 107/62 (26 Oct 2020 13:27) (107/62 - 107/62)  BP(mean): --  RR: 19 (26 Oct 2020 13:27) (19 - 19)  SpO2: --    General: NAD, Resting Comfortable, sitting upright at in bed                      HEENT: NC/AT, EOM I, PERRLA, Normal Conjunctivae.. large NGT in place  Cardio: RRR                              Pulm: No Respiratory Distress,  Lungs with bilateral rhonchi R>L. Breathing comfortably and saturating well on RA                     Abdomen: ND/NT, Soft                                              MSK: No joint swelling, decreased ROM in the right upper and lower extremity.                                         Ext: No C/C/E, No calf tenderness    Skin: intact                                                                   Neurological Examination:  Cognitive: [  x  ] AAO x 3  with choices   [    ]  other                                                                      Attention:  [ x   ] intact   [    ]  other                            Mood/Affect:  [   x ] wnl    [    ]  other                                                                             Communication:  [    ]Fluent   [   x ] other dysarthria, apraxia, severe expressive aphasia  CN II - XII  [    ] intact   [  x  ] other decrease function in clearing saliva  Coordination:   [    ] FTN/HTS intact   [    x] other    normal on left, unable to assess on right due to hemiparesis                                                                   Sensory: [    ]Intact to light touch   [  x  ] other     decreased rt leg                                                                                    Tone:  [    ]  wnl,   [    x]  other decreased tone in RUE .     Motor    LEFT    UE: [ x  ] WNL.  [   ] other:  RIGHT UE:  [   ] WNL.  [ x  ] other: shoulder 2/5, elbow 1/5. wrist 0/5, fingers 0/5  LEFT    LE:  [  x ] WNL.  [   ] other:  RIGHT LE:  [   ] WNL.  [ x  ] other: mod  strong extensor synergy. Trace isolated movement    Reflex:  [    ]  symmetric,  [  x  ]  other: Right bicep 3+otherwise reflexes normal      MEDICATIONS  (STANDING):  aspirin  chewable 81 milliGRAM(s) Enteral Tube daily  atorvastatin 80 milliGRAM(s) Oral at bedtime  dextrose 5% + sodium chloride 0.45%. 1000 milliLiter(s) (75 mL/Hr) IV Continuous <Continuous>  fludroCORTISONE 0.2 milliGRAM(s) Oral <User Schedule>  gabapentin 300 milliGRAM(s) Oral at bedtime  midodrine. 5 milliGRAM(s) Oral <User Schedule>  nystatin    Suspension [id number] Unit(s) Oral four times a day  pantoprazole   Suspension 40 milliGRAM(s) Enteral Tube daily  scopolamine 1 mG/72 Hr(s) Patch 1 Patch Transdermal every 72 hours    MEDICATIONS  (PRN):  chlorproMAZINE    Tablet 10 milliGRAM(s) Oral every 6 hours PRN hiccups        RECENT LABS/IMAGING - reviewed                 13.3   7.68  )-----------( 264      ( 26 Oct 2020 07:07 )             39.3     10-26    141  |  104  |  16  ----------------------------<  97  4.3   |  29  |  1.0    Ca    8.9      26 Oct 2020 07:07  Mg     2.0     10-26    TPro  6.0  /  Alb  3.5  /  TBili  1.2  /  DBili  x   /  AST  27  /  ALT  22  /  AlkPhos  73  10-26    PT/INR - ( 27 Oct 2020 07:12 )   PT: 14.20 sec;   INR: 1.23 ratio         Urinalysis Basic - ( 21 Oct 2020 04:25 )    Color: Yellow / Appearance: Slightly Turbid / S.015 / pH: x  Gluc: x / Ketone: Negative  / Bili: Negative / Urobili: 3 mg/dL   Blood: x / Protein: Negative / Nitrite: Negative   Leuk Esterase: Large / RBC: 5 /HPF /  /HPF   Sq Epi: x / Non Sq Epi: 0 /HPF / Bacteria: Moderate    Recent imaging:  CXR 10/25:  Findings:  Support devices: Enteric tube coursing below left hemidiaphragm terminating in the left upper quadrant.  Cardiac/mediastinum/hilum: Stable appearance of the cardiomediastinal silhouette.  Lung parenchyma/Pleura: No focal consolidation, pneumothorax or pleural effusion on frontal view.  Skeleton/soft tissues: Unchanged.  Impression:  Enteric tube coursing below left hemidiaphragm terminating in the left upper quadrant.    CXR 10/23: IMPRESSION:  No radiographic evidence of acute cardiopulmonary disease.  Enteric tube tip is in the gastric body.

## 2020-10-27 NOTE — PROGRESS NOTE ADULT - ASSESSMENT
ASSESSMENT/PLAN  This is a 69 year old male with hx b/l hip replacements, recent mechanical thrombectomy of left ADEOLA/MCA thrombus (8/27/2020), with mild residual deficits, now presents with new left MCA stroke  Rehab of acute  left MCA CVA stroke:    #Right hemiplegia - cont PT/OT    # Severe dysphagia with drooling saliva - NPO. Difficulty managing secretions. c/w Yankhauer catheter suction and scopolamine patch, continue to monitor. GI and neuro recs appreciated. Plavix held since 10/23 per neuro recs. Discussed with GI fellow today. Patient pending PEG tube placement by GI Dr. Prakash on Wednesday 10/28. Covid screen sent today, am INR ordered; will plan to make him NPO/NPNGT and hold lovenox prior. Plan to resume plavix after peg placement, patient remains on ASA and lovenox 40 sc qhs.     #severe aphasia/ dysarthria - ST  - Continue aspirin, lipitor. HOLD plavix for now until after peg placement (10/23-)  - Maintain systolic blood pressure 120-160  - S/p interrogation of loop recorder with no events  - MRI not performed as patient is aspiration risk at this time due to inability to manage oral secretions  - PT/OT/ST/Neuropsych 3 hrs daily    #Wet coughing and R>L rhonchi  Decreased fluid inputs via NGT (10/25-); CXR reviewed and negative, continue to monitor    #DLD  Atorvastatin 80 mg    #Hypotension - improved  - Midodrine 10mg TID, Florinef 0.2mg daily continue to monitor    #Hiccups - improving  - Thorazine PRN     #Pyuria/bacturia - moderate. Asymptomatic. Had condom catheter at the time. Recheck U/A in a few days. CBC 10/26 wnl, no leukocytosis    -Pain control: Tylenol PRN    -GI/Bowel Mgmt -  Miralax PRN at night    -Bladder management: no issues     -Skin: No active issues at this time    -FEN   - Diet/ Severe Dysphagia - NPO with feeds via NGT. SLP F/U. PEG scheduled. Tube feed and water volume decreased as above      Precautions / PROPHYLAXIS:    - Falls precautions  - Aspiration  precautions  - DVT prophylaxis: lovenox   ASSESSMENT/PLAN  This is a 69 year old male with hx b/l hip replacements, recent mechanical thrombectomy of left ADEOLA/MCA thrombus (8/27/2020), with mild residual deficits, now presents with new left MCA stroke  Rehab of acute  left MCA CVA stroke:    #Right hemiplegia - cont PT/OT    # Severe dysphagia with drooling saliva - NPO. Difficulty managing secretions. c/w Yankhauer catheter suction and scopolamine patch, continue to monitor. GI and neuro recs appreciated. Plavix held since 10/23 per neuro recs. Discussed with GI fellow yesterday. Patient pending PEG tube placement by GI Dr. Prakash on Wednesday 10/28. Covid screen sent yesterday, INR 1.3 today.   Plan to d/c NGT tonight and starting IVNS 75cc/hr at 6pm with NPO and hold lovenox pending PEG placement by GI tomorrow. Plan to resume plavix and lovenox after peg placement, patient remains on ASA.     #severe aphasia/ dysarthria - ST  - Continue aspirin, lipitor. HOLD plavix for now until after peg placement (10/23-)  - Maintain systolic blood pressure 120-160  - S/p interrogation of loop recorder with no events  - MRI not performed as patient is aspiration risk at this time due to inability to manage oral secretions  - PT/OT/ST/Neuropsych 3 hrs daily    #Wet coughing and R>L rhonchi  Decreased fluid inputs via NGT (10/25-); CXR reviewed and negative, continue to monitor    #DLD  Atorvastatin 80 mg    #Hypotension - improved  - Midodrine 10mg TID, Florinef 0.2mg daily continue to monitor    #Hiccups - improving  - Thorazine PRN     #Pyuria/bacturia - moderate. Asymptomatic. Had condom catheter at the time. Recheck U/A in a few days. CBC 10/26 wnl, no leukocytosis    -Pain control: Tylenol PRN    -GI/Bowel Mgmt -  Miralax PRN at night    -Bladder management: no issues     -Skin: No active issues at this time    -FEN   - Diet/ Severe Dysphagia - NPO with feeds via NGT. SLP F/U. PEG scheduled. Tube feed and water volume decreased as above      Precautions / PROPHYLAXIS:    - Falls precautions  - Aspiration  precautions  - DVT prophylaxis: lovenox   ASSESSMENT/PLAN  This is a 69 year old male with hx b/l hip replacements, recent mechanical thrombectomy of left ADEOLA/MCA thrombus (8/27/2020), with mild residual deficits, now presents with new left MCA stroke  Rehab of acute  left MCA CVA stroke:  Continue aspirin, lipitor. HOLD plavix for now until after peg placement (10/23-)  - Maintain systolic blood pressure 120-160  - S/p interrogation of loop recorder with no events  - MRI not performed as patient is aspiration risk at this time due to inability to manage oral secretions  - PT/OT/ST/Neuropsych 3 hrs daily    #Right hemiplegia - cont PT/OT    # Severe dysphagia with drooling saliva - NPO. Difficulty managing secretions. c/w Yankhauer catheter suction and scopolamine patch, continue to monitor. GI and neuro recs appreciated. Plavix held since 10/23 per neuro recs. Discussed with GI fellow yesterday. Patient pending PEG tube placement by GI Dr. Balderas on Wednesday 10/28. Covid screen sent yesterday, INR 1.3 today.   Plan to d/c NGT tonight and starting IVNS 75cc/hr at 6pm with NPO and hold lovenox pending PEG placement by GI tomorrow. Plan to resume plavix and lovenox after peg placement, patient remains on ASA.     #severe aphasia/ dysarthria - ST  -     #Wet coughing and R>L rhonchi Likely chronic aspiration of saliva. Better on scopol patch  Decreased fluid inputs via NGT (10/25-); CXR reviewed and negative, continue to monitor    #DLD  Atorvastatin 80 mg    #Hypotension - improved  - Midodrine 10mg TID, Florinef 0.2mg daily continue to monitor    #Hiccups - improving  - Thorazine PRN     #Pyuria/bacturia - moderate. Asymptomatic. Had condom catheter at the time. Recheck U/A in a few days. CBC 10/26 wnl, no leukocytosis    -Pain control: Tylenol PRN    -GI/Bowel Mgmt -  Miralax PRN at night    -Bladder management: no issues     -Skin: No active issues at this time    -FEN   - Diet/ Severe Dysphagia - NPO with feeds via NGT. SLP F/U. PEG scheduled. Tube feed and water volume decreased as above      Precautions / PROPHYLAXIS:    - Falls precautions  - Aspiration  precautions  - DVT prophylaxis: lovenox

## 2020-10-27 NOTE — PROGRESS NOTE ADULT - ASSESSMENT
Patient was assessed on part of the Auditory Comprehension subtest of the [place] Diagnostic and Aphasia Examination (BDAE). His object identification and comprehension was unreliable as he was able to identify 44% of objects (4/9); 40% of colors and letters and 80% numbers. He had right and left disorientation and followed one step commands consistently and 50 % of 2-3 step commands. Patient was uncomfortable with the hiccups and the son was concerned. This was relayed to the Rehab resident who stated that they were aware and were following up. Patient denied any significant mood issues though he indicated his hand, being NPO and hiccups were hard to deal with. Sleep was functional and patient felt reasonably rested. Support and education provided to patient and son as appropriate.    Goals: ? Facilitate Positive Coping ? Family Support / Education ? Cognitive Assessment   Plan: 1-2 times a week 30-60 minutes ? Individual Psychotherapy ? Cognitive Assessment ? Family contact/support/education

## 2020-10-27 NOTE — PROGRESS NOTE ADULT - SUBJECTIVE AND OBJECTIVE BOX
Pain: Location: Right arm  Ratin-5 (rated after numerous attempts) tolerable   Intervention: Did not want pain medication  Orientation: Patient was oriented to Name, place, event, and grossly to date and time with written choices  Arousal Level: Alert  Behavior: Pleasant, cooperative and friendly.  Affect Range: WFL     Needed: ? No        Attention: WFL  Insight into illness/deficits: Fair   ?Treatment Session Focused on Mood  / Coping  / Cognition / Family Contact and Education

## 2020-10-27 NOTE — CHART NOTE - NSCHARTNOTEFT_GEN_A_CORE
patient is scheduled for PEG in am   please keep NPO after midnight   hold AC after midnight   Ancef 1 gr on hold to endoscopy   await COVID result patient is scheduled for PEG in am   please keep NPO after midnight   hold AC after midnight   await COVID result

## 2020-10-28 ENCOUNTER — TRANSCRIPTION ENCOUNTER (OUTPATIENT)
Age: 70
End: 2020-10-28

## 2020-10-28 LAB
HCT VFR BLD CALC: 38.2 % — LOW (ref 42–52)
HGB BLD-MCNC: 13 G/DL — LOW (ref 14–18)
MCHC RBC-ENTMCNC: 31.9 PG — HIGH (ref 27–31)
MCHC RBC-ENTMCNC: 34 G/DL — SIGNIFICANT CHANGE UP (ref 32–37)
MCV RBC AUTO: 93.9 FL — SIGNIFICANT CHANGE UP (ref 80–94)
NRBC # BLD: 0 /100 WBCS — SIGNIFICANT CHANGE UP (ref 0–0)
PLATELET # BLD AUTO: 253 K/UL — SIGNIFICANT CHANGE UP (ref 130–400)
RBC # BLD: 4.07 M/UL — LOW (ref 4.7–6.1)
RBC # FLD: 10.9 % — LOW (ref 11.5–14.5)
WBC # BLD: 11.14 K/UL — HIGH (ref 4.8–10.8)
WBC # FLD AUTO: 11.14 K/UL — HIGH (ref 4.8–10.8)

## 2020-10-28 PROCEDURE — 43246 EGD PLACE GASTROSTOMY TUBE: CPT

## 2020-10-28 RX ORDER — GABAPENTIN 400 MG/1
100 CAPSULE ORAL
Refills: 0 | Status: DISCONTINUED | OUTPATIENT
Start: 2020-10-28 | End: 2020-10-30

## 2020-10-28 RX ORDER — VANCOMYCIN HCL 1 G
1000 VIAL (EA) INTRAVENOUS ONCE
Refills: 0 | Status: COMPLETED | OUTPATIENT
Start: 2020-10-28 | End: 2020-10-28

## 2020-10-28 RX ORDER — SODIUM CHLORIDE 9 MG/ML
1000 INJECTION, SOLUTION INTRAVENOUS
Refills: 0 | Status: DISCONTINUED | OUTPATIENT
Start: 2020-10-28 | End: 2020-10-29

## 2020-10-28 RX ADMIN — ATORVASTATIN CALCIUM 80 MILLIGRAM(S): 80 TABLET, FILM COATED ORAL at 21:47

## 2020-10-28 RX ADMIN — Medication 500000 UNIT(S): at 23:24

## 2020-10-28 RX ADMIN — MIDODRINE HYDROCHLORIDE 5 MILLIGRAM(S): 2.5 TABLET ORAL at 18:15

## 2020-10-28 RX ADMIN — ENOXAPARIN SODIUM 40 MILLIGRAM(S): 100 INJECTION SUBCUTANEOUS at 21:48

## 2020-10-28 RX ADMIN — SCOPALAMINE 1 PATCH: 1 PATCH, EXTENDED RELEASE TRANSDERMAL at 16:36

## 2020-10-28 RX ADMIN — SODIUM CHLORIDE 75 MILLILITER(S): 9 INJECTION, SOLUTION INTRAVENOUS at 18:23

## 2020-10-28 RX ADMIN — SCOPALAMINE 1 PATCH: 1 PATCH, EXTENDED RELEASE TRANSDERMAL at 19:44

## 2020-10-28 RX ADMIN — GABAPENTIN 100 MILLIGRAM(S): 400 CAPSULE ORAL at 18:14

## 2020-10-28 RX ADMIN — Medication 500000 UNIT(S): at 18:14

## 2020-10-28 RX ADMIN — SCOPALAMINE 1 PATCH: 1 PATCH, EXTENDED RELEASE TRANSDERMAL at 05:40

## 2020-10-28 RX ADMIN — SCOPALAMINE 1 PATCH: 1 PATCH, EXTENDED RELEASE TRANSDERMAL at 18:15

## 2020-10-28 RX ADMIN — Medication 250 MILLIGRAM(S): at 18:14

## 2020-10-28 NOTE — PROGRESS NOTE ADULT - ASSESSMENT
ASSESSMENT/PLAN  This is a 69 year old male with hx b/l hip replacements, recent mechanical thrombectomy of left ADEOLA/MCA thrombus (8/27/2020), with mild residual deficits, now presents with new left MCA stroke  Rehab of acute  left MCA CVA stroke:  Continue aspirin, lipitor. HOLD plavix for now until after peg placement (10/23-)  - Maintain systolic blood pressure 120-160  - S/p interrogation of loop recorder with no events  - MRI not performed as patient is aspiration risk at this time due to inability to manage oral secretions  - PT/OT/ST/Neuropsych 3 hrs daily    #Right hemiplegia - cont PT/OT    # Severe dysphagia with drooling saliva - NPO. Difficulty managing secretions. c/w Yankhauer catheter suction and scopolamine patch, continue to monitor. GI and neuro recs appreciated. Plavix held since 10/23 per neuro recs. Patient pending PEG tube placement by GI Dr. Balderas today, covid neg. c/w D5 1/2 NS at 75cc/hr, PEG not to be used until 24 hours later. Plan to resume plavix and lovenox after peg placement, patient remains on ASA.     #severe aphasia/ dysarthria - Speech therapy    #Wet coughing and R>L rhonchi Likely chronic aspiration of saliva. Better on scopol patch  Decreased fluid inputs via NGT (10/25-); CXR reviewed and negative, continue to monitor    #DLD  Atorvastatin 80 mg    #Hypotension - improved  - Midodrine 10mg TID, Florinef 0.2mg daily continue to monitor    #Hiccups - improving  - Thorazine PRN, start gabapentin 100mg po bid    #Pyuria/bacturia - moderate. Asymptomatic. Had condom catheter at the time. Recheck U/A in a few days. CBC 10/26 wnl, no leukocytosis    -Pain control: Tylenol PRN    -GI/Bowel Mgmt -  Miralax PRN at night    -Bladder management: no issues     -Skin: No active issues at this time    -FEN   - Diet/ Severe Dysphagia - SLP F/U. PEG scheduled for today, Pt remains NPO.    Precautions / PROPHYLAXIS:    - Falls precautions  - Aspiration  precautions  - DVT prophylaxis: lovenox, ASA, SCDs

## 2020-10-28 NOTE — PROGRESS NOTE ADULT - SUBJECTIVE AND OBJECTIVE BOX
Patient is a 69y old  Male who presents with a chief complaint of Left CVA (dominant side)    HPI:  This is a 69 year old male with PMHx of b/l hip replacements, recent mechanical thrombectomy of left ADEOLA/MCA stroke (2020) admitted to [facility] on 10/13/2020 after he developed new right sided facial droop, weakness, and dysarthria. In the ED, his NIHSS was 20 and stroke code was called. However, patient did not receive tPA because of recent stroke history. Initial CTH showed wedge shaped area hypoattenuation within the left frontal and parietal lobes consistent with prior Left MCA territory infarct. CT perfusion showed left MCA M2 and M3 occlusions. Repeat CTH on 10/14/2020 showed interval development of left basal ganglia hypoattenuation compatible with acute infarct. Patient was admitted to ICU for closer monitoring and downgraded to medicine on 10/15/2020. Patient's loop recorder was interrogated 10/14 and did not demonstrate any events. Patient failed bedside swallow evaluation and remains NPO with feedings through NGT requiring frequent suctioning to clean saline in oral cavity. He was started on midodrine 10mg TID for orthostatic hypotension. Patient received fluids and was then started on Florinef 0.2mg daily for continued hypotension. His BP improved the following day and denies lightheadedness or dizziness. Patient seen and examined by physiatry and deemed an appropriate candidate for acute rehabilitation consisting of 3 hours of therapy a day.     Of note during his first stroke on 2020, JANET was performed and did not demonstrate thrombus or cardiac abnormality to suggest cardiac origin for CVA.  (19 Oct 2020 14:10). He reports only being left with residual dysarthria.    CLOF:  He ambulates 10' x2 with hemicane max assist, ACE wrapping for R foot drop. He has severe expressive aphasia/ apraxia and moderate receptive deficits    Subjective:    The patient was examined at during rounds this morning with Dr. Larsen. Pt with NGT removed and NPO since midnight, on IV fluids with D5 1/2NS at 75cc/hr, and hold lovenox pending PEG placement by GI today. Pt remains medically stable, reports feeling well, denies new complaints today. On scopolamine patch, with mild improvement in drooling saliva, will continue to monitor.  Patient continues to use his [employer] catheter to suction his oral secretions. Plavix held since 10/23 per neuro recs. Patient pending PEG tube placement by GI Dr. Balderas today. Covid negative, Plan to resume plavix after peg placement, and wait 24 hours before pt cleared to use peg. Patient remains on ASA and will resume lovenox 40 sc qhs tonight.     REVIEW OF SYSTEMS   HEENT: Hiccups - improving, drooling saliva   Pulm: occasional cough,  No shortness of breath  Cardio: No chest pain,  GI:    No diarrhea, No constipation  : No dysuria, No frequency  Neuro: Right sided weakness  MSK: Denies pain    PHYSICAL EXAM    Vital Signs Last 24 Hrs  T(C): 36.9 (28 Oct 2020 14:47), Max: 36.9 (28 Oct 2020 13:59)  T(F): 98.4 (28 Oct 2020 14:47), Max: 98.4 (28 Oct 2020 13:59)  HR: 63 (28 Oct 2020 14:57) (49 - 64)  BP: 128/76 (28 Oct 2020 14:57) (105/59 - 137/77)  BP(mean): --  RR: 18 (28 Oct 2020 14:57) (18 - 20)  SpO2: 99% (28 Oct 2020 14:57) (99% - 100%)    General: NAD, Resting Comfortable, sitting upright at in bed                      HEENT: NC/AT, EOM I, PERRLA, Normal Conjunctivae. NGT removed.   Cardio: RRR                              Pulm: No Respiratory Distress,  Lungs with bilateral rhonchi R>L. Breathing comfortably and saturating well on RA                     Abdomen: ND/NT, Soft                                              MSK: No joint swelling, decreased ROM in the right upper and lower extremity.                                         Ext: No C/C/E, No calf tenderness    Skin: intact                                                                   Neurological Examination:  Cognitive: [  x  ] AAO x 3  with choices   [    ]  other                                                                      Attention:  [ x   ] intact   [    ]  other                            Mood/Affect:  [   x ] wnl    [    ]  other                                                                             Communication:  [    ]Fluent   [   x ] other dysarthria, apraxia, severe expressive aphasia  CN II - XII  [    ] intact   [  x  ] other decrease function in clearing saliva  Coordination:   [    ] FTN/HTS intact   [    x] other    normal on left, unable to assess on right due to hemiparesis                                                                   Sensory: [    ]Intact to light touch   [  x  ] other     decreased rt leg                                                                                    Tone:  [    ]  wnl,   [    x]  other decreased tone in RUE .     Motor    LEFT    UE: [ x  ] WNL.  [   ] other:  RIGHT UE:  [   ] WNL.  [ x  ] other: shoulder 2/5, elbow 1/5. wrist 0/5, fingers 0/5  LEFT    LE:  [  x ] WNL.  [   ] other:  RIGHT LE:  [   ] WNL.  [ x  ] other: mod  strong extensor synergy. Trace isolated movement    Reflex:  [    ]  symmetric,  [  x  ]  other: Right bicep 3+otherwise reflexes normal      MEDICATIONS  (STANDING):  aspirin  chewable 81 milliGRAM(s) Enteral Tube daily  atorvastatin 80 milliGRAM(s) Oral at bedtime  dextrose 5% + sodium chloride 0.45%. 1000 milliLiter(s) (75 mL/Hr) IV Continuous <Continuous>  enoxaparin Injectable 40 milliGRAM(s) SubCutaneous at bedtime  fludroCORTISONE 0.2 milliGRAM(s) Oral <User Schedule>  gabapentin 100 milliGRAM(s) Oral two times a day  midodrine. 5 milliGRAM(s) Oral <User Schedule>  nystatin    Suspension 050746 Unit(s) Oral four times a day  pantoprazole   Suspension 40 milliGRAM(s) Enteral Tube daily  scopolamine 1 mG/72 Hr(s) Patch 1 Patch Transdermal every 72 hours  vancomycin  IVPB 1000 milliGRAM(s) IV Intermittent once    MEDICATIONS  (PRN):  chlorproMAZINE    Tablet 10 milliGRAM(s) Oral every 6 hours PRN hiccups      RECENT LABS/IMAGING - reviewed                     13.6   10.02 )-----------( 288      ( 27 Oct 2020 12:14 )             40.5     10-27    139  |  103  |  15  ----------------------------<  108<H>  4.4   |  27  |  0.9    Ca    8.9      27 Oct 2020 12:14      PT/INR - ( 27 Oct 2020 07:12 )   PT: 14.20 sec;   INR: 1.23 ratio         Urinalysis Basic - ( 21 Oct 2020 04:25 )    Color: Yellow / Appearance: Slightly Turbid / S.015 / pH: x  Gluc: x / Ketone: Negative  / Bili: Negative / Urobili: 3 mg/dL   Blood: x / Protein: Negative / Nitrite: Negative   Leuk Esterase: Large / RBC: 5 /HPF /  /HPF   Sq Epi: x / Non Sq Epi: 0 /HPF / Bacteria: Moderate   Patient is a 69y old  Male who presents with a chief complaint of Left CVA (dominant side)    HPI:  This is a 69 year old male with PMHx of b/l hip replacements, recent mechanical thrombectomy of left ADEOLA/MCA stroke (2020) admitted to [facility] on 10/13/2020 after he developed new right sided facial droop, weakness, and dysarthria. In the ED, his NIHSS was 20 and stroke code was called. However, patient did not receive tPA because of recent stroke history. Initial CTH showed wedge shaped area hypoattenuation within the left frontal and parietal lobes consistent with prior Left MCA territory infarct. CT perfusion showed left MCA M2 and M3 occlusions. Repeat CTH on 10/14/2020 showed interval development of left basal ganglia hypoattenuation compatible with acute infarct. Patient was admitted to ICU for closer monitoring and downgraded to medicine on 10/15/2020. Patient's loop recorder was interrogated 10/14 and did not demonstrate any events. Patient failed bedside swallow evaluation and remains NPO with feedings through NGT requiring frequent suctioning to clean saline in oral cavity. He was started on midodrine 10mg TID for orthostatic hypotension. Patient received fluids and was then started on Florinef 0.2mg daily for continued hypotension. His BP improved the following day and denies lightheadedness or dizziness. Patient seen and examined by physiatry and deemed an appropriate candidate for acute rehabilitation consisting of 3 hours of therapy a day.     Of note during his first stroke on 2020, JANET was performed and did not demonstrate thrombus or cardiac abnormality to suggest cardiac origin for CVA.  (19 Oct 2020 14:10). He reports only being left with residual dysarthria.    CLOF:  He ambulates 10' x2 with hemicane max assist, ACE wrapping for R foot drop. He has severe expressive aphasia/ apraxia and moderate receptive deficits    Subjective:    The patient was examined at during rounds this morning with Dr. Larsen. Pt with NGT removed and NPO since midnight, on IV fluids with D5 1/2NS at 75cc/hr, and hold lovenox pending PEG placement by GI today. Pt remains medically stable, reports feeling well, denies new complaints today. On scopolamine patch, with mild improvement in drooling saliva, will continue to monitor.  Patient continues to use his [employer] catheter to suction his oral secretions. Plavix held since 10/23 per neuro recs. Patient pending PEG tube placement by GI Dr. Balderas today. Covid negative, Plan to resume plavix after peg placement, and wait 24 hours before pt cleared to use peg. Patient remains on ASA and will resume lovenox 40 sc qhs tonight.     REVIEW OF SYSTEMS   HEENT: Hiccups - improving, drooling saliva   Pulm: occasional cough,  No shortness of breath  Cardio: No chest pain,  GI:    No diarrhea, No constipation  : No dysuria, No frequency  Neuro: Right sided weakness  MSK: Denies pain    PHYSICAL EXAM    Vital Signs Last 24 Hrs  T(C): 36.9 (28 Oct 2020 14:47), Max: 36.9 (28 Oct 2020 13:59)  T(F): 98.4 (28 Oct 2020 14:47), Max: 98.4 (28 Oct 2020 13:59)  HR: 63 (28 Oct 2020 14:57) (49 - 64)  BP: 128/76 (28 Oct 2020 14:57) (105/59 - 137/77)  BP(mean): --  RR: 18 (28 Oct 2020 14:57) (18 - 20)  SpO2: 99% (28 Oct 2020 14:57) (99% - 100%)    General: NAD, Resting Comfortable, sitting upright at in bed                      HEENT: NC/AT, EOM I, PERRLA, Normal Conjunctivae. NGT removed.   Cardio: RRR                              Pulm: No Respiratory Distress,  Lungs with bilateral rhonchi R>L. Breathing comfortably and saturating well on RA                     Abdomen: ND/NT, Soft                                              MSK: No joint swelling, decreased ROM in the right upper and lower extremity.                                         Ext: No C/C/E, No calf tenderness    Skin: intact                                                                   Neurological Examination:  Cognitive: [  x  ] AAO x 3  with choices   [    ]  other                                                                      Attention:  [ x   ] intact   [    ]  other                            Mood/Affect:  [   x ] wnl    [    ]  other                                                                             Communication:  [    ]Fluent   [   x ] other dysarthria, apraxia, severe expressive aphasia  CN II - XII  [    ] intact   [  x  ] other decrease function in clearing saliva  Coordination:   [    ] FTN/HTS intact   [    x] other    normal on left, unable to assess on right due to hemiparesis                                                                   Sensory: [    ]Intact to light touch   [  x  ] other     decreased rt leg                                                                                    Tone:  [    ]  wnl,   [    x]  other decreased tone in RUE .     Motor    LEFT    UE: [ x  ] WNL.  [   ] other:  RIGHT UE:  [   ] WNL.  [ x  ] other: shoulder 2-/5, elbow 1/5. wrist 0/5, fingers 0/5  LEFT    LE:  [  x ] WNL.  [   ] other:  RIGHT LE:  [   ] WNL.  [ x  ] other: mod  strong extensor synergy. Trace isolated movement    Reflex:  [    ]  symmetric,  [  x  ]  other: Right bicep 3+otherwise reflexes normal      MEDICATIONS  (STANDING):  aspirin  chewable 81 milliGRAM(s) Enteral Tube daily  atorvastatin 80 milliGRAM(s) Oral at bedtime  dextrose 5% + sodium chloride 0.45%. 1000 milliLiter(s) (75 mL/Hr) IV Continuous <Continuous>  enoxaparin Injectable 40 milliGRAM(s) SubCutaneous at bedtime  fludroCORTISONE 0.2 milliGRAM(s) Oral <User Schedule>  gabapentin 100 milliGRAM(s) Oral two times a day  midodrine. 5 milliGRAM(s) Oral <User Schedule>  nystatin    Suspension 413793 Unit(s) Oral four times a day  pantoprazole   Suspension 40 milliGRAM(s) Enteral Tube daily  scopolamine 1 mG/72 Hr(s) Patch 1 Patch Transdermal every 72 hours  vancomycin  IVPB 1000 milliGRAM(s) IV Intermittent once    MEDICATIONS  (PRN):  chlorproMAZINE    Tablet 10 milliGRAM(s) Oral every 6 hours PRN hiccups      RECENT LABS/IMAGING - reviewed                     13.6   10.02 )-----------( 288      ( 27 Oct 2020 12:14 )             40.5     10-27    139  |  103  |  15  ----------------------------<  108<H>  4.4   |  27  |  0.9    Ca    8.9      27 Oct 2020 12:14      PT/INR - ( 27 Oct 2020 07:12 )   PT: 14.20 sec;   INR: 1.23 ratio         Urinalysis Basic - ( 21 Oct 2020 04:25 )    Color: Yellow / Appearance: Slightly Turbid / S.015 / pH: x  Gluc: x / Ketone: Negative  / Bili: Negative / Urobili: 3 mg/dL   Blood: x / Protein: Negative / Nitrite: Negative   Leuk Esterase: Large / RBC: 5 /HPF /  /HPF   Sq Epi: x / Non Sq Epi: 0 /HPF / Bacteria: Moderate

## 2020-10-28 NOTE — CHART NOTE - NSCHARTNOTEFT_GEN_A_CORE
patient is s/p PEG placement today .   REC:   please keep NPO except for medication and water flush   CBC afternoon and in am   hold anticoagulation for today patient is s/p PEG placement today .   REC:   please keep NPO except for medication and water flush   CBC 4 pm and in am   hold anticoagulation for today

## 2020-10-28 NOTE — PROGRESS NOTE ADULT - NUTRITIONAL ASSESSMENT
Diet, NPO after Midnight:      NPO Start Date: 27-Oct-2020,   NPO Start Time: 23:59 (10-27-20 @ 08:38) [Active]  Diet, NPO with Tube Feed:   Tube Feeding Modality: Nasogastric  Osmolite 1.5 Benjamin  Total Volume for 24 Hours (mL): 1280  Bolus  Total Volume of Bolus (mL):  320  Tube Feed Frequency: Every 6 hours   Tube Feed Start Time: 18:00  Bolus Feed Rate (mL per Hour): 53   Bolus Feed Duration (in Hours): 25  Beneprotein ([facility] Only)     Qty per Day:  4 (10-25-20 @ 16:53) [Active]

## 2020-10-28 NOTE — CHART NOTE - NSCHARTNOTEFT_GEN_A_CORE
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 72"  - Wt.  182.5lbs (10/23)  - %wt change  - BMI 24.7 -- based on 182.5lbs  - IBW 178lbs     Pertinent Lab Data: (10/27) H/H 13.6/40.5,      Pertinent Meds: lovenox, aspirin, dextrose 5% + sodium chloride 0.45%, lipitor, protonix      Physical Findings:  - Appearance: No edema noted per RN flow sheets  - GI function: LBM (10/27)  - Tubes: NGT, PEG placement scheduled for today   - Oral/Mouth cavity: NPO  - Skin: WDL     Nutrition Requirements  Weight Used: 89.8kg -Derived from nutrition note (10/21)     Estimated Energy Needs    Continue [x]  Adjust []  1859-2014kcal/day (MSJ-1549 x AF 1.2-1.3)         Estimated Protein Needs    Continue [x]  Adjust []  90-108gm/day (1-1.2grams/kg of admit weight)        Estimated Fluid Needs        Continue [x]  Adjust []  1859-2014mL/day (1mL/kcal)     Nutrient Intake: current tube feed regimen is meeting 99% of pt's estimated energy needs, 105% of estimated protein needs, 100% of RDIs         [x] Previous Nutrition Diagnosis: Increased Nutrient Needs             [x] Ongoing          [] Resolved       Nutrition Intervention: enteral nutrition      Goal/Expected Outcome: Pt to continue to meet % of estimated energy needs via enteral nutrition within 3 days      Indicator/Monitoring: diet order, energy intake, body comp, NFPF    Recommendations  1. Continue current tube feed regimen Registered Dietitian Follow-Up     Patient Profile Reviewed                           Yes [x]   No []     Nutrition History Previously Obtained        Yes [x]  No []       Pertinent Subjective Information:  Pt reports he was tolerating tube feed regimen with no GI distress. RN reports pt has not been receiving tube feeds since midnight d/t planned PEG placement.      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 72"  - Wt.  182.6lbs (10/28) pt taken by RD. Wt seems relatively stable since adm.   182.5lbs (10/23)   - %wt change  - BMI 24.7 -- based on 182.5lbs  - IBW 178lbs     Pertinent Lab Data: (10/27) H/H 13.6/40.5,      Pertinent Meds: lovenox, aspirin, dextrose 5% + sodium chloride 0.45%, lipitor, protonix      Physical Findings:  - Appearance: No edema noted per RN flow sheets  - GI function: LBM (10/27)  - Tubes: NGT, PEG placement scheduled for today   - Oral/Mouth cavity: NPO  - Skin: WDL     Nutrition Requirements  Weight Used: 89.8kg -Derived from nutrition note (10/21)     Estimated Energy Needs    Continue [x]  Adjust []  1859-2014kcal/day (MSJ-1549 x AF 1.2-1.3)         Estimated Protein Needs    Continue [x]  Adjust []  90-108gm/day (1-1.2grams/kg of admit weight)        Estimated Fluid Needs        Continue [x]  Adjust []  1859-2014mL/day (1mL/kcal)     Nutrient Intake: current tube feed regimen is meeting 99% of pt's estimated energy needs, 105% of estimated protein needs, 100% of RDIs         [x] Previous Nutrition Diagnosis: Increased Nutrient Needs             [x] Ongoing          [] Resolved       Nutrition Intervention: enteral nutrition      Goal/Expected Outcome: Pt to continue to meet % of estimated energy needs via enteral nutrition within 3 days      Indicator/Monitoring: diet order, energy intake, body comp, NFPF    Recommendations  Continue current tube feed regimen

## 2020-10-28 NOTE — PROGRESS NOTE ADULT - ASSESSMENT
Patient discussed in team and he has a positive approach and making progress. Feedback from team provided to patient’s wife and son. Discharge process discussed including options and continuum of care. Medical questions and concerns redirected to MD. Emotional and cognitive status discussed and support provided to wife.    Goals: ? Facilitate Positive Coping ? Family Support / Education ? Cognitive Assessment   Plan: 1-2 times a week 30-60 minutes ? Individual Psychotherapy ? Cognitive Assessment ? Family contact/support/education

## 2020-10-29 VITALS
RESPIRATION RATE: 18 BRPM | SYSTOLIC BLOOD PRESSURE: 152 MMHG | DIASTOLIC BLOOD PRESSURE: 72 MMHG | HEART RATE: 73 BPM | TEMPERATURE: 97 F | OXYGEN SATURATION: 96 %

## 2020-10-29 LAB
ANION GAP SERPL CALC-SCNC: 10 MMOL/L — SIGNIFICANT CHANGE UP (ref 7–14)
APTT BLD: 25.5 SEC — LOW (ref 27–39.2)
BASOPHILS # BLD AUTO: 0.02 K/UL — SIGNIFICANT CHANGE UP (ref 0–0.2)
BASOPHILS NFR BLD AUTO: 0.1 % — SIGNIFICANT CHANGE UP (ref 0–1)
BUN SERPL-MCNC: 17 MG/DL — SIGNIFICANT CHANGE UP (ref 10–20)
CALCIUM SERPL-MCNC: 9.1 MG/DL — SIGNIFICANT CHANGE UP (ref 8.5–10.1)
CHLORIDE SERPL-SCNC: 101 MMOL/L — SIGNIFICANT CHANGE UP (ref 98–110)
CO2 SERPL-SCNC: 26 MMOL/L — SIGNIFICANT CHANGE UP (ref 17–32)
CREAT SERPL-MCNC: 0.8 MG/DL — SIGNIFICANT CHANGE UP (ref 0.7–1.5)
EOSINOPHIL # BLD AUTO: 0.04 K/UL — SIGNIFICANT CHANGE UP (ref 0–0.7)
EOSINOPHIL NFR BLD AUTO: 0.3 % — SIGNIFICANT CHANGE UP (ref 0–8)
GLUCOSE SERPL-MCNC: 122 MG/DL — HIGH (ref 70–99)
HCT VFR BLD CALC: 39.1 % — LOW (ref 42–52)
HCT VFR BLD CALC: 39.6 % — LOW (ref 42–52)
HCT VFR BLD CALC: 39.7 % — LOW (ref 42–52)
HGB BLD-MCNC: 13.3 G/DL — LOW (ref 14–18)
HGB BLD-MCNC: 13.5 G/DL — LOW (ref 14–18)
HGB BLD-MCNC: 13.6 G/DL — LOW (ref 14–18)
IMM GRANULOCYTES NFR BLD AUTO: 0.4 % — HIGH (ref 0.1–0.3)
INR BLD: 1.3 RATIO — SIGNIFICANT CHANGE UP (ref 0.65–1.3)
LACTATE SERPL-SCNC: 1.2 MMOL/L — SIGNIFICANT CHANGE UP (ref 0.7–2)
LYMPHOCYTES # BLD AUTO: 0.77 K/UL — LOW (ref 1.2–3.4)
LYMPHOCYTES # BLD AUTO: 5.6 % — LOW (ref 20.5–51.1)
MCHC RBC-ENTMCNC: 31.6 PG — HIGH (ref 27–31)
MCHC RBC-ENTMCNC: 31.8 PG — HIGH (ref 27–31)
MCHC RBC-ENTMCNC: 32.2 PG — HIGH (ref 27–31)
MCHC RBC-ENTMCNC: 34 G/DL — SIGNIFICANT CHANGE UP (ref 32–37)
MCHC RBC-ENTMCNC: 34 G/DL — SIGNIFICANT CHANGE UP (ref 32–37)
MCHC RBC-ENTMCNC: 34.3 G/DL — SIGNIFICANT CHANGE UP (ref 32–37)
MCV RBC AUTO: 92.5 FL — SIGNIFICANT CHANGE UP (ref 80–94)
MCV RBC AUTO: 92.9 FL — SIGNIFICANT CHANGE UP (ref 80–94)
MCV RBC AUTO: 94.7 FL — HIGH (ref 80–94)
MONOCYTES # BLD AUTO: 0.79 K/UL — HIGH (ref 0.1–0.6)
MONOCYTES NFR BLD AUTO: 5.7 % — SIGNIFICANT CHANGE UP (ref 1.7–9.3)
NEUTROPHILS # BLD AUTO: 12.19 K/UL — HIGH (ref 1.4–6.5)
NEUTROPHILS NFR BLD AUTO: 87.9 % — HIGH (ref 42.2–75.2)
NRBC # BLD: 0 /100 WBCS — SIGNIFICANT CHANGE UP (ref 0–0)
PLATELET # BLD AUTO: 257 K/UL — SIGNIFICANT CHANGE UP (ref 130–400)
PLATELET # BLD AUTO: 277 K/UL — SIGNIFICANT CHANGE UP (ref 130–400)
PLATELET # BLD AUTO: 285 K/UL — SIGNIFICANT CHANGE UP (ref 130–400)
POTASSIUM SERPL-MCNC: 4.2 MMOL/L — SIGNIFICANT CHANGE UP (ref 3.5–5)
POTASSIUM SERPL-SCNC: 4.2 MMOL/L — SIGNIFICANT CHANGE UP (ref 3.5–5)
PROTHROM AB SERPL-ACNC: 15 SEC — HIGH (ref 9.95–12.87)
RBC # BLD: 4.19 M/UL — LOW (ref 4.7–6.1)
RBC # BLD: 4.21 M/UL — LOW (ref 4.7–6.1)
RBC # BLD: 4.28 M/UL — LOW (ref 4.7–6.1)
RBC # FLD: 11 % — LOW (ref 11.5–14.5)
RBC # FLD: 11 % — LOW (ref 11.5–14.5)
RBC # FLD: 11.2 % — LOW (ref 11.5–14.5)
SODIUM SERPL-SCNC: 137 MMOL/L — SIGNIFICANT CHANGE UP (ref 135–146)
WBC # BLD: 11.82 K/UL — HIGH (ref 4.8–10.8)
WBC # BLD: 12.18 K/UL — HIGH (ref 4.8–10.8)
WBC # BLD: 13.86 K/UL — HIGH (ref 4.8–10.8)
WBC # FLD AUTO: 11.82 K/UL — HIGH (ref 4.8–10.8)
WBC # FLD AUTO: 12.18 K/UL — HIGH (ref 4.8–10.8)
WBC # FLD AUTO: 13.86 K/UL — HIGH (ref 4.8–10.8)

## 2020-10-29 PROCEDURE — 99232 SBSQ HOSP IP/OBS MODERATE 35: CPT

## 2020-10-29 PROCEDURE — 74018 RADEX ABDOMEN 1 VIEW: CPT | Mod: 26

## 2020-10-29 PROCEDURE — 71045 X-RAY EXAM CHEST 1 VIEW: CPT | Mod: 26

## 2020-10-29 RX ORDER — ONDANSETRON 8 MG/1
4 TABLET, FILM COATED ORAL EVERY 6 HOURS
Refills: 0 | Status: DISCONTINUED | OUTPATIENT
Start: 2020-10-29 | End: 2020-10-30

## 2020-10-29 RX ORDER — CLOPIDOGREL BISULFATE 75 MG/1
75 TABLET, FILM COATED ORAL DAILY
Refills: 0 | Status: DISCONTINUED | OUTPATIENT
Start: 2020-10-30 | End: 2020-10-30

## 2020-10-29 RX ORDER — CLOPIDOGREL BISULFATE 75 MG/1
75 TABLET, FILM COATED ORAL DAILY
Refills: 0 | Status: DISCONTINUED | OUTPATIENT
Start: 2020-10-29 | End: 2020-10-29

## 2020-10-29 RX ORDER — SODIUM CHLORIDE 9 MG/ML
1000 INJECTION, SOLUTION INTRAVENOUS
Refills: 0 | Status: DISCONTINUED | OUTPATIENT
Start: 2020-10-29 | End: 2020-10-29

## 2020-10-29 RX ORDER — PANTOPRAZOLE SODIUM 20 MG/1
40 TABLET, DELAYED RELEASE ORAL
Refills: 0 | Status: DISCONTINUED | OUTPATIENT
Start: 2020-10-29 | End: 2020-10-30

## 2020-10-29 RX ORDER — PANTOPRAZOLE SODIUM 20 MG/1
40 TABLET, DELAYED RELEASE ORAL
Refills: 0 | Status: DISCONTINUED | OUTPATIENT
Start: 2020-10-29 | End: 2020-10-29

## 2020-10-29 RX ADMIN — Medication 500000 UNIT(S): at 14:53

## 2020-10-29 RX ADMIN — Medication 500000 UNIT(S): at 05:46

## 2020-10-29 RX ADMIN — SCOPALAMINE 1 PATCH: 1 PATCH, EXTENDED RELEASE TRANSDERMAL at 06:23

## 2020-10-29 RX ADMIN — FLUDROCORTISONE ACETATE 0.2 MILLIGRAM(S): 0.1 TABLET ORAL at 05:46

## 2020-10-29 RX ADMIN — MIDODRINE HYDROCHLORIDE 5 MILLIGRAM(S): 2.5 TABLET ORAL at 17:28

## 2020-10-29 RX ADMIN — MIDODRINE HYDROCHLORIDE 5 MILLIGRAM(S): 2.5 TABLET ORAL at 05:46

## 2020-10-29 RX ADMIN — MIDODRINE HYDROCHLORIDE 5 MILLIGRAM(S): 2.5 TABLET ORAL at 14:52

## 2020-10-29 RX ADMIN — GABAPENTIN 100 MILLIGRAM(S): 400 CAPSULE ORAL at 05:48

## 2020-10-29 RX ADMIN — Medication 500000 UNIT(S): at 17:28

## 2020-10-29 RX ADMIN — PANTOPRAZOLE SODIUM 40 MILLIGRAM(S): 20 TABLET, DELAYED RELEASE ORAL at 14:52

## 2020-10-29 RX ADMIN — Medication 81 MILLIGRAM(S): at 14:52

## 2020-10-29 RX ADMIN — SCOPALAMINE 1 PATCH: 1 PATCH, EXTENDED RELEASE TRANSDERMAL at 20:39

## 2020-10-29 RX ADMIN — GABAPENTIN 100 MILLIGRAM(S): 400 CAPSULE ORAL at 17:28

## 2020-10-29 NOTE — PROGRESS NOTE ADULT - SUBJECTIVE AND OBJECTIVE BOX
Patient is a 69y old  Male who presents with a chief complaint of Left CVA (dominant side)    HPI:  This is a 69 year old male with PMHx of b/l hip replacements, recent mechanical thrombectomy of left ADEOLA/MCA stroke (2020) admitted to [facility] on 10/13/2020 after he developed new right sided facial droop, weakness, and dysarthria. In the ED, his NIHSS was 20 and stroke code was called. However, patient did not receive tPA because of recent stroke history. Initial CTH showed wedge shaped area hypoattenuation within the left frontal and parietal lobes consistent with prior Left MCA territory infarct. CT perfusion showed left MCA M2 and M3 occlusions. Repeat CTH on 10/14/2020 showed interval development of left basal ganglia hypoattenuation compatible with acute infarct. Patient was admitted to ICU for closer monitoring and downgraded to medicine on 10/15/2020. Patient's loop recorder was interrogated 10/14 and did not demonstrate any events. Patient failed bedside swallow evaluation and remains NPO with feedings through NGT requiring frequent suctioning to clean saline in oral cavity. He was started on midodrine 10mg TID for orthostatic hypotension. Patient received fluids and was then started on Florinef 0.2mg daily for continued hypotension. His BP improved the following day and denies lightheadedness or dizziness. Patient seen and examined by physiatry and deemed an appropriate candidate for acute rehabilitation consisting of 3 hours of therapy a day.     Of note during his first stroke on 2020, JANET was performed and did not demonstrate thrombus or cardiac abnormality to suggest cardiac origin for CVA.  (19 Oct 2020 14:10). He reports only being left with residual dysarthria.    CLOF:  He ambulates 10' x2 with hemicane max assist, ACE wrapping for R foot drop. He has severe expressive aphasia/ apraxia and moderate receptive deficits    Subjective:    The patient was examined at during rounds this morning with Dr. Larsen. Pt s/p PEG placement yesterday, remains on NPO, on IV fluids with D5 1/2NS at 75cc/hr. Lovenox qhs resumed. Pt had an episode of spitting up blood overnight, improved this morning. Vitals stable and labs reviewed, hgb uptrending 13.0->13.3. Pt remains medically stable, reports feeling well, denies new complaints today. On scopolamine patch, with mild improvement in drooling saliva, will continue to monitor. Hiccups better today. Patient remains on ASA. Resume plavix today as hgb is stable, f/u am cbc. Pt's PEG tube OK for medications and water flushes, PEG tube feeding okay to start this afternoon after 2pm.     REVIEW OF SYSTEMS   HEENT: Hiccups - improving, drooling saliva   Pulm: occasional cough,  No shortness of breath  Cardio: No chest pain,  GI:    No diarrhea, No constipation  : No dysuria, No frequency  Neuro: Right sided weakness  MSK: Denies pain    PHYSICAL EXAM    Vital Signs Last 24 Hrs  T(C): 37.1 (29 Oct 2020 05:04), Max: 37.1 (29 Oct 2020 05:04)  T(F): 98.8 (29 Oct 2020 05:04), Max: 98.8 (29 Oct 2020 05:04)  HR: 64 (29 Oct 2020 05:04) (49 - 67)  BP: 121/72 (29 Oct 2020 05:04) (105/59 - 137/77)  BP(mean): --  RR: 18 (29 Oct 2020 05:04) (18 - 20)  SpO2: 99% (28 Oct 2020 14:57) (99% - 100%)    General: NAD, Resting Comfortable, sitting upright at in bed                      HEENT: NC/AT, EOM I, PERRLA, Normal Conjunctivae. NGT removed.   Cardio: RRR                              Pulm: No Respiratory Distress,  Lungs with bilateral rhonchi R>L. Breathing comfortably and saturating well on RA                     Abdomen: ND/NT, Soft, PEG tube in place, c/d/i             MSK: No joint swelling, decreased ROM in the right upper and lower extremity.                                         Ext: No C/C/E, No calf tenderness    Skin: intact                                                                   Neurological Examination:  Cognitive: [  x  ] AAO x 3  with choices   [    ]  other                                                                      Attention:  [ x   ] intact   [    ]  other                            Mood/Affect:  [   x ] wnl    [    ]  other                                                                             Communication:  [    ]Fluent   [   x ] other dysarthria, apraxia, severe expressive aphasia  CN II - XII  [    ] intact   [  x  ] other decrease function in clearing saliva  Coordination:   [    ] FTN/HTS intact   [    x] other    normal on left, unable to assess on right due to hemiparesis                                                                   Sensory: [    ]Intact to light touch   [  x  ] other     decreased rt leg                                                                                    Tone:  [    ]  wnl,   [    x]  other decreased tone in RUE .     Motor    LEFT    UE: [ x  ] WNL.  [   ] other:  RIGHT UE:  [   ] WNL.  [ x  ] other: shoulder 2-/5, elbow 1/5. wrist 0/5, fingers 0/5  LEFT    LE:  [  x ] WNL.  [   ] other:  RIGHT LE:  [   ] WNL.  [ x  ] other: mod  strong extensor synergy. Trace isolated movement    Reflex:  [    ]  symmetric,  [  x  ]  other: Right bicep 3+otherwise reflexes normal      MEDICATIONS  (STANDING):  aspirin  chewable 81 milliGRAM(s) Enteral Tube daily  atorvastatin 80 milliGRAM(s) Oral at bedtime  clopidogrel Tablet 75 milliGRAM(s) Oral daily  dextrose 5% + sodium chloride 0.45%. 1000 milliLiter(s) (75 mL/Hr) IV Continuous <Continuous>  enoxaparin Injectable 40 milliGRAM(s) SubCutaneous at bedtime  fludroCORTISONE 0.2 milliGRAM(s) Oral <User Schedule>  gabapentin 100 milliGRAM(s) Oral two times a day  midodrine. 5 milliGRAM(s) Oral <User Schedule>  nystatin    Suspension 508884 Unit(s) Oral four times a day  pantoprazole   Suspension 40 milliGRAM(s) Enteral Tube daily  scopolamine 1 mG/72 Hr(s) Patch 1 Patch Transdermal every 72 hours    MEDICATIONS  (PRN):  chlorproMAZINE    Tablet 10 milliGRAM(s) Oral every 6 hours PRN hiccups        RECENT LABS/IMAGING - reviewed                          13.3   12.18 )-----------( 257      ( 29 Oct 2020 06:27 )             39.1     10-27    139  |  103  |  15  ----------------------------<  108<H>  4.4   |  27  |  0.9    Ca    8.9      27 Oct 2020 12:14    PT/INR - ( 27 Oct 2020 07:12 )   PT: 14.20 sec;   INR: 1.23 ratio       Urinalysis Basic - ( 21 Oct 2020 04:25 )    Color: Yellow / Appearance: Slightly Turbid / S.015 / pH: x  Gluc: x / Ketone: Negative  / Bili: Negative / Urobili: 3 mg/dL   Blood: x / Protein: Negative / Nitrite: Negative   Leuk Esterase: Large / RBC: 5 /HPF /  /HPF   Sq Epi: x / Non Sq Epi: 0 /HPF / Bacteria: Moderate    Imaging:   EGD with PEG placement 10/28:    PEG Placement:   Percutaneous endoscopic gastrostomy: In a darkened room, the abdominal wall was  transilluminated and a site was selected. Indentation of the gastric wall by  external finger pressure was demonstrated. The skin was surgically prepared and  anesthetized with xylocaine. A small incision was made in the abdominal wall  using a surgical blade. A 25-gauge needle with cannula was inserted through the  abdominal wall and into the gastric lumen. A guidewire was passed through the  cannula, was caught by the snare passed through the endoscope and brought out  through the mouth. A [employer] 20Fr PEG tube was secured to the  guidewire and pulled through the abdominal wall. A satisfactory final position  was confirmed endoscopically. The gastrostomy tube was secured with the outer  flange positioned at 2 cm. The post procedure appearances were satisfactory.    Limitations/Complications: There were no apparent limitations or complications  Findings:   Esophagus Mucosa Normal mucosa was noted in the whole esophagus.   Stomach Mucosa Diffuse erythema of the mucosa was noted in the stomach. These  findings are compatible with non-erosive gastritis.   Duodenum Mucosa Normal mucosa was noted in the whole examined duodenum.     Impressions:    Normal mucosa in the whole esophagus.    Erythema in the stomach compatible with non-erosive gastritis.    Normal mucosa in the whole examined duodenum.     Plan: NPO  Nothing via gtube except meds and water flushes   PEG may be used for Medications today and Feedings in 24 h  please flush PEG tube with 50 cc of water before and after each feeds and  medications starting tomorrow if uneventful  Do not place gauze between bumper and skin  Wash with water and soap daily  Abdominal binder  Pain control    CBC at 4pm    Restart Plavix in am Patient is a 69y old  Male who presents with a chief complaint of Left CVA (dominant side)    HPI:  This is a 69 year old male with PMHx of b/l hip replacements, recent mechanical thrombectomy of left ADEOLA/MCA stroke (2020) admitted to [facility] on 10/13/2020 after he developed new right sided facial droop, weakness, and dysarthria. In the ED, his NIHSS was 20 and stroke code was called. However, patient did not receive tPA because of recent stroke history. Initial CTH showed wedge shaped area hypoattenuation within the left frontal and parietal lobes consistent with prior Left MCA territory infarct. CT perfusion showed left MCA M2 and M3 occlusions. Repeat CTH on 10/14/2020 showed interval development of left basal ganglia hypoattenuation compatible with acute infarct. Patient was admitted to ICU for closer monitoring and downgraded to medicine on 10/15/2020. Patient's loop recorder was interrogated 10/14 and did not demonstrate any events. Patient failed bedside swallow evaluation and remains NPO with feedings through NGT requiring frequent suctioning to clean saline in oral cavity. He was started on midodrine 10mg TID for orthostatic hypotension. Patient received fluids and was then started on Florinef 0.2mg daily for continued hypotension. His BP improved the following day and denies lightheadedness or dizziness. Patient seen and examined by physiatry and deemed an appropriate candidate for acute rehabilitation consisting of 3 hours of therapy a day.     Of note during his first stroke on 2020, JANET was performed and did not demonstrate thrombus or cardiac abnormality to suggest cardiac origin for CVA.  (19 Oct 2020 14:10). He reports only being left with residual dysarthria.    CLOF:  He ambulates 10' x2 with hemicane max assist, ACE wrapping for R foot drop. He has severe expressive aphasia/ apraxia and moderate receptive deficits    Subjective:    The patient was examined at during rounds this morning with Dr. Larsen. Pt s/p PEG placement yesterday, remains on NPO, on IV fluids with D5 1/2NS at 75cc/hr. Lovenox qhs resumed. Pt had an episode of spitting up blood overnight, improved this morning. Vitals stable and labs reviewed, hgb uptrending 13.0->13.3. Pt remains medically stable, reports feeling well, denies new complaints today. On scopolamine patch, with mild improvement in drooling saliva, will continue to monitor. Hiccups better today. Patient remains on ASA. Plan to f/u am cbc ordered, prior to restarting plavix. Pt's PEG tube OK for medications and water flushes, PEG tube feeding okay to start this afternoon after 2pm.     REVIEW OF SYSTEMS   HEENT: Hiccups - improving, drooling saliva   Pulm: occasional cough,  No shortness of breath  Cardio: No chest pain,  GI:    No diarrhea, No constipation  : No dysuria, No frequency  Neuro: Right sided weakness  MSK: Denies pain    PHYSICAL EXAM    Vital Signs Last 24 Hrs  T(C): 37.1 (29 Oct 2020 05:04), Max: 37.1 (29 Oct 2020 05:04)  T(F): 98.8 (29 Oct 2020 05:04), Max: 98.8 (29 Oct 2020 05:04)  HR: 64 (29 Oct 2020 05:04) (49 - 67)  BP: 121/72 (29 Oct 2020 05:04) (105/59 - 137/77)  BP(mean): --  RR: 18 (29 Oct 2020 05:04) (18 - 20)  SpO2: 99% (28 Oct 2020 14:57) (99% - 100%)    General: NAD, Resting Comfortable, sitting upright at in bed                      HEENT: NC/AT, EOM I, PERRLA, Normal Conjunctivae. NGT removed.   Cardio: RRR                              Pulm: No Respiratory Distress,  Lungs with bilateral rhonchi R>L. Breathing comfortably and saturating well on RA                     Abdomen: ND/NT, Soft, PEG tube in place, c/d/i             MSK: No joint swelling, decreased ROM in the right upper and lower extremity.                                         Ext: No C/C/E, No calf tenderness    Skin: intact                                                                   Neurological Examination:  Cognitive: [  x  ] AAO x 3  with choices   [    ]  other                                                                      Attention:  [ x   ] intact   [    ]  other                            Mood/Affect:  [   x ] wnl    [    ]  other                                                                             Communication:  [    ]Fluent   [   x ] other dysarthria, apraxia, severe expressive aphasia  CN II - XII  [    ] intact   [  x  ] other decrease function in clearing saliva  Coordination:   [    ] FTN/HTS intact   [    x] other    normal on left, unable to assess on right due to hemiparesis                                                                   Sensory: [    ]Intact to light touch   [  x  ] other     decreased rt leg                                                                                    Tone:  [    ]  wnl,   [    x]  other decreased tone in RUE .     Motor    LEFT    UE: [ x  ] WNL.  [   ] other:  RIGHT UE:  [   ] WNL.  [ x  ] other: shoulder 2-/5, elbow 1/5. wrist 0/5, fingers 0/5  LEFT    LE:  [  x ] WNL.  [   ] other:  RIGHT LE:  [   ] WNL.  [ x  ] other: mod  strong extensor synergy. Trace isolated movement    Reflex:  [    ]  symmetric,  [  x  ]  other: Right bicep 3+otherwise reflexes normal      MEDICATIONS  (STANDING):  aspirin  chewable 81 milliGRAM(s) Enteral Tube daily  atorvastatin 80 milliGRAM(s) Oral at bedtime  dextrose 5% + sodium chloride 0.45%. 1000 milliLiter(s) (75 mL/Hr) IV Continuous <Continuous>  enoxaparin Injectable 40 milliGRAM(s) SubCutaneous at bedtime  fludroCORTISONE 0.2 milliGRAM(s) Oral <User Schedule>  gabapentin 100 milliGRAM(s) Oral two times a day  midodrine. 5 milliGRAM(s) Oral <User Schedule>  nystatin    Suspension 212462 Unit(s) Oral four times a day  pantoprazole   Suspension 40 milliGRAM(s) Enteral Tube daily  scopolamine 1 mG/72 Hr(s) Patch 1 Patch Transdermal every 72 hours    MEDICATIONS  (PRN):  chlorproMAZINE    Tablet 10 milliGRAM(s) Oral every 6 hours PRN hiccups        RECENT LABS/IMAGING - reviewed                          13.3   12.18 )-----------( 257      ( 29 Oct 2020 06:27 )             39.1     10-27    139  |  103  |  15  ----------------------------<  108<H>  4.4   |  27  |  0.9    Ca    8.9      27 Oct 2020 12:14    PT/INR - ( 27 Oct 2020 07:12 )   PT: 14.20 sec;   INR: 1.23 ratio       Urinalysis Basic - ( 21 Oct 2020 04:25 )    Color: Yellow / Appearance: Slightly Turbid / S.015 / pH: x  Gluc: x / Ketone: Negative  / Bili: Negative / Urobili: 3 mg/dL   Blood: x / Protein: Negative / Nitrite: Negative   Leuk Esterase: Large / RBC: 5 /HPF /  /HPF   Sq Epi: x / Non Sq Epi: 0 /HPF / Bacteria: Moderate    Imaging:   EGD with PEG placement 10/28:    PEG Placement:   Percutaneous endoscopic gastrostomy: In a darkened room, the abdominal wall was  transilluminated and a site was selected. Indentation of the gastric wall by  external finger pressure was demonstrated. The skin was surgically prepared and  anesthetized with xylocaine. A small incision was made in the abdominal wall  using a surgical blade. A 25-gauge needle with cannula was inserted through the  abdominal wall and into the gastric lumen. A guidewire was passed through the  cannula, was caught by the snare passed through the endoscope and brought out  through the mouth. A [employer] 20Fr PEG tube was secured to the  guidewire and pulled through the abdominal wall. A satisfactory final position  was confirmed endoscopically. The gastrostomy tube was secured with the outer  flange positioned at 2 cm. The post procedure appearances were satisfactory.    Limitations/Complications: There were no apparent limitations or complications  Findings:   Esophagus Mucosa Normal mucosa was noted in the whole esophagus.   Stomach Mucosa Diffuse erythema of the mucosa was noted in the stomach. These  findings are compatible with non-erosive gastritis.   Duodenum Mucosa Normal mucosa was noted in the whole examined duodenum.     Impressions:    Normal mucosa in the whole esophagus.    Erythema in the stomach compatible with non-erosive gastritis.    Normal mucosa in the whole examined duodenum.     Plan: NPO  Nothing via gtube except meds and water flushes   PEG may be used for Medications today and Feedings in 24 h  please flush PEG tube with 50 cc of water before and after each feeds and  medications starting tomorrow if uneventful  Do not place gauze between bumper and skin  Wash with water and soap daily  Abdominal binder  Pain control    CBC at 4pm    Restart Plavix in am Patient is a 69y old  Male who presents with a chief complaint of Left CVA (dominant side)    HPI:  This is a 69 year old male with PMHx of b/l hip replacements, recent mechanical thrombectomy of left ADEOLA/MCA stroke (2020) admitted to [facility] on 10/13/2020 after he developed new right sided facial droop, weakness, and dysarthria. In the ED, his NIHSS was 20 and stroke code was called. However, patient did not receive tPA because of recent stroke history. Initial CTH showed wedge shaped area hypoattenuation within the left frontal and parietal lobes consistent with prior Left MCA territory infarct. CT perfusion showed left MCA M2 and M3 occlusions. Repeat CTH on 10/14/2020 showed interval development of left basal ganglia hypoattenuation compatible with acute infarct. Patient was admitted to ICU for closer monitoring and downgraded to medicine on 10/15/2020. Patient's loop recorder was interrogated 10/14 and did not demonstrate any events. Patient failed bedside swallow evaluation and remains NPO with feedings through NGT requiring frequent suctioning to clean saline in oral cavity. He was started on midodrine 10mg TID for orthostatic hypotension. Patient received fluids and was then started on Florinef 0.2mg daily for continued hypotension. His BP improved the following day and denies lightheadedness or dizziness. Patient seen and examined by physiatry and deemed an appropriate candidate for acute rehabilitation consisting of 3 hours of therapy a day.     Of note during his first stroke on 2020, JANET was performed and did not demonstrate thrombus or cardiac abnormality to suggest cardiac origin for CVA.  (19 Oct 2020 14:10). He reports only being left with residual dysarthria.    CLOF:  He ambulates 10' x2 with hemicane max assist, ACE wrapping for R foot drop. He has severe expressive aphasia/ apraxia and moderate receptive deficits    Subjective:    The patient was examined at during rounds this morning with Dr. Larsen. Pt s/p PEG placement yesterday, remains on NPO, on IV fluids with D5 1/2NS at 75cc/hr. Lovenox qhs resumed. Pt had an episode of spitting up blood overnight, improved this morning. Vitals stable and labs reviewed. Pt remains medically stable, reports feeling well, denies new complaints today. On scopolamine patch, with mild improvement in drooling saliva, will continue to monitor. Hiccups better today. Patient remains on ASA. Plan to f/u am cbc ordered, prior to restarting plavix. Pt's PEG tube OK for medications and water flushes, PEG tube feeding okay to start this afternoon after 2pm.     REVIEW OF SYSTEMS   HEENT: Hiccups - improving, drooling saliva   Pulm: occasional cough,  No shortness of breath  Cardio: No chest pain,  GI:    No diarrhea, No constipation  : No dysuria, No frequency  Neuro: Right sided weakness  MSK: Denies pain    PHYSICAL EXAM    Vital Signs Last 24 Hrs  T(C): 37.1 (29 Oct 2020 05:04), Max: 37.1 (29 Oct 2020 05:04)  T(F): 98.8 (29 Oct 2020 05:04), Max: 98.8 (29 Oct 2020 05:04)  HR: 64 (29 Oct 2020 05:04) (49 - 67)  BP: 121/72 (29 Oct 2020 05:04) (105/59 - 137/77)  BP(mean): --  RR: 18 (29 Oct 2020 05:04) (18 - 20)  SpO2: 99% (28 Oct 2020 14:57) (99% - 100%)    General: NAD, Resting Comfortable, sitting upright at in bed                      HEENT: NC/AT, EOM I, PERRLA, Normal Conjunctivae. NGT removed.   Cardio: RRR                              Pulm: No Respiratory Distress,  Lungs with bilateral rhonchi R>L. Breathing comfortably and saturating well on RA                     Abdomen: ND/NT, Soft, PEG tube in place, c/d/i             MSK: No joint swelling, decreased ROM in the right upper and lower extremity.                                         Ext: No C/C/E, No calf tenderness    Skin: intact                                                                   Neurological Examination:  Cognitive: [  x  ] AAO x 3  with choices   [    ]  other                                                                      Attention:  [ x   ] intact   [    ]  other                            Mood/Affect:  [   x ] wnl    [    ]  other                                                                             Communication:  [    ]Fluent   [   x ] other dysarthria, apraxia, severe expressive aphasia  CN II - XII  [    ] intact   [  x  ] other decrease function in clearing saliva  Coordination:   [    ] FTN/HTS intact   [    x] other    normal on left, unable to assess on right due to hemiparesis                                                                   Sensory: [    ]Intact to light touch   [  x  ] other     decreased rt leg                                                                                    Tone:  [    ]  wnl,   [    x]  other decreased tone in RUE .     Motor    LEFT    UE: [ x  ] WNL.  [   ] other:  RIGHT UE:  [   ] WNL.  [ x  ] other: shoulder 2-/5, elbow 1/5. wrist 0/5, fingers 0/5  LEFT    LE:  [  x ] WNL.  [   ] other:  RIGHT LE:  [   ] WNL.  [ x  ] other: mod  strong extensor synergy. Trace isolated movement    Reflex:  [    ]  symmetric,  [  x  ]  other: Right bicep 3+otherwise reflexes normal      MEDICATIONS  (STANDING):  aspirin  chewable 81 milliGRAM(s) Enteral Tube daily  atorvastatin 80 milliGRAM(s) Oral at bedtime  dextrose 5% + sodium chloride 0.45%. 1000 milliLiter(s) (75 mL/Hr) IV Continuous <Continuous>  dextrose 5% + sodium chloride 0.45%. 1000 milliLiter(s) (75 mL/Hr) IV Continuous <Continuous>  enoxaparin Injectable 40 milliGRAM(s) SubCutaneous at bedtime  fludroCORTISONE 0.2 milliGRAM(s) Oral <User Schedule>  gabapentin 100 milliGRAM(s) Oral two times a day  midodrine. 5 milliGRAM(s) Oral <User Schedule>  nystatin    Suspension [id number] Unit(s) Oral four times a day  pantoprazole   Suspension 40 milliGRAM(s) Enteral Tube daily  scopolamine 1 mG/72 Hr(s) Patch 1 Patch Transdermal every 72 hours    MEDICATIONS  (PRN):  chlorproMAZINE    Tablet 10 milliGRAM(s) Oral every 6 hours PRN hiccups          RECENT LABS/IMAGING - reviewed                          13.3   12.18 )-----------( 257      ( 29 Oct 2020 06:27 )             39.1     10-    139  |  103  |  15  ----------------------------<  108<H>  4.4   |  27  |  0.9    Ca    8.9      27 Oct 2020 12:14    PT/INR - ( 27 Oct 2020 07:12 )   PT: 14.20 sec;   INR: 1.23 ratio       Urinalysis Basic - ( 21 Oct 2020 04:25 )    Color: Yellow / Appearance: Slightly Turbid / S.015 / pH: x  Gluc: x / Ketone: Negative  / Bili: Negative / Urobili: 3 mg/dL   Blood: x / Protein: Negative / Nitrite: Negative   Leuk Esterase: Large / RBC: 5 /HPF /  /HPF   Sq Epi: x / Non Sq Epi: 0 /HPF / Bacteria: Moderate    Imaging:   EGD with PEG placement 10/28:    PEG Placement:   Impressions:    Normal mucosa in the whole esophagus.    Erythema in the stomach compatible with non-erosive gastritis.    Normal mucosa in the whole examined duodenum.     Plan: NPO  Nothing via gtube except meds and water flushes   PEG may be used for Medications today and Feedings in 24 h  please flush PEG tube with 50 cc of water before and after each feeds and  medications starting tomorrow if uneventful  Do not place gauze between bumper and skin  Wash with water and soap daily  Abdominal binder  Pain control    CBC at 4pm    Restart Plavix in am

## 2020-10-29 NOTE — PROGRESS NOTE ADULT - ASSESSMENT
ASSESSMENT/PLAN  This is a 69 year old male with hx b/l hip replacements, recent mechanical thrombectomy of left ADEOLA/MCA thrombus (8/27/2020), with mild residual deficits, now presents with new left MCA stroke  Rehab of acute  left MCA CVA stroke:  Continue aspirin, lipitor.   - Hgb uptrending, Resume plavix 75 daily today. Continue to monitor cbc  - Maintain systolic blood pressure 120-160  - S/p interrogation of loop recorder with no events  - MRI not performed as patient is aspiration risk at this time due to inability to manage oral secretions  - PT/OT/ST/Neuropsych 3 hrs daily    #Right hemiplegia - cont PT/OT    # Severe dysphagia with drooling saliva - NPO. Difficulty managing secretions. c/w Yankhauer catheter suction and scopolamine patch, continue to monitor. GI and neuro recs appreciated. Plavix held since 10/23- per neuro recs. Patient s/p PEG tube placement 10/28, s/p vancomycin 1g ivpb x1, resume plavix today as hgb stable. Monitor am cbc. c/w D5 1/2 NS at 75cc/hr, PEG not to be used until 24 hours later at 2pm today. Patient remains on ASA and lovenox.    #severe aphasia/ dysarthria - Speech therapy    #Wet coughing and R>L rhonchi Likely chronic aspiration of saliva. Better on scopol patch  Decreased fluid inputs via NGT (10/25-); CXR reviewed and negative, continue to monitor    #DLD  Atorvastatin 80 mg    #Hypotension - improved  - Midodrine 10mg TID, Florinef 0.2mg daily continue to monitor    #Hiccups - improving  - Thorazine PRN, c/w gabapentin 100mg po bid    #Pyuria/bacturia - moderate. Asymptomatic. Had condom catheter at the time. Recheck U/A in a few days. CBC 10/26 wnl, no leukocytosis    -Pain control: Tylenol PRN    -GI/Bowel Mgmt -  Miralax PRN at night    -Bladder management: no issues     -Skin: No active issues at this time    -FEN   - Diet/ Severe Dysphagia - SLP F/U. s/p PEG 10/28. Pt remains NPO.    Precautions / PROPHYLAXIS:    - Falls precautions  - Aspiration  precautions  - DVT prophylaxis: lovenox, ASA, SCDs, resume plavix today ASSESSMENT/PLAN  This is a 69 year old male with hx b/l hip replacements, recent mechanical thrombectomy of left ADEOLA/MCA thrombus (8/27/2020), with mild residual deficits, now presents with new left MCA stroke  Rehab of acute  left MCA CVA stroke:  Continue aspirin, lipitor.   - Hgb uptrending. f/u am cbc prior to restarting plavix in am  - Maintain systolic blood pressure 120-160  - S/p interrogation of loop recorder with no events  - MRI not performed as patient is aspiration risk at this time due to inability to manage oral secretions  - PT/OT/ST/Neuropsych 3 hrs daily    #Right hemiplegia - cont PT/OT    # Severe dysphagia with drooling saliva - NPO. Difficulty managing secretions. c/w Yankhauer catheter suction and scopolamine patch, continue to monitor. GI and neuro recs appreciated. Plavix held since 10/23- per neuro recs. Patient s/p PEG tube placement 10/28, s/p vancomycin 1g ivpb x1, Monitor am cbc prior to restarting plavix. c/w D5 1/2 NS at 75cc/hr, PEG not to be used until 24 hours later at 2pm today. Patient remains on ASA and lovenox.    #severe aphasia/ dysarthria - Speech therapy    #Wet coughing and R>L rhonchi Likely chronic aspiration of saliva. Better on scopol patch  Decreased fluid inputs via NGT (10/25-); CXR reviewed and negative, continue to monitor    #DLD  Atorvastatin 80 mg    #Hypotension - improved  - Midodrine 10mg TID, Florinef 0.2mg daily continue to monitor    #Hiccups - improving  - Thorazine PRN, c/w gabapentin 100mg po bid    #Pyuria/bacturia - moderate. Asymptomatic. Had condom catheter at the time. Recheck U/A in a few days. CBC 10/26 wnl, no leukocytosis    -Pain control: Tylenol PRN    -GI/Bowel Mgmt -  Miralax PRN at night    -Bladder management: no issues     -Skin: No active issues at this time    -FEN   - Diet/ Severe Dysphagia - SLP F/U. s/p PEG 10/28. Pt remains NPO.    Precautions / PROPHYLAXIS:    - Falls precautions  - Aspiration  precautions  - DVT prophylaxis: lovenox, ASA, SCDs ASSESSMENT/PLAN  This is a 69 year old male with hx b/l hip replacements, recent mechanical thrombectomy of left ADEOLA/MCA thrombus (8/27/2020), with mild residual deficits, now presents with new left MCA stroke  Rehab of acute  left MCA CVA stroke:  Continue aspirin, lipitor.   - Hgb uptrending. f/u am cbc prior to restarting plavix in am  - Maintain systolic blood pressure 120-160  - S/p interrogation of loop recorder with no events  - MRI not performed as patient is aspiration risk at this time due to inability to manage oral secretions  - PT/OT/ST/Neuropsych 3 hrs daily    #Right hemiplegia - cont PT/OT    # Severe dysphagia with drooling saliva - NPO. Difficulty managing secretions. c/w Yankhauer catheter suction and scopolamine patch, continue to monitor. GI and neuro recs appreciated. Plavix held since 10/23- per neuro recs. Patient s/p PEG tube placement 10/28, s/p vancomycin 1g ivpb x1, Monitor am cbc prior to restarting plavix. c/w D5 1/2 NS at 75cc/hr, PEG not to be used until 24 hours later at 2pm today. Patient remains on ASA and lovenox.    #severe aphasia/ dysarthria - Speech therapy    #DLD  Atorvastatin 80 mg    #Hypotension - improved  - Midodrine 10mg TID, Florinef 0.2mg daily continue to monitor    #Hiccups - improving  - Thorazine PRN, c/w gabapentin 100mg po bid    #Pyuria/bacturia - moderate. Asymptomatic. Had condom catheter at the time. Recheck U/A in a few days. CBC 10/26 wnl, no leukocytosis    -Pain control: Tylenol PRN    -GI/Bowel Mgmt -  Miralax PRN at night    -Bladder management: no issues     -Skin: No active issues at this time    -FEN   - Diet/ Severe Dysphagia - SLP F/U. s/p PEG 10/28. Pt remains NPO.    Precautions / PROPHYLAXIS:    - Falls precautions  - Aspiration  precautions  - DVT prophylaxis: lovenox, ASA, SCDs

## 2020-10-29 NOTE — CHART NOTE - NSCHARTNOTEFT_GEN_A_CORE
Attending Attestation:  Rapid response called for coffee ground emesis, 50-100ml. Patient had a piror episode earlier as well. Severe dysphagia with PEG recently placed. Patient reports significant nausea and epigastric pain on examination. was satting 90% on Room air. Chest xray, KUB, CBC ordered. Supplemental O2 as needed. Will transfer back down to the medical floor for observation, [place]-sump placed set to low continuous suction and Protonix BID started.   Patient is hemodynamically stable.   ?Aspiration Pneumonitis as well. Will monitor respiratory status and for any signs of infection.   Hold feeds for now. Attending Attestation:  Rapid response called for coffee ground emesis, 50-100ml. Patient had a piror episode earlier as well. Severe dysphagia with PEG recently placed. Patient reports significant nausea and epigastric pain on examination. was satting 90% on Room air. Chest xray, KUB, CBC ordered. Supplemental O2 as needed. Will transfer back down to the medical floor for observation, Todd-sump placed set to low continuous suction and Protonix BID started.   Patient is hemodynamically stable.   ?Aspiration Pneumonitis as well. Will monitor respiratory status and for any signs of infection.   Hold feeds for now.  Patient also appears to have a hard time clearing secretion, suction frequently. May need scopolamine patch if worsening.

## 2020-10-29 NOTE — PROGRESS NOTE ADULT - SUBJECTIVE AND OBJECTIVE BOX
Pain: Location: Denied        Ratin           Intervention: Did not want pain medication  Orientation: Patient was oriented to self, place, and event  Arousal Level: Lethargic but able to wake up and focus for brief periods of time  Behavior: Pleasant and cooperative   Affect Range: WFL     Needed: ? No        Attention: Mildly impaired due to lethargy  Insight into illness/deficits: Fair   ?Treatment Session Focused on Mood / Coping

## 2020-10-29 NOTE — PROGRESS NOTE ADULT - ASSESSMENT
69 year old male with PMHx of b/l hip replacements, multiple CVA last one on 10/13/20, currently in inpatient rehab, for physical therapy. GI consulted for PEG-tube placement.      # Dysphagia due to multiple stroke s/p PEG tube placement 10/28    REC:   can start tube feeding   can restart Plavix   G tube care :   wash site with soap and water twice daily  50 cc flush prior and after to each feed or medication   apply abdominal binder all the time

## 2020-10-29 NOTE — CHART NOTE - NSCHARTNOTEFT_GEN_A_CORE
68 yo M s/p PEG tube placement 10/28/2020 for severe dysphagia due to recurrent CVA.  GI fellow saw the patient today and irrigated the PEG tube and aspirate was clear, no blood was obtained (there was one episode of hematemesis reported last night, no vomiting since) and CBC x 2 today is stable, done at 4:30AM and STAT CBC was repeated at 1PM today.  Therefore PEG feedings may start as per GI.  PEG tube care:   - wash site with soap and water twice daily   - 50 cc flush prior and after to each feed or medication    - apply abdominal binder all the time    ICU Vital Signs Last 24 Hrs  T(C): 37.1 (29 Oct 2020 05:04), Max: 37.1 (29 Oct 2020 05:04)  T(F): 98.8 (29 Oct 2020 05:04), Max: 98.8 (29 Oct 2020 05:04)  HR: 64 (29 Oct 2020 05:04) (54 - 67)  BP: 121/72 (29 Oct 2020 05:04) (115/69 - 137/77)  RR: 18 (29 Oct 2020 05:04) (18 - 18)  SpO2: 99% (28 Oct 2020 14:57) (99% - 100%)      LABS:                13.5   13.86 )-----------( 277      ( 29 Oct 2020 13:14 )             39.7       Plans:    monitor VS, weight, I's and O's    check all labs tomorrow AM

## 2020-10-29 NOTE — PROGRESS NOTE ADULT - REASON FOR ADMISSION
Left CVA

## 2020-10-29 NOTE — PROGRESS NOTE ADULT - ATTENDING COMMENTS
Family contact without patient: 50 mins
Patient and Family Contact: 60 mins
Patient Contact: 30 mins
Patient and Family contact: 45 mins
I personally interviewed and examined the patient. No more coffee ground fluid via G tube. Tolerating feeds.

## 2020-10-29 NOTE — CHART NOTE - NSCHARTNOTESELECT_GEN_ALL_CORE
Transfer Note
Event Note

## 2020-10-29 NOTE — PROGRESS NOTE ADULT - SUBJECTIVE AND OBJECTIVE BOX
Gastroenterology progress note:     Patient is a 69y old  Male who presents with a chief complaint of Left CVA (29 Oct 2020 09:56)       Admitted on: 10-20-20    We are following the patient post PEG tube placement      Interval History:  no acute event overnight , patient complained of mild pain at the PEG site , PEG bumper was loosened to 3 cm , no melena, no hematemesis , HD stable , HGB stable     Patient's medical problems are stable        PAST MEDICAL & SURGICAL HISTORY:  Cerebrovascular accident (CVA)    GERD (gastroesophageal reflux disease)    S/P tonsillectomy    H/O bilateral hip replacements    H/O skin graft  upper body  legs        MEDICATIONS  (STANDING):  aspirin  chewable 81 milliGRAM(s) Enteral Tube daily  atorvastatin 80 milliGRAM(s) Oral at bedtime  dextrose 5% + sodium chloride 0.45%. 1000 milliLiter(s) (75 mL/Hr) IV Continuous <Continuous>  dextrose 5% + sodium chloride 0.45%. 1000 milliLiter(s) (75 mL/Hr) IV Continuous <Continuous>  enoxaparin Injectable 40 milliGRAM(s) SubCutaneous at bedtime  fludroCORTISONE 0.2 milliGRAM(s) Oral <User Schedule>  gabapentin 100 milliGRAM(s) Oral two times a day  midodrine. 5 milliGRAM(s) Oral <User Schedule>  nystatin    Suspension 934931 Unit(s) Oral four times a day  pantoprazole   Suspension 40 milliGRAM(s) Enteral Tube daily  scopolamine 1 mG/72 Hr(s) Patch 1 Patch Transdermal every 72 hours    MEDICATIONS  (PRN):  chlorproMAZINE    Tablet 10 milliGRAM(s) Oral every 6 hours PRN hiccups      Allergies  amoxicillin (Rash)      Review of Systems:   Cardiovascular:  No Chest Pain, No Palpitations  Respiratory:  No Cough, No Dyspnea  Gastrointestinal:  As described in HPI    Physical Examination:  T(C): 37.1 (10-29-20 @ 05:04), Max: 37.1 (10-29-20 @ 05:04)  HR: 64 (10-29-20 @ 05:04) (50 - 67)  BP: 121/72 (10-29-20 @ 05:04) (115/69 - 137/77)  RR: 18 (10-29-20 @ 05:04) (18 - 20)  SpO2: 99% (10-28-20 @ 14:57) (99% - 100%)      10-28-20 @ 07:01  -  10-29-20 @ 07:00  --------------------------------------------------------  IN: 800 mL / OUT: 1000 mL / NET: -200 mL      Constitutional: No acute distress.  Respiratory:  No signs of respiratory distress. Lung sounds are clear bilaterally.  Cardiovascular:  S1 S2, Regular rate and rhythm.  Abdominal: Abdomen is soft, symmetric, and non-tender without distention. PEG tube in site , site was cleaned . Bowel sounds are present and normoactive in all four quadrants. No masses, hepatomegaly, or splenomegaly are noted.   Skin: No rashes, No Jaundice.        Data: (reviewed by attending)                        13.5   13.86 )-----------( 277      ( 29 Oct 2020 13:14 )             39.7     Hgb trend:  13.5  10-29-20 @ 13:14  13.3  10-29-20 @ 06:27  13.0  10-28-20 @ 20:57  13.6  10-27-20 @ 12:14              Liver panel trend:  TBili 1.2   /   AST 27   /   ALT 22   /   AlkP 73   /   Tptn 6.0   /   Alb 3.5    /   DBili --      10-26  TBili 0.4   /   AST 25   /   ALT 18   /   AlkP 63   /   Tptn 5.4   /   Alb 3.2    /   DBili --      10-21  TBili 0.5   /   AST 20   /   ALT 13   /   AlkP 61   /   Tptn 5.7   /   Alb 3.2    /   DBili --      10-20             Radiology: (reviewed by attending)

## 2020-10-29 NOTE — PROGRESS NOTE ADULT - ASSESSMENT
Patient endorsed coping well with the PEG placement. Speech was clearer without the NG tube and more intelligible. Patient endorsed reasonable mood currently and stated that he noticed improvement in his speech and he did not have the hiccups.     Goals: ? Facilitate Positive Coping ? Family Support / Education ? Cognitive Assessment   Plan: 1-2 times a week 30-60 minutes ? Individual Psychotherapy ? Cognitive Assessment ? Family contact/support/education

## 2020-10-30 ENCOUNTER — INPATIENT (INPATIENT)
Facility: HOSPITAL | Age: 70
LOS: 3 days | Discharge: NURSING, REHAB OR LONG-TERM CARE | End: 2020-11-03
Attending: STUDENT IN AN ORGANIZED HEALTH CARE EDUCATION/TRAINING PROGRAM | Admitting: STUDENT IN AN ORGANIZED HEALTH CARE EDUCATION/TRAINING PROGRAM
Payer: COMMERCIAL

## 2020-10-30 VITALS
SYSTOLIC BLOOD PRESSURE: 119 MMHG | RESPIRATION RATE: 18 BRPM | TEMPERATURE: 100 F | DIASTOLIC BLOOD PRESSURE: 57 MMHG | HEART RATE: 71 BPM | OXYGEN SATURATION: 94 %

## 2020-10-30 DIAGNOSIS — Z94.5 SKIN TRANSPLANT STATUS: Chronic | ICD-10-CM

## 2020-10-30 DIAGNOSIS — Z96.643 PRESENCE OF ARTIFICIAL HIP JOINT, BILATERAL: Chronic | ICD-10-CM

## 2020-10-30 DIAGNOSIS — Z90.89 ACQUIRED ABSENCE OF OTHER ORGANS: Chronic | ICD-10-CM

## 2020-10-30 LAB
ANION GAP SERPL CALC-SCNC: 10 MMOL/L — SIGNIFICANT CHANGE UP (ref 7–14)
BASOPHILS # BLD AUTO: 0.03 K/UL — SIGNIFICANT CHANGE UP (ref 0–0.2)
BASOPHILS NFR BLD AUTO: 0.2 % — SIGNIFICANT CHANGE UP (ref 0–1)
BUN SERPL-MCNC: 14 MG/DL — SIGNIFICANT CHANGE UP (ref 10–20)
CALCIUM SERPL-MCNC: 8.7 MG/DL — SIGNIFICANT CHANGE UP (ref 8.5–10.1)
CHLORIDE SERPL-SCNC: 101 MMOL/L — SIGNIFICANT CHANGE UP (ref 98–110)
CO2 SERPL-SCNC: 26 MMOL/L — SIGNIFICANT CHANGE UP (ref 17–32)
CREAT SERPL-MCNC: 0.8 MG/DL — SIGNIFICANT CHANGE UP (ref 0.7–1.5)
EOSINOPHIL # BLD AUTO: 0.06 K/UL — SIGNIFICANT CHANGE UP (ref 0–0.7)
EOSINOPHIL NFR BLD AUTO: 0.4 % — SIGNIFICANT CHANGE UP (ref 0–8)
GLUCOSE SERPL-MCNC: 115 MG/DL — HIGH (ref 70–99)
HCT VFR BLD CALC: 38.8 % — LOW (ref 42–52)
HGB BLD-MCNC: 13.1 G/DL — LOW (ref 14–18)
IMM GRANULOCYTES NFR BLD AUTO: 0.3 % — SIGNIFICANT CHANGE UP (ref 0.1–0.3)
LYMPHOCYTES # BLD AUTO: 0.92 K/UL — LOW (ref 1.2–3.4)
LYMPHOCYTES # BLD AUTO: 6.7 % — LOW (ref 20.5–51.1)
MCHC RBC-ENTMCNC: 31.9 PG — HIGH (ref 27–31)
MCHC RBC-ENTMCNC: 33.8 G/DL — SIGNIFICANT CHANGE UP (ref 32–37)
MCV RBC AUTO: 94.4 FL — HIGH (ref 80–94)
MONOCYTES # BLD AUTO: 1 K/UL — HIGH (ref 0.1–0.6)
MONOCYTES NFR BLD AUTO: 7.3 % — SIGNIFICANT CHANGE UP (ref 1.7–9.3)
NEUTROPHILS # BLD AUTO: 11.72 K/UL — HIGH (ref 1.4–6.5)
NEUTROPHILS NFR BLD AUTO: 85.1 % — HIGH (ref 42.2–75.2)
NRBC # BLD: 0 /100 WBCS — SIGNIFICANT CHANGE UP (ref 0–0)
PLATELET # BLD AUTO: 257 K/UL — SIGNIFICANT CHANGE UP (ref 130–400)
POTASSIUM SERPL-MCNC: 4.3 MMOL/L — SIGNIFICANT CHANGE UP (ref 3.5–5)
POTASSIUM SERPL-SCNC: 4.3 MMOL/L — SIGNIFICANT CHANGE UP (ref 3.5–5)
RBC # BLD: 4.11 M/UL — LOW (ref 4.7–6.1)
RBC # FLD: 11.2 % — LOW (ref 11.5–14.5)
SODIUM SERPL-SCNC: 137 MMOL/L — SIGNIFICANT CHANGE UP (ref 135–146)
WBC # BLD: 13.77 K/UL — HIGH (ref 4.8–10.8)
WBC # FLD AUTO: 13.77 K/UL — HIGH (ref 4.8–10.8)

## 2020-10-30 PROCEDURE — 99223 1ST HOSP IP/OBS HIGH 75: CPT | Mod: 25

## 2020-10-30 PROCEDURE — 74177 CT ABD & PELVIS W/CONTRAST: CPT | Mod: 26

## 2020-10-30 PROCEDURE — 74018 RADEX ABDOMEN 1 VIEW: CPT | Mod: 26

## 2020-10-30 PROCEDURE — 99233 SBSQ HOSP IP/OBS HIGH 50: CPT

## 2020-10-30 PROCEDURE — 99291 CRITICAL CARE FIRST HOUR: CPT

## 2020-10-30 RX ORDER — CLOPIDOGREL BISULFATE 75 MG/1
75 TABLET, FILM COATED ORAL DAILY
Refills: 0 | Status: DISCONTINUED | OUTPATIENT
Start: 2020-10-30 | End: 2020-10-30

## 2020-10-30 RX ORDER — CHLORPROMAZINE HCL 10 MG
10 TABLET ORAL EVERY 6 HOURS
Refills: 0 | Status: DISCONTINUED | OUTPATIENT
Start: 2020-10-30 | End: 2020-10-31

## 2020-10-30 RX ORDER — ONDANSETRON 8 MG/1
4 TABLET, FILM COATED ORAL ONCE
Refills: 0 | Status: COMPLETED | OUTPATIENT
Start: 2020-10-30 | End: 2020-10-30

## 2020-10-30 RX ORDER — CLOPIDOGREL BISULFATE 75 MG/1
75 TABLET, FILM COATED ORAL DAILY
Refills: 0 | Status: DISCONTINUED | OUTPATIENT
Start: 2020-10-30 | End: 2020-11-03

## 2020-10-30 RX ORDER — SODIUM CHLORIDE 9 MG/ML
1000 INJECTION, SOLUTION INTRAVENOUS
Refills: 0 | Status: DISCONTINUED | OUTPATIENT
Start: 2020-10-30 | End: 2020-11-02

## 2020-10-30 RX ORDER — GABAPENTIN 400 MG/1
1 CAPSULE ORAL
Qty: 0 | Refills: 0 | DISCHARGE

## 2020-10-30 RX ORDER — MORPHINE SULFATE 50 MG/1
1 CAPSULE, EXTENDED RELEASE ORAL ONCE
Refills: 0 | Status: DISCONTINUED | OUTPATIENT
Start: 2020-10-30 | End: 2020-10-30

## 2020-10-30 RX ORDER — FLUDROCORTISONE ACETATE 0.1 MG/1
0.2 TABLET ORAL
Refills: 0 | Status: DISCONTINUED | OUTPATIENT
Start: 2020-10-30 | End: 2020-11-03

## 2020-10-30 RX ORDER — ASPIRIN/CALCIUM CARB/MAGNESIUM 324 MG
81 TABLET ORAL DAILY
Refills: 0 | Status: DISCONTINUED | OUTPATIENT
Start: 2020-10-30 | End: 2020-11-03

## 2020-10-30 RX ORDER — GABAPENTIN 400 MG/1
100 CAPSULE ORAL
Refills: 0 | Status: DISCONTINUED | OUTPATIENT
Start: 2020-10-30 | End: 2020-11-03

## 2020-10-30 RX ORDER — NYSTATIN 500MM UNIT
500000 POWDER (EA) MISCELLANEOUS
Refills: 0 | Status: DISCONTINUED | OUTPATIENT
Start: 2020-10-30 | End: 2020-11-03

## 2020-10-30 RX ORDER — FLUDROCORTISONE ACETATE 0.1 MG/1
0.1 TABLET ORAL DAILY
Refills: 0 | Status: DISCONTINUED | OUTPATIENT
Start: 2020-10-30 | End: 2020-10-30

## 2020-10-30 RX ORDER — SCOPALAMINE 1 MG/3D
1 PATCH, EXTENDED RELEASE TRANSDERMAL
Refills: 0 | Status: DISCONTINUED | OUTPATIENT
Start: 2020-10-30 | End: 2020-11-03

## 2020-10-30 RX ORDER — PANTOPRAZOLE SODIUM 20 MG/1
40 TABLET, DELAYED RELEASE ORAL
Refills: 0 | Status: DISCONTINUED | OUTPATIENT
Start: 2020-10-30 | End: 2020-10-30

## 2020-10-30 RX ORDER — MIDODRINE HYDROCHLORIDE 2.5 MG/1
5 TABLET ORAL THREE TIMES A DAY
Refills: 0 | Status: DISCONTINUED | OUTPATIENT
Start: 2020-10-30 | End: 2020-11-03

## 2020-10-30 RX ORDER — ATORVASTATIN CALCIUM 80 MG/1
80 TABLET, FILM COATED ORAL AT BEDTIME
Refills: 0 | Status: DISCONTINUED | OUTPATIENT
Start: 2020-10-30 | End: 2020-11-03

## 2020-10-30 RX ORDER — ENOXAPARIN SODIUM 100 MG/ML
40 INJECTION SUBCUTANEOUS AT BEDTIME
Refills: 0 | Status: DISCONTINUED | OUTPATIENT
Start: 2020-10-30 | End: 2020-11-03

## 2020-10-30 RX ORDER — PANTOPRAZOLE SODIUM 20 MG/1
40 TABLET, DELAYED RELEASE ORAL DAILY
Refills: 0 | Status: DISCONTINUED | OUTPATIENT
Start: 2020-10-30 | End: 2020-11-02

## 2020-10-30 RX ADMIN — SODIUM CHLORIDE 50 MILLILITER(S): 9 INJECTION, SOLUTION INTRAVENOUS at 14:34

## 2020-10-30 RX ADMIN — SCOPALAMINE 1 PATCH: 1 PATCH, EXTENDED RELEASE TRANSDERMAL at 22:04

## 2020-10-30 RX ADMIN — MORPHINE SULFATE 1 MILLIGRAM(S): 50 CAPSULE, EXTENDED RELEASE ORAL at 00:00

## 2020-10-30 RX ADMIN — PANTOPRAZOLE SODIUM 40 MILLIGRAM(S): 20 TABLET, DELAYED RELEASE ORAL at 22:05

## 2020-10-30 RX ADMIN — MORPHINE SULFATE 1 MILLIGRAM(S): 50 CAPSULE, EXTENDED RELEASE ORAL at 23:22

## 2020-10-30 RX ADMIN — PANTOPRAZOLE SODIUM 40 MILLIGRAM(S): 20 TABLET, DELAYED RELEASE ORAL at 17:45

## 2020-10-30 RX ADMIN — SCOPALAMINE 1 PATCH: 1 PATCH, EXTENDED RELEASE TRANSDERMAL at 06:43

## 2020-10-30 RX ADMIN — ONDANSETRON 4 MILLIGRAM(S): 8 TABLET, FILM COATED ORAL at 04:24

## 2020-10-30 RX ADMIN — GABAPENTIN 100 MILLIGRAM(S): 400 CAPSULE ORAL at 05:53

## 2020-10-30 RX ADMIN — Medication 81 MILLIGRAM(S): at 13:49

## 2020-10-30 RX ADMIN — CLOPIDOGREL BISULFATE 75 MILLIGRAM(S): 75 TABLET, FILM COATED ORAL at 13:49

## 2020-10-30 NOTE — PROGRESS NOTE ADULT - ASSESSMENT
69 year old male with PMHx of b/l hip replacements, multiple CVA last one on 10/13/20, currently in inpatient rehab, for physical therapy. GI consulted for PEG-tube placement.      # Dysphagia due to multiple stroke s/p PEG tube placement 10/28  #coffee ground emesis? : No evidence of active GI bleed , hgb is stable , HD stable   #distension of rt colon on KUB: abdomen and pelvis : PEG in position , no obstruction     REC:   trend CBC  Start tube feeding in am   can restart Plavix   G tube care :   wash site with soap and water twice daily  50 cc flush prior and after to each feed or medication   apply abdominal binder all the time      69 year old male with PMHx of b/l hip replacements, multiple CVA last one on 10/13/20, currently in inpatient rehab, for physical therapy. GI consulted for PEG-tube placement.      # Dysphagia due to multiple stroke s/p PEG tube placement 10/28  #coffee ground emesis? : No evidence of active GI bleed , hgb is stable , HD stable   #distension of rt colon on KUB: abdomen and pelvis : PEG in position , no obstruction . CT scan was reviewed with radiologist dr Corbett.    REC:   trend CBC  Protonix 40 mg IV qd  keep NPO for now   surgery evaluation   can restart Plavix    G tube care :   wash site with soap and water twice daily  50 cc flush prior and after to each feed or medication   apply abdominal binder all the time

## 2020-10-30 NOTE — H&P ADULT - NSHPLABSRESULTS_GEN_ALL_CORE
LABS:                          13.6   11.82 )-----------( 285      ( 29 Oct 2020 21:31 )             39.6     10-29    137  |  101  |  17  ----------------------------<  122<H>  4.2   |  26  |  0.8    Ca    9.1      29 Oct 2020 21:35              PT/INR - ( 29 Oct 2020 21:46 )   PT: 15.00 sec;   INR: 1.30 ratio         PTT - ( 29 Oct 2020 21:46 )  PTT:25.5 sec  Lactate Trend  10-29 @ 21:46 Lactate:1.2         CAPILLARY BLOOD GLUCOSE    RADIOLOGY:  c  < from: MR Head w/wo IV Cont (08.31.20 @ 21:17) >    IMPRESSION:    Redemonstration of evolving acute/subacute infarcts involving the left frontal, temporal, parietal lobes with partially decreased mass effect in comparison with the prior study. Associated scattered lamina petechial hemorrhage with associated susceptibility signal, and enhancement. No evidence of underlying mass.    No malika intracranial hemorrhage, new acute territorial infarct, or midline shift.    EKGS:

## 2020-10-30 NOTE — H&P ADULT - ASSESSMENT
Coffee Ground Emesis S/P Recent PEG tube Placement  -           Recurrent L MCA CVA  L basal ganglia acute infarct  Complicated by Dysphagia s/p PEG   Maintain systolic blood pressure 120-160  JANET showed no thrombus on September 2  S/p interrogation of loop recorder with no events  Continue aspirin, Plavix and Statin      DVT PPX: Lovenox   GI PPX:  Protonix  Diet: NPO with tube feeds  Full Code  Dispo: from home  Likely D/C back to Rehab pending GI eval         Coffee Ground Emesis S/P Recent PEG tube Placement  -Feeds held until evaluated by GI  -Hemodynamically stable, hemoglobin stable  -CXR and KUB: Unremarkable (Pending official read)    -c/w PPI IV Q12 and Antiemetics for now   - Monitor CbC     Recurrent L MCA CVA  L basal ganglia acute infarct  Complicated by Dysphagia s/p PEG   Maintain systolic blood pressure 120-160  JANET showed no thrombus in September   S/p interrogation of loop recorder with no events  Continue aspirin, Plavix and Statin  Scopolamine patch for secretions     DVT PPX: Lovenox   GI PPX:  Protonix  Diet: NPO with tube feeds  Full Code  Dispo: from home  Likely D/C back to Rehab pending GI eval

## 2020-10-30 NOTE — H&P ADULT - HISTORY OF PRESENT ILLNESS
Patient is a 69 year old male with PMHx of b/l hip replacements, recent mechanical thrombectomy of left ADEOLA/MCA stroke (8/27/2020) admitted to [facility] on 10/13/2020 after he developed new right sided facial droop, weakness, and dysarthria. In the ED, his NIHSS was 20 and stroke code was called. However, patient did not receive tPA because of recent stroke history. Initial CTH showed wedge shaped area hypoattenuation within the left frontal and parietal lobes consistent with prior Left MCA territory infarct. CT perfusion showed left MCA M2 and M3 occlusions. Repeat CTH on 10/14/2020 showed interval development of left basal ganglia hypoattenuation compatible with acute infarct. Patient was admitted to ICU for closer monitoring and downgraded to medicine on 10/15/2020. Patient's loop recorder was interrogated 10/14 and did not demonstrate any events. Patient failed bedside swallow evaluation and remains NPO with feedings through NGT requiring frequent suctioning to clean saline in oral cavity. He was started on midodrine 10mg TID for orthostatic hypotension. Patient received fluids and was then started on Florinef 0.2mg daily for continued hypotension. His BP improved,  patient seen and examined by physiatry and deemed an appropriate candidate for acute rehabilitation     Patient had his PEG  placed by GI on 10/28, he tolerated his first feed on 10/29, however rapid response was called for coffee ground emesis, he was evaluated by the team, STAT labs and CXR was ordered and patient was transferred to Medicine for Closer Observation Patient is a 69 year old male with PMHx of b/l hip replacements, recent mechanical thrombectomy of left ADEOLA/MCA stroke (8/27/2020) admitted to [facility] on 10/13/2020 after he developed new right sided facial droop, weakness, and dysarthria. In the ED, his NIHSS was 20 and stroke code was called. However, patient did not receive tPA because of recent stroke history. Initial CTH showed wedge shaped area hypoattenuation within the left frontal and parietal lobes consistent with prior Left MCA territory infarct. CT perfusion showed left MCA M2 and M3 occlusions. Repeat CTH on 10/14/2020 showed interval development of left basal ganglia hypoattenuation compatible with acute infarct. Patient was admitted to ICU for closer monitoring and downgraded to medicine on 10/15/2020. Patient's loop recorder was interrogated 10/14 and did not demonstrate any events. Patient failed bedside swallow evaluation and remains NPO with feedings through NGT requiring frequent suctioning to clean saline in oral cavity. He was started on midodrine 10mg TID for orthostatic hypotension. Patient received fluids and was then started on Florinef 0.2mg daily for continued hypotension. His BP improved,  patient seen and examined by physiatry and deemed an appropriate candidate for acute rehabilitation     Patient had his PEG  placed by GI on 10/28, he tolerated his first feed on 10/29, however rapid response was called for coffee ground emesis, he was evaluated by the team, STAT labs and CXR  and KUB was ordered and patient was transferred to Medicine for Closer Observation

## 2020-10-30 NOTE — PROVIDER CONTACT NOTE (OTHER) - SITUATION
report from 4a nurse shavon said she was told to hold feeds and that ng tube should be set to intermittent low suction but no order in place

## 2020-10-30 NOTE — PROGRESS NOTE ADULT - PROVIDER SPECIALTY LIST ADULT
Gastroenterology Rest as needed.  May apply heating pad to affected area of the back.  Please return to the emergency room for any worsening pain, fevers, weakness, numbness, bowel or bladder incontinence or any other concerns.

## 2020-10-30 NOTE — H&P ADULT - NSHPREVIEWOFSYSTEMS_GEN_ALL_CORE
CONSTITUTIONAL: No weakness, fevers or chills  EYES/ENT: No visual changes;  No vertigo or throat pain   NECK: No pain or stiffness  RESPIRATORY: No cough, wheezing, hemoptysis; No shortness of breath  CARDIOVASCULAR: No chest pain or palpitations  GASTROINTESTINAL: Mild Abdominal pain at PEG site, + Nausea and vomiting, No diarrhea or constipation.   GENITOURINARY: No dysuria, frequency or hematuria  NEUROLOGICAL: No numbness or weakness  SKIN: No itching, rashes

## 2020-10-30 NOTE — PROGRESS NOTE ADULT - SUBJECTIVE AND OBJECTIVE BOX
Patient s/p left MCA stroke with right hemiparesis and severe dysphagia and aphasia, s/p PEG by Dr Balderas 10/28. He was stable on the Rehab Service post PEG with one episode of coffee ground emesis later the day of the PEG. He reportedly developed intractable vomiting last night and CT showed a 9 cm dilated loop of bowel and he was transferred to Medicine [facility]. He is currently on NGT suction and appears comfortable and neurologically stable, alert and appears comfortable.    Please reconsult PT and OT when he is off suction and cleared for out of bed activities. He is a good candidate to return to Rehab when medically stable. The plan was discussed with his wife at bedside.

## 2020-10-30 NOTE — PROGRESS NOTE ADULT - SUBJECTIVE AND OBJECTIVE BOX
Gastroenterology progress note:     Patient is a 69y old  Male who presents with a chief complaint of Coffee Ground Emesis (30 Oct 2020 15:35)       Admitted on: 10-30-20    We are following the patient for: coffee ground emesis      Interval History: patient is S/P PEG placement 10 /28 . Patient had one episode of coffee ground emesis yesterday , HD stable , no drop in HGB , no melena , no hematochezia. NG tube was placed , 200 cc of biliary fluid obtained .     Patient's medical problems are stable         PAST MEDICAL & SURGICAL HISTORY:  Cerebrovascular accident (CVA)    GERD (gastroesophageal reflux disease)    S/P tonsillectomy    H/O bilateral hip replacements    H/O skin graft  upper body  legs        MEDICATIONS  (STANDING):  aspirin  chewable 81 milliGRAM(s) Oral daily  atorvastatin 80 milliGRAM(s) Oral at bedtime  clopidogrel Tablet 75 milliGRAM(s) Oral daily  dextrose 5% + sodium chloride 0.45%. 1000 milliLiter(s) (50 mL/Hr) IV Continuous <Continuous>  enoxaparin Injectable 40 milliGRAM(s) SubCutaneous at bedtime  fludroCORTISONE 0.2 milliGRAM(s) Oral <User Schedule>  gabapentin 100 milliGRAM(s) Oral two times a day  midodrine. 5 milliGRAM(s) Oral three times a day  nystatin    Suspension 831085 Unit(s) Oral four times a day  pantoprazole  Injectable 40 milliGRAM(s) IV Push two times a day  scopolamine 1 mG/72 Hr(s) Patch 1 Patch Transdermal every 72 hours    MEDICATIONS  (PRN):  chlorproMAZINE    Tablet 10 milliGRAM(s) Oral every 6 hours PRN hiccups      Allergies  amoxicillin (Rash)      Review of Systems:   Cardiovascular:  No Chest Pain, No Palpitations  Respiratory:  No Cough, No Dyspnea  Gastrointestinal:  As described in HPI    Physical Examination:  T(C): 36.3 (10-30-20 @ 15:38), Max: 37.8 (10-30-20 @ 00:44)  HR: 71 (10-30-20 @ 15:38) (65 - 76)  BP: 109/57 (10-30-20 @ 15:38) (109/57 - 130/63)  RR: 18 (10-30-20 @ 15:38) (17 - 18)  SpO2: 89% (10-30-20 @ 08:24) (89% - 94%)      10-30-20 @ 07:01  -  10-30-20 @ 17:31  --------------------------------------------------------  IN: 50 mL / OUT: 500 mL / NET: -450 mL      Constitutional: No acute distress.  Respiratory:  No signs of respiratory distress. Lung sounds are clear bilaterally.  Cardiovascular:  S1 S2, Regular rate and rhythm.  Abdominal: Abdomen is soft, symmetric, and non-tender without distention.  PEG in place .  Bowel sounds are present and normoactive in all four quadrants. No masses, hepatomegaly, or splenomegaly are noted.   Skin: No rashes, No Jaundice.        Data: (reviewed by attending)                        13.1   13.77 )-----------( 257      ( 30 Oct 2020 11:50 )             38.8     Hgb trend:  13.1  10-30-20 @ 11:50  13.6  10-29-20 @ 21:31  13.5  10-29-20 @ 13:14  13.3  10-29-20 @ 06:27  13.0  10-28-20 @ 20:57        10-30    137  |  101  |  14  ----------------------------<  115<H>  4.3   |  26  |  0.8    Ca    8.7      30 Oct 2020 11:50      Liver panel trend:  TBili 1.2   /   AST 27   /   ALT 22   /   AlkP 73   /   Tptn 6.0   /   Alb 3.5    /   DBili --      10-26  TBili 0.4   /   AST 25   /   ALT 18   /   AlkP 63   /   Tptn 5.4   /   Alb 3.2    /   DBili --      10-21      PT/INR - ( 29 Oct 2020 21:46 )   PT: 15.00 sec;   INR: 1.30 ratio         PTT - ( 29 Oct 2020 21:46 )  PTT:25.5 sec       Radiology: (reviewed by attending)  CT Abdomen and Pelvis w/ IV Cont:   EXAM:  CT ABDOMEN AND PELVIS IC            PROCEDURE DATE:  10/30/2020            INTERPRETATION:  CLINICAL STATEMENT: Abdominal pain. Post PEG tube placement.    TECHNIQUE: Contiguous axial CT images were obtained from the lower chest to the pubicsymphysis following administration of 100cc Omnipaque 350 intravenous contrast.  Oral contrast was not administered.  Reformatted images in the coronal and sagittal planes were acquired.    COMPARISON CT: None.      FINDINGS:    LOWER CHEST: Bibasilar lower lobe patchy opacities with a nodular component seen at the left lung base on image 9 series 2 measuring 1.5 cm.. There is a trace right pleural effusion..    HEPATOBILIARY: Unremarkable..    SPLEEN: Unremarkable..    PANCREAS: Unremarkable..    ADRENAL GLANDS: Unremarkable..    KIDNEYS: Symmetric renal enhancement. No hydronephrosis..    ABDOMINOPELVIC NODES: No abdominal adenopathy. Evaluation the pelvis is limited due to streak artifact..    PELVIC ORGANS: Streak artifacts from bilateralhip hardwares limit evaluation of the pelvic organs..    PERITONEUM/MESENTERY/BOWEL: Enteric tube terminating in the proximal duodenum. Gastrostomy tube with balloon tip within the stomach. No bowel obstruction or pneumoperitoneum. There is possibly small amount ascites in the pelvis.. Possibly status post right inguinal hernia repair with postoperative change..    BONES/SOFT TISSUES: Post bilateral total hip arthroplasty. Diffuse osteopenia. Degenerative changes of the spine. Atrophy of the leftiliopsoas muscle..    OTHER: Atherosclerotic vascular calcifications.. 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      IMPRESSION:    1.  No bowel obstruction.    2.  Enteric tube terminating in the proximal duodenum. Recommend slight retraction.    3.  Gastrostomytube in appropriate position.    4.  Bibasilar lower lobe patchy opacities with a nodular component at the left lung base measuring 1.5 cm. Follow to resolution is recommended to exclude the possibility of an underlying neoplastic process            OMEGA MENJIVAR M.D., RESIDENT RADIOLOGIST  This document has been electronically signed.  FORTINO IRVING MD; Attending Radiologist  This document has been electronically signed. Oct 30 2020 10:57AM (10-30-20 @ 10:08)

## 2020-10-30 NOTE — H&P ADULT - NSHPPHYSICALEXAM_GEN_ALL_CORE
General: NAD, resting comfortably in bed     Heart: S1/S2 appreciated, RRR, no murmurs     Lungs: CTA b/l, no adventitious sounds     Abdomen: soft, ND, PEG site clean and TTP     Musculoskeletal: Atraumatic, no LE edema, no skin color changes     Neuro: AO x 3, no focal deficit

## 2020-10-30 NOTE — H&P ADULT - ATTENDING COMMENTS
Agree with resident's note, HPI, PE, assessment and plan.  Pt was seen and examined independently.     69 year old male with PMHx of b/l hip replacements, recent thrombectomy, left ADEOLA/MCA stroke (8/27/2020) transferred from acute rehab unit where he was admitted for rehab after acute stroke. Last night RRT code called for hematemesis. Pt had PEG tube placement on 10/28. Since than pt had daily vomiting with coffee ground emesis. Hb remains stable.   Pt denies abdominal pain, no nausea now. [place] sump placed with low wall suction.      CONSTITUTIONAL: No weakness, fevers or chills  EYES/ENT: No visual changes;  No vertigo or throat pain   NECK: No pain or stiffness  RESPIRATORY: No cough, wheezing, hemoptysis; No shortness of breath  CARDIOVASCULAR: No chest pain or palpitations  GASTROINTESTINAL: No abdominal or epigastric pain. + hematemesis; No diarrhea or constipation. No melena or hematochezia.  GENITOURINARY: No dysuria, frequency or hematuria  NEUROLOGICAL: No new numbness or weakness  SKIN: No itching, rashes     T(C): 35.9  HR: 76  BP: 117/62  RR: 17  SpO2: 89%      Physical exam:  NAD, looks comfortable, NGT to low wall suction   HEENT: PERRL, moist membranes, copious saliva   NECK: supple, no JVD  RESP: CTA b/l, no crackles, rhonchi, decreased basilar breath sounds  CVS: S1S2, RRR  GI: abdomen soft, mildly tender at the site of PEG, ND  Extremities: no edema, no calf tenderness. RUE in a brace  NEURO: AOx3, Right hemiparesis, Motor RUE 0/5, RLE 1+/5, Left side UE and LE 5/5, sensation 0 on Right, +aphasia  H/L: no enlarged LN noted     LABS:                       13.1   13.77 )-----------( 257      ( 30 Oct 2020 11:50 )             38.8   10-29    137  |  101  |  17  ----------------------------<  122<H>  4.2   |  26  |  0.8    Ca    9.1      29 Oct 2020 21:35    CXR: Impression:  No radiographic evidence of acute cardiopulmonary disease.  Unchanged.    Xray KUB: Supine abdominal x-ray is provided for review and compared to 10/27/2020.  There is new severe distention of the colon in the right abdomen measuring up to 9.5 cm. Further evaluation with CT scan is recommended. In the interval, a feeding tube has been placed with the tip overlying the mid abdomen. The osseous structures demonstrates degenerative changes and bilateral hip replacements. A PEG tube appears to be present.    CT abd: IMPRESSION:  1.  No bowel obstruction.  2.  Enteric tube terminating in the proximal duodenum. Recommend slight retraction.  3.  Gastrostomy tube in appropriate position.  4.  Bibasilar lower lobe patchy opacities with a nodular component at the left lung base measuring 1.5 cm. Follow to resolution is recommended to exclude the possibility of an underlying neoplastic process    EKG: no new EKG    A/P: # Vomiting, doubt GI bleed given stable Hb. R/o ileus. Hemodynamically stable.  - cont NGT to low wall suction  - NPO  - GI eval  - PPI IV BID  - monitor Hb Q 24 hrs  - cont DAPT for now    # CVA with residual Right side UE paresis and significant RLE weakness.   - cont high intensity statins  - cont DAPT  - resume PT Agree with resident's note, HPI, PE, assessment and plan.  Pt was seen and examined independently.     69 year old male with PMHx of b/l hip replacements, recent thrombectomy, left ADEOLA/MCA stroke (8/27/2020) transferred from acute rehab unit where he was admitted for rehab after acute stroke. Last night RRT code called for hematemesis. Pt had PEG tube placement on 10/28. Since than pt had daily vomiting with coffee ground emesis. Hb remains stable.   Pt denies abdominal pain, no nausea now. [place] sump placed with low wall suction.      CONSTITUTIONAL: No weakness, fevers or chills  EYES/ENT: No visual changes;  No vertigo or throat pain   NECK: No pain or stiffness  RESPIRATORY: No cough, wheezing, hemoptysis; No shortness of breath  CARDIOVASCULAR: No chest pain or palpitations  GASTROINTESTINAL: No abdominal or epigastric pain. + hematemesis; No diarrhea or constipation. No melena or hematochezia.  GENITOURINARY: No dysuria, frequency or hematuria  NEUROLOGICAL: No new numbness or weakness  SKIN: No itching, rashes     T(C): 35.9  HR: 76  BP: 117/62  RR: 17  SpO2: 89%      Physical exam:  NAD, looks comfortable, NGT to low wall suction   HEENT: PERRL, moist membranes, copious saliva   NECK: supple, no JVD  RESP: CTA b/l, no crackles, rhonchi, decreased basilar breath sounds  CVS: S1S2, RRR  GI: abdomen soft, mildly tender at the site of PEG, ND  Extremities: no edema, no calf tenderness. RUE in a brace  NEURO: AOx3, Right hemiparesis, Motor RUE 0/5, RLE 1+/5, Left side UE and LE 5/5, sensation 0 on Right, +aphasia  H/L: no enlarged LN noted     LABS:                       13.1   13.77 )-----------( 257      ( 30 Oct 2020 11:50 )             38.8   10-29    137  |  101  |  17  ----------------------------<  122<H>  4.2   |  26  |  0.8    Ca    9.1      29 Oct 2020 21:35    CXR: Impression:  No radiographic evidence of acute cardiopulmonary disease.  Unchanged.    Xray KUB: Supine abdominal x-ray is provided for review and compared to 10/27/2020.  There is new severe distention of the colon in the right abdomen measuring up to 9.5 cm. Further evaluation with CT scan is recommended. In the interval, a feeding tube has been placed with the tip overlying the mid abdomen. The osseous structures demonstrates degenerative changes and bilateral hip replacements. A PEG tube appears to be present.    CT abd: IMPRESSION:  1.  No bowel obstruction.  2.  Enteric tube terminating in the proximal duodenum. Recommend slight retraction.  3.  Gastrostomy tube in appropriate position.  4.  Bibasilar lower lobe patchy opacities with a nodular component at the left lung base measuring 1.5 cm. Follow to resolution is recommended to exclude the possibility of an underlying neoplastic process    EKG: no new EKG    A/P: # Vomiting, doubt GI bleed given stable Hb. R/o ileus. Hemodynamically stable.  - cont NGT to low wall suction  - NPO  - GI eval  - PPI IV BID  - monitor Hb Q 24 hrs  - cont DAPT for now    # CVA with residual Right side UE paresis and significant RLE weakness.   - cont high intensity statins  - cont DAPT  - resume PT    # Bibasilar lower lobe patchy opacities with a nodular component seen at the left lung base on image 9 series 2 measuring 1.5 cm.. There is a trace right pleural effusion. - monitor for any clinical signs of PNA. Incentive spirometry. Repeat images in 4-6 weeks.    DVT ppx SQ Heparin  DVT ppx IV PPI

## 2020-10-30 NOTE — PROVIDER CONTACT NOTE (OTHER) - BACKGROUND
pt had rapid called early tonight vomitting coffee ground emesis [place] pump put in and new peg placed

## 2020-10-30 NOTE — PROVIDER CONTACT NOTE (OTHER) - ASSESSMENT
was wondering if feed should be given in am and if peg is okay to use for meds as well as what the ng tube suction should be set at

## 2020-10-31 LAB
ALBUMIN SERPL ELPH-MCNC: 3.3 G/DL — LOW (ref 3.5–5.2)
ALP SERPL-CCNC: 71 U/L — SIGNIFICANT CHANGE UP (ref 30–115)
ALT FLD-CCNC: 14 U/L — SIGNIFICANT CHANGE UP (ref 0–41)
ANION GAP SERPL CALC-SCNC: 10 MMOL/L — SIGNIFICANT CHANGE UP (ref 7–14)
AST SERPL-CCNC: 18 U/L — SIGNIFICANT CHANGE UP (ref 0–41)
BASOPHILS # BLD AUTO: 0.02 K/UL — SIGNIFICANT CHANGE UP (ref 0–0.2)
BASOPHILS NFR BLD AUTO: 0.2 % — SIGNIFICANT CHANGE UP (ref 0–1)
BILIRUB SERPL-MCNC: 0.7 MG/DL — SIGNIFICANT CHANGE UP (ref 0.2–1.2)
BUN SERPL-MCNC: 15 MG/DL — SIGNIFICANT CHANGE UP (ref 10–20)
CALCIUM SERPL-MCNC: 9 MG/DL — SIGNIFICANT CHANGE UP (ref 8.5–10.1)
CHLORIDE SERPL-SCNC: 100 MMOL/L — SIGNIFICANT CHANGE UP (ref 98–110)
CO2 SERPL-SCNC: 30 MMOL/L — SIGNIFICANT CHANGE UP (ref 17–32)
CREAT SERPL-MCNC: 0.9 MG/DL — SIGNIFICANT CHANGE UP (ref 0.7–1.5)
EOSINOPHIL # BLD AUTO: 0.1 K/UL — SIGNIFICANT CHANGE UP (ref 0–0.7)
EOSINOPHIL NFR BLD AUTO: 0.8 % — SIGNIFICANT CHANGE UP (ref 0–8)
GLUCOSE SERPL-MCNC: 125 MG/DL — HIGH (ref 70–99)
HCT VFR BLD CALC: 39.8 % — LOW (ref 42–52)
HGB BLD-MCNC: 13.5 G/DL — LOW (ref 14–18)
IMM GRANULOCYTES NFR BLD AUTO: 0.3 % — SIGNIFICANT CHANGE UP (ref 0.1–0.3)
LYMPHOCYTES # BLD AUTO: 1.15 K/UL — LOW (ref 1.2–3.4)
LYMPHOCYTES # BLD AUTO: 8.9 % — LOW (ref 20.5–51.1)
MAGNESIUM SERPL-MCNC: 2.1 MG/DL — SIGNIFICANT CHANGE UP (ref 1.8–2.4)
MCHC RBC-ENTMCNC: 32 PG — HIGH (ref 27–31)
MCHC RBC-ENTMCNC: 33.9 G/DL — SIGNIFICANT CHANGE UP (ref 32–37)
MCV RBC AUTO: 94.3 FL — HIGH (ref 80–94)
MONOCYTES # BLD AUTO: 0.98 K/UL — HIGH (ref 0.1–0.6)
MONOCYTES NFR BLD AUTO: 7.6 % — SIGNIFICANT CHANGE UP (ref 1.7–9.3)
NEUTROPHILS # BLD AUTO: 10.61 K/UL — HIGH (ref 1.4–6.5)
NEUTROPHILS NFR BLD AUTO: 82.2 % — HIGH (ref 42.2–75.2)
NRBC # BLD: 0 /100 WBCS — SIGNIFICANT CHANGE UP (ref 0–0)
PLATELET # BLD AUTO: 295 K/UL — SIGNIFICANT CHANGE UP (ref 130–400)
POTASSIUM SERPL-MCNC: 4 MMOL/L — SIGNIFICANT CHANGE UP (ref 3.5–5)
POTASSIUM SERPL-SCNC: 4 MMOL/L — SIGNIFICANT CHANGE UP (ref 3.5–5)
PROT SERPL-MCNC: 6.2 G/DL — SIGNIFICANT CHANGE UP (ref 6–8)
RBC # BLD: 4.22 M/UL — LOW (ref 4.7–6.1)
RBC # FLD: 11.3 % — LOW (ref 11.5–14.5)
SODIUM SERPL-SCNC: 140 MMOL/L — SIGNIFICANT CHANGE UP (ref 135–146)
WBC # BLD: 12.9 K/UL — HIGH (ref 4.8–10.8)
WBC # FLD AUTO: 12.9 K/UL — HIGH (ref 4.8–10.8)

## 2020-10-31 PROCEDURE — 99223 1ST HOSP IP/OBS HIGH 75: CPT

## 2020-10-31 PROCEDURE — 71045 X-RAY EXAM CHEST 1 VIEW: CPT | Mod: 26

## 2020-10-31 PROCEDURE — 99233 SBSQ HOSP IP/OBS HIGH 50: CPT

## 2020-10-31 PROCEDURE — 74176 CT ABD & PELVIS W/O CONTRAST: CPT | Mod: 26

## 2020-10-31 RX ORDER — MORPHINE SULFATE 50 MG/1
1 CAPSULE, EXTENDED RELEASE ORAL ONCE
Refills: 0 | Status: DISCONTINUED | OUTPATIENT
Start: 2020-10-31 | End: 2020-10-31

## 2020-10-31 RX ORDER — IOHEXOL 300 MG/ML
30 INJECTION, SOLUTION INTRAVENOUS ONCE
Refills: 0 | Status: COMPLETED | OUTPATIENT
Start: 2020-10-31 | End: 2020-10-31

## 2020-10-31 RX ORDER — ONDANSETRON 8 MG/1
4 TABLET, FILM COATED ORAL ONCE
Refills: 0 | Status: COMPLETED | OUTPATIENT
Start: 2020-10-31 | End: 2020-10-31

## 2020-10-31 RX ORDER — CHLORPROMAZINE HCL 10 MG
25 TABLET ORAL EVERY 6 HOURS
Refills: 0 | Status: DISCONTINUED | OUTPATIENT
Start: 2020-10-31 | End: 2020-11-01

## 2020-10-31 RX ORDER — METOCLOPRAMIDE HCL 10 MG
5 TABLET ORAL EVERY 8 HOURS
Refills: 0 | Status: DISCONTINUED | OUTPATIENT
Start: 2020-10-31 | End: 2020-11-01

## 2020-10-31 RX ADMIN — FLUDROCORTISONE ACETATE 0.2 MILLIGRAM(S): 0.1 TABLET ORAL at 06:20

## 2020-10-31 RX ADMIN — IOHEXOL 30 MILLILITER(S): 300 INJECTION, SOLUTION INTRAVENOUS at 08:30

## 2020-10-31 RX ADMIN — Medication 500000 UNIT(S): at 11:21

## 2020-10-31 RX ADMIN — Medication 500000 UNIT(S): at 23:57

## 2020-10-31 RX ADMIN — SCOPALAMINE 1 PATCH: 1 PATCH, EXTENDED RELEASE TRANSDERMAL at 07:45

## 2020-10-31 RX ADMIN — GABAPENTIN 100 MILLIGRAM(S): 400 CAPSULE ORAL at 17:44

## 2020-10-31 RX ADMIN — Medication 25 MILLIGRAM(S): at 17:43

## 2020-10-31 RX ADMIN — GABAPENTIN 100 MILLIGRAM(S): 400 CAPSULE ORAL at 06:20

## 2020-10-31 RX ADMIN — Medication 25 MILLIGRAM(S): at 23:57

## 2020-10-31 RX ADMIN — MIDODRINE HYDROCHLORIDE 5 MILLIGRAM(S): 2.5 TABLET ORAL at 17:44

## 2020-10-31 RX ADMIN — Medication 81 MILLIGRAM(S): at 11:21

## 2020-10-31 RX ADMIN — ONDANSETRON 4 MILLIGRAM(S): 8 TABLET, FILM COATED ORAL at 11:21

## 2020-10-31 RX ADMIN — ATORVASTATIN CALCIUM 80 MILLIGRAM(S): 80 TABLET, FILM COATED ORAL at 21:50

## 2020-10-31 RX ADMIN — MIDODRINE HYDROCHLORIDE 5 MILLIGRAM(S): 2.5 TABLET ORAL at 11:21

## 2020-10-31 RX ADMIN — MORPHINE SULFATE 1 MILLIGRAM(S): 50 CAPSULE, EXTENDED RELEASE ORAL at 03:39

## 2020-10-31 RX ADMIN — MIDODRINE HYDROCHLORIDE 5 MILLIGRAM(S): 2.5 TABLET ORAL at 06:20

## 2020-10-31 RX ADMIN — ENOXAPARIN SODIUM 40 MILLIGRAM(S): 100 INJECTION SUBCUTANEOUS at 21:48

## 2020-10-31 RX ADMIN — PANTOPRAZOLE SODIUM 40 MILLIGRAM(S): 20 TABLET, DELAYED RELEASE ORAL at 11:21

## 2020-10-31 RX ADMIN — Medication 5 MILLIGRAM(S): at 22:28

## 2020-10-31 RX ADMIN — Medication 500000 UNIT(S): at 17:44

## 2020-10-31 RX ADMIN — SCOPALAMINE 1 PATCH: 1 PATCH, EXTENDED RELEASE TRANSDERMAL at 21:50

## 2020-10-31 RX ADMIN — CLOPIDOGREL BISULFATE 75 MILLIGRAM(S): 75 TABLET, FILM COATED ORAL at 11:21

## 2020-10-31 NOTE — PROGRESS NOTE ADULT - SUBJECTIVE AND OBJECTIVE BOX
NYDIA VALVERDE    Patient is a 69y old  Male who presents with a chief complaint of Coffee Ground Emesis (31 Oct 2020 01:46)    INTERVAL HPI/OVERNIGHT EVENTS: no events overnight. Pt did not have BM for >2 days, he does not pass gas, denies abdominal pain, still high output from NGT. Pt c/o persistent hiccups.    ROS: All ROS negative except as documented above    PHYSICAL EXAM:  T(C): 36.9, Max: 36.9 (10-31-20 @ 05:19)  HR: 78 (67 - 78)  BP: 118/57 (109/57 - 123/60)  RR: 18 (18 - 18)  SpO2: 93% (93% - 93%)    GENERAL: NAD  NECK: Supple, No JVD  PULMONARY/CHEST: No rales, rhonchi, wheezing  CARDIOVASC: Regular rate and rhythm; No murmurs  GI/ABDOMEN: decreased bowel sounds, abd soft, Nontender, Nondistended;  EXTREMITIES:  2+ Peripheral Pulses, No clubbing, cyanosis, or edema, no deformity. No calf tenderness b/l.    NERVOUS SYSTEM:  Alert & Oriented X3, no focal deficit     Consultant(s) Notes Reviewed by me.     LABS:                        13.5   12.90 )-----------( 295      ( 31 Oct 2020 04:30 )             39.8     10-31    140  |  100  |  15  ----------------------------<  125<H>  4.0   |  30  |  0.9    Ca    9.0      31 Oct 2020 04:30  Mg     2.1     10-31    TPro  6.2  /  Alb  3.3<L>  /  TBili  0.7  /  DBili  x   /  AST  18  /  ALT  14  /  AlkPhos  71  10-31    PT/INR - ( 29 Oct 2020 21:46 )   PT: 15.00 sec;   INR: 1.30 ratio    PTT - ( 29 Oct 2020 21:46 )  PTT:25.5 sec    RADIOLOGY & ADDITIONAL TESTS:  < from: Xray Kidney Ureter Bladder (10.30.20 @ 19:27) >  Impression:  Improved gaseous distention of right colon, now 6.5 cm, previously 9.5 cm. Persistent diffuse colorectal stool burden. Correlate for constipation or ileus.  Enteric tube extending below diaphragm. Gastrotomy tube not well evaluated.    < end of copied text >      MEDICATIONS  (STANDING):  aspirin  chewable 81 milliGRAM(s) Oral daily  atorvastatin 80 milliGRAM(s) Oral at bedtime  clopidogrel Tablet 75 milliGRAM(s) Oral daily  dextrose 5% + sodium chloride 0.45%. 1000 milliLiter(s) (50 mL/Hr) IV Continuous <Continuous>  enoxaparin Injectable 40 milliGRAM(s) SubCutaneous at bedtime  fludroCORTISONE 0.2 milliGRAM(s) Oral <User Schedule>  gabapentin 100 milliGRAM(s) Oral two times a day  midodrine. 5 milliGRAM(s) Oral three times a day  nystatin    Suspension [id number] Unit(s) Oral four times a day  pantoprazole  Injectable 40 milliGRAM(s) IV Push daily  scopolamine 1 mG/72 Hr(s) Patch 1 Patch Transdermal every 72 hours    MEDICATIONS  (PRN):  chlorproMAZINE    Tablet 10 milliGRAM(s) Oral every 6 hours PRN hiccups

## 2020-10-31 NOTE — DIETITIAN INITIAL EVALUATION ADULT. - OTHER INFO
Pt admitted d/t intractable vomiting. NGT in place for suction per GI- no evidence of GI bleed. No obstruction on CT A/P with IV contrast. GI recs CT A/P with po contrast -- then may resume PEG feeding if no signs of obstruction. Distention of R colon on KUB.

## 2020-10-31 NOTE — PROGRESS NOTE ADULT - ASSESSMENT
69 year old male with PMHx of b/l hip replacements, recent thrombectomy, left ADEOLA/MCA stroke (8/27/2020) transferred from acute rehab unit where he was admitted for rehab after acute stroke. Last night RRT code called for hematemesis. Pt had PEG tube placement on 10/28. Since than pt had daily vomiting with coffee ground emesis.    # Vomiting, doubt GI bleed given stable Hb. R/o ileus or SBO. Hemodynamically stable.  - cont NGT to low wall suction  - NPO  - GI eval appreciated  - PPI IV BID  - monitor Hb Q 24 hrs  - cont DAPT for now  - CT abd/p with PO contrast pending   - surgery eval appreciated    # Hiccups, intractable - will try Thorazine 25 mg IM Q 6 hrs     # Hypotension - cont Florinef and Midodrine 5 mg TID. BP on low side, but acceptable.    # CVA with residual Right side UE paresis and significant RLE weakness.   - cont high intensity statins  - cont DAPT  - resume PT    # Bibasilar lower lobe patchy opacities with a nodular component seen at the left lung base on image 9 series 2 measuring 1.5 cm.. There is a trace right pleural effusion. - monitor for any clinical signs of PNA. Incentive spirometry. Repeat images in 4-6 weeks.    DVT ppx SQ Heparin  DVT ppx IV PPI.

## 2020-10-31 NOTE — PROGRESS NOTE ADULT - ASSESSMENT
69 year old male with PMHx of b/l hip replacements, multiple CVA last one on 10/13/20, currently in inpatient rehab, for physical therapy. GI consulted for PEG-tube placement.      # Dysphagia due to multiple stroke s/p PEG tube placement 10/28  #coffee ground emesis? : No evidence of active GI bleed , hgb is stable , HD stable , bilious and brown material on NGtube suction  #distension of rt colon on KUB: abdomen and pelvis : PEG in position , no obstruction . CT scan was reviewed with radiologist dr Corbett.    REC:   trend CBC  Protonix 40 mg IV qd  keep NPO for now   surgery evaluation --> CT abdomen pelvis with PO contrast requested  can restart Plavix    may start PEG-tube feeding with slower rate if no signs of obstruction in CT with PO contrast    #G tube care :   wash site with soap and water twice daily  50 cc flush prior and after to each feed or medication   apply abdominal binder all the time 69 year old male with PMHx of b/l hip replacements, multiple CVA last one on 10/13/20, currently in inpatient rehab, for physical therapy. GI consulted for PEG-tube placement.      # Dysphagia due to multiple stroke s/p PEG tube placement 10/28  #coffee ground emesis? : No evidence of active GI bleed , hgb is stable , HD stable , bilious and brown material on NGtube suction  #distension of rt colon on KUB: abdomen and pelvis : PEG in position , no obstruction . CT scan was reviewed with radiologist dr Corbett.    REC:   trend CBC  Protonix 40 mg IV qd  keep NPO for now   surgery evaluation --> CT abdomen pelvis with PO contrast requested  can restart Plavix    may start PEG-tube feeding with slower rate if no signs of obstruction in CT with PO contrast  Reglan 5 mg q8h  physical therapy    #G tube care :   wash site with soap and water twice daily  50 cc flush prior and after to each feed or medication   apply abdominal binder all the time

## 2020-10-31 NOTE — CONSULT NOTE ADULT - ATTENDING COMMENTS
This is 70 y/o male with recent Left ADEOLA/MCA Stroke and has right hemiparesis.  Patient underwent PEG placement on 10/28/2020 with GI.  Patient had vomiting after the procedure and NGT was placed by primary team and is on suction.  CT A/p from yesterday shows the PEG in good position.  Surgery consult was called to evaluate for Ileus vs SBO.    Abdomen: soft, nontender    ASSESSMENT:  70 y/o male with Dysphagia.  S/P PEG placement.  Vomiting.    PLAN:  Patient needs to get CT A/p with po contrast to evaluate for SBO vs Ileus.  IV hydration.  Serial abdominal exams.  Will follow up.

## 2020-10-31 NOTE — DIETITIAN INITIAL EVALUATION ADULT. - ENTERAL
When medically feasible, initiate the following EN regimen as tolerated. Osmolite 1.5 720 mL/d- bolus rate of 120 mL q4h. Increase as tolerated within 24-48h to total volume of 1440 mL/d with bolus rate of 240 mL q4h. At goal, this will provide 2160 kcal, 90 g protein and 1094 mL free H2O.

## 2020-10-31 NOTE — CHART NOTE - NSCHARTNOTEFT_GEN_A_CORE
Spoke with surgery resident. recommendation for CT abdomen with PO contrast. Also enteric tube ending in proximal duodenum on imaging. NG tube retracted 5 cm. CXR ordered for tube position. CT abdomen with PO contrast ordered.

## 2020-10-31 NOTE — DIETITIAN INITIAL EVALUATION ADULT. - ORAL INTAKE PTA/DIET HISTORY
Pt was in [facility] rehab from 10/20-30, PEG placed during that admission on 10/28. Pt has not been seen by SLP since transferred to medicine sp Rapid Response when in rehab d/t coffee ground emesis after a PEG feeding; he was tolerating NGTF on rehab prior to PEG placement. Dysphagia sp multiple stroke. NKFA/intolerances. No vitamins/supplements pta.

## 2020-10-31 NOTE — DIETITIAN INITIAL EVALUATION ADULT. - PHYSCIAL ASSESSMENT
well nourished Pt UBW is ~185-190 lbs. Per previous admission (10/28) wt was 83 kg versus current admission dosing wt (10.31) 76.4 kg-- likely bed scale error, as 15 lbs wt loss is highly unlikely within 4 days. Pt also appears well nourished, with no overt signs of muscle wasting/fat loss observed. No edema noted; skin assessment on 10/31 shows ecchymosis

## 2020-10-31 NOTE — PROGRESS NOTE ADULT - SUBJECTIVE AND OBJECTIVE BOX
Gastroenterology progress note:     Patient is a 69y old  Male who presents with a chief complaint of Coffee Ground Emesis (31 Oct 2020 11:46)       Admitted on: 10-30-20    We are following the patient for: n/v     Interval History:  patient NGtube on suction showed brown material, patient denies abdominal pain, passing gas, last BM 2 days ago.        PAST MEDICAL & SURGICAL HISTORY:  Cerebrovascular accident (CVA)    GERD (gastroesophageal reflux disease)    S/P tonsillectomy    H/O bilateral hip replacements    H/O skin graft  upper body  legs        MEDICATIONS  (STANDING):  aspirin  chewable 81 milliGRAM(s) Oral daily  atorvastatin 80 milliGRAM(s) Oral at bedtime  chlorproMAZINE    Injectable 25 milliGRAM(s) IntraMuscular every 6 hours  clopidogrel Tablet 75 milliGRAM(s) Oral daily  dextrose 5% + sodium chloride 0.45%. 1000 milliLiter(s) (50 mL/Hr) IV Continuous <Continuous>  enoxaparin Injectable 40 milliGRAM(s) SubCutaneous at bedtime  fludroCORTISONE 0.2 milliGRAM(s) Oral <User Schedule>  gabapentin 100 milliGRAM(s) Oral two times a day  midodrine. 5 milliGRAM(s) Oral three times a day  nystatin    Suspension 239147 Unit(s) Oral four times a day  pantoprazole  Injectable 40 milliGRAM(s) IV Push daily  scopolamine 1 mG/72 Hr(s) Patch 1 Patch Transdermal every 72 hours    MEDICATIONS  (PRN):      Allergies  amoxicillin (Rash)      Review of Systems:   Constitutional: Ne Fever, No chills, No weakness  ENT: No visual changes, No throat pain  Cardiovascular:  No Chest Pain, No Palpitations  Respiratory:  No Cough, No Dyspnea  Gastrointestinal:  As described in HPI  Neurological: No numbness or weakness  Skin: No rash, no itching    Physical Examination:  T(C): 36.9 (10-31-20 @ 07:31), Max: 36.9 (10-31-20 @ 05:19)  HR: 78 (10-31-20 @ 07:31) (67 - 78)  BP: 118/57 (10-31-20 @ 07:31) (109/57 - 123/60)  RR: 18 (10-31-20 @ 07:31) (18 - 18)  SpO2: 93% (10-31-20 @ 07:31) (93% - 93%)  Weight (kg): 76.4 (10-31-20 @ 07:31)    10-30-20 @ 07:01  -  10-31-20 @ 07:00  --------------------------------------------------------  IN: 50 mL / OUT: 2100 mL / NET: -2050 mL      Constitutional: No acute distress.  Respiratory:  No signs of respiratory distress. Lung sounds are clear bilaterally.  Cardiovascular:  S1 S2, Regular rate and rhythm.  Abdominal: Abdomen is soft, symmetric, and non-tender without distention. There are no visible lesions. PEG-tube in place, no secretion or discharge around PEG.   Bowel sounds are present and normoactive in all four quadrants. No masses, hepatomegaly, or splenomegaly are noted.   Skin: No rashes, No Jaundice.  Skin: No rash, No excoriation  Vascular: No varicose vein, No cyanosis, No edema        Data: (reviewed by attending)                        13.5   12.90 )-----------( 295      ( 31 Oct 2020 04:30 )             39.8     Hgb trend:  13.5  10-31-20 @ 04:30  13.1  10-30-20 @ 11:50  13.6  10-29-20 @ 21:31  13.5  10-29-20 @ 13:14  13.3  10-29-20 @ 06:27  13.0  10-28-20 @ 20:57        10-31    140  |  100  |  15  ----------------------------<  125<H>  4.0   |  30  |  0.9    Ca    9.0      31 Oct 2020 04:30  Mg     2.1     10-31    TPro  6.2  /  Alb  3.3<L>  /  TBili  0.7  /  DBili  x   /  AST  18  /  ALT  14  /  AlkPhos  71  10-31    Liver panel trend:  TBili 0.7   /   AST 18   /   ALT 14   /   AlkP 71   /   Tptn 6.2   /   Alb 3.3    /   DBili --      10-31  TBili 1.2   /   AST 27   /   ALT 22   /   AlkP 73   /   Tptn 6.0   /   Alb 3.5    /   DBili --      10-26      PT/INR - ( 29 Oct 2020 21:46 )   PT: 15.00 sec;   INR: 1.30 ratio         PTT - ( 29 Oct 2020 21:46 )  PTT:25.5 sec       < from: CT Abdomen and Pelvis w/ IV Cont (10.30.20 @ 10:08) >    IMPRESSION:    1.  No bowel obstruction.    2.  Enteric tube terminating in the proximal duodenum. Recommend slight retraction.    3.  Gastrostomytube in appropriate position.    4.  Bibasilar lower lobe patchy opacities with a nodular component at the left lung base measuring 1.5 cm. Follow to resolution is recommended to exclude the possibility of an underlying neoplastic process    < end of copied text >  Radiology: (reviewed by attending)

## 2020-10-31 NOTE — DIETITIAN INITIAL EVALUATION ADULT. - EDUCATION DIETARY MODIFICATIONS
[facility] ([place])- PROGRESS NOTE    Patient is a 72y Female with MIKAELA on adv ckd; adm with NCSE f/d by retroperitoneal bleed, multiple iv contrast studies, has been on dialysis past wk     Allergies:  No Known Allergies    Hospital Medications:   MEDICATIONS  (STANDING):  calcium acetate 667 milliGRAM(s) Oral three times a day with meals  chlorhexidine 4% Liquid 1 Application(s) Topical daily  dextrose 5%. 1000 milliLiter(s) (50 mL/Hr) IV Continuous <Continuous>  dextrose 50% Injectable 12.5 Gram(s) IV Push once  dextrose 50% Injectable 25 Gram(s) IV Push once  dextrose 50% Injectable 25 Gram(s) IV Push once  diVALproex  milliGRAM(s) Oral two times a day  epoetin marla Injectable 73748 Unit(s) IV Push once  influenza   Vaccine 0.5 milliLiter(s) IntraMuscular once  insulin lispro (HumaLOG) corrective regimen sliding scale   SubCutaneous three times a day before meals  insulin lispro (HumaLOG) corrective regimen sliding scale   SubCutaneous at bedtime  metoprolol     tartrate 50 milliGRAM(s) Oral two times a day    REVIEW OF SYSTEMS:  CONSTITUTIONAL: mild weakness, no fevers or chills  RESPIRATORY: mild sob, cough  CARDIOVASCULAR: No chest pain or palpitations.  GASTROINTESTINAL: decreased abd pain; No nausea, vomiting  GENITOURINARY: No dysuria  SKIN: No itching, burning, rashes, or lesions   VASCULAR: feels mildly puffy  All other review of systems is negative unless indicated above.    VITALS:  T(F): 98.6 (01-06-18 @ 08:00), Max: 98.8 (01-05-18 @ 20:00)  HR: 57 (01-06-18 @ 10:00)  BP: 145/65 (01-06-18 @ 10:00)  RR: 24 (01-06-18 @ 10:00)  SpO2: 95% (01-06-18 @ 08:00)  Wt(kg): --    01-04 @ 07:01  -  01-05 @ 07:00  --------------------------------------------------------  IN: 1040 mL / OUT: 1900 mL / NET: -860 mL    01-05 @ 07:01 - 01-06 @ 07:00  --------------------------------------------------------  IN: 360 mL / OUT: 0 mL / NET: 360 mL    01-06 @ 07:01 - 01-06 @ 10:15  --------------------------------------------------------  IN: 0 mL / OUT: 160 mL / NET: -160 mL        PHYSICAL EXAM:  Constitutional: NAD; sitting in chair  HEENT: anicteric sclera  Respiratory: CTAB, no wheezes, rales- dec bs at bases  Cardiovascular: S1, S2, RRR  Gastrointestinal: BS+, soft, mild mid lower abd tenderness  Extremities: mild bilat leg edema; mod sacral edema  Neurological: A/O x 3, no focal deficits  Psychiatric: Normal mood, normal affect  : No CVA tenderness. No mistry.     Vascular Access: rt IJ [facility]    LABS:  01-06    135  |  94<L>  |  67<H>  ----------------------------<  86  4.1   |  25  |  4.87<H>    Ca    7.8<L>      06 Jan 2018 05:30  Phos  5.6     01-06  Mg     2.4     01-06    TPro  5.8<L>  /  Alb  2.9<L>  /  TBili  0.9  /  DBili      /  AST  63<H>  /  ALT  23  /  AlkPhos  89  01-05    Creatinine Trend: 4.87 <--, 4.28 <--, 5.84 <--, 4.95 <--, 7.92 <--, 7.56 <--, 9.38 <--                        7.6    8.74  )-----------( 108      ( 06 Jan 2018 05:30 )             22.6     Urine Studies:      RADIOLOGY & ADDITIONAL STUDIES: not applicable

## 2020-10-31 NOTE — DIETITIAN INITIAL EVALUATION ADULT. - FACTORS AFF FOOD INTAKE
vomiting/NPO/Pt denies any GI s/s as of current assessment. He reports that nausea has resolved sp medication. Has not had a feeding since the episode of emesis on 10/29. Pt states that it has been 3 days since his last BM.

## 2020-10-31 NOTE — CONSULT NOTE ADULT - ASSESSMENT
ASSESSMENT:  69y Male w/ significant neurological hx, w/ reports of coffee-ground emesis s/p PEG tube placement    HB remains stable at 13, UGIB unlikely, if suspected, would recommend EGD  NGT in place, to suction 750 cc bilious enteric contents in canister   Surgery consulted for concerns of emesis, NGT outputs despite no CT evidence of mechanical obstruction    PLAN:  NPO, NGT  Please obtain CT C/A/P w/ PO contrast to r/o obstruction  Surgery will follow  D/w Dr. Connors

## 2020-11-01 PROCEDURE — 99233 SBSQ HOSP IP/OBS HIGH 50: CPT

## 2020-11-01 PROCEDURE — 99231 SBSQ HOSP IP/OBS SF/LOW 25: CPT | Mod: GC

## 2020-11-01 RX ORDER — METOCLOPRAMIDE HCL 10 MG
5 TABLET ORAL THREE TIMES A DAY
Refills: 0 | Status: DISCONTINUED | OUTPATIENT
Start: 2020-11-01 | End: 2020-11-03

## 2020-11-01 RX ORDER — SENNA PLUS 8.6 MG/1
2 TABLET ORAL AT BEDTIME
Refills: 0 | Status: DISCONTINUED | OUTPATIENT
Start: 2020-11-01 | End: 2020-11-03

## 2020-11-01 RX ORDER — METOCLOPRAMIDE HCL 10 MG
5 TABLET ORAL EVERY 8 HOURS
Refills: 0 | Status: DISCONTINUED | OUTPATIENT
Start: 2020-11-01 | End: 2020-11-01

## 2020-11-01 RX ADMIN — Medication 81 MILLIGRAM(S): at 11:57

## 2020-11-01 RX ADMIN — GABAPENTIN 100 MILLIGRAM(S): 400 CAPSULE ORAL at 17:57

## 2020-11-01 RX ADMIN — Medication 500000 UNIT(S): at 23:31

## 2020-11-01 RX ADMIN — PANTOPRAZOLE SODIUM 40 MILLIGRAM(S): 20 TABLET, DELAYED RELEASE ORAL at 11:57

## 2020-11-01 RX ADMIN — FLUDROCORTISONE ACETATE 0.2 MILLIGRAM(S): 0.1 TABLET ORAL at 05:21

## 2020-11-01 RX ADMIN — Medication 25 MILLIGRAM(S): at 05:22

## 2020-11-01 RX ADMIN — CLOPIDOGREL BISULFATE 75 MILLIGRAM(S): 75 TABLET, FILM COATED ORAL at 11:57

## 2020-11-01 RX ADMIN — Medication 500000 UNIT(S): at 11:57

## 2020-11-01 RX ADMIN — MIDODRINE HYDROCHLORIDE 5 MILLIGRAM(S): 2.5 TABLET ORAL at 17:57

## 2020-11-01 RX ADMIN — SENNA PLUS 2 TABLET(S): 8.6 TABLET ORAL at 21:43

## 2020-11-01 RX ADMIN — Medication 5 MILLIGRAM(S): at 21:42

## 2020-11-01 RX ADMIN — SCOPALAMINE 1 PATCH: 1 PATCH, EXTENDED RELEASE TRANSDERMAL at 20:40

## 2020-11-01 RX ADMIN — ENOXAPARIN SODIUM 40 MILLIGRAM(S): 100 INJECTION SUBCUTANEOUS at 21:43

## 2020-11-01 RX ADMIN — GABAPENTIN 100 MILLIGRAM(S): 400 CAPSULE ORAL at 05:21

## 2020-11-01 RX ADMIN — Medication 5 MILLIGRAM(S): at 14:07

## 2020-11-01 RX ADMIN — SODIUM CHLORIDE 50 MILLILITER(S): 9 INJECTION, SOLUTION INTRAVENOUS at 23:31

## 2020-11-01 RX ADMIN — Medication 500000 UNIT(S): at 05:22

## 2020-11-01 RX ADMIN — ATORVASTATIN CALCIUM 80 MILLIGRAM(S): 80 TABLET, FILM COATED ORAL at 21:42

## 2020-11-01 RX ADMIN — Medication 5 MILLIGRAM(S): at 05:21

## 2020-11-01 RX ADMIN — SCOPALAMINE 1 PATCH: 1 PATCH, EXTENDED RELEASE TRANSDERMAL at 07:14

## 2020-11-01 RX ADMIN — Medication 500000 UNIT(S): at 17:57

## 2020-11-01 RX ADMIN — MIDODRINE HYDROCHLORIDE 5 MILLIGRAM(S): 2.5 TABLET ORAL at 11:57

## 2020-11-01 RX ADMIN — MIDODRINE HYDROCHLORIDE 5 MILLIGRAM(S): 2.5 TABLET ORAL at 05:21

## 2020-11-01 NOTE — PROGRESS NOTE ADULT - SUBJECTIVE AND OBJECTIVE BOX
SUBJECTIVE:    Patient is a 69y old Male who presents with a chief complaint of Coffee Ground Emesis (01 Nov 2020 03:23)    Currently admitted to medicine with the primary diagnosis of abdominal pain     Today is hospital day 3d. Patient seen and examined at bedside this AM. No overnight events.    PAST MEDICAL & SURGICAL HISTORY  Cerebrovascular accident (CVA)    GERD (gastroesophageal reflux disease)    S/P tonsillectomy    H/O bilateral hip replacements    H/O skin graft  upper body  legs      SOCIAL HISTORY:  Negative for smoking/alcohol/drug use.     ALLERGIES:  amoxicillin (Rash)    MEDICATIONS:  STANDING MEDICATIONS  aspirin  chewable 81 milliGRAM(s) Oral daily  atorvastatin 80 milliGRAM(s) Oral at bedtime  clopidogrel Tablet 75 milliGRAM(s) Oral daily  dextrose 5% + sodium chloride 0.45%. 1000 milliLiter(s) IV Continuous <Continuous>  enoxaparin Injectable 40 milliGRAM(s) SubCutaneous at bedtime  fludroCORTISONE 0.2 milliGRAM(s) Oral <User Schedule>  gabapentin 100 milliGRAM(s) Oral two times a day  midodrine. 5 milliGRAM(s) Oral three times a day  nystatin    Suspension 445187 Unit(s) Oral four times a day  pantoprazole  Injectable 40 milliGRAM(s) IV Push daily  scopolamine 1 mG/72 Hr(s) Patch 1 Patch Transdermal every 72 hours  senna 2 Tablet(s) Oral at bedtime    PRN MEDICATIONS  metoclopramide 5 milliGRAM(s) Oral every 8 hours PRN    VITALS:   T(F): 99.4  HR: 77  BP: 109/57  RR: 17  SpO2: --    LABS:                        13.5   12.90 )-----------( 295      ( 31 Oct 2020 04:30 )             39.8     10-31    140  |  100  |  15  ----------------------------<  125<H>  4.0   |  30  |  0.9    Ca    9.0      31 Oct 2020 04:30  Mg     2.1     10-31    TPro  6.2  /  Alb  3.3<L>  /  TBili  0.7  /  DBili  x   /  AST  18  /  ALT  14  /  AlkPhos  71  10-31                  RADIOLOGY:    PHYSICAL EXAM:  GEN: No acute distress  LUNGS: Clear to auscultation bilaterally   HEART: S1/S2 present. RRR.   ABD: Soft, non-tender, non-distended. Bowel sounds present  EXT: NC/NC/NE/2+PP/DELUNA/Skin Intact.   NEURO: AAOX3    Intravenous access:   NG tube:   Mcclellan Catheter:       6 click score:       SUBJECTIVE:    Patient is a 69y old Male who presents with a chief complaint of Coffee Ground Emesis (01 Nov 2020 03:23)    Currently admitted to medicine with the primary diagnosis of abdominal pain     Today is hospital day 3d. Patient seen and examined at bedside today. No overnight events.    PAST MEDICAL & SURGICAL HISTORY  Cerebrovascular accident (CVA)    GERD (gastroesophageal reflux disease)    S/P tonsillectomy    H/O bilateral hip replacements    H/O skin graft  upper body  legs      SOCIAL HISTORY:  Negative for smoking/alcohol/drug use.     ALLERGIES:  amoxicillin (Rash)    MEDICATIONS:  STANDING MEDICATIONS  aspirin  chewable 81 milliGRAM(s) Oral daily  atorvastatin 80 milliGRAM(s) Oral at bedtime  clopidogrel Tablet 75 milliGRAM(s) Oral daily  dextrose 5% + sodium chloride 0.45%. 1000 milliLiter(s) IV Continuous <Continuous>  enoxaparin Injectable 40 milliGRAM(s) SubCutaneous at bedtime  fludroCORTISONE 0.2 milliGRAM(s) Oral <User Schedule>  gabapentin 100 milliGRAM(s) Oral two times a day  midodrine. 5 milliGRAM(s) Oral three times a day  nystatin    Suspension [id number] Unit(s) Oral four times a day  pantoprazole  Injectable 40 milliGRAM(s) IV Push daily  scopolamine 1 mG/72 Hr(s) Patch 1 Patch Transdermal every 72 hours  senna 2 Tablet(s) Oral at bedtime    PRN MEDICATIONS  metoclopramide 5 milliGRAM(s) Oral every 8 hours PRN    VITALS:   T(F): 99.4  HR: 77  BP: 109/57  RR: 17  SpO2: --    LABS:                        13.5   12.90 )-----------( 295      ( 31 Oct 2020 04:30 )             39.8     10-31    140  |  100  |  15  ----------------------------<  125<H>  4.0   |  30  |  0.9    Ca    9.0      31 Oct 2020 04:30  Mg     2.1     10-31    TPro  6.2  /  Alb  3.3<L>  /  TBili  0.7  /  DBili  x   /  AST  18  /  ALT  14  /  AlkPhos  71  10-31                  RADIOLOGY:    PHYSICAL EXAM:  GEN: No acute distress  LUNGS: Clear to auscultation bilaterally   HEART: S1/S2 present, no M/R/G  ABD: Soft, non-tender, non-distended, NG tube in place  EXT: no LE edema    Intravenous access:   NG tube:   Mcclellan Catheter:       6 click score:

## 2020-11-01 NOTE — PROGRESS NOTE ADULT - ASSESSMENT
Assessment:  69y Male patient admitted HD 3 surgery consulted to rule out obstruction, CTAP findings not suggestive of bowel obstruction, with the above physical exam, labs, and imaging findings.    Plan:  - monitor NGT output  - f/u GI recommendations  - no acute surgical intervention at this time  -Pain control as needed  -Hemodynamic monitoring as per routine  -Continue current management    Date/Time: 11-01-20 @ 03:24 Assessment:  69y Male patient admitted HD 3 surgery consulted to rule out obstruction, CTAP findings not suggestive of bowel obstruction, with the above physical exam, labs, and imaging findings.    Plan:  - monitor NGT output  - f/u GI recommendations  - no acute surgical intervention at this time  -Pain control as needed  -Hemodynamic monitoring as per routine  -Continue current management    Date/Time: 11-01-20 @ 03:24    Addendum 11/1/2020 @ 10:49: Patient seen and evaluated at bedside this morning. On exam, patient is in no acute distress without signs of obstruction. On repeat CT imaging with PO contrast from 10/31, there is no evidence of obstruction as contrast is seen in the colon. No surgical intervention is indicated and we will sign off. Please re-call if needed. Assessment:  69y Male patient admitted HD 3 surgery consulted to rule out obstruction, CTAP findings not suggestive of bowel obstruction, with the above physical exam, labs, and imaging findings.    Plan:  - monitor NGT output  - f/u GI recommendations  - no acute surgical intervention at this time  -Pain control as needed  -Hemodynamic monitoring as per routine  -Continue current management    Date/Time: 11-01-20 @ 03:24    Addendum 11/1/2020 @ 10:49: Patient seen and evaluated at bedside this morning. On exam, patient is in no acute distress without signs of obstruction. On repeat CT imaging with PO contrast from 10/31, there is no evidence of obstruction as contrast is seen in the colon. No surgical intervention is indicated at this time. Please re-call as needed with questions or concerns.

## 2020-11-01 NOTE — PHYSICAL THERAPY INITIAL EVALUATION ADULT - GENERAL OBSERVATIONS, REHAB EVAL
120-155 pm Chart reviewed. Pt. seen semirecline in bed , in No apparent distress, + NGT with suction+ PEG, IV lock, right hemiparesis, family at bedside , Pt. agreed to activity/therapy.

## 2020-11-01 NOTE — PROGRESS NOTE ADULT - ASSESSMENT
69 year old male with PMHx of b/l hip replacements, recent thrombectomy, left ADEOLA/MCA stroke (8/27/2020) transferred from acute rehab unit where he was admitted for rehab after acute stroke. Last night RRT code called for hematemesis. Pt had PEG tube placement on 10/28. Since than pt had daily vomiting with coffee ground emesis.    # Vomiting, doubt GI bleed given stable Hb. R/o ileus or SBO. Hemodynamically stable.  - cont NGT to low wall suction  - NPO  - GI eval appreciated  - PPI IV BID  - monitor Hb Q 24 hrs  - cont DAPT for now  - CT abd/p with PO contrast pending   - surgery eval appreciated    # Hiccups, intractable - will try Thorazine 25 mg IM Q 6 hrs     # Hypotension - cont Florinef and Midodrine 5 mg TID. BP on low side, but acceptable.    # CVA with residual Right side UE paresis and significant RLE weakness.   - cont high intensity statins  - cont DAPT  - resume PT    # Bibasilar lower lobe patchy opacities with a nodular component seen at the left lung base on image 9 series 2 measuring 1.5 cm.. There is a trace right pleural effusion. - monitor for any clinical signs of PNA. Incentive spirometry. Repeat images in 4-6 weeks.    DVT ppx SQ Heparin  DVT ppx IV PPI.     # Dysphagia due to multiple stroke s/p PEG tube placement 10/28  #coffee ground emesis? : No evidence of active GI bleed , hgb is stable , HD stable , bilious and brown material on NGtube suction  #distension of rt colon on KUB: abdomen and pelvis : PEG in position , no obstruction . CT scan was reviewed with radiologist dr Corbett.    REC:   trend CBC  Protonix 40 mg IV qd  keep NPO for now   surgery evaluation --> CT abdomen pelvis with PO contrast requested  can restart Plavix    may start PEG-tube feeding with slower rate if no signs of obstruction in CT with PO contrast  Reglan 5 mg q8h  physical therapy    #G tube care :   wash site with soap and water twice daily  50 cc flush prior and after to each feed or medication   apply abdominal binder all the time     Assessment:  69y Male patient admitted HD 3 surgery consulted to rule out obstruction, CTAP findings not suggestive of bowel obstruction, with the above physical exam, labs, and imaging findings.    Plan:  - monitor NGT output  - f/u GI recommendations  - no acute surgical intervention at this time  -Pain control as needed  -Hemodynamic monitoring as per routine  -Continue current management     69 year old male with PMHx of b/l hip replacements, recent thrombectomy, left ADEOLA/MCA stroke (8/27/2020) transferred from acute rehab unit where he was admitted for rehab after acute stroke. RRT code called for hematemesis prior to admission. Pt had PEG tube placement on 10/28. Since than pt had daily vomiting with coffee ground emesis. Per surgery, no intervention at this time. Patient can resume PEG tube feedings.    #Vomiting; possible ileus  - H/H stable; GI bleed unlikely  - cont NGT to low wall suction  - NPO with tube feeds  - PPI IV BID  - monitor Hb Q24 hrs  - cont DAPT for now  - CT abd/p with PO contrast -> no obstruction  - Surgery eval appreciated  - G tube care: wash site with soap and water twice daily, 50 cc flush prior and after to each feed or medication , apply abdominal binder all the time     # Hiccups, intractable  - D/C Thorazine 25 mg IM Q 6 hrs due to risk of EPS when used with reglan     # Hypotension  - cont Florinef and Midodrine 5 mg TID    # CVA with residual Right side UE paresis and significant RLE weakness.   - cont high intensity statins  - cont DAPT  - resume PT    # Bibasilar lower lobe patchy opacities   - with a nodular component seen at the left lung base on image 9 series 2 measuring 1.5 cm.  - There is a trace right pleural effusion. - monitor for any clinical signs of PNA.   - Incentive spirometry  - Repeat images in 4-6 weeks.    #Misc  Diet: NPO with tube feeds  GI PPx: Protonix  DVT PPx: Lovenox

## 2020-11-01 NOTE — PROGRESS NOTE ADULT - SUBJECTIVE AND OBJECTIVE BOX
Progress Note: Surgery  Patient: NYDIA VALVERDE , 69y (1950)Male   MRN: [id number]  Location: [address]   Visit: 10-30-20 Inpatient  Date: 11-01-20 @ 03:24    Procedure/Diagnosis: HD 3 surgery consulted to rule out obstruction  Events over 24h: No acute event overnight. No new complaint. Pt is hemodynamically stable. Patient states no improvement in abdominal pain, no N/V (NGT in place). No BM/Gas.    Vitals: T(F): 98.6 (11-01-20 @ 00:00), Max: 98.6 (11-01-20 @ 00:00)  HR: 77 (11-01-20 @ 00:00)  BP: 108/55 (11-01-20 @ 00:00) (108/55 - 123/63)  RR: 18 (11-01-20 @ 00:00)  SpO2: 93% (10-31-20 @ 07:31)      Diet: Diet, NPO with Tube Feed:   Tube Feeding Modality: Gastrostomy  Osmolite 1.5 Benjamin  Total Volume for 24 Hours (mL): 240  Bolus  Total Volume of Bolus (mL):  60  Tube Feed Frequency: Every 6 hours   Tube Feed Start Time: 16:00  Bolus Feed Rate (mL per Hour): 60   Bolus Feed Duration (in Hours): 1 (10-31-20 @ 15:53)    IV Fluid: dextrose 5% + sodium chloride 0.45%. 1000 milliLiter(s) (50 mL/Hr) IV Continuous <Continuous>      In:   10-30-20 @ 07:01  -  10-31-20 @ 07:00  --------------------------------------------------------  IN: 50 mL    10-31-20 @ 08:01  -  11-01-20 @ 03:24  --------------------------------------------------------  IN: 120 mL      Out:   10-30-20 @ 07:01  -  10-31-20 @ 07:00  --------------------------------------------------------  OUT:    Nasogastric/Oral tube (mL): 1600 mL    Voided (mL): 500 mL  Total OUT: 2100 mL      10-31-20 @ 08:01  -  11-01-20 @ 03:24  --------------------------------------------------------  OUT:    Nasogastric/Oral tube (mL): 550 mL  Total OUT: 550 mL        Net:   10-30-20 @ 07:01  -  10-31-20 @ 07:00  --------------------------------------------------------  NET: -2050 mL    10-31-20 @ 08:01  -  11-01-20 @ 03:24  --------------------------------------------------------  NET: -430 mL        Physical Examination:  General Appearance: NAD, alert and cooperative  Heart: S1 and S2. No murmurs. Rhythm is regular.  Lungs: Clear to auscultation BL without rales, rhonchi, wheezing, crackles or diminished breath sounds.  Abdomen: Soft, nondistended, nontender. No rigidity, guarding, or rebound tenderness.    Medications: [Standing]  aspirin  chewable 81 milliGRAM(s) Oral daily  atorvastatin 80 milliGRAM(s) Oral at bedtime  chlorproMAZINE    Injectable 25 milliGRAM(s) IntraMuscular every 6 hours  clopidogrel Tablet 75 milliGRAM(s) Oral daily  dextrose 5% + sodium chloride 0.45%. 1000 milliLiter(s) (50 mL/Hr) IV Continuous <Continuous>  enoxaparin Injectable 40 milliGRAM(s) SubCutaneous at bedtime  fludroCORTISONE 0.2 milliGRAM(s) Oral <User Schedule>  gabapentin 100 milliGRAM(s) Oral two times a day  metoclopramide 5 milliGRAM(s) Oral every 8 hours  midodrine. 5 milliGRAM(s) Oral three times a day  nystatin    Suspension [id number] Unit(s) Oral four times a day  pantoprazole  Injectable 40 milliGRAM(s) IV Push daily  scopolamine 1 mG/72 Hr(s) Patch 1 Patch Transdermal every 72 hours    Medications:[PRN]    Labs:                        13.5   12.90 )-----------( 295      ( 31 Oct 2020 04:30 )             39.8   10-31    140  |  100  |  15  ----------------------------<  125<H>  4.0   |  30  |  0.9    Ca    9.0      31 Oct 2020 04:30  Mg     2.1     10-31    TPro  6.2  /  Alb  3.3<L>  /  TBili  0.7  /  DBili  x   /  AST  18  /  ALT  14  /  AlkPhos  71  10-31  LIVER FUNCTIONS - ( 31 Oct 2020 04:30 )  Alb: 3.3 g/dL / Pro: 6.2 g/dL / ALK PHOS: 71 U/L / ALT: 14 U/L / AST: 18 U/L / GGT: x             Micro/Urine:    Imaging:  EXAM:  CT ABDOMEN AND PELVIS OC        IMPRESSION:    1.  No evidence of bowel obstruction.  2.  Persistent bibasilar pulmonary opacities, likely atelectasis.  3.  Remainder of findings are not significantly changed from CT from one day prior.

## 2020-11-01 NOTE — PHYSICAL THERAPY INITIAL EVALUATION ADULT - PERTINENT HX OF CURRENT PROBLEM, REHAB EVAL
left MCA stroke with right hemiparesis, (right handed)  and severe dysphagia and aphasia, s/p PEG by Dr Balderas 10/28. He was stable on the [facility] PEG with one episode of coffee ground emesis later the day of the PEG.

## 2020-11-01 NOTE — PHYSICAL THERAPY INITIAL EVALUATION ADULT - SPECIFY REASON(S)
1041 am damon to see pt for PT, however WILLIE De Oliveira requesting if pt can be seen later after PEG feeding. Will f/u to complete eval.

## 2020-11-01 NOTE — PROGRESS NOTE ADULT - SUBJECTIVE AND OBJECTIVE BOX
NYDIA VALVERDE    Patient is a 69y old  Male who presents with a chief complaint of Coffee Ground Emesis (01 Nov 2020 03:23)    INTERVAL HPI/OVERNIGHT EVENTS: pt denies nausea or abdominal pain, low suction on hold, tube feeds restarted at lower rate. Pt does not have new complaints. Hiccups resolved.     CONSTITUTIONAL: No weakness, fevers or chills  EYES/ENT: No visual changes;  No vertigo or throat pain   NECK: No pain or stiffness  RESPIRATORY: No cough, wheezing, hemoptysis; No shortness of breath  CARDIOVASCULAR: No chest pain or palpitations  GASTROINTESTINAL: see above  GENITOURINARY: No dysuria, frequency or hematuria  NEUROLOGICAL: No new numbness or weakness  SKIN: No itching, rashes     PHYSICAL EXAM:  T(C): 37.4, Max: 37.4 (11-01-20 @ 07:34)  HR: 77 (60 - 77)  BP: 109/57 (108/55 - 123/63)  RR: 17 (17 - 19)  SpO2: --    NAD, looks comfortable, NGT in place  HEENT: PERRL, moist membranes   NECK: supple, no JVD  RESP: CTA b/l, no crackles, rhonchi, decreased basilar breath sounds  CVS: S1S2, RRR  GI: abdomen soft, mildly tender at the site of PEG, ND  Extremities: no edema, no calf tenderness.   NEURO: AOx3, Right hemiparesis, Motor RUE 0/5, RLE 1+/5, Left side UE and LE 5/5, sensation 0 on Right, + Broca's aphasia    Consultant(s) Notes Reviewed by me.     LABS: labs pending                        RADIOLOGY & ADDITIONAL TESTS:  < from: CT Abdomen and Pelvis w/ Oral Cont (10.31.20 @ 12:11) >  IMPRESSION:    1.  No evidence of bowel obstruction.  2.  Persistent bibasilar pulmonary opacities, likely atelectasis.  3.  Remainder of findings are not significantly changed from CT from one day prior.    < end of copied text >        MEDICATIONS  (STANDING):  aspirin  chewable 81 milliGRAM(s) Oral daily  atorvastatin 80 milliGRAM(s) Oral at bedtime  clopidogrel Tablet 75 milliGRAM(s) Oral daily  dextrose 5% + sodium chloride 0.45%. 1000 milliLiter(s) (50 mL/Hr) IV Continuous <Continuous>  enoxaparin Injectable 40 milliGRAM(s) SubCutaneous at bedtime  fludroCORTISONE 0.2 milliGRAM(s) Oral <User Schedule>  gabapentin 100 milliGRAM(s) Oral two times a day  midodrine. 5 milliGRAM(s) Oral three times a day  nystatin    Suspension 903271 Unit(s) Oral four times a day  pantoprazole  Injectable 40 milliGRAM(s) IV Push daily  scopolamine 1 mG/72 Hr(s) Patch 1 Patch Transdermal every 72 hours  senna 2 Tablet(s) Oral at bedtime    MEDICATIONS  (PRN):  metoclopramide 5 milliGRAM(s) Oral every 8 hours PRN nausea

## 2020-11-01 NOTE — PROGRESS NOTE ADULT - ATTENDING COMMENTS
I personally interviewed and examined the patient S/P PEG vomiting Coffee grouns. CT scan discussed with radiologist no obstruction Rec Surgery consult.
CT from 10/31/202 reviewed and shows no bowel obstruction or ileus.  Ok to start tube feeds.

## 2020-11-01 NOTE — PROGRESS NOTE ADULT - ASSESSMENT
69 year old male with PMHx of b/l hip replacements, recent thrombectomy, left ADEOLA/MCA stroke (8/27/2020) transferred from acute rehab unit where he was admitted for rehab after acute stroke. Last night RRT code called for hematemesis. Pt had PEG tube placement on 10/28. Since than pt had daily vomiting with coffee ground emesis.    # Vomiting, doubt GI bleed given stable Hb. SBO ruled out, clinically it might be ileus. Hemodynamically stable.  - CT abd with PO con - no signs of obstruction, not much stool seen in colon  - hold suction, keep [place] sump for now  - feeds at low rate  - GI eval appreciated  - PPI IV BID for now  - monitor Hb Q 24 hrs, follow up labs drawn this morning, still pending  - cont DAPT for now  - CT abd/p with PO contrast pending   - surgery eval appreciated    # Hiccups, intractable - resolved, will dc Thorazine      # Hypotension - cont Florinef and Midodrine 5 mg TID. BP on low side, but acceptable.    # CVA with residual Right side UE paresis and significant RLE weakness.   - cont high intensity statins  - cont DAPT  - resume PT    # Bibasilar lower lobe patchy opacities with a nodular component seen at the left lung base on image 9 series 2 measuring 1.5 cm.. There is a trace right pleural effusion. - monitor for any clinical signs of PNA. Incentive spirometry encouraged and ask the nurse to make sure pt able to do it. Repeat images in 4-6 weeks.    DVT ppx SQ Heparin  DVT ppx IV PPI.

## 2020-11-02 LAB
ALBUMIN SERPL ELPH-MCNC: 3.1 G/DL — LOW (ref 3.5–5.2)
ALP SERPL-CCNC: 71 U/L — SIGNIFICANT CHANGE UP (ref 30–115)
ALT FLD-CCNC: 29 U/L — SIGNIFICANT CHANGE UP (ref 0–41)
ANION GAP SERPL CALC-SCNC: 11 MMOL/L — SIGNIFICANT CHANGE UP (ref 7–14)
AST SERPL-CCNC: 39 U/L — SIGNIFICANT CHANGE UP (ref 0–41)
BASOPHILS # BLD AUTO: 0.03 K/UL — SIGNIFICANT CHANGE UP (ref 0–0.2)
BASOPHILS NFR BLD AUTO: 0.3 % — SIGNIFICANT CHANGE UP (ref 0–1)
BILIRUB SERPL-MCNC: 1.1 MG/DL — SIGNIFICANT CHANGE UP (ref 0.2–1.2)
BUN SERPL-MCNC: 19 MG/DL — SIGNIFICANT CHANGE UP (ref 10–20)
CALCIUM SERPL-MCNC: 9 MG/DL — SIGNIFICANT CHANGE UP (ref 8.5–10.1)
CHLORIDE SERPL-SCNC: 101 MMOL/L — SIGNIFICANT CHANGE UP (ref 98–110)
CO2 SERPL-SCNC: 30 MMOL/L — SIGNIFICANT CHANGE UP (ref 17–32)
CREAT SERPL-MCNC: 0.8 MG/DL — SIGNIFICANT CHANGE UP (ref 0.7–1.5)
EOSINOPHIL # BLD AUTO: 0.13 K/UL — SIGNIFICANT CHANGE UP (ref 0–0.7)
EOSINOPHIL NFR BLD AUTO: 1.4 % — SIGNIFICANT CHANGE UP (ref 0–8)
GLUCOSE SERPL-MCNC: 117 MG/DL — HIGH (ref 70–99)
HCT VFR BLD CALC: 40.8 % — LOW (ref 42–52)
HGB BLD-MCNC: 13.6 G/DL — LOW (ref 14–18)
IMM GRANULOCYTES NFR BLD AUTO: 0.3 % — SIGNIFICANT CHANGE UP (ref 0.1–0.3)
LYMPHOCYTES # BLD AUTO: 1.07 K/UL — LOW (ref 1.2–3.4)
LYMPHOCYTES # BLD AUTO: 11.7 % — LOW (ref 20.5–51.1)
MAGNESIUM SERPL-MCNC: 2.1 MG/DL — SIGNIFICANT CHANGE UP (ref 1.8–2.4)
MCHC RBC-ENTMCNC: 31.4 PG — HIGH (ref 27–31)
MCHC RBC-ENTMCNC: 33.3 G/DL — SIGNIFICANT CHANGE UP (ref 32–37)
MCV RBC AUTO: 94.2 FL — HIGH (ref 80–94)
MONOCYTES # BLD AUTO: 0.88 K/UL — HIGH (ref 0.1–0.6)
MONOCYTES NFR BLD AUTO: 9.6 % — HIGH (ref 1.7–9.3)
NEUTROPHILS # BLD AUTO: 7.02 K/UL — HIGH (ref 1.4–6.5)
NEUTROPHILS NFR BLD AUTO: 76.7 % — HIGH (ref 42.2–75.2)
NRBC # BLD: 0 /100 WBCS — SIGNIFICANT CHANGE UP (ref 0–0)
PLATELET # BLD AUTO: 295 K/UL — SIGNIFICANT CHANGE UP (ref 130–400)
POTASSIUM SERPL-MCNC: 3.7 MMOL/L — SIGNIFICANT CHANGE UP (ref 3.5–5)
POTASSIUM SERPL-SCNC: 3.7 MMOL/L — SIGNIFICANT CHANGE UP (ref 3.5–5)
PROT SERPL-MCNC: 6.2 G/DL — SIGNIFICANT CHANGE UP (ref 6–8)
RBC # BLD: 4.33 M/UL — LOW (ref 4.7–6.1)
RBC # FLD: 11.2 % — LOW (ref 11.5–14.5)
SODIUM SERPL-SCNC: 142 MMOL/L — SIGNIFICANT CHANGE UP (ref 135–146)
WBC # BLD: 9.16 K/UL — SIGNIFICANT CHANGE UP (ref 4.8–10.8)
WBC # FLD AUTO: 9.16 K/UL — SIGNIFICANT CHANGE UP (ref 4.8–10.8)

## 2020-11-02 PROCEDURE — 99232 SBSQ HOSP IP/OBS MODERATE 35: CPT

## 2020-11-02 RX ORDER — POLYETHYLENE GLYCOL 3350 17 G/17G
17 POWDER, FOR SOLUTION ORAL DAILY
Refills: 0 | Status: DISCONTINUED | OUTPATIENT
Start: 2020-11-02 | End: 2020-11-03

## 2020-11-02 RX ORDER — PANTOPRAZOLE SODIUM 20 MG/1
40 TABLET, DELAYED RELEASE ORAL
Refills: 0 | Status: DISCONTINUED | OUTPATIENT
Start: 2020-11-02 | End: 2020-11-03

## 2020-11-02 RX ADMIN — SENNA PLUS 2 TABLET(S): 8.6 TABLET ORAL at 22:26

## 2020-11-02 RX ADMIN — Medication 81 MILLIGRAM(S): at 11:44

## 2020-11-02 RX ADMIN — CLOPIDOGREL BISULFATE 75 MILLIGRAM(S): 75 TABLET, FILM COATED ORAL at 11:44

## 2020-11-02 RX ADMIN — ATORVASTATIN CALCIUM 80 MILLIGRAM(S): 80 TABLET, FILM COATED ORAL at 22:26

## 2020-11-02 RX ADMIN — SCOPALAMINE 1 PATCH: 1 PATCH, EXTENDED RELEASE TRANSDERMAL at 05:40

## 2020-11-02 RX ADMIN — ENOXAPARIN SODIUM 40 MILLIGRAM(S): 100 INJECTION SUBCUTANEOUS at 22:27

## 2020-11-02 RX ADMIN — SCOPALAMINE 1 PATCH: 1 PATCH, EXTENDED RELEASE TRANSDERMAL at 05:39

## 2020-11-02 RX ADMIN — GABAPENTIN 100 MILLIGRAM(S): 400 CAPSULE ORAL at 05:34

## 2020-11-02 RX ADMIN — Medication 5 MILLIGRAM(S): at 13:16

## 2020-11-02 RX ADMIN — PANTOPRAZOLE SODIUM 40 MILLIGRAM(S): 20 TABLET, DELAYED RELEASE ORAL at 11:46

## 2020-11-02 RX ADMIN — Medication 5 MILLIGRAM(S): at 22:26

## 2020-11-02 RX ADMIN — MIDODRINE HYDROCHLORIDE 5 MILLIGRAM(S): 2.5 TABLET ORAL at 17:29

## 2020-11-02 RX ADMIN — GABAPENTIN 100 MILLIGRAM(S): 400 CAPSULE ORAL at 17:29

## 2020-11-02 RX ADMIN — MIDODRINE HYDROCHLORIDE 5 MILLIGRAM(S): 2.5 TABLET ORAL at 11:44

## 2020-11-02 RX ADMIN — FLUDROCORTISONE ACETATE 0.2 MILLIGRAM(S): 0.1 TABLET ORAL at 05:34

## 2020-11-02 RX ADMIN — Medication 5 MILLIGRAM(S): at 05:34

## 2020-11-02 RX ADMIN — Medication 500000 UNIT(S): at 05:34

## 2020-11-02 RX ADMIN — Medication 500000 UNIT(S): at 11:45

## 2020-11-02 RX ADMIN — SCOPALAMINE 1 PATCH: 1 PATCH, EXTENDED RELEASE TRANSDERMAL at 06:49

## 2020-11-02 RX ADMIN — SCOPALAMINE 1 PATCH: 1 PATCH, EXTENDED RELEASE TRANSDERMAL at 18:16

## 2020-11-02 RX ADMIN — PANTOPRAZOLE SODIUM 40 MILLIGRAM(S): 20 TABLET, DELAYED RELEASE ORAL at 19:50

## 2020-11-02 RX ADMIN — Medication 500000 UNIT(S): at 17:28

## 2020-11-02 RX ADMIN — POLYETHYLENE GLYCOL 3350 17 GRAM(S): 17 POWDER, FOR SOLUTION ORAL at 18:16

## 2020-11-02 NOTE — PROGRESS NOTE ADULT - SUBJECTIVE AND OBJECTIVE BOX
Gastroenterology progress note:     Patient is a 69y old  Male who presents with a chief complaint of Coffee Ground Emesis (01 Nov 2020 10:41)       Admitted on: 10-30-20    We are following the patient for:  vomiting post PEG placement      Interval History: patient still have the NG tube. Around 100 cc of biliary fluid is suctioned . Patient denies melena, no fresh blood per rectum , no hematemesis or coffee ground sections in NG tube , no further vomiting . HD stable . HGB stable     Patient's medical problems are improving        PAST MEDICAL & SURGICAL HISTORY:  Cerebrovascular accident (CVA)    GERD (gastroesophageal reflux disease)    S/P tonsillectomy    H/O bilateral hip replacements    H/O skin graft  upper body  legs        MEDICATIONS  (STANDING):  aspirin  chewable 81 milliGRAM(s) Oral daily  atorvastatin 80 milliGRAM(s) Oral at bedtime  clopidogrel Tablet 75 milliGRAM(s) Oral daily  enoxaparin Injectable 40 milliGRAM(s) SubCutaneous at bedtime  fludroCORTISONE 0.2 milliGRAM(s) Oral <User Schedule>  gabapentin 100 milliGRAM(s) Oral two times a day  metoclopramide 5 milliGRAM(s) Oral three times a day  midodrine. 5 milliGRAM(s) Oral three times a day  nystatin    Suspension [id number] Unit(s) Oral four times a day  pantoprazole  Injectable 40 milliGRAM(s) IV Push daily  scopolamine 1 mG/72 Hr(s) Patch 1 Patch Transdermal every 72 hours  senna 2 Tablet(s) Oral at bedtime    MEDICATIONS  (PRN):      Allergies  amoxicillin (Rash)      Review of Systems:   Cardiovascular:  No Chest Pain, No Palpitations  Respiratory:  No Cough, No Dyspnea  Gastrointestinal:  As described in HPI    Physical Examination:  T(C): 35.9 (11-02-20 @ 07:26), Max: 37.1 (11-01-20 @ 15:40)  HR: 77 (11-02-20 @ 07:26) (73 - 80)  BP: 118/62 (11-02-20 @ 07:26) (118/60 - 120/63)  RR: 18 (11-02-20 @ 07:26) (18 - 19)  SpO2: --      11-01-20 @ 07:01  -  11-02-20 @ 07:00  --------------------------------------------------------  IN: 240 mL / OUT: 1075 mL / NET: -835 mL      Constitutional: No acute distress.  Respiratory:  No signs of respiratory distress. Lung sounds are clear bilaterally.  Cardiovascular:  S1 S2, Regular rate and rhythm.  Abdominal: Abdomen is soft, symmetric, and non-tender without distention. PEG in place , cleaned.   Bowel sounds are present and normoactive in all four quadrants. No masses, hepatomegaly, or splenomegaly are noted.   Skin: No rashes, No Jaundice.        Data: (reviewed by attending)                        13.6   9.16  )-----------( 295      ( 02 Nov 2020 08:56 )             40.8     Hgb trend:  13.6  11-02-20 @ 08:56  13.5  10-31-20 @ 04:30        11-02    142  |  101  |  19  ----------------------------<  117<H>  3.7   |  30  |  0.8    Ca    9.0      02 Nov 2020 08:56  Mg     2.1     11-02    TPro  6.2  /  Alb  3.1<L>  /  TBili  1.1  /  DBili  x   /  AST  39  /  ALT  29  /  AlkPhos  71  11-02    Liver panel trend:  TBili 1.1   /   AST 39   /   ALT 29   /   AlkP 71   /   Tptn 6.2   /   Alb 3.1    /   DBili --      11-02  TBili 0.7   /   AST 18   /   ALT 14   /   AlkP 71   /   Tptn 6.2   /   Alb 3.3    /   DBili --      10-31  TBili 1.2   /   AST 27   /   ALT 22   /   AlkP 73   /   Tptn 6.0   /   Alb 3.5    /   DBili --      10-26             Radiology: (reviewed by attending)

## 2020-11-02 NOTE — PROGRESS NOTE ADULT - ASSESSMENT
69 year old male with PMHx of b/l hip replacements, recent thrombectomy, left ADEOLA/MCA stroke (8/27/2020) transferred from acute rehab unit where he was admitted for rehab after acute stroke. Last night RRT code called for hematemesis. Pt had PEG tube placement on 10/28. Since than pt had daily vomiting with coffee ground emesis.    # Vomiting, GI bleed less likely given stable Hb. SBO ruled out, clinically it might be ileus. Hemodynamically stable.  - CT abd with PO con - no signs of obstruction, not much stool seen in colon  - remove NGT  - cont feeds, can increase rate slow, talk to dietitian for recommendations   - dc IVF  - GI f/u  - PPI BID can be changed to PO  - would not monitor Hb anymore  - cont DAPT  - surgery eval appreciated, no interventions    # Hiccups, intractable - resolved, off Thorazine      # Hypotension - cont Florinef and Midodrine 5 mg TID. BP at goal.    # CVA with residual Right side UE paresis and significant Right side weakness.   - cont high intensity statins  - cont DAPT  - cont PT    # Bibasilar lower lobe patchy opacities with a nodular component seen at the left lung base on image 9 series 2 measuring 1.5 cm.. There is a trace right pleural effusion. - monitor for any clinical signs of PNA. Incentive spirometry encouraged and ask the nurse to make sure pt able to do it. Repeat images in 4-6 weeks.    DVT ppx SQ Heparin  DVT ppx IV PPI.   Pt will need to go back to rehab, anticipated dc in 24-48 hrs.

## 2020-11-02 NOTE — CONSULT NOTE ADULT - SUBJECTIVE AND OBJECTIVE BOX
HPI:  Patient is a 69 year old male with PMHx of b/l hip replacements, recent mechanical thrombectomy of left ADEOLA/MCA stroke (8/27/2020) admitted to [facility] on 10/13/2020 after he developed new right sided facial droop, weakness, and dysarthria. In the ED, his NIHSS was 20 and stroke code was called. However, patient did not receive tPA because of recent stroke history. Initial CTH showed wedge shaped area hypoattenuation within the left frontal and parietal lobes consistent with prior Left MCA territory infarct. CT perfusion showed left MCA M2 and M3 occlusions. Repeat CTH on 10/14/2020 showed interval development of left basal ganglia hypoattenuation compatible with acute infarct. Patient was admitted to ICU for closer monitoring and downgraded to medicine on 10/15/2020. Patient's loop recorder was interrogated 10/14 and did not demonstrate any events. Patient failed bedside swallow evaluation and remains NPO with feedings through NGT requiring frequent suctioning to clean saline in oral cavity. He was started on midodrine 10mg TID for orthostatic hypotension. Patient received fluids and was then started on Florinef 0.2mg daily for continued hypotension. His BP improved,  patient seen and examined by physiatry and deemed an appropriate candidate for acute rehabilitation     Patient had his PEG  placed by GI on 10/28, he tolerated his first feed on 10/29, however rapid response was called for coffee ground emesis, he was evaluated by the team, STAT labs and CXR  and KUB was ordered and patient was transferred to Medicine for Closer Observation  (30 Oct 2020 03:29)      PAST MEDICAL & SURGICAL HISTORY:  Cerebrovascular accident (CVA)    GERD (gastroesophageal reflux disease)    S/P tonsillectomy    H/O bilateral hip replacements    H/O skin graft  upper body  legs        Hospital Course:  He was found to have a dilated loop of bowel with no obstruction. He has improved with NGT decompression and is now tolerating small amounts of PEG feeds.     TODAY'S SUBJECTIVE & REVIEW OF SYMPTOMS: Alert, no c/o (limited by aphasia)     Constitutional WNL   Cardio WNL   Resp WNL   GI HPI  Heme WNL  Endo WNL  Skin WNL  MSK WNL  Neuro HPI  Cognitive WNL  Psych WNL      MEDICATIONS  (STANDING):  aspirin  chewable 81 milliGRAM(s) Oral daily  atorvastatin 80 milliGRAM(s) Oral at bedtime  clopidogrel Tablet 75 milliGRAM(s) Oral daily  enoxaparin Injectable 40 milliGRAM(s) SubCutaneous at bedtime  fludroCORTISONE 0.2 milliGRAM(s) Oral <User Schedule>  gabapentin 100 milliGRAM(s) Oral two times a day  metoclopramide 5 milliGRAM(s) Oral three times a day  midodrine. 5 milliGRAM(s) Oral three times a day  nystatin    Suspension [id number] Unit(s) Oral four times a day  pantoprazole  Injectable 40 milliGRAM(s) IV Push daily  polyethylene glycol 3350 17 Gram(s) Oral daily  scopolamine 1 mG/72 Hr(s) Patch 1 Patch Transdermal every 72 hours  senna 2 Tablet(s) Oral at bedtime    MEDICATIONS  (PRN):      FAMILY HISTORY:  No pertinent family history in first degree relatives        Allergies    amoxicillin (Rash)    Intolerances        SOCIAL HISTORY:    [  ] Etoh  [  ] Smoking  [  ] Substance abuse     Home Environment:  [  ] Home Alone  [x  ] Lives with Family  [  ] Home Health Aid    Dwelling:  [  ] Apartment  [ x ] Private House  [  ] Adult Home  [  ] Skilled Nursing Facility      [  ] Short Term  [  ] Long Term  [  ] Stairs       Elevator [  ]    FUNCTIONAL STATUS PTA: (Check all that apply)  Ambulation: [ x  ]Independent   [   ] Requires Assistance   [  ] Dependent     [  ] Non-Ambulatory       Assistive Device: [  ] SA Cane  [  ]  Q Cane  [  ] Walker  [  ]  Wheelchair  ADL : [x  ] Independent    [   ] Requires Assistance    [  ]  Dependent       Vital Signs Last 24 Hrs  T(C): 35.9 (02 Nov 2020 07:26), Max: 37.1 (01 Nov 2020 15:40)  T(F): 96.7 (02 Nov 2020 07:26), Max: 98.7 (01 Nov 2020 15:40)  HR: 77 (02 Nov 2020 07:26) (73 - 80)  BP: 118/62 (02 Nov 2020 07:26) (118/60 - 120/63)  BP(mean): --  RR: 18 (02 Nov 2020 07:26) (18 - 19)  SpO2: --      PHYSICAL EXAM:   GENERAL: NAD, well-groomed, well-developed  HEAD:  Atraumatic, Normocephalic  EYES: EOMI, PERRL  NECK: Supple  CHEST/LUNG: Clear to auscultation  HEART: Regular rate and rhythm  ABDOMEN: Soft, Nontender, Nondistended, PEG tube  EXTREMITIES:  No clubbing, cyanosis, or edema  zpROM:  [ x  ] WFL all extremities  [  ] Abnormal    NERVOUS SYSTEM:    Cranial Nerves 2-12: [   ] intact  [x  ] Abnormal - rt facial weakness  Motor Strength: [   ] WFL all extremities   [x  ] Abnormal - RUE- 0/5, RLE weak extensor thrust prox  Sensation: [   ] intact to light touch  [  ] Abnormal   Reflexes: [   ] Symmetric  [  ]  Abnormal     FUNCTIONAL STATUS:  Bed Mobility: [   ]Independent  [  ] Supervision  [x  ] Needs Assistance   [  ] N/A   Transfers: [   ] Independent  [  ] Supervision  [x  ] Needs Assistance  [  ]  N/A   Ambulation: [   ] Independent  [  ] Supervision  [ x ] Needs Assistance  [  ] N/A   ADL: [   ] Independent  [ x ] Requires Assistance  [  ] N/A       LABS:                        13.6   9.16  )-----------( 295      ( 02 Nov 2020 08:56 )             40.8     11-02    142  |  101  |  19  ----------------------------<  117<H>  3.7   |  30  |  0.8    Ca    9.0      02 Nov 2020 08:56  Mg     2.1     11-02    TPro  6.2  /  Alb  3.1<L>  /  TBili  1.1  /  DBili  x   /  AST  39  /  ALT  29  /  AlkPhos  71  11-02          RADIOLOGY & ADDITIONAL STUDIES:            Assesment:

## 2020-11-02 NOTE — PROGRESS NOTE ADULT - SUBJECTIVE AND OBJECTIVE BOX
SUBJECTIVE:    Patient is a 69y old Male who presents with a chief complaint of Coffee Ground Emesis (01 Nov 2020 03:23)    Currently admitted to medicine with the primary diagnosis of abdominal pain     Today is hospital day 3d. Patient seen and examined at bedside today. No overnight events.    PAST MEDICAL & SURGICAL HISTORY  Cerebrovascular accident (CVA)    GERD (gastroesophageal reflux disease)    S/P tonsillectomy    H/O bilateral hip replacements    H/O skin graft  upper body  legs      SOCIAL HISTORY:  Negative for smoking/alcohol/drug use.     ALLERGIES:  amoxicillin (Rash)    MEDICATIONS:  STANDING MEDICATIONS  aspirin  chewable 81 milliGRAM(s) Oral daily  atorvastatin 80 milliGRAM(s) Oral at bedtime  clopidogrel Tablet 75 milliGRAM(s) Oral daily  dextrose 5% + sodium chloride 0.45%. 1000 milliLiter(s) IV Continuous <Continuous>  enoxaparin Injectable 40 milliGRAM(s) SubCutaneous at bedtime  fludroCORTISONE 0.2 milliGRAM(s) Oral <User Schedule>  gabapentin 100 milliGRAM(s) Oral two times a day  midodrine. 5 milliGRAM(s) Oral three times a day  nystatin    Suspension 705124 Unit(s) Oral four times a day  pantoprazole  Injectable 40 milliGRAM(s) IV Push daily  scopolamine 1 mG/72 Hr(s) Patch 1 Patch Transdermal every 72 hours  senna 2 Tablet(s) Oral at bedtime    PRN MEDICATIONS  metoclopramide 5 milliGRAM(s) Oral every 8 hours PRN    VITALS:   ICU Vital Signs Last 24 Hrs  T(C): 35.9 (02 Nov 2020 16:00), Max: 36.9 (02 Nov 2020 00:00)  T(F): 96.7 (02 Nov 2020 16:00), Max: 98.5 (02 Nov 2020 00:00)  HR: 83 (02 Nov 2020 16:00) (73 - 83)  BP: 112/83 (02 Nov 2020 16:00) (112/83 - 120/63)  RR: 20 (02 Nov 2020 16:00) (18 - 20)        LABS:                                     13.6   9.16  )-----------( 295      ( 02 Nov 2020 08:56 )             40.8       11-02    142  |  101  |  19  ----------------------------<  117<H>  3.7   |  30  |  0.8    Ca    9.0      02 Nov 2020 08:56  Mg     2.1     11-02    TPro  6.2  /  Alb  3.1<L>  /  TBili  1.1  /  DBili  x   /  AST  39  /  ALT  29  /  AlkPhos  71  11-02            Lactate Trend  10-29 @ 21:46 Lactate:1.2             CAPILLARY BLOOD GLUCOSE          RADIOLOGY:    PHYSICAL EXAM:  GEN: No acute distress  LUNGS: Clear to auscultation bilaterally   HEART: S1/S2 present, no M/R/G  ABD: Soft, non-tender, non-distended, NG tube removed, PEG tube in place, clean/dry, dressing in place  EXT: no LE edema

## 2020-11-02 NOTE — PROVIDER CONTACT NOTE (OTHER) - ACTION/TREATMENT ORDERED:
As per MD, Give midodrine as ordered
as per MD hold midodrine
as per md still ok to give midodrine as ordered,

## 2020-11-02 NOTE — CONSULT NOTE ADULT - ASSESSMENT
Left CVA/ Rt hemiparesis, Aphasia, Severe pharyngeal dysphagia - Cont PT and OT bedside. Plan for return to [facility] Acute Rehab when medically stable and tolerating feeds.

## 2020-11-02 NOTE — PROGRESS NOTE ADULT - ASSESSMENT
69 year old male with PMHx of b/l hip replacements, recent thrombectomy, left ADEOLA/MCA stroke (8/27/2020) transferred from acute rehab unit where he was admitted for rehab after acute stroke. RRT code called for hematemesis prior to admission. Pt had PEG tube placement on 10/28. Since than pt had daily vomiting with coffee ground emesis. Per surgery, no intervention at this time. Patient can resume PEG tube feedings.    #Vomiting; possible ileus  - H/H stable; GI bleed unlikely  - PEG in place, tolerating feeds  - dietary consulted  - PPI PO BID  - monitor Hb Q24 hrs  - cont DAPT for now  - CT abd/p with PO contrast -> no obstruction  - Surgery eval appreciated  - G tube care: wash site with soap and water twice daily, 50 cc flush prior and after to each feed or medication , apply abdominal binder all the time     # Hypotension  - cont Florinef and Midodrine 5 mg TID    # CVA with residual Right side UE paresis and significant RLE weakness.   - cont high intensity statins  - cont DAPT  - resume PT    # Bibasilar lower lobe patchy opacities   - with a nodular component seen at the left lung base on image 9 series 2 measuring 1.5 cm.  - There is a trace right pleural effusion. - monitor for any clinical signs of PNA.   - Incentive spirometry  - Repeat images in 4-6 weeks.    #Misc  Diet: NPO with tube feeds  GI PPx: Protonix  DVT PPx: Lovenox

## 2020-11-02 NOTE — PROGRESS NOTE ADULT - ASSESSMENT
69 year old male with PMHx of b/l hip replacements, multiple CVA last one on 10/13/20, currently in inpatient rehab, for physical therapy. GI consulted for PEG-tube placement.      # Dysphagia due to multiple stroke s/p PEG tube placement 10/28  #coffee ground emesis? : No evidence of active GI bleed , hgb is stable , HD stable , bilious material on NGtube suction  #recurrent vomiting:   PEG in position , no obstruction . CT scan was reviewed with radiologist dr Corbett. Repeated CT with oral contrast showed PEG in position , no obstruction.   surgery input  appreciated : no surgical intervention     REC:   Protonix 40 mg QD per PEG   Reglan 5 mg q8h  can restart Plavix    remove NG tube   may start PEG-tube feeding with slower rate   physical therapy  G tube care :  wash site with soap and water twice daily. 50 cc flush prior and after to each feed or medication   apply abdominal binder all the time 69 year old male with PMHx of b/l hip replacements, multiple CVA last one on 10/13/20, currently in inpatient rehab, for physical therapy. GI consulted for PEG-tube placement.      # Dysphagia due to multiple stroke s/p PEG tube placement 10/28  #coffee ground emesis? : No evidence of active GI bleed , hgb is stable , HD stable , bilious material on NGtube suction  #recurrent vomiting:   PEG in position , no obstruction . CT scan was reviewed with radiologist dr Corbett. Repeated CT with oral contrast showed PEG in position , no obstruction.   surgery input  appreciated : no surgical intervention     REC:   Protonix 40 mg QD per PEG   Reglan 5 mg q8h  can restart Plavix    remove NG tube   may start PEG-tube feeding with slower rate, less volume with more frequent feeds  nutrition consult to adjust formulation of feeds  physical therapy  G tube care :  wash site with soap and water twice daily. 50 cc flush prior and after to each feed or medication   apply abdominal binder all the time

## 2020-11-02 NOTE — PROGRESS NOTE ADULT - SUBJECTIVE AND OBJECTIVE BOX
NYDIA VALVERDE    Patient is a 69y old  Male who presents with a chief complaint of Coffee Ground Emesis (01 Nov 2020 10:41)    INTERVAL HPI/OVERNIGHT EVENTS: no events overnight. Pt tolerated feeds well, no nausea, no vomiting, no abdominal pain. No more hiccups.     ROS: All ROS negative except as documented above    PHYSICAL EXAM:  T(C): 35.9, Max: 37.1 (11-01-20 @ 15:40)  HR: 77 (73 - 80)  BP: 118/62 (118/60 - 120/63)  RR: 18 (18 - 19)  SpO2: --    GENERAL: NAD, NGT in place  PULMONARY/CHEST: basilar crackles  CARDIOVASC: Regular rate and rhythm; No murmurs  GI/ABDOMEN: PEG in place, no leak, abd soft, Nontender, Nondistended; Bowel sounds present  EXTREMITIES:  2+ Peripheral Pulses, No clubbing, cyanosis, or edema, no deformity. No calf tenderness b/l.  NEURO: AOx3, Right hemiparesis, Motor RUE 0/5, RLE 1+/5, Left side UE and LE 5/5, sensation 0 on Right, + Broca's aphasia    Consultant(s) Notes Reviewed by me.   Care Discussed with Consultants/Other Providers     LABS:                        13.6   9.16  )-----------( 295      ( 02 Nov 2020 08:56 )             40.8     11-02    142  |  101  |  19  ----------------------------<  117<H>  3.7   |  30  |  0.8    Ca    9.0      02 Nov 2020 08:56  Mg     2.1     11-02    TPro  6.2  /  Alb  3.1<L>  /  TBili  1.1  /  DBili  x   /  AST  39  /  ALT  29  /  AlkPhos  71  11-02      MEDICATIONS  (STANDING):  aspirin  chewable 81 milliGRAM(s) Oral daily  atorvastatin 80 milliGRAM(s) Oral at bedtime  clopidogrel Tablet 75 milliGRAM(s) Oral daily  dextrose 5% + sodium chloride 0.45%. 1000 milliLiter(s) (50 mL/Hr) IV Continuous <Continuous>  enoxaparin Injectable 40 milliGRAM(s) SubCutaneous at bedtime  fludroCORTISONE 0.2 milliGRAM(s) Oral <User Schedule>  gabapentin 100 milliGRAM(s) Oral two times a day  metoclopramide 5 milliGRAM(s) Oral three times a day  midodrine. 5 milliGRAM(s) Oral three times a day  nystatin    Suspension 331901 Unit(s) Oral four times a day  pantoprazole  Injectable 40 milliGRAM(s) IV Push daily  scopolamine 1 mG/72 Hr(s) Patch 1 Patch Transdermal every 72 hours  senna 2 Tablet(s) Oral at bedtime    MEDICATIONS  (PRN):

## 2020-11-03 ENCOUNTER — TRANSCRIPTION ENCOUNTER (OUTPATIENT)
Age: 70
End: 2020-11-03

## 2020-11-03 VITALS
SYSTOLIC BLOOD PRESSURE: 112 MMHG | RESPIRATION RATE: 18 BRPM | TEMPERATURE: 96 F | DIASTOLIC BLOOD PRESSURE: 58 MMHG | HEART RATE: 65 BPM

## 2020-11-03 DIAGNOSIS — I95.9 HYPOTENSION, UNSPECIFIED: ICD-10-CM

## 2020-11-03 LAB
ALBUMIN SERPL ELPH-MCNC: 3.2 G/DL — LOW (ref 3.5–5.2)
ALP SERPL-CCNC: 75 U/L — SIGNIFICANT CHANGE UP (ref 30–115)
ALT FLD-CCNC: 31 U/L — SIGNIFICANT CHANGE UP (ref 0–41)
ANION GAP SERPL CALC-SCNC: 11 MMOL/L — SIGNIFICANT CHANGE UP (ref 7–14)
AST SERPL-CCNC: 40 U/L — SIGNIFICANT CHANGE UP (ref 0–41)
BILIRUB SERPL-MCNC: 0.5 MG/DL — SIGNIFICANT CHANGE UP (ref 0.2–1.2)
BUN SERPL-MCNC: 23 MG/DL — HIGH (ref 10–20)
CALCIUM SERPL-MCNC: 8.9 MG/DL — SIGNIFICANT CHANGE UP (ref 8.5–10.1)
CHLORIDE SERPL-SCNC: 103 MMOL/L — SIGNIFICANT CHANGE UP (ref 98–110)
CO2 SERPL-SCNC: 28 MMOL/L — SIGNIFICANT CHANGE UP (ref 17–32)
CREAT SERPL-MCNC: 0.9 MG/DL — SIGNIFICANT CHANGE UP (ref 0.7–1.5)
GLUCOSE SERPL-MCNC: 131 MG/DL — HIGH (ref 70–99)
HCT VFR BLD CALC: 39.7 % — LOW (ref 42–52)
HGB BLD-MCNC: 13.3 G/DL — LOW (ref 14–18)
MAGNESIUM SERPL-MCNC: 2 MG/DL — SIGNIFICANT CHANGE UP (ref 1.8–2.4)
MCHC RBC-ENTMCNC: 31.8 PG — HIGH (ref 27–31)
MCHC RBC-ENTMCNC: 33.5 G/DL — SIGNIFICANT CHANGE UP (ref 32–37)
MCV RBC AUTO: 95 FL — HIGH (ref 80–94)
NRBC # BLD: 0 /100 WBCS — SIGNIFICANT CHANGE UP (ref 0–0)
PLATELET # BLD AUTO: 286 K/UL — SIGNIFICANT CHANGE UP (ref 130–400)
POTASSIUM SERPL-MCNC: 3.5 MMOL/L — SIGNIFICANT CHANGE UP (ref 3.5–5)
POTASSIUM SERPL-SCNC: 3.5 MMOL/L — SIGNIFICANT CHANGE UP (ref 3.5–5)
PROT SERPL-MCNC: 6.1 G/DL — SIGNIFICANT CHANGE UP (ref 6–8)
RBC # BLD: 4.18 M/UL — LOW (ref 4.7–6.1)
RBC # FLD: 11.4 % — LOW (ref 11.5–14.5)
SARS-COV-2 IGG SERPL IA-ACNC: <0.3 RATIO — SIGNIFICANT CHANGE UP
SARS-COV-2 IGG SERPL QL IA: NEGATIVE — SIGNIFICANT CHANGE UP
SARS-COV-2 IGG SERPL QL IA: NEGATIVE — SIGNIFICANT CHANGE UP
SARS-COV-2 IGM SERPL IA-ACNC: 0.31 RATIO — SIGNIFICANT CHANGE UP
SARS-COV-2 RNA SPEC QL NAA+PROBE: SIGNIFICANT CHANGE UP
SODIUM SERPL-SCNC: 142 MMOL/L — SIGNIFICANT CHANGE UP (ref 135–146)
WBC # BLD: 8.82 K/UL — SIGNIFICANT CHANGE UP (ref 4.8–10.8)
WBC # FLD AUTO: 8.82 K/UL — SIGNIFICANT CHANGE UP (ref 4.8–10.8)

## 2020-11-03 PROCEDURE — 99239 HOSP IP/OBS DSCHRG MGMT >30: CPT

## 2020-11-03 PROCEDURE — 99232 SBSQ HOSP IP/OBS MODERATE 35: CPT

## 2020-11-03 RX ORDER — GABAPENTIN 400 MG/1
1 CAPSULE ORAL
Qty: 0 | Refills: 0 | DISCHARGE
Start: 2020-11-03

## 2020-11-03 RX ORDER — SENNA PLUS 8.6 MG/1
2 TABLET ORAL
Qty: 0 | Refills: 0 | DISCHARGE
Start: 2020-11-03

## 2020-11-03 RX ORDER — POLYETHYLENE GLYCOL 3350 17 G/17G
17 POWDER, FOR SOLUTION ORAL
Qty: 0 | Refills: 0 | DISCHARGE
Start: 2020-11-03

## 2020-11-03 RX ADMIN — POLYETHYLENE GLYCOL 3350 17 GRAM(S): 17 POWDER, FOR SOLUTION ORAL at 11:48

## 2020-11-03 RX ADMIN — Medication 81 MILLIGRAM(S): at 11:45

## 2020-11-03 RX ADMIN — SCOPALAMINE 1 PATCH: 1 PATCH, EXTENDED RELEASE TRANSDERMAL at 07:16

## 2020-11-03 RX ADMIN — PANTOPRAZOLE SODIUM 40 MILLIGRAM(S): 20 TABLET, DELAYED RELEASE ORAL at 05:45

## 2020-11-03 RX ADMIN — MIDODRINE HYDROCHLORIDE 5 MILLIGRAM(S): 2.5 TABLET ORAL at 11:45

## 2020-11-03 RX ADMIN — Medication 500000 UNIT(S): at 05:48

## 2020-11-03 RX ADMIN — Medication 5 MILLIGRAM(S): at 05:45

## 2020-11-03 RX ADMIN — GABAPENTIN 100 MILLIGRAM(S): 400 CAPSULE ORAL at 05:47

## 2020-11-03 RX ADMIN — Medication 5 MILLIGRAM(S): at 14:31

## 2020-11-03 RX ADMIN — FLUDROCORTISONE ACETATE 0.2 MILLIGRAM(S): 0.1 TABLET ORAL at 05:45

## 2020-11-03 RX ADMIN — CLOPIDOGREL BISULFATE 75 MILLIGRAM(S): 75 TABLET, FILM COATED ORAL at 11:45

## 2020-11-03 RX ADMIN — Medication 500000 UNIT(S): at 11:46

## 2020-11-03 RX ADMIN — MIDODRINE HYDROCHLORIDE 5 MILLIGRAM(S): 2.5 TABLET ORAL at 05:45

## 2020-11-03 RX ADMIN — Medication 500000 UNIT(S): at 00:24

## 2020-11-03 NOTE — DISCHARGE NOTE PROVIDER - NSDCCPCAREPLAN_GEN_ALL_CORE_FT
PRINCIPAL DISCHARGE DIAGNOSIS  Diagnosis: Stroke  Assessment and Plan of Treatment: You were found to have a new stroke on your CT herad imaging: Interval development of left basal ganglia hypoattenuation compatible with acute infarct. Stable subacute infarcts in the left frontal and parietal lobes and posterior insular cortex. It is important that you take your medications as prescribed and follow-up with your doctors/ specialists.  You have been diagnosed with stroke which is a condition that develops when part of the brain doesn't get enough blood and oxygen   - Take your blood thinners and cholesterol medication as prescribed. Do not skip doses and do not run low on your medication. call your doctor if you need refills   - If you have diabetes, take your diabetes medication as prescribed. Check your blood sugar level every day and contact your doctor if the levels arr high as your medication may need to be re-adjusted or some medication may need to be added   - If you have hypertension, take your blood pressure medication as prescribed. Measure your blood pressure with a cuff every day and write down the values. Call your doctors if the values are high despite medication as he may adjust your medication   - Avoid smoking and do not let anyone smoke next to you. If you are a smoker and find it hard to smoke seek help or call your doctors for referrals for counselling programs   - Follow up with your doctor as outpatient. he may prescribe regular lab work to keep track of your cholesterol and sugar levels in your blood. Do not skip appointments and always be compliant wit your doctor's advise  - Call 911 or report to the emergency department if you have sudden onset severe headache, vision problems such as blurry or double vision or vision that is going dark, vertigo, numbness or weakness anywhere in your body, slurred speech or difficulty walking        SECONDARY DISCHARGE DIAGNOSES  Diagnosis: S/P percutaneous endoscopic gastrostomy (PEG) tube placement  Assessment and Plan of Treatment: You had a PEG tube placed by our gastroenterology team. You experienced vomiting w/ dark blood. This was suspected to be a result of the PEG tube placement. You are tolerating feeds and medications through the PEG tube. Please ensure proper management/ care of the PEG tube. Dietary services evaluate you and recommend Osmolite 1.5 Benjamin 60cc every 6 hours, feed duration for 1 hr. Peg tube care:  wash site with soap and water twice daily  50 cc flush prior and after to each feed or medication   apply abdominal binder all the time

## 2020-11-03 NOTE — PROGRESS NOTE ADULT - ASSESSMENT
69 year old male with PMHx of b/l hip replacements, multiple CVA last one on 10/13/20, currently in inpatient rehab, for physical therapy. GI consulted for PEG-tube placement.      # Dysphagia due to multiple stroke s/p PEG tube placement 10/28  #coffee ground emesis? : No evidence of active GI bleed , hgb is stable , HD stable , bilious material on NGtube suction  #recurrent vomiting: ( resolved)    PEG in position , no obstruction . CT scan was reviewed with radiologist dr Corbett. Repeated CT with oral contrast showed PEG in position , no obstruction.   surgery input  appreciated : no surgical intervention     REC:   Protonix 40 mg QD per PEG   Reglan 5 mg q8h  continue PEG-tube feeding with slower rate, less volume with more frequent feeds  nutrition consult to adjust formulation of feeds  physical therapy  G tube care :  wash site with soap and water twice daily. 50 cc flush prior and after to each feed or medication   apply abdominal binder all the time

## 2020-11-03 NOTE — OCCUPATIONAL THERAPY INITIAL EVALUATION ADULT - REHAB POTENTIAL, OT EVAL
Pharmacy is out of stock of Pt prescription for Spironolactone 50MG one tablet daily. They are requesting to change the Rx to Spironolactone 25MG one tablet BID. Please advise.   good, to achieve stated therapy goals

## 2020-11-03 NOTE — PROGRESS NOTE ADULT - SUBJECTIVE AND OBJECTIVE BOX
NYDIA VALVERDE    Patient is a 69y old  Male who presents with a chief complaint of Coffee Ground Emesis (03 Nov 2020 07:33)    INTERVAL HPI/OVERNIGHT EVENTS: no events overnight, pt tolerates tube feeds well, no nausea, no vomiting, no abdominal pain, no new complaints  CONSTITUTIONAL: No weakness, fevers or chills  EYES/ENT: No visual changes;  No vertigo or throat pain   NECK: No pain or stiffness  RESPIRATORY: No cough, wheezing, hemoptysis; No shortness of breath  CARDIOVASCULAR: No chest pain or palpitations  GASTROINTESTINAL: No abdominal or epigastric pain. No nausea, vomiting, or hematemesis; No diarrhea or constipation. No melena or hematochezia.  GENITOURINARY: No dysuria, frequency or hematuria  NEUROLOGICAL: No new numbness or weakness  SKIN: No itching, rashes     PHYSICAL EXAM:  T(C): 36.4, Max: 36.6 (11-03-20 @ 00:00)  HR: 60 (58 - 83)  BP: 118/56 (104/54 - 129/69)  RR: 18 (18 - 20)  SpO2: --    GENERAL: NAD, sitting in a chair  NECK: Supple, No JVD  PULMONARY/CHEST: No rales, rhonchi, or wheezing  CARDIOVASC: Regular rate and rhythm; No murmurs  GI/ABDOMEN: PEG in place, no leak, abd soft, nontender, Nondistended; Bowel sounds present  EXTREMITIES: 2+ Peripheral Pulses, No clubbing, cyanosis, or edema, no deformity. No calf tenderness b/l.  NEURO: AOx3, Right hemiparesis, Motor RUE 0/5, RLE 1+/5, Left side UE and LE 5/5, sensation 0 on Right, + Broca's aphasia      LABS:                        13.3   8.82  )-----------( 286      ( 03 Nov 2020 06:15 )             39.7     11-03    142  |  103  |  23<H>  ----------------------------<  131<H>  3.5   |  28  |  0.9    Ca    8.9      03 Nov 2020 06:15  Mg     2.0     11-03    TPro  6.1  /  Alb  3.2<L>  /  TBili  0.5  /  DBili  x   /  AST  40  /  ALT  31  /  AlkPhos  75  11-03      MEDICATIONS  (STANDING):  aspirin  chewable 81 milliGRAM(s) Oral daily  atorvastatin 80 milliGRAM(s) Oral at bedtime  clopidogrel Tablet 75 milliGRAM(s) Oral daily  enoxaparin Injectable 40 milliGRAM(s) SubCutaneous at bedtime  fludroCORTISONE 0.2 milliGRAM(s) Oral <User Schedule>  gabapentin 100 milliGRAM(s) Oral two times a day  metoclopramide 5 milliGRAM(s) Oral three times a day  midodrine. 5 milliGRAM(s) Oral three times a day  nystatin    Suspension 832576 Unit(s) Oral four times a day  pantoprazole   Suspension 40 milliGRAM(s) Oral two times a day  polyethylene glycol 3350 17 Gram(s) Oral daily  scopolamine 1 mG/72 Hr(s) Patch 1 Patch Transdermal every 72 hours  senna 2 Tablet(s) Oral at bedtime    MEDICATIONS  (PRN):

## 2020-11-03 NOTE — DISCHARGE NOTE PROVIDER - PROVIDER TOKENS
FREE:[LAST:[Emi],FIRST:[Annabelle],PHONE:[(   )    -],FAX:[(   )    -],ADDRESS:[[facility]]]

## 2020-11-03 NOTE — PROGRESS NOTE ADULT - NSHPATTENDINGPLANDISCUSS_GEN_ALL_CORE
assigned resident
assigned resident
residents, nursing, , patient
residents, nursing, , patient
care team

## 2020-11-03 NOTE — OCCUPATIONAL THERAPY INITIAL EVALUATION ADULT - PLANNED THERAPY INTERVENTIONS, OT EVAL
ADL retraining/bed mobility training/strengthening/fine motor coordination training/neuromuscular re-education/transfer training/balance training

## 2020-11-03 NOTE — PROGRESS NOTE ADULT - SUBJECTIVE AND OBJECTIVE BOX
Gastroenterology progress note:     Patient is a 69y old  Male who presents with a chief complaint of Coffee Ground Emesis (02 Nov 2020 17:30)       Admitted on: 10-30-20    We are following the patient for: vomiting      Interval History: patient NG tube was removed yesterday , no further vomiting , no abdominal pain , patient is passing gas and bowel movements . Tube feeding was started and is tolerated     Patient's medical problems are improving        PAST MEDICAL & SURGICAL HISTORY:  Cerebrovascular accident (CVA)    GERD (gastroesophageal reflux disease)    S/P tonsillectomy    H/O bilateral hip replacements    H/O skin graft  upper body  legs        MEDICATIONS  (STANDING):  aspirin  chewable 81 milliGRAM(s) Oral daily  atorvastatin 80 milliGRAM(s) Oral at bedtime  clopidogrel Tablet 75 milliGRAM(s) Oral daily  enoxaparin Injectable 40 milliGRAM(s) SubCutaneous at bedtime  fludroCORTISONE 0.2 milliGRAM(s) Oral <User Schedule>  gabapentin 100 milliGRAM(s) Oral two times a day  metoclopramide 5 milliGRAM(s) Oral three times a day  midodrine. 5 milliGRAM(s) Oral three times a day  nystatin    Suspension 283016 Unit(s) Oral four times a day  pantoprazole   Suspension 40 milliGRAM(s) Oral two times a day  polyethylene glycol 3350 17 Gram(s) Oral daily  scopolamine 1 mG/72 Hr(s) Patch 1 Patch Transdermal every 72 hours  senna 2 Tablet(s) Oral at bedtime    MEDICATIONS  (PRN):      Allergies  amoxicillin (Rash)      Review of Systems:   Cardiovascular:  No Chest Pain, No Palpitations  Respiratory:  No Cough, No Dyspnea  Gastrointestinal:  As described in HPI    Physical Examination:  T(C): 36.6 (11-03-20 @ 00:00), Max: 36.6 (11-03-20 @ 00:00)  HR: 58 (11-03-20 @ 05:44) (58 - 83)  BP: 129/69 (11-03-20 @ 05:44) (104/54 - 129/69)  RR: 19 (11-03-20 @ 00:00) (19 - 20)  SpO2: --      11-02-20 @ 07:01  -  11-03-20 @ 07:00  --------------------------------------------------------  IN: 345 mL / OUT: 800 mL / NET: -455 mL      Constitutional: No acute distress.  Respiratory:  No signs of respiratory distress. Lung sounds are clear bilaterally.  Cardiovascular:  S1 S2, Regular rate and rhythm.  Abdominal: Abdomen is soft, symmetric, and non-tender without distention. Bowel sounds are present and normoactive in all four quadrants. No masses, hepatomegaly, or splenomegaly are noted. PEG in place   Skin: No rashes, No Jaundice.        Data: (reviewed by attending)                        13.3   8.82  )-----------( 286      ( 03 Nov 2020 06:15 )             39.7     Hgb trend:  13.3  11-03-20 @ 06:15  13.6  11-02-20 @ 08:56        11-02    142  |  101  |  19  ----------------------------<  117<H>  3.7   |  30  |  0.8    Ca    9.0      02 Nov 2020 08:56  Mg     2.1     11-02    TPro  6.2  /  Alb  3.1<L>  /  TBili  1.1  /  DBili  x   /  AST  39  /  ALT  29  /  AlkPhos  71  11-02    Liver panel trend:  TBili 1.1   /   AST 39   /   ALT 29   /   AlkP 71   /   Tptn 6.2   /   Alb 3.1    /   DBili --      11-02  TBili 0.7   /   AST 18   /   ALT 14   /   AlkP 71   /   Tptn 6.2   /   Alb 3.3    /   DBili --      10-31  TBili 1.2   /   AST 27   /   ALT 22   /   AlkP 73   /   Tptn 6.0   /   Alb 3.5    /   DBili --      10-26             Radiology: (reviewed by attending)

## 2020-11-03 NOTE — DISCHARGE NOTE PROVIDER - HOSPITAL COURSE
69 year old male with PMHx of b/l hip replacements, recent mechanical thrombectomy of left ADEOLA/MCA stroke (8/27/2020) admitted to [facility] on 10/13/2020 after he developed new right sided facial droop, weakness, and dysarthria. In the ED, his NIHSS was 20 and stroke code was called. However, patient did not receive tPA because of recent stroke history. Initial CTH showed wedge shaped area hypoattenuation within the left frontal and parietal lobes consistent with prior Left MCA territory infarct. CT perfusion showed left MCA M2 and M3 occlusions. Repeat CTH on 10/14/2020 showed interval development of left basal ganglia hypoattenuation compatible with acute infarct. Patient was admitted to ICU for closer monitoring and downgraded to medicine on 10/15/2020. Patient's loop recorder was interrogated 10/14 and did not demonstrate any events. Patient failed bedside swallow evaluation and remains NPO with feedings through NGT requiring frequent suctioning to clean saline in oral cavity. He was started on midodrine 10mg TID for orthostatic hypotension. Patient received fluids and was then started on Florinef 0.2mg daily for continued hypotension. His BP improved,  patient seen and examined by physiatry and deemed an appropriate candidate for acute rehabilitation     Patient had his PEG  placed by GI on 10/28, he tolerated his first feed on 10/29, however rapid response was called for coffee ground emesis 10/29, he was evaluated by the team, STAT labs and CXR  and KUB was ordered and patient was transferred to Medicine for Closer Observation. CT abd w/ PO contrast showed no signs of obstruction, not much stool seen in colon. Pt has tolerated PEG tube feedings w/o complication. PPI BID PO. Surgery evaluated patient , no interventions at this time.Pt's hypotension treated w/ Florinef and Midodrine 5 mg TID. Bibasilar lower lobe patchy opacities with a nodular component seen at the left lung base on CXR image 9 series 2 measuring 1.5 cm.. There is a trace right pleural effusion. - monitor for any clinical signs of PNA. Incentive spirometry encouraged and ask the nurse to make sure pt able to do it. Repeat images in 4-6 weeks. Pt cleared for discharge to SNF.

## 2020-11-03 NOTE — CHART NOTE - NSCHARTNOTEFT_GEN_A_CORE
Registered Dietitian Follow-Up     Patient Profile Reviewed                           Yes [x]   No []     Nutrition History Previously Obtained        Yes [x]  No []       Pertinent Subjective Information: Pt reports tolerance to current EN regimen with no nausea/abdominal pain + no recent episodes of emesis.      Pertinent Medical Interventions: NGT has been removed + EN via PEG initiated. Per progress notes no further vomiting as of 11/2 + tolerance to EN noted.      Diet order: NPO with PEG. Osmolite 1.5 240 mL/d @ bolus rate of 60 mL q6h. Running at goal rate. This provides: 360 kcal, 17 g protein and 194 mL free H2O.     Anthropometrics:  Height (cm): 182.9 (10-31-20 @ 14:41), 182.9 (10-28-20 @ 14:02)  Weight (kg): 76.4 (10-31-20 @ 07:31)  BMI (kg/m2): 22.8 (10-31-20 @ 14:41), 22.8 (10-31-20 @ 07:31)  IBW: 80.9 kg/178 lbs    Daily no new wt recorded since previously assessed; of note pt wt 83 kg from previous admission on 10/28  % Weight Change  N/A    MEDICATIONS  (STANDING):  aspirin  chewable 81 milliGRAM(s) Oral daily  atorvastatin 80 milliGRAM(s) Oral at bedtime  clopidogrel Tablet 75 milliGRAM(s) Oral daily  enoxaparin Injectable 40 milliGRAM(s) SubCutaneous at bedtime  fludroCORTISONE 0.2 milliGRAM(s) Oral <User Schedule>  gabapentin 100 milliGRAM(s) Oral two times a day  metoclopramide 5 milliGRAM(s) Oral three times a day  midodrine. 5 milliGRAM(s) Oral three times a day  nystatin    Suspension 137212 Unit(s) Oral four times a day  pantoprazole   Suspension 40 milliGRAM(s) Oral two times a day  polyethylene glycol 3350 17 Gram(s) Oral daily  scopolamine 1 mG/72 Hr(s) Patch 1 Patch Transdermal every 72 hours  senna 2 Tablet(s) Oral at bedtime    MEDICATIONS  (PRN):    Pertinent Labs: 11-03 @ 06:15: RBC 4.18, H/H 13.3/39.7,BUN 23, glucose 131    Physical Findings:  - Appearance: well nourished; no edema noted  - GI function: pt reports that his last BM was last night (11/2) with no diarrhea or constipation noted. Pt is on a bowel regimen.  - Tubes: PEG  - Oral/Mouth cavity: dysphagia sp multiple strokes from pta  - Skin: ecchymosis (11/3)     Nutrition Requirements:  Weight Used: 83 kg (continue from initial assessment on 10/31)     Estimated Energy Needs    Continue [x]  8084-7249 (MSJ x 1.2-1.3 AF)     Estimated Protein Needs    Continue [x]   g (1-1.2 g/kg    Estimated Fluid Needs        Continue [x] 1 mL/kcal or per LIP     Nutrient Intake: current EN meeting  17-18% estimated energy requirements and  17-20% estimated protein requirements     [x] Previous Nutrition Diagnosis: Inadequate protein/energy intake           [x] Ongoing     Nutrition Intervention EN, coordination of care     Goal/Expected Outcome: pt to meet % within the next 3 days     Indicator/Monitoring: RD to monitor diet order, energy intake, body composition, NFPF, glucose profile     Recommendation: consider adjusting the EN rate to the following-  Osmolite 1.5 total volume of 1440 mL/d with bolus rate of 240 mL q4h infused over 2h. At goal, this will provide 2160 kcal, 90 g protein and 1094 mL free H2O. Additional fluid per LIP, maintain aspiration precautions. Registered Dietitian Follow-Up     Patient Profile Reviewed                           Yes [x]   No []     Nutrition History Previously Obtained        Yes [x]  No []       Pertinent Subjective Information: Pt reports tolerance to current EN regimen with no nausea/abdominal pain + no recent episodes of emesis.      Pertinent Medical Interventions: NGT has been removed + EN via PEG initiated. Per progress notes no further vomiting as of 11/2 + tolerance to EN noted.      Diet order: NPO with PEG. Osmolite 1.5 240 mL/d @ bolus rate of 60 mL q6h. Running at goal rate. This provides: 360 kcal, 17 g protein and 194 mL free H2O.     Anthropometrics:  Height (cm): 182.9 (10-31-20 @ 14:41), 182.9 (10-28-20 @ 14:02)  Weight (kg): 76.4 (10-31-20 @ 07:31)  BMI (kg/m2): 22.8 (10-31-20 @ 14:41), 22.8 (10-31-20 @ 07:31)  IBW: 80.9 kg/178 lbs    Daily no new wt recorded since previously assessed; of note pt wt 83 kg from previous admission on 10/28  % Weight Change  N/A    MEDICATIONS  (STANDING):  aspirin  chewable 81 milliGRAM(s) Oral daily  atorvastatin 80 milliGRAM(s) Oral at bedtime  clopidogrel Tablet 75 milliGRAM(s) Oral daily  enoxaparin Injectable 40 milliGRAM(s) SubCutaneous at bedtime  fludroCORTISONE 0.2 milliGRAM(s) Oral <User Schedule>  gabapentin 100 milliGRAM(s) Oral two times a day  metoclopramide 5 milliGRAM(s) Oral three times a day  midodrine. 5 milliGRAM(s) Oral three times a day  nystatin    Suspension 492775 Unit(s) Oral four times a day  pantoprazole   Suspension 40 milliGRAM(s) Oral two times a day  polyethylene glycol 3350 17 Gram(s) Oral daily  scopolamine 1 mG/72 Hr(s) Patch 1 Patch Transdermal every 72 hours  senna 2 Tablet(s) Oral at bedtime    MEDICATIONS  (PRN):    Pertinent Labs: 11-03 @ 06:15: RBC 4.18, H/H 13.3/39.7,BUN 23, glucose 131    Physical Findings:  - Appearance: well nourished; no edema noted  - GI function: pt reports that his last BM was last night (11/2) with no diarrhea or constipation noted. Pt is on a bowel regimen.  - Tubes: PEG  - Oral/Mouth cavity: dysphagia sp multiple strokes from pta  - Skin: ecchymosis (11/3)     Nutrition Requirements:  Weight Used: 83 kg (continue from initial assessment on 10/31)     Estimated Energy Needs    Continue [x]  4427-1741 (MSJ x 1.2-1.3 AF)     Estimated Protein Needs    Continue [x]   g (1-1.2 g/kg    Estimated Fluid Needs        Continue [x] 1 mL/kcal or per LIP     Nutrient Intake: current EN meeting  17-18% estimated energy requirements and  17-20% estimated protein requirements     [x] Previous Nutrition Diagnosis: Inadequate protein/energy intake           [x] Ongoing     Nutrition Intervention EN, coordination of care     Goal/Expected Outcome: pt to meet % within the next 3 days     Indicator/Monitoring: RD to monitor diet order, energy intake, body composition, NFPF, glucose profile     Recommendation: consider adjusting the EN rate to the following-  Osmolite 1.5 total volume of 1440 mL/d with bolus rate of 240 mL q4h infused over 2h. At goal, this will provide 2160 kcal, 90 g protein and 1094 mL free H2O. Additional fluid per LIP, maintain aspiration precautions. Recs discussed with Dr Frank [phone].

## 2020-11-03 NOTE — DISCHARGE NOTE PROVIDER - NSDCMRMEDTOKEN_GEN_ALL_CORE_FT
aspirin 81 mg oral tablet, chewable: 1 tab(s) orally once a day  atorvastatin 80 mg oral tablet: 1 tab(s) orally once a day (at bedtime)  clopidogrel 75 mg oral tablet: 1 tab(s) orally once a day  fludrocortisone: 0.2 milligram(s) orally once a day  gabapentin 100 mg oral capsule: 1 cap(s) orally 2 times a day  midodrine 5 mg oral tablet: 1 tab(s) orally 3 times a day  nystatin 100,000 units/mL oral suspension: 5 milliliter(s) orally 4 times a day  pantoprazole 40 mg oral granule, delayed release: 1 tab(s) orally once a day  polyethylene glycol 3350 oral powder for reconstitution: 17 gram(s) orally once a day, As Needed  senna oral tablet: 2 tab(s) orally once a day (at bedtime)

## 2020-11-03 NOTE — DISCHARGE NOTE NURSING/CASE MANAGEMENT/SOCIAL WORK - PATIENT PORTAL LINK FT
You can access the FollowMyHealth Patient Portal offered by [facility] by registering at the following website: [email or web address]. By joining [facility]’s FollowMyHealth portal, you will also be able to view your health information using other applications (apps) compatible with our system.

## 2020-11-03 NOTE — PROGRESS NOTE ADULT - REASON FOR ADMISSION
Coffee Ground Emesis

## 2020-11-03 NOTE — PROGRESS NOTE ADULT - ASSESSMENT
69 year old male with PMHx of b/l hip replacements, recent thrombectomy, left ADEOLA/MCA stroke (8/27/2020) transferred from acute rehab unit where he was admitted for rehab after acute stroke. Last night RRT code called for hematemesis. Pt had PEG tube placement on 10/28. Since than pt had daily vomiting with coffee ground emesis.    # Vomiting, GI bleed less likely given stable Hb. SBO ruled out, clinically it might be ileus. Hemodynamically stable.  - tolerates feeds, had regular brown BM  - CT abd with PO con - no signs of obstruction, not much stool seen in colon  - cont feeds, can increase rate slow, talk to dietitian for recommendations   - PPI BID PO  - would not monitor Hb anymore  - cont DAPT  - surgery eval appreciated, no interventions    # Hiccups, intractable - resolved, off Thorazine      # Hypotension - cont Florinef and Midodrine 5 mg TID. BP at goal.    # CVA with residual Right side UE paresis and significant Right side weakness.   - cont high intensity statins  - cont DAPT  - cont PT/OT    # Bibasilar lower lobe patchy opacities with a nodular component seen at the left lung base on image 9 series 2 measuring 1.5 cm.. There is a trace right pleural effusion. - monitor for any clinical signs of PNA. Incentive spirometry encouraged and ask the nurse to make sure pt able to do it. Repeat images in 4-6 weeks.    DVT ppx SQ Heparin  DVT ppx PPI.     Pt is clinically stable for discharge to NH, meds reconciliation done.

## 2020-11-03 NOTE — OCCUPATIONAL THERAPY INITIAL EVALUATION ADULT - ADDITIONAL COMMENTS
Pt admitted to [facility]B from Rehab department. Per spouse, pt independent in ADLs prior to CVA in Aug 2020

## 2020-11-03 NOTE — OCCUPATIONAL THERAPY INITIAL EVALUATION ADULT - IMPAIRMENTS CONTRIBUTING IMPAIRED BED MOBILITY, REHAB EVAL
impaired postural control/impaired motor control/impaired balance/impaired coordination/decreased strength

## 2020-11-03 NOTE — OCCUPATIONAL THERAPY INITIAL EVALUATION ADULT - GENERAL OBSERVATIONS, REHAB EVAL
Pt encountered in bedside chair after working with PT, +R resting hand splint. Agreeable to OT IE. Presents with expressive aphasia, but able to make basic needs met. Left in bedside chair after IE, +alarm, +splint for jennifer RUE

## 2020-11-03 NOTE — OCCUPATIONAL THERAPY INITIAL EVALUATION ADULT - RANGE OF MOTION EXAMINATION, UPPER EXTREMITY
RUE flaccid 0 degrees AROM/Left UE Active ROM was WNL (within normal limits)/bilateral UE Passive ROM was WNL (within normal limits)

## 2020-11-07 DIAGNOSIS — K21.9 GASTRO-ESOPHAGEAL REFLUX DISEASE WITHOUT ESOPHAGITIS: ICD-10-CM

## 2020-11-07 DIAGNOSIS — R13.10 DYSPHAGIA, UNSPECIFIED: ICD-10-CM

## 2020-11-07 DIAGNOSIS — I69.320 APHASIA FOLLOWING CEREBRAL INFARCTION: ICD-10-CM

## 2020-11-07 DIAGNOSIS — Z88.0 ALLERGY STATUS TO PENICILLIN: ICD-10-CM

## 2020-11-07 DIAGNOSIS — I69.391 DYSPHAGIA FOLLOWING CEREBRAL INFARCTION: ICD-10-CM

## 2020-11-07 DIAGNOSIS — Z79.82 LONG TERM (CURRENT) USE OF ASPIRIN: ICD-10-CM

## 2020-11-07 DIAGNOSIS — I95.9 HYPOTENSION, UNSPECIFIED: ICD-10-CM

## 2020-11-07 DIAGNOSIS — I69.351 HEMIPLEGIA AND HEMIPARESIS FOLLOWING CEREBRAL INFARCTION AFFECTING RIGHT DOMINANT SIDE: ICD-10-CM

## 2020-11-07 DIAGNOSIS — R82.71 BACTERIURIA: ICD-10-CM

## 2020-11-07 DIAGNOSIS — Z79.02 LONG TERM (CURRENT) USE OF ANTITHROMBOTICS/ANTIPLATELETS: ICD-10-CM

## 2020-11-07 DIAGNOSIS — K92.0 HEMATEMESIS: ICD-10-CM

## 2020-11-07 DIAGNOSIS — Z96.643 PRESENCE OF ARTIFICIAL HIP JOINT, BILATERAL: ICD-10-CM

## 2020-11-07 DIAGNOSIS — Z95.818 PRESENCE OF OTHER CARDIAC IMPLANTS AND GRAFTS: ICD-10-CM

## 2020-11-07 DIAGNOSIS — I95.1 ORTHOSTATIC HYPOTENSION: ICD-10-CM

## 2020-11-07 DIAGNOSIS — I69.322 DYSARTHRIA FOLLOWING CEREBRAL INFARCTION: ICD-10-CM

## 2020-11-07 DIAGNOSIS — Z79.899 OTHER LONG TERM (CURRENT) DRUG THERAPY: ICD-10-CM

## 2020-11-07 DIAGNOSIS — R06.6 HICCOUGH: ICD-10-CM

## 2020-11-07 DIAGNOSIS — Z98.890 OTHER SPECIFIED POSTPROCEDURAL STATES: ICD-10-CM

## 2020-11-07 DIAGNOSIS — E78.5 HYPERLIPIDEMIA, UNSPECIFIED: ICD-10-CM

## 2020-11-07 DIAGNOSIS — R91.1 SOLITARY PULMONARY NODULE: ICD-10-CM

## 2020-11-07 DIAGNOSIS — Z93.1 GASTROSTOMY STATUS: ICD-10-CM

## 2021-01-01 NOTE — SWALLOW BEDSIDE ASSESSMENT ADULT - SWALLOW EVAL: RECOMMENDED DIET
NPO, NGT feeds
NPO with alternate means of nutrition and hydration
continue NPO w/ alternate means of nutrition/hydration
normal

## 2021-01-06 ENCOUNTER — APPOINTMENT (OUTPATIENT)
Dept: NEUROLOGY | Facility: CLINIC | Age: 71
End: 2021-01-06

## 2021-01-11 ENCOUNTER — OUTPATIENT (OUTPATIENT)
Dept: OUTPATIENT SERVICES | Facility: HOSPITAL | Age: 71
LOS: 1 days | Discharge: HOME | End: 2021-01-11
Payer: MEDICARE

## 2021-01-11 ENCOUNTER — TRANSCRIPTION ENCOUNTER (OUTPATIENT)
Age: 71
End: 2021-01-11

## 2021-01-11 VITALS
DIASTOLIC BLOOD PRESSURE: 67 MMHG | SYSTOLIC BLOOD PRESSURE: 118 MMHG | TEMPERATURE: 98 F | OXYGEN SATURATION: 98 % | WEIGHT: 169.98 LBS | HEIGHT: 72 IN | HEART RATE: 58 BPM | RESPIRATION RATE: 16 BRPM

## 2021-01-11 VITALS — SYSTOLIC BLOOD PRESSURE: 119 MMHG | DIASTOLIC BLOOD PRESSURE: 73 MMHG | RESPIRATION RATE: 17 BRPM | HEART RATE: 85 BPM

## 2021-01-11 DIAGNOSIS — Z94.5 SKIN TRANSPLANT STATUS: Chronic | ICD-10-CM

## 2021-01-11 DIAGNOSIS — Z96.643 PRESENCE OF ARTIFICIAL HIP JOINT, BILATERAL: Chronic | ICD-10-CM

## 2021-01-11 DIAGNOSIS — Z90.89 ACQUIRED ABSENCE OF OTHER ORGANS: Chronic | ICD-10-CM

## 2021-01-11 PROCEDURE — 43247 EGD REMOVE FOREIGN BODY: CPT

## 2021-01-11 NOTE — ASU DISCHARGE PLAN (ADULT/PEDIATRIC) - CARE PROVIDER_API CALL
Terese Balderas (MD)  Gastroenterology  [address] [place] [address]  Phone: [phone]  Fax: [phone]  Follow Up Time:

## 2021-01-13 NOTE — OCCUPATIONAL THERAPY INITIAL EVALUATION ADULT - GROOMING, PREVIOUS LEVEL OF FUNCTION, OT EVAL
Quality 130: Documentation Of Current Medications In The Medical Record: Current Medications Documented independent Quality 138: Melanoma: Coordination Of Care: A treatment plan was communicated to the physicians providing continuing care within one month of diagnosis outlining: diagnosis, tumor thickness and a plan for surgery or alternate care. Detail Level: Detailed

## 2021-01-14 DIAGNOSIS — K29.70 GASTRITIS, UNSPECIFIED, WITHOUT BLEEDING: ICD-10-CM

## 2021-01-14 DIAGNOSIS — Z43.1 ENCOUNTER FOR ATTENTION TO GASTROSTOMY: ICD-10-CM

## 2021-01-14 DIAGNOSIS — Z88.0 ALLERGY STATUS TO PENICILLIN: ICD-10-CM

## 2021-02-04 NOTE — SPEECH LANGUAGE PATHOLOGY EVALUATION - 2-STEP
Lizy Pace is a 14year old female [facility] Patient's last menstrual period was 01/30/2021. Patient presents with:  Gyn Exam: ANNUAL EXAM   On OCP to help with mood swings around period.  States it does help and been taking the pill continuously to only g Not on file      Years of education: Not on file      Highest education level: Not on file    Occupational History      Not on file    Social Needs      Financial resource strain: Not on file      Food insecurity        Worry: Not on file        Inability: Narrative      5th Grade            The patient does not use an assistive device. .        The patient does live in a home with stairs.       MEDICATIONS:    Current Outpatient Medications:   •  divalproex Sodium 250 MG Oral Tab EC DR tab, 1 TABLET DR EVERY place, person and situation. Appropriate mood and affect    Assessment & Plan:  Wm Fermin was seen today for gyn exam.    Diagnoses and all orders for this visit:    Encounter for surveillance of contraceptive pills  -     VIENVA 0.1-20 MG-MCG Oral Tab;  Take 1 no

## 2021-02-05 ENCOUNTER — APPOINTMENT (OUTPATIENT)
Dept: NEUROLOGY | Facility: CLINIC | Age: 71
End: 2021-02-05

## 2021-02-24 ENCOUNTER — APPOINTMENT (OUTPATIENT)
Dept: CARDIOLOGY | Facility: CLINIC | Age: 71
End: 2021-02-24
Payer: MEDICARE

## 2021-02-24 ENCOUNTER — NON-APPOINTMENT (OUTPATIENT)
Age: 71
End: 2021-02-24

## 2021-02-24 PROCEDURE — G2066: CPT

## 2021-02-24 PROCEDURE — 93298 REM INTERROG DEV EVAL SCRMS: CPT

## 2021-03-18 PROBLEM — E78.00 PURE HYPERCHOLESTEROLEMIA, UNSPECIFIED: Chronic | Status: ACTIVE | Noted: 2021-01-11

## 2021-03-31 ENCOUNTER — APPOINTMENT (OUTPATIENT)
Dept: CARDIOLOGY | Facility: CLINIC | Age: 71
End: 2021-03-31
Payer: MEDICARE

## 2021-03-31 ENCOUNTER — NON-APPOINTMENT (OUTPATIENT)
Age: 71
End: 2021-03-31

## 2021-03-31 PROCEDURE — 93298 REM INTERROG DEV EVAL SCRMS: CPT

## 2021-03-31 PROCEDURE — G2066: CPT

## 2021-04-12 ENCOUNTER — APPOINTMENT (OUTPATIENT)
Dept: CARDIOLOGY | Facility: CLINIC | Age: 71
End: 2021-04-12

## 2021-04-30 ENCOUNTER — APPOINTMENT (OUTPATIENT)
Dept: NEUROLOGY | Facility: CLINIC | Age: 71
End: 2021-04-30
Payer: MEDICARE

## 2021-04-30 VITALS
TEMPERATURE: 97.8 F | BODY MASS INDEX: 24.38 KG/M2 | OXYGEN SATURATION: 98 % | WEIGHT: 180 LBS | HEIGHT: 72 IN | SYSTOLIC BLOOD PRESSURE: 117 MMHG | DIASTOLIC BLOOD PRESSURE: 74 MMHG

## 2021-04-30 PROCEDURE — 99214 OFFICE O/P EST MOD 30 MIN: CPT

## 2021-04-30 NOTE — REASON FOR VISIT
[Follow-Up: _____] : a [unfilled] follow-up visit [FreeTextEntry1] : for left-sided cryptogenic stroke once in 8/2020 and then in 10/2020

## 2021-04-30 NOTE — PHYSICAL EXAM
[FreeTextEntry1] : Focal neurological exam:\par \par MS: Awake, alert, oriented to person, place, situation and time. Normal affect. Follows commands. Able to do simple addition and subtraction. \par \par Language: Speech is fluent with mild dysarthria, good comprehension. \par \par CNs 2 - 12 intact. EOMI no nystagmus, no diplopia. No facial asymmetry b/l, full eye closure strength b/l. Hearing grossly normal. Head turning & shoulder shrug intact b/l. Tongue midline, normal movements, no atrophy.\par \par Motor: Slight decrease muscle tone and contracture of RUE. Able to lift RUE against gravity.  No noticeable tremor or seizure. Muscle strength b/l LE 5/5, LUE 5/5. Foot brace on RLE.  \par \par Reflexes: DTR of biceps and knees normal \par \par Sensation: Intact to LT and vibration b/l throughout\par \par Cortical: No extinction\par \par Coordination: Intact rapid-alternating movements. No dysmetria to FTN.\par \par Gait: Not assessed as in wheelchaire. Able to ambulate with quad cane at home.  \par \par \par \par \par

## 2021-04-30 NOTE — HISTORY OF PRESENT ILLNESS
[FreeTextEntry1] : Mr. Biggs is a 69 yo man with history of left MCA territory infarct 2/2 multifocal occlusion of the left M2/M3 branches s/p mechanical thrombectomy w/ partial recanalization in 8/2020, who was admitted to [facility] on 10/13/2020 after he developed new right sided facial droop, weakness, and dysarthria. Patient was last seen in clinic in 10/6/2020 for his first stroke.\par \par Initial CTH of his second stroke showed wedge shaped area hypoattenuation within the left frontal and parietal lobes consistent with prior Left MCA territory infarct. CT perfusion showed left MCA M2 and M3 occlusions. Repeat CTH on 10/14/2020 showed interval development of left basal ganglia hypoattenuation compatible with acute infarct. It appears that the second stroke was in the same territory as the first stroke and related to blood vessel re-closure. During his hospital stay for the second stroke, he developed coffee ground emesis as well as pna and required PEG for failing speech and swallow. Patient was discharged to SNF after stabilization in 11/2020 w/ mRS 4. JANET 9/2020 showed intact atrial septum and no vegetations. Last LDL was 74.  ILR up to 3/31/21 was negative for Afib.   \par \par Today, patient presents to clinic with wife from home.  He has been home for 2 months but received rehab at [facility]. He is currently only on Eliquis for RLE DVT as well as Paxil and Atorvastatin 40.  He is able to life his RUE against gravity now and has been receiving electric stimulation for the limb.  He has a foot brace of RLE and can walk with a quad cane.  Wife admits that he continues to have slight confusion at time and dysarthria although his mobility and language has greatly improved. He no longer has a PEG. \par \par

## 2021-04-30 NOTE — END OF VISIT
[Time Spent: ___ minutes] : I have spent [unfilled] minutes of time on the encounter. [FreeTextEntry3] : Patient seen and examined with QUINCY Adam and agree with above except as noted.  Patient improving since his last stroke but still has some mixed aphasia and right hemiparesis.\par \par Plan as above

## 2021-04-30 NOTE — DISCUSSION/SUMMARY
[FreeTextEntry1] : Mr. Biggs is a 69 yo man with history of left MCA territory infarct 2/2 multifocal occlusion of the left M2/M3 branches s/p mechanical thrombectomy w/ partial recanalization in 8/2020, who was admitted to [facility] on 10/13/2020 after he developed new right sided facial droop, weakness, and dysarthria. Patient was last seen in clinic in 10/6/2020 for his first stroke.  It appears that the second stroke was in the same territory as the first stroke and related to blood vessel re-closure. Today, patient presents from home and is currently only on Eliquis for RLE DVT as well as Paxil and Atorvastatin 40.  He is able to life his RUE against gravity now.  Although he continues to have slight confusion at time and dysarthria his mobility and language has greatly improved. He no longer has a PEG and can ambulate with a quad cane. He does not have vascular risk factors for stroke and ILR has not shown Afib. Hypercoagulability work up was also negative. JANET 9/2020 showed intact atrial septum and no vegetations. \par \par PLAN:\par -Repeat blood work--A1c, cbc, lipid panel, cmp, ptt/INR\par -Repeat CTA Head and Neck \par -Follow up in clinic in 6 months \par

## 2021-05-03 LAB
ALBUMIN SERPL ELPH-MCNC: 4.3 G/DL
ALP BLD-CCNC: 94 U/L
ALT SERPL-CCNC: 42 U/L
ANION GAP SERPL CALC-SCNC: 9 MMOL/L
APTT BLD: 32.7 SEC
AST SERPL-CCNC: 36 U/L
BASOPHILS # BLD AUTO: 0.03 K/UL
BASOPHILS NFR BLD AUTO: 0.5 %
BILIRUB SERPL-MCNC: 0.4 MG/DL
BUN SERPL-MCNC: 12 MG/DL
CALCIUM SERPL-MCNC: 9.6 MG/DL
CHLORIDE SERPL-SCNC: 102 MMOL/L
CHOLEST SERPL-MCNC: 142 MG/DL
CO2 SERPL-SCNC: 27 MMOL/L
CREAT SERPL-MCNC: 0.9 MG/DL
EOSINOPHIL # BLD AUTO: 0.21 K/UL
EOSINOPHIL NFR BLD AUTO: 3.5 %
ESTIMATED AVERAGE GLUCOSE: 117 MG/DL
GLUCOSE SERPL-MCNC: 85 MG/DL
HBA1C MFR BLD HPLC: 5.7 %
HCT VFR BLD CALC: 44.1 %
HDLC SERPL-MCNC: 70 MG/DL
HGB BLD-MCNC: 14.9 G/DL
IMM GRANULOCYTES NFR BLD AUTO: 0.5 %
LDLC SERPL CALC-MCNC: 65 MG/DL
LYMPHOCYTES # BLD AUTO: 1.29 K/UL
LYMPHOCYTES NFR BLD AUTO: 21.3 %
MAN DIFF?: NORMAL
MCHC RBC-ENTMCNC: 32 PG
MCHC RBC-ENTMCNC: 33.8 G/DL
MCV RBC AUTO: 94.8 FL
MONOCYTES # BLD AUTO: 0.57 K/UL
MONOCYTES NFR BLD AUTO: 9.4 %
NEUTROPHILS # BLD AUTO: 3.93 K/UL
NEUTROPHILS NFR BLD AUTO: 64.8 %
NONHDLC SERPL-MCNC: 72 MG/DL
PLATELET # BLD AUTO: 210 K/UL
POTASSIUM SERPL-SCNC: 4.9 MMOL/L
PROT SERPL-MCNC: 6.8 G/DL
RBC # BLD: 4.65 M/UL
RBC # FLD: 12.4 %
SODIUM SERPL-SCNC: 138 MMOL/L
TRIGL SERPL-MCNC: 58 MG/DL
WBC # FLD AUTO: 6.06 K/UL

## 2021-05-05 ENCOUNTER — NON-APPOINTMENT (OUTPATIENT)
Age: 71
End: 2021-05-05

## 2021-05-05 ENCOUNTER — APPOINTMENT (OUTPATIENT)
Dept: CARDIOLOGY | Facility: CLINIC | Age: 71
End: 2021-05-05
Payer: MEDICARE

## 2021-05-05 PROCEDURE — 93298 REM INTERROG DEV EVAL SCRMS: CPT

## 2021-05-05 PROCEDURE — G2066: CPT

## 2021-05-12 DIAGNOSIS — J38.2 NODULES OF VOCAL CORDS: ICD-10-CM

## 2021-05-18 NOTE — PRE-ANESTHESIA EVALUATION ADULT - NSDENTALSD_ENT_ALL_CORE
Problem: Adult Mental Health  Goal: Demonstrates control of behavior, refraining from hostility, aggression or threats of violence  Outcome: Outcome Not Met, Continue to Monitor     Pt isolative and withdrawn to her room, visible in the milieu for meals and needs.  Pt reminded of the needed urine sample for drug screen and Pt verbalized understanding.  Writer inquired about substance use history and Pt became irritable but answered questions accordingly.  Will continue to offer support during interaction.  
  Problem: Adult Mental Health  Goal: Reports or exhibits improvement in depressive mood signs/symptoms  Outcome: Outcome Not Met at Discharge, Continue plan of care  Goal: Demonstrates ability to proactively perform self cares  Outcome: Outcome Not Met at Discharge, Continue plan of care  Goal: Demonstrates the ability to engage in reality-based communication and refrains from acting on delusional thoughts  Outcome: Outcome Not Met at Discharge, Continue plan of care  Goal: Reports or exhibits hallucinations absent or at manageable level  Outcome: Outcome Not Met at Discharge, Continue plan of care  Goal: Demonstrates improved ability to track conversation, process information  Outcome: Outcome Not Met at Discharge, Continue plan of care  Goal: Verbalizes understanding of positive individual coping skills  Outcome: Outcome Not Met at Discharge, Continue plan of care  Goal: Demonstrates control of behavior, refraining from hostility, aggression or threats of violence  Outcome: Outcome Not Met at Discharge, Continue plan of care     
  Problem: Adult Mental Health  Goal: Reports or exhibits improvement in depressive mood signs/symptoms  Outcome: Outcome Not Met, Continue to Monitor     Problem: Adult Mental Health  Goal: Demonstrates ability to proactively perform self cares  Outcome: Outcome Not Met, Continue to Monitor     Problem: Adult Mental Health  Goal: Demonstrates the ability to engage in reality-based communication and refrains from acting on delusional thoughts  Outcome: Outcome Not Met, Continue to Monitor     Problem: Adult Mental Health  Goal: Reports or exhibits hallucinations absent or at manageable level  Outcome: Outcome Not Met, Continue to Monitor     Problem: Adult Mental Health  Goal: Demonstrates improved ability to track conversation, process information  Outcome: Outcome Not Met, Continue to Monitor     
  Problem: Adult Mental Health  Goal: Reports or exhibits improvement in depressive mood signs/symptoms  Outcome: Outcome Not Met, Continue to Monitor     Problem: Adult Mental Health  Goal: Demonstrates ability to proactively perform self cares  Outcome: Outcome Not Met, Continue to Monitor     Problem: Adult Mental Health  Goal: Demonstrates the ability to engage in reality-based communication and refrains from acting on delusional thoughts  Outcome: Outcome Not Met, Continue to Monitor     Problem: Adult Mental Health  Goal: Reports or exhibits hallucinations absent or at manageable level  Outcome: Outcome Not Met, Continue to Monitor     Problem: Adult Mental Health  Goal: Demonstrates improved ability to track conversation, process information  Outcome: Outcome Not Met, Continue to Monitor     
  Problem: Adult Mental Health  Goal: Reports or exhibits improvement in depressive mood signs/symptoms  Outcome: Outcome Not Met, Continue to Monitor  Goal: Demonstrates ability to proactively perform self cares  Outcome: Outcome Not Met, Continue to Monitor  Goal: Demonstrates the ability to engage in reality-based communication and refrains from acting on delusional thoughts  Outcome: Outcome Not Met, Continue to Monitor  Goal: Reports or exhibits hallucinations absent or at manageable level  Outcome: Outcome Not Met, Continue to Monitor  Goal: Demonstrates improved ability to track conversation, process information  Outcome: Outcome Not Met, Continue to Monitor  Goal: Verbalizes understanding of positive individual coping skills  Outcome: Outcome Not Met, Continue to Monitor  Goal: Demonstrates control of behavior, refraining from hostility, aggression or threats of violence  Outcome: Outcome Not Met, Continue to Monitor     
Chart reviewed, diet appropriate. No nutrition intervention indicated at this time. RD following per policy or consult RD prn and appropriate.   
appears normal and intact

## 2021-05-21 PROBLEM — J38.2 VOCAL CORD NODULE: Status: ACTIVE | Noted: 2021-05-21

## 2021-06-09 ENCOUNTER — APPOINTMENT (OUTPATIENT)
Dept: CARDIOLOGY | Facility: CLINIC | Age: 71
End: 2021-06-09
Payer: MEDICARE

## 2021-06-09 ENCOUNTER — NON-APPOINTMENT (OUTPATIENT)
Age: 71
End: 2021-06-09

## 2021-06-09 PROCEDURE — G2066: CPT

## 2021-06-09 PROCEDURE — 93298 REM INTERROG DEV EVAL SCRMS: CPT

## 2021-07-14 ENCOUNTER — NON-APPOINTMENT (OUTPATIENT)
Age: 71
End: 2021-07-14

## 2021-07-14 ENCOUNTER — APPOINTMENT (OUTPATIENT)
Dept: CARDIOLOGY | Facility: CLINIC | Age: 71
End: 2021-07-14
Payer: MEDICARE

## 2021-07-14 PROCEDURE — G2066: CPT

## 2021-07-14 PROCEDURE — 93298 REM INTERROG DEV EVAL SCRMS: CPT

## 2021-08-19 ENCOUNTER — NON-APPOINTMENT (OUTPATIENT)
Age: 71
End: 2021-08-19

## 2021-08-19 ENCOUNTER — APPOINTMENT (OUTPATIENT)
Dept: CARDIOLOGY | Facility: CLINIC | Age: 71
End: 2021-08-19
Payer: MEDICARE

## 2021-08-19 PROCEDURE — G2066: CPT

## 2021-08-19 PROCEDURE — 93298 REM INTERROG DEV EVAL SCRMS: CPT

## 2021-09-23 ENCOUNTER — NON-APPOINTMENT (OUTPATIENT)
Age: 71
End: 2021-09-23

## 2021-09-23 ENCOUNTER — APPOINTMENT (OUTPATIENT)
Dept: CARDIOLOGY | Facility: CLINIC | Age: 71
End: 2021-09-23

## 2021-09-23 ENCOUNTER — APPOINTMENT (OUTPATIENT)
Dept: CARDIOLOGY | Facility: CLINIC | Age: 71
End: 2021-09-23
Payer: MEDICARE

## 2021-09-23 PROCEDURE — 93298 REM INTERROG DEV EVAL SCRMS: CPT

## 2021-09-23 PROCEDURE — G2066: CPT

## 2021-10-14 NOTE — DISCHARGE NOTE NURSING/CASE MANAGEMENT/SOCIAL WORK - NSDCPEPTSTRK_GEN_ALL_CORE
RECORDS RECEIVED FROM: Internal   Appt Date: 10-15-21   NOTES STATUS DETAILS   OFFICE NOTE from referring provider Internal Dr. Johnson 10-13-21   OFFICE NOTES from other specialists N/A    DISCHARGE SUMMARY from hospital Internal 9-24-21 - [facility]   MEDICATION LIST Internal    LIVER BIOSPY (IF APPLICABLE)      PATHOLOGY REPORTS  N/A    IMAGING     ENDOSCOPY (IF AVAILABLE) N/A    COLONOSCOPY (IF AVAILABLE) N/A    ULTRASOUND LIVER Internal 9-24-21 - US Abd   CT OF ABDOMEN Internal 9-25-20   MRI OF LIVER N/A    FIBROSCAN, US ELASTOGRAPHY, FIBROSIS SCAN, MR ELASTOGRAPHY N/A    LABS     HEPATIC PANEL (LIVER PANEL) Internal 10-1-21   BASIC METABOLIC PANEL Internal 10-1-21   COMPLETE METABOLIC PANEL Internal 10-4-21   COMPLETE BLOOD COUNT (CBC) Internal 10-4-21   INTERNATIONAL NORMALIZED RATIO (INR) Internal 9-30-21   HEPATITIS C ANTIBODY Internal 9-26-21   HEPATITIS C VIRAL LOAD/PCR N/A    HEPATITIS C GENOTYPE N/A    HEPATITIS B SURFACE ANTIGEN Internal 9-26-21   HEPATITIS B SURFACE ANTIBODY Internal 9-26-21   HEPATITIS B DNA QUANT LEVEL N/A    HEPATITIS B CORE ANTIBODY N/A         Prescribed medications/Stroke education booklet/Risk factors for stroke/Stroke support groups for patients, families, and friends/Stroke warning signs and symptoms/Signs and symptoms of stroke/Call 911 for stroke/Need for follow up after discharge

## 2021-10-28 ENCOUNTER — NON-APPOINTMENT (OUTPATIENT)
Age: 71
End: 2021-10-28

## 2021-10-28 ENCOUNTER — APPOINTMENT (OUTPATIENT)
Dept: CARDIOLOGY | Facility: CLINIC | Age: 71
End: 2021-10-28
Payer: MEDICARE

## 2021-10-28 PROCEDURE — G2066: CPT | Mod: NC

## 2021-10-28 PROCEDURE — 93298 REM INTERROG DEV EVAL SCRMS: CPT | Mod: NC

## 2021-12-01 ENCOUNTER — EMERGENCY (EMERGENCY)
Facility: HOSPITAL | Age: 71
LOS: 0 days | Discharge: HOME | End: 2021-12-01
Attending: EMERGENCY MEDICINE | Admitting: EMERGENCY MEDICINE
Payer: MEDICARE

## 2021-12-01 VITALS
RESPIRATION RATE: 22 BRPM | HEIGHT: 72 IN | DIASTOLIC BLOOD PRESSURE: 77 MMHG | TEMPERATURE: 97 F | OXYGEN SATURATION: 99 % | HEART RATE: 78 BPM | WEIGHT: 190.04 LBS | SYSTOLIC BLOOD PRESSURE: 153 MMHG

## 2021-12-01 VITALS
DIASTOLIC BLOOD PRESSURE: 65 MMHG | SYSTOLIC BLOOD PRESSURE: 119 MMHG | RESPIRATION RATE: 18 BRPM | OXYGEN SATURATION: 99 % | HEART RATE: 65 BPM

## 2021-12-01 DIAGNOSIS — E78.00 PURE HYPERCHOLESTEROLEMIA, UNSPECIFIED: ICD-10-CM

## 2021-12-01 DIAGNOSIS — Z79.01 LONG TERM (CURRENT) USE OF ANTICOAGULANTS: ICD-10-CM

## 2021-12-01 DIAGNOSIS — R50.9 FEVER, UNSPECIFIED: ICD-10-CM

## 2021-12-01 DIAGNOSIS — M62.838 OTHER MUSCLE SPASM: ICD-10-CM

## 2021-12-01 DIAGNOSIS — Z96.643 PRESENCE OF ARTIFICIAL HIP JOINT, BILATERAL: Chronic | ICD-10-CM

## 2021-12-01 DIAGNOSIS — Z94.5 SKIN TRANSPLANT STATUS: Chronic | ICD-10-CM

## 2021-12-01 DIAGNOSIS — M79.661 PAIN IN RIGHT LOWER LEG: ICD-10-CM

## 2021-12-01 DIAGNOSIS — K21.9 GASTRO-ESOPHAGEAL REFLUX DISEASE WITHOUT ESOPHAGITIS: ICD-10-CM

## 2021-12-01 DIAGNOSIS — Z88.0 ALLERGY STATUS TO PENICILLIN: ICD-10-CM

## 2021-12-01 DIAGNOSIS — Z90.89 ACQUIRED ABSENCE OF OTHER ORGANS: Chronic | ICD-10-CM

## 2021-12-01 PROCEDURE — 93970 EXTREMITY STUDY: CPT | Mod: 26

## 2021-12-01 PROCEDURE — 99284 EMERGENCY DEPT VISIT MOD MDM: CPT

## 2021-12-01 RX ORDER — METHOCARBAMOL 500 MG/1
2 TABLET, FILM COATED ORAL
Qty: 32 | Refills: 0
Start: 2021-12-01 | End: 2021-12-04

## 2021-12-01 RX ORDER — METHOCARBAMOL 500 MG/1
1500 TABLET, FILM COATED ORAL ONCE
Refills: 0 | Status: COMPLETED | OUTPATIENT
Start: 2021-12-01 | End: 2021-12-01

## 2021-12-01 RX ORDER — OXYCODONE AND ACETAMINOPHEN 5; 325 MG/1; MG/1
1 TABLET ORAL ONCE
Refills: 0 | Status: DISCONTINUED | OUTPATIENT
Start: 2021-12-01 | End: 2021-12-01

## 2021-12-01 RX ORDER — METHOCARBAMOL 500 MG/1
1000 TABLET, FILM COATED ORAL ONCE
Refills: 0 | Status: DISCONTINUED | OUTPATIENT
Start: 2021-12-01 | End: 2021-12-01

## 2021-12-01 RX ADMIN — OXYCODONE AND ACETAMINOPHEN 1 TABLET(S): 5; 325 TABLET ORAL at 21:22

## 2021-12-01 RX ADMIN — METHOCARBAMOL 1500 MILLIGRAM(S): 500 TABLET, FILM COATED ORAL at 21:23

## 2021-12-01 RX ADMIN — OXYCODONE AND ACETAMINOPHEN 1 TABLET(S): 5; 325 TABLET ORAL at 22:42

## 2021-12-01 NOTE — ED PROVIDER NOTE - OBJECTIVE STATEMENT
this is a 69 yo male presents to ed for cramp to right leg while he was bending in shower. patient has history of cva. patient states that he walks one mile everyday. patient states he went for his walk today. patient had no injury to leg. patient states that while he ws in shower he got a cramp

## 2021-12-01 NOTE — ED PROVIDER NOTE - PATIENT PORTAL LINK FT
You can access the FollowMyHealth Patient Portal offered by [facility] by registering at the following website: [email or web address]. By joining [employer]’s FollowMyHealth portal, you will also be able to view your health information using other applications (apps) compatible with our system.

## 2021-12-01 NOTE — ED PROVIDER NOTE - NS ED ROS FT
Review of Systems:  	•	CONSTITUTIONAL - no fever, no diaphoresis, no chills  	•	SKIN - no rash    	•	RESPIRATORY - no shortness of breath, no cough  	•	CARDIAC - no chest pain, no palpitations    	•	MUSCULOSKELETAL -leg pain

## 2021-12-01 NOTE — ED PROVIDER NOTE - PHYSICAL EXAMINATION
--EXAM--  VITAL SIGNS: I have reviewed vs documented at present.  CONSTITUTIONAL: Well-developed; well-nourished; in no acute distress.   SKIN: Warm and dry, no acute rash.     CARD: S1, S2, Regular rate and rhythm.   RESP: No wheezes, rales or rhonchi.    EXT: right leg no swelling no eccymosis full rom. patient feels twitching in calf  NEURO: Alert, oriented, grossly unremarkable. Strength 5/5 in all extremities. Sensation intact throughout.

## 2021-12-01 NOTE — ED PROVIDER NOTE - CLINICAL SUMMARY MEDICAL DECISION MAKING FREE TEXT BOX
Patient presented with acute onset of calf pain while showering. (+) spasming on exam. Otherwise afebrile, HD stable, well appearing. Venous duplex negative for DVT and patient felt much better after tx in ED. Able to ambulate. likely muscle spasm. Given the above, will discharge home with outpatient follow up. Patient agreeable with plan. Agrees to return to ED for any new or worsening symptoms.

## 2021-12-01 NOTE — ED PROVIDER NOTE - ATTENDING CONTRIBUTION TO CARE
70 year old male, pmhx as documented presenting with R leg cramping s/p bending to pick something up in the shower. States it came on suddenly and since then has had pain described as crampy, severe, non-radiating, worse with movement, no palliative factors, moderate severity. Patient very active at baseline, walks and runs daily. Otherwise denies direct trauma, N/V or any other complaints.    Vital Signs: I have reviewed the initial vital signs.  Constitutional: NAD, well-nourished, appears stated age, no acute distress.  HEENT: Airway patent, moist MM, no erythema/swelling/deformity of oral structures. EOMI, PERRLA.  CV: regular rate, regular rhythm, well-perfused extremities, 2+ b/l DP and radial pulses equal.  Lungs: BCTA, no increased WOB.  ABD: NTND, no guarding or rebound, no pulsatile mass, no hernias.   MSK: Neck supple, nontender, nl ROM, no stepoff. Chest nontender. Back nontender in TLS spine or to b/l bony structures or flanks. (+) tenderness to R calf with (+) spasm. otherwise ext nontender, nl rom, no deformity.   INTEG: Skin warm, dry, no rash.  NEURO: A&Ox3, normal strength, nl sensation throughout, normal speech.   PSYCH: Calm, cooperative, normal affect and interaction.    Patient and wife concerned about DVT. Will obtain duplex, pain control, re-eval.

## 2021-12-01 NOTE — ED PROVIDER NOTE - NSICDXPASTMEDICALHX_GEN_ALL_CORE_FT
PAST MEDICAL HISTORY:  Cerebrovascular accident (CVA)     GERD (gastroesophageal reflux disease)     High cholesterol

## 2021-12-03 ENCOUNTER — NON-APPOINTMENT (OUTPATIENT)
Age: 71
End: 2021-12-03

## 2021-12-03 ENCOUNTER — APPOINTMENT (OUTPATIENT)
Dept: CARDIOLOGY | Facility: CLINIC | Age: 71
End: 2021-12-03
Payer: MEDICARE

## 2021-12-03 PROCEDURE — 93298 REM INTERROG DEV EVAL SCRMS: CPT

## 2021-12-03 PROCEDURE — G2066: CPT

## 2021-12-17 ENCOUNTER — LABORATORY RESULT (OUTPATIENT)
Age: 71
End: 2021-12-17

## 2021-12-17 ENCOUNTER — APPOINTMENT (OUTPATIENT)
Dept: NEUROLOGY | Facility: CLINIC | Age: 71
End: 2021-12-17
Payer: MEDICARE

## 2021-12-17 VITALS
HEIGHT: 72 IN | BODY MASS INDEX: 25.06 KG/M2 | OXYGEN SATURATION: 98 % | SYSTOLIC BLOOD PRESSURE: 127 MMHG | HEART RATE: 59 BPM | TEMPERATURE: 97.9 F | WEIGHT: 185 LBS | DIASTOLIC BLOOD PRESSURE: 78 MMHG

## 2021-12-17 PROCEDURE — 99214 OFFICE O/P EST MOD 30 MIN: CPT

## 2021-12-17 RX ORDER — ATORVASTATIN CALCIUM 40 MG/1
40 TABLET, FILM COATED ORAL
Refills: 0 | Status: DISCONTINUED | COMMUNITY
End: 2021-12-17

## 2021-12-17 NOTE — REASON FOR VISIT
[Follow-Up: _____] : a [unfilled] follow-up visit [Spouse] : spouse [FreeTextEntry1] : left MCA territory infarct 2/2 multifocal occlusion of the left M2/M3 branches s/p mechanical thrombectomy w/ partial recanalization in 8/2020 w/ partial recanalization in 8/2020 and then repeat stroke in the same territory in 10/13/21 related to blood vessel reclosure of the same branches

## 2021-12-17 NOTE — PHYSICAL EXAM
[FreeTextEntry1] : Focal neurological exam:\par \par MS: Awake, alert, oriented to person, place, situation and time. Normal affect. Follows commands. \par \par Language: Speech mod to severe expressive aphasia and dysarthria. Good comprehension. \par \par CNs 2 - 12 intact. EOMI no nystagmus, no diplopia. Right facial, full eye closure strength b/l. Hearing grossly normal. Head turning & shoulder shrug intact b/l. Tongue midline, normal movements, no atrophy.\par \par Motor: Slight decrease muscle tone and contracture of RUE. Able to lift RUE against gravity and hold for 5 seconds. No noticeable tremor or seizure. Able to see muscle fasciculation of Right bicep. Muscle strength of RLE 5-/5, LLE 5/5, RLE 5/5. Foot brace on RLE. \par \par Reflexes: DTR of biceps and knees normal \par \par Sensation: Intact to LT and vibration b/l throughout\par \par Cortical: No extinction\par \par Coordination: No dysmetria to FTN.\par \par Gait: Ambulates with single forearm crutch with R hemiparetic gait. \par \par \par \par

## 2021-12-17 NOTE — ASSESSMENT
[FreeTextEntry1] : Patient is a 69 yo man with history of left MCA territory infarct 2/2 multifocal occlusion of the left M2/M3 branches s/p mechanical thrombectomy w/ PARTIAL recanalization in 8/2020 and then repeat stroke in the same territory in 10/13/21 related to blood vessel RECLOSURE of the same branches. Repeat CTA H/N of vessels in 5/2021 reported SLIGHT DIMINISHED CALIBER of the left MCA. At time of stroke, he did not have any vascular risk factors. He was found to have RLE DVT during October stroke after transfer from [facility] to [facility] and started on Eliquis. At [facility] after second stroke, hypercoagulability work up was negative. JANET 9/2020 showed intact atrial septum and no vegetations. ILR was placed in 9/2020 and have not showed any Afib. I believe the cause of his stroke is still undetermined. Recent b/l LE DVT sonogram was negative. I don't think he needs to be on Eliquis, but given his 2 strokes in same territory and due to occlusion of the same vessels, I am reluctant to take him off of Eliquis. He is also not on ASA or Plavix, not sure if this is really indicated or not because he does not have much plaque elsewhere. Recent BW shows LDL and A1c are very well controlled. \par \par PLAN: \par -Baclofen 5 BID and then increase to TID for spasticity\par -Rheum panel \par -Continue to follow up with ILR results \par -Please follow up with Dr. Pato TOMLIN to determine etiology of stroke, if Eliquis can be discontinued, and where he needs an antiplatelet agent

## 2021-12-17 NOTE — HISTORY OF PRESENT ILLNESS
[FreeTextEntry1] : Patient is a 69 yo man with history of left MCA territory infarct 2/2 multifocal occlusion of the left M2/M3 branches s/p mechanical thrombectomy w/ partial recanalization in 8/2020 and then repeat stroke in the same territory in 10/13/21 related to blood vessel reclosure of the same branches.  He was last seen on 4/2021 and was on Eliquis for RLE DVT from hospitalization in 10/2021, Paxil and Atorvastatin.  He does not have significant vascular risk factors for stroke and ILR has not shown Afib. There was one episode in 12/021 but it was artifact. Hypercoagulability work up inpatient was also negative. JANET 9/2020 showed intact atrial septum and no vegetations. \par \par Today, patient presents to clinic with wife and c/o of muscle spasms of RUE and RLE, was in the ED recently and given Percocet and Robaxin which have helped minimally. Last LDL 69 and A1c 5.4. He is on Atorvastatin 20 QD and Eliquis, no ASA or Plavix.  Recent b/l LE DVT sonogram was negative. The Eliquis was placed when he was hospitalized in [facility] where he was initially found to have DVT. CTA H/N of vessels in 5/2021 reported slight diminished caliber of the left MCA. Per wife, vocal cord nodule was chronic, possibly related to work in 9/11. \par \par \par \par

## 2021-12-20 LAB
ACE BLD-CCNC: 25 U/L
CRP SERPL-MCNC: <3 MG/L
DSDNA AB SER-ACNC: <12 IU/ML
ENA SS-A AB SER IA-ACNC: <0.2 AL
ENA SS-B AB SER IA-ACNC: <0.2 AL
ERYTHROCYTE [SEDIMENTATION RATE] IN BLOOD BY WESTERGREN METHOD: 1 MM/HR
RHEUMATOID FACT SER QL: <10 IU/ML

## 2021-12-22 LAB
CCP AB SER IA-ACNC: <8 UNITS
CONFIRM: NORMAL
DRVVT IMM 1:2 NP PPP: NORMAL
DRVVT SCREEN TO CONFIRM RATIO: 0.66 RATIO
RF+CCP IGG SER-IMP: NEGATIVE
SCREEN DRVVT: NORMAL
SILICA CLOTTING TIME INTERPRETATION: NORMAL
SILICA CLOTTING TIME S/C: 0.76 RATIO

## 2021-12-29 LAB — HLA-B27 RELATED AG QL: NEGATIVE

## 2022-01-10 ENCOUNTER — NON-APPOINTMENT (OUTPATIENT)
Age: 72
End: 2022-01-10

## 2022-01-10 ENCOUNTER — APPOINTMENT (OUTPATIENT)
Dept: CARDIOLOGY | Facility: CLINIC | Age: 72
End: 2022-01-10
Payer: MEDICARE

## 2022-01-10 PROCEDURE — 93298 REM INTERROG DEV EVAL SCRMS: CPT

## 2022-01-10 PROCEDURE — G2066: CPT

## 2022-01-18 ENCOUNTER — APPOINTMENT (OUTPATIENT)
Dept: NEUROLOGY | Facility: CLINIC | Age: 72
End: 2022-01-18
Payer: MEDICARE

## 2022-01-18 PROCEDURE — 99443: CPT

## 2022-01-18 RX ORDER — APIXABAN 5 MG/1
5 TABLET, FILM COATED ORAL
Qty: 180 | Refills: 3 | Status: DISCONTINUED | COMMUNITY
End: 2022-01-18

## 2022-01-18 RX ORDER — ASPIRIN 81 MG/1
81 TABLET, CHEWABLE ORAL DAILY
Qty: 30 | Refills: 3 | Status: ACTIVE | COMMUNITY
Start: 2022-01-18 | End: 1900-01-01

## 2022-01-18 NOTE — DATA REVIEWED
[de-identified] : CT head 10/2020: IMPRESSION:\par Since the CT head performed on the previous day:\par \par 1.  Interval development of left basal ganglia hypoattenuation compatible with acute infarct.\par \par 2.  Stable subacute infarcts in the left frontal and parietal lobes and posterior insular cortex.\par \par 3.  No significant mass effect. No acute intracranial hemorrhage.\par \par

## 2022-01-18 NOTE — PHYSICAL EXAM
[FreeTextEntry1] : Unable to connect to video during visit, thus physical examination was not possible.

## 2022-01-18 NOTE — REASON FOR VISIT
[Home] : at home, [unfilled] , at the time of the visit. [Other Location: e.g. Home (Enter Location, City,State)___] : at [unfilled] [FreeTextEntry3] : pt's wife [Follow-Up: _____] : a [unfilled] follow-up visit [Spouse] : spouse

## 2022-01-18 NOTE — DISCUSSION/SUMMARY
[FreeTextEntry1] : Pt is a 72 yo M with PMHx of L MCA ischemic stroke x 2, HLD, and hypothyroidism, for whom a televisit (audio only) was conducted. Pt referred by Renetta, stroke clinic PA, for further evaluation of stroke etiology and management.\par Etiology of stroke possibly cardioembolic vs underlying large-medium vessel athero. Granted it would be a little unusual for it to only be in a single vessel. Focal vasculitis is another consideration, however much of his rheumatologic workup was unrevealing. \par \par Plan:\par - D/c eliquis- no indication to continue from stroke standpoint (or cardiac thus far)\par - Start ASA/plavix and continue statin\par - MRI brain and MRA head without contrast\par - F/u in clinic in 3 mo

## 2022-02-14 ENCOUNTER — APPOINTMENT (OUTPATIENT)
Dept: CARDIOLOGY | Facility: CLINIC | Age: 72
End: 2022-02-14
Payer: MEDICARE

## 2022-02-14 ENCOUNTER — NON-APPOINTMENT (OUTPATIENT)
Age: 72
End: 2022-02-14

## 2022-02-14 PROCEDURE — 93298 REM INTERROG DEV EVAL SCRMS: CPT

## 2022-02-14 PROCEDURE — G2066: CPT

## 2022-03-14 ENCOUNTER — RX RENEWAL (OUTPATIENT)
Age: 72
End: 2022-03-14

## 2022-03-21 ENCOUNTER — APPOINTMENT (OUTPATIENT)
Dept: CARDIOLOGY | Facility: CLINIC | Age: 72
End: 2022-03-21
Payer: MEDICARE

## 2022-03-21 ENCOUNTER — NON-APPOINTMENT (OUTPATIENT)
Age: 72
End: 2022-03-21

## 2022-03-21 PROCEDURE — G2066: CPT

## 2022-03-21 PROCEDURE — 93298 REM INTERROG DEV EVAL SCRMS: CPT

## 2022-04-12 ENCOUNTER — RX RENEWAL (OUTPATIENT)
Age: 72
End: 2022-04-12

## 2022-04-25 ENCOUNTER — APPOINTMENT (OUTPATIENT)
Dept: CARDIOLOGY | Facility: CLINIC | Age: 72
End: 2022-04-25
Payer: MEDICARE

## 2022-04-25 ENCOUNTER — NON-APPOINTMENT (OUTPATIENT)
Age: 72
End: 2022-04-25

## 2022-04-25 PROCEDURE — 93298 REM INTERROG DEV EVAL SCRMS: CPT

## 2022-04-25 PROCEDURE — G2066: CPT

## 2022-04-26 ENCOUNTER — APPOINTMENT (OUTPATIENT)
Dept: NEUROLOGY | Facility: CLINIC | Age: 72
End: 2022-04-26
Payer: MEDICARE

## 2022-04-26 VITALS
HEART RATE: 85 BPM | WEIGHT: 186 LBS | OXYGEN SATURATION: 98 % | DIASTOLIC BLOOD PRESSURE: 76 MMHG | HEIGHT: 72 IN | BODY MASS INDEX: 25.19 KG/M2 | SYSTOLIC BLOOD PRESSURE: 126 MMHG

## 2022-04-26 DIAGNOSIS — G81.11 SPASTIC HEMIPLEGIA AFFECTING RIGHT DOMINANT SIDE: ICD-10-CM

## 2022-04-26 PROCEDURE — 99214 OFFICE O/P EST MOD 30 MIN: CPT

## 2022-04-26 RX ORDER — DOCUSATE SODIUM 100 MG/1
100 CAPSULE ORAL
Qty: 14 | Refills: 0 | Status: DISCONTINUED | COMMUNITY
End: 2022-04-26

## 2022-04-26 RX ORDER — LEVOTHYROXINE SODIUM 0.07 MG/1
75 TABLET ORAL
Refills: 0 | Status: ACTIVE | COMMUNITY

## 2022-04-26 RX ORDER — LEVOTHYROXINE SODIUM 0.05 MG/1
50 TABLET ORAL
Qty: 30 | Refills: 0 | Status: DISCONTINUED | COMMUNITY
Start: 2021-12-22 | End: 2022-04-26

## 2022-04-26 NOTE — DATA REVIEWED
[de-identified] : CT head 10/2020: IMPRESSION:\par Since the CT head performed on the previous day:\par \par 1.  Interval development of left basal ganglia hypoattenuation compatible with acute infarct.\par \par 2.  Stable subacute infarcts in the left frontal and parietal lobes and posterior insular cortex.\par \par 3.  No significant mass effect. No acute intracranial hemorrhage.\par \par MRI brain 9/1/2020: \par IMPRESSION: \par \par Redemonstration of evolving acute/subacute infarcts involving the left frontal, temporal, parietal lobes with partially decreased mass effect in comparison with the prior study. Associated scattered lamina petechial hemorrhage with associated susceptibility signal, and enhancement. No evidence of underlying mass. \par \par No malika intracranial hemorrhage, new acute territorial infarct, or midline shift. \par \par JANET: 9/2020\par Summary: \par  1. Left ventricular ejection fraction, by visual estimation, is 60 to 65%. \par  2. Normal left atrial size. \par  3. Normal right atrial size. \par  4. Mild mitral valve regurgitation. \par  5. Color flow doppler and intravenous injection of agitated saline demonstrates the presence of an intact intra atrial septum. \par  6. No left atrial appendage thrombus and normal left atrial appendage velocities. \par \par \par

## 2022-04-26 NOTE — HISTORY OF PRESENT ILLNESS
[FreeTextEntry1] : Interval History: \par Pt presents today with his wife. Endorses feeling well since his last visit. Continues to exercise daily, goes for long walks with his son. He has also been reading, has some difficulties, but is able to get through at least a page at a time. He is still taking ASA/plavix. Baclofen has helped with his spasticity and movement. Denies any new symptoms. \par \par \par HPI: This telephonic visit was provided via audio only technology. The patient, NYDIA VALVERDE, was located at home, [address] [place] [address] , at the time of the visit. \par The provider, OMAR TORIBIO, was located at [place], [place] at the time of the visit. The patient, NYDIA VALVERDE and Provider participated in the telephonic visit. Spouse also participated. \par Verbal consent for telephonic services was given on January 18, 2022 by Pt's wife. \par Pt is a 70 yo M with PMHx of L MCA ischemic stroke x 2, HLD, and hypothyroidism, for whom a televisit (audio only) was conducted. Pt referred by Renetta, stroke clinic PA, for further evaluation of stroke etiology and management. Much of history and discussion was with pt's wife, as pt has residual aphasia from his strokes. Pt's initial stroke was in August 2020, presented with Left MCA/ICA, s/p EVT. MRI aruna showed moderate stroke in in left MCA territory, particularly frontoparietal region and small stroke in left ADEOLA as well. Pt was discharged on ASA/statin. He returned in October 2020 with worsening of aphasia and right sided weakness. Again found to have left MCA branch occlusion. CT head showed new ischemia in left basal ganglia. Pt was discharged on ASA/plavix to [facility], where he was found to have a LE DVT and was switched to eliquis monotherapy. He has been taking eliquis since then. He had a JANET that was negative for clot or shunt. S/p loop with no evidence of afib thus far. Pt's wife states that he has been doing well over all. Ambulates with a cane, has some difficulty with speech, peg tube was removed and he has been eating/drinking. recently completed PT/ST.\par No h/o stroke, clot or heart disease. No family h/o storke or clotting disorder, ut he has a brother with afib. No h/o tobacco, alcohol or drug use. \par \par \par .

## 2022-04-26 NOTE — REVIEW OF SYSTEMS
[As Noted in HPI] : as noted in HPI [Joint Stiffness] : joint stiffness [Negative] : Heme/Lymph [FreeTextEntry3] : blurred vision in left eye

## 2022-04-26 NOTE — DISCUSSION/SUMMARY
[FreeTextEntry1] : Pt is a 72 yo M with PMHx of L MCA ischemic stroke x 2, HLD, and hypothyroidism, who presents for stroke f/u.\par \par # Left MCA ischemic stroke: residual right hemiplegia and expressive aphasia. Reviewed prior imaging in chart. Doing well since switching from AC to  DAPT.\par - CUS in 6 mo prior to f/u\par - Continue DAPT/statin\par - Continue baclofen 5mg TID. If spasticity worsens, consider increasing baclofen and/or referral for botox injections\par - It is reasonable to hold anti-platelets briefly, if needed, for dental work (continue ASA until day prior to procedure, and resume DAPT as soon as permissible).\par \par # Anisocoria: OD 2 mm, round and reactive to light. OS 5mm, slowly reactive to light.\par - f/u with ophtho\par \par RTC in 6 mo after imaging is completed.\par \par \par

## 2022-04-26 NOTE — PHYSICAL EXAM
[Oriented To Time, Place, And Person] : oriented to person, place, and time [Affect] : the affect was normal [Cranial Nerves Oculomotor (III)] : extraocular motion intact [Peripheral Vision Was Full To Confrontation Bilaterally] : peripheral vision was full to confrontation bilaterally [____] : [unfilled] mm [General Appearance - Alert] : alert [General Appearance - In No Acute Distress] : in no acute distress [General Appearance - Well Nourished] : well nourished [General Appearance - Well Developed] : well developed [Cranial Nerves Trigeminal (V)] : facial sensation intact symmetrically [Cranial Nerves Vestibulocochlear (VIII)] : hearing was intact bilaterally [Cranial Nerves Glossopharyngeal (IX)] : tongue and palate midline [Cranial Nerves Accessory (XI - Cranial And Spinal)] : head turning and shoulder shrug symmetric [Cranial Nerves Hypoglossal (XII)] : there was no tongue deviation with protrusion [Flattening Of Nasolabial Fold On The Right] : flattening of the nasolabial fold [Sensation Tactile Decrease] : light touch was intact [3+] : Patella right 3+ [2+] : Patella left 2+ [FreeTextEntry4] : Decreased fluency. Able to name most objects (some with multiple choice), able to read short sentences, poor repetition, comprehension intact.  [FreeTextEntry6] : increased tone in RUE and RLE. RUE 1/5, RLE 3/5. LUE and LLE 5/5 [FreeTextEntry8] : unable to perform FTN on right, intact on the left. AFO present on the right. Ambulates with cane.  [Sclera] : the sclera and conjunctiva were normal [Outer Ear] : the ears and nose were normal in appearance [Hearing Threshold Finger Rub Not [place]] : hearing was normal [Neck Appearance] : the appearance of the neck was normal [] : the neck was supple [Respiration, Rhythm And Depth] : normal respiratory rhythm and effort [Heart Rate And Rhythm] : heart rate was normal and rhythm regular [Heart Sounds] : normal S1 and S2 [Arterial Pulses Carotid] : carotid pulses were normal with no bruits [Edema] : there was no peripheral edema [Musculoskeletal - Swelling] : no joint swelling seen [Skin Turgor] : normal skin turgor

## 2022-05-26 ENCOUNTER — NON-APPOINTMENT (OUTPATIENT)
Age: 72
End: 2022-05-26

## 2022-05-26 ENCOUNTER — APPOINTMENT (OUTPATIENT)
Dept: CARDIOLOGY | Facility: CLINIC | Age: 72
End: 2022-05-26
Payer: MEDICARE

## 2022-05-26 PROCEDURE — 93298 REM INTERROG DEV EVAL SCRMS: CPT

## 2022-05-26 PROCEDURE — G2066: CPT

## 2022-06-30 ENCOUNTER — APPOINTMENT (OUTPATIENT)
Dept: CARDIOLOGY | Facility: CLINIC | Age: 72
End: 2022-06-30

## 2022-06-30 ENCOUNTER — NON-APPOINTMENT (OUTPATIENT)
Age: 72
End: 2022-06-30

## 2022-06-30 PROCEDURE — 93298 REM INTERROG DEV EVAL SCRMS: CPT

## 2022-06-30 PROCEDURE — G2066: CPT

## 2022-08-04 ENCOUNTER — APPOINTMENT (OUTPATIENT)
Dept: CARDIOLOGY | Facility: CLINIC | Age: 72
End: 2022-08-04

## 2022-08-04 ENCOUNTER — NON-APPOINTMENT (OUTPATIENT)
Age: 72
End: 2022-08-04

## 2022-08-04 PROCEDURE — 93298 REM INTERROG DEV EVAL SCRMS: CPT

## 2022-08-04 PROCEDURE — G2066: CPT

## 2022-09-02 ENCOUNTER — INPATIENT (INPATIENT)
Facility: HOSPITAL | Age: 72
LOS: 1 days | Discharge: HOME | End: 2022-09-04
Attending: HOSPITALIST | Admitting: HOSPITALIST

## 2022-09-02 VITALS
HEART RATE: 80 BPM | OXYGEN SATURATION: 97 % | TEMPERATURE: 96 F | RESPIRATION RATE: 18 BRPM | SYSTOLIC BLOOD PRESSURE: 136 MMHG | DIASTOLIC BLOOD PRESSURE: 78 MMHG

## 2022-09-02 DIAGNOSIS — Z98.890 OTHER SPECIFIED POSTPROCEDURAL STATES: Chronic | ICD-10-CM

## 2022-09-02 DIAGNOSIS — I63.9 CEREBRAL INFARCTION, UNSPECIFIED: ICD-10-CM

## 2022-09-02 DIAGNOSIS — Z90.89 ACQUIRED ABSENCE OF OTHER ORGANS: Chronic | ICD-10-CM

## 2022-09-02 DIAGNOSIS — Z96.643 PRESENCE OF ARTIFICIAL HIP JOINT, BILATERAL: Chronic | ICD-10-CM

## 2022-09-02 DIAGNOSIS — Z94.5 SKIN TRANSPLANT STATUS: Chronic | ICD-10-CM

## 2022-09-02 LAB
ALBUMIN SERPL ELPH-MCNC: 3.4 G/DL — LOW (ref 3.5–5.2)
ALP SERPL-CCNC: 60 U/L — SIGNIFICANT CHANGE UP (ref 30–115)
ALT FLD-CCNC: 19 U/L — SIGNIFICANT CHANGE UP (ref 0–41)
ANION GAP SERPL CALC-SCNC: 7 MMOL/L — SIGNIFICANT CHANGE UP (ref 7–14)
APTT BLD: 29.2 SEC — SIGNIFICANT CHANGE UP (ref 27–39.2)
AST SERPL-CCNC: 21 U/L — SIGNIFICANT CHANGE UP (ref 0–41)
BASOPHILS # BLD AUTO: 0.03 K/UL — SIGNIFICANT CHANGE UP (ref 0–0.2)
BASOPHILS NFR BLD AUTO: 0.6 % — SIGNIFICANT CHANGE UP (ref 0–1)
BILIRUB SERPL-MCNC: 0.5 MG/DL — SIGNIFICANT CHANGE UP (ref 0.2–1.2)
BUN SERPL-MCNC: 10 MG/DL — SIGNIFICANT CHANGE UP (ref 10–20)
CALCIUM SERPL-MCNC: 7.8 MG/DL — LOW (ref 8.5–10.1)
CHLORIDE SERPL-SCNC: 98 MMOL/L — SIGNIFICANT CHANGE UP (ref 98–110)
CO2 SERPL-SCNC: 27 MMOL/L — SIGNIFICANT CHANGE UP (ref 17–32)
CREAT SERPL-MCNC: 0.9 MG/DL — SIGNIFICANT CHANGE UP (ref 0.7–1.5)
EGFR: 92 ML/MIN/1.73M2 — SIGNIFICANT CHANGE UP
EOSINOPHIL # BLD AUTO: 0.11 K/UL — SIGNIFICANT CHANGE UP (ref 0–0.7)
EOSINOPHIL NFR BLD AUTO: 2.4 % — SIGNIFICANT CHANGE UP (ref 0–8)
GLUCOSE SERPL-MCNC: 111 MG/DL — HIGH (ref 70–99)
HCT VFR BLD CALC: 37.5 % — LOW (ref 42–52)
HGB BLD-MCNC: 12.8 G/DL — LOW (ref 14–18)
IMM GRANULOCYTES NFR BLD AUTO: 0.4 % — HIGH (ref 0.1–0.3)
INR BLD: 1.15 RATIO — SIGNIFICANT CHANGE UP (ref 0.65–1.3)
LYMPHOCYTES # BLD AUTO: 0.82 K/UL — LOW (ref 1.2–3.4)
LYMPHOCYTES # BLD AUTO: 17.7 % — LOW (ref 20.5–51.1)
MCHC RBC-ENTMCNC: 32 PG — HIGH (ref 27–31)
MCHC RBC-ENTMCNC: 34.1 G/DL — SIGNIFICANT CHANGE UP (ref 32–37)
MCV RBC AUTO: 93.8 FL — SIGNIFICANT CHANGE UP (ref 80–94)
MONOCYTES # BLD AUTO: 0.41 K/UL — SIGNIFICANT CHANGE UP (ref 0.1–0.6)
MONOCYTES NFR BLD AUTO: 8.8 % — SIGNIFICANT CHANGE UP (ref 1.7–9.3)
NEUTROPHILS # BLD AUTO: 3.25 K/UL — SIGNIFICANT CHANGE UP (ref 1.4–6.5)
NEUTROPHILS NFR BLD AUTO: 70.1 % — SIGNIFICANT CHANGE UP (ref 42.2–75.2)
NRBC # BLD: 0 /100 WBCS — SIGNIFICANT CHANGE UP (ref 0–0)
PLATELET # BLD AUTO: 156 K/UL — SIGNIFICANT CHANGE UP (ref 130–400)
POTASSIUM SERPL-MCNC: 3.9 MMOL/L — SIGNIFICANT CHANGE UP (ref 3.5–5)
POTASSIUM SERPL-SCNC: 3.9 MMOL/L — SIGNIFICANT CHANGE UP (ref 3.5–5)
PROT SERPL-MCNC: 5.1 G/DL — LOW (ref 6–8)
PROTHROM AB SERPL-ACNC: 13.2 SEC — HIGH (ref 9.95–12.87)
RBC # BLD: 4 M/UL — LOW (ref 4.7–6.1)
RBC # FLD: 11.4 % — LOW (ref 11.5–14.5)
SARS-COV-2 RNA SPEC QL NAA+PROBE: SIGNIFICANT CHANGE UP
SODIUM SERPL-SCNC: 132 MMOL/L — LOW (ref 135–146)
TROPONIN T SERPL-MCNC: <0.01 NG/ML — SIGNIFICANT CHANGE UP
WBC # BLD: 4.64 K/UL — LOW (ref 4.8–10.8)
WBC # FLD AUTO: 4.64 K/UL — LOW (ref 4.8–10.8)

## 2022-09-02 PROCEDURE — 70496 CT ANGIOGRAPHY HEAD: CPT | Mod: 26,MA

## 2022-09-02 PROCEDURE — 70498 CT ANGIOGRAPHY NECK: CPT | Mod: 26,MA

## 2022-09-02 PROCEDURE — 93306 TTE W/DOPPLER COMPLETE: CPT | Mod: 26

## 2022-09-02 PROCEDURE — 99222 1ST HOSP IP/OBS MODERATE 55: CPT

## 2022-09-02 PROCEDURE — 99221 1ST HOSP IP/OBS SF/LOW 40: CPT | Mod: AI

## 2022-09-02 PROCEDURE — 99291 CRITICAL CARE FIRST HOUR: CPT | Mod: GC

## 2022-09-02 PROCEDURE — 0042T: CPT | Mod: MA

## 2022-09-02 PROCEDURE — 93010 ELECTROCARDIOGRAM REPORT: CPT

## 2022-09-02 RX ORDER — SODIUM CHLORIDE 9 MG/ML
50 INJECTION INTRAMUSCULAR; INTRAVENOUS; SUBCUTANEOUS
Refills: 0 | Status: DISCONTINUED | OUTPATIENT
Start: 2022-09-02 | End: 2022-09-02

## 2022-09-02 RX ORDER — LEVOTHYROXINE SODIUM 125 MCG
50 TABLET ORAL DAILY
Refills: 0 | Status: DISCONTINUED | OUTPATIENT
Start: 2022-09-02 | End: 2022-09-04

## 2022-09-02 RX ORDER — BACLOFEN 100 %
5 POWDER (GRAM) MISCELLANEOUS EVERY 8 HOURS
Refills: 0 | Status: DISCONTINUED | OUTPATIENT
Start: 2022-09-02 | End: 2022-09-04

## 2022-09-02 RX ORDER — ALTEPLASE 100 MG
68.9 KIT INTRAVENOUS ONCE
Refills: 0 | Status: COMPLETED | OUTPATIENT
Start: 2022-09-02 | End: 2022-09-02

## 2022-09-02 RX ORDER — ALTEPLASE 100 MG
7.6 KIT INTRAVENOUS ONCE
Refills: 0 | Status: COMPLETED | OUTPATIENT
Start: 2022-09-02 | End: 2022-09-02

## 2022-09-02 RX ORDER — PANTOPRAZOLE SODIUM 20 MG/1
40 TABLET, DELAYED RELEASE ORAL
Refills: 0 | Status: DISCONTINUED | OUTPATIENT
Start: 2022-09-02 | End: 2022-09-04

## 2022-09-02 RX ORDER — ATORVASTATIN CALCIUM 80 MG/1
80 TABLET, FILM COATED ORAL AT BEDTIME
Refills: 0 | Status: DISCONTINUED | OUTPATIENT
Start: 2022-09-02 | End: 2022-09-04

## 2022-09-02 RX ORDER — APIXABAN 2.5 MG/1
1 TABLET, FILM COATED ORAL
Qty: 0 | Refills: 0 | DISCHARGE

## 2022-09-02 RX ADMIN — SODIUM CHLORIDE 20 MILLILITER(S): 9 INJECTION INTRAMUSCULAR; INTRAVENOUS; SUBCUTANEOUS at 11:41

## 2022-09-02 RX ADMIN — ALTEPLASE 456 MILLIGRAM(S): KIT at 10:55

## 2022-09-02 RX ADMIN — Medication 5 MILLIGRAM(S): at 21:36

## 2022-09-02 RX ADMIN — ATORVASTATIN CALCIUM 80 MILLIGRAM(S): 80 TABLET, FILM COATED ORAL at 21:36

## 2022-09-02 RX ADMIN — ALTEPLASE 68.9 MILLIGRAM(S): KIT at 10:56

## 2022-09-02 NOTE — ED PROVIDER NOTE - OBJECTIVE STATEMENT
Patient is a 71y Male with prior CVAx2 with residual right sided weakness, dysarthria and aphasia with TPA administeredx1 presenting to the ED for evaluation of acute onset headache, blurry vision in his left eye, and LUE weakness that all occurred when he was going for his morning walk today at 920am. Headache and LUE weakness resolved prior to arrival to the ED and patient's left visual deficit has improved but not completely resolved. Otherwise denies fevers, chills, cp, sob, n/v/d, abd pain, back pain, calf tenderness or swelling, recent travel.

## 2022-09-02 NOTE — ED PROVIDER NOTE - NS ED ROS FT
Constitutional:  See HPI  Eyes:  + visual changes  ENMT: No neck pain or stiffness  Cardiac:  No chest pain  Respiratory:  No cough or respiratory distress.   GI:  No nausea, vomiting, diarrhea or abdominal pain.  :  No dysuria, frequency or burning.  MS:  No back pain.  Neuro:  + headache   Skin:  No skin rash  Except as documented in the HPI,  all other systems are negative

## 2022-09-02 NOTE — SPEECH LANGUAGE PATHOLOGY EVALUATION - SLP VERBAL STRATEGIES
"Jennifer Mathews 's Coumadin will be dosed and monitored by Pharmacy at the request of LAST Yeung   Indication: Atrial Fibrillation  Target INR is 2-3  Coumadin Monitoring INR   Date Value Ref Range Status   03/23/2011 2.1 (H) 0.8 - 1.2 Final     Comment:     ACCP Guidelline for Coumadin usage recommends a "target range" of  2.0 - 3.0 for INR for all indicators except mechanical heart valves  and antiphospholipid syndromes which should use 2.5 - 3.5.  .     INR   Date Value Ref Range Status   04/10/2018 1.3 (H) 0.8 - 1.2 Final     Comment:     Coumadin Therapy:  2.0 - 3.0 for INR for all indicators except mechanical heart valves  and antiphospholipid syndromes which should use 2.5 - 3.5.       Patient was seen today in coumadin clinic prior to hospital admission and dosing recommendations per coumadin clinic [facility] was to give a 3.75 mg dose today and then 2.5 mg po daily thereafter.  I will give a 4 mg po x 1 dose today and then resume 2.5 mg po daily and follow PT/INR daily. Dose adjustments will be made accordingly.      Thank you for allowing us to participate in this patient's care.     Florence Rawls [facility] 4/10/2018 7:16 PM    " phonemic cues/semantic cues

## 2022-09-02 NOTE — H&P ADULT - HISTORY OF PRESENT ILLNESS
FROM ER:  · HPI Objective Statement: Patient is a 71y Male with prior CVAx2 with residual right sided weakness, dysarthria and aphasia with TPA administeredx1 presenting to the ED for evaluation of acute onset headache, blurry vision in his left eye, and LUE weakness that all occurred when he was going for his morning walk today at 920am. Headache and LUE weakness resolved prior to arrival to the ED and patient's left visual deficit has improved but not completely resolved. Otherwise denies fevers, chills, cp, sob, n/v/d, abd pain, back pain, calf tenderness or swelling, recent travel.  upon further review and speaking with family over phone:  post tpa pt appears to be at baseline which is dysarthric at baseline, RUE>RLE weakness - this morning had left sided weakness which resolved prior to arrival and left visual defects - which has improved  still states some blurry vision but no focal defects   denies cp., palp, sob, HA - feel smuch better now  sp tpa in ER - NIH 8 due to old deficits and mild visual changes residual

## 2022-09-02 NOTE — OCCUPATIONAL THERAPY INITIAL EVALUATION ADULT - SPECIFY REASON(S)
Chart reviewed. As discussed with WILLIE Ritchie, hold bedside OT IE at this time 2/2 bedrest s/p tPA. OT to f/u when appropriate. WILLIE Ritchie aware.

## 2022-09-02 NOTE — SPEECH LANGUAGE PATHOLOGY EVALUATION - SLP PERTINENT HISTORY OF CURRENT PROBLEM
· HPI Objective Statement: Patient is a 71y Male with prior CVAx2 with residual right sided weakness, dysarthria and aphasia with TPA administeredx1 presenting to the ED for evaluation of acute onset headache, blurry vision in his left eye, and LUE weakness that all occurred when he was going for his morning walk today at 920am. Headache and LUE weakness resolved prior to arrival to the ED and patient's left visual deficit has improved but not completely resolved. Otherwise denies fevers, chills, cp, sob, n/v/d, abd pain, back pain, calf tenderness or swelling, recent travel.

## 2022-09-02 NOTE — PATIENT PROFILE ADULT - FALL HARM RISK - HARM RISK INTERVENTIONS

## 2022-09-02 NOTE — ED PROVIDER NOTE - ATTENDING CONTRIBUTION TO CARE
Patient complains of blurriness of the left side vision.  He also complains of weakness of the left upper extremity.  This was of acute onset today at 9:20 AM.  The symptoms occurred while he was taking his daily morning walk outside.  Patient has a history of a stroke 2 years ago.  Stroke left him with right-sided hemiparesis upper greater than lower, facial droop, dysarthria, aphasia.  He received tPA at that time.  Patient was on Eliquis for DVT, but he has not taking Eliquis for several months.  Vital signs noted.  Airway is intact.  Chest clear.  Heart regular rate no murmur.  Neck no bruit.  Neuro see NIH stroke scale. I accompanied this patient to the CAT scan suite.  I reviewed the images in real-time.  This aided my medical decision making.  No hemorrhage was seen on the dry CT.  Door to tPA was somewhat delayed.  This was due to my decision to allow the IV contrast scans to be performed immediately after the dry CT.  I felt this was an the patient's best interest since he had advanced prior neurological weakness.  My opinion, this would allow safe for administration of tPA.  I performed the exam with his wife present.  All right-sided findings, speech, aphasia, is old.  The new left lateral hemianopsia is not consistent with the prior stroke in my opinion.  This opinion was shared with the telestroke neurologist.  The risk and benefit of tPA was discussed with the patient and the family.  They are agreeable to tPA.  tPA was given in the emergency department for acute stroke. Patient complains of blurriness of the left side vision.  He also complains of weakness of the left upper extremity.  This was of acute onset today at 9:20 AM.  The symptoms occurred while he was taking his daily morning walk outside.  Patient has a history of a stroke 2 years ago.  Stroke left him with right-sided hemiparesis upper greater than lower, facial droop, dysarthria, aphasia.  He received tPA at that time.  Patient was on Eliquis for DVT, but he has not taking Eliquis for several months.  Vital signs noted.  Airway is intact.  Chest clear.  Heart regular rate no murmur.  Neck no bruit.  Neuro see NIH stroke scale. I accompanied this patient to the CAT scan suite.  I reviewed the images in real-time.  This aided my medical decision making.  No hemorrhage was seen on the dry CT.  Door to tPA was somewhat delayed.  This was due to my decision to allow the IV contrast scans to be performed immediately after the dry CT.  I felt this was an the patient's best interest since he had advanced prior neurological weakness.  In my opinion, this would allow safe for administration of tPA.  I performed the exam with his wife present.  All right-sided findings, speech, aphasia, is old.  The new left lateral hemianopsia is not consistent with the prior stroke in my opinion.  This opinion was shared with the telestroke neurologist.  The risk and benefit of tPA was discussed with the patient and the family.  They are agreeable to tPA.  tPA was given in the emergency department for acute stroke.

## 2022-09-02 NOTE — ED PROVIDER NOTE - FAMILY DETAILS FREE TEXT FOR MDM ADDL HISTORY OBTAINED FROM QUESTION
PI details, past medical history, and decision to give tPA was discussed with the wife at the patient's bedside.

## 2022-09-02 NOTE — H&P ADULT - PROBLEM SELECTOR PLAN 1
post tpa protocol in orders for vitals and neuro checks  no blood draws, antiplatelets for 24 hrs  repeat CTH in 24 hrs from tpa   when cleared by neuro - resume DAPT  cont statin high intensity and rest of home meds  neuro cs  pt/ot/rehab/s&s  echo  tele monitor  pt had old loop recorder - may need new one   unclear etiology of recurrent stroke - follow up neuro   keep bp <180 sbp post tpa   MRI after repeat cth per neuro   recall neuro stat if any change in NIHSS or new symptoms

## 2022-09-02 NOTE — ED ADULT NURSE NOTE - CAS EDN DISCHARGE INTERVENTIONS
Patient comes to clinic for follow up anticoagulation visit.   Last INR 10/03/2017 was 2.9. Dose decreased per protocol.   Today's INR is 1.8 and is below goal range.  Patient to decrease her green intake.   Current warfarin dosing verified with patient. Patient was informed that today's INR result is below therapeutic range and instructed to increase current dose. Dr. Lucas is in the office today supervising the treatment.    Call your physician immediately if you notice any of the following symptoms of a blood clot:   Sudden weakness in any limb  Numbness or tingling anywhere  Visual changes or loss of sight in either eye  Sudden onset of slurred speech or inability to speak  Dizziness or faintness  New pain, swelling, redness or heat in any extremity  New SOB or chest pain  Symptoms associated with blood clotting/low INR reviewed and verbalizes understanding.    Patient was instructed to contact the [facility] with any unusual bleeding or bruising, any changes in medications or over the counter medications, start of antibiotics, changes in diet or health status, any acute illnesses, and if there are any other questions or concerns. Patient verbalized understanding of above.            
IV intact

## 2022-09-02 NOTE — ED PROVIDER NOTE - PROGRESS NOTE DETAILS
PK: Patient seen on arrival to the ED. ABCs intact. Fingerstick 113. Strength 5/5 on left side with no facial droop. Residual right sided upper ext weakness appreciated as well as aphasia. PK: Case disc with radiology Dr. Gant, pt has chronic infarct on the left, no evidence of acute infarct on the right or evidence of cerebral hemorrhage. Jim: Case discussed with telestroke neurology.  tPA recommended.  Risks and benefits of tPA discussed with patient and wife.  They both consented tPA treatment.  Of note, the patient is retired  and the wife is a nurse.  Patient's correct name and date of birth was verified.  His actual weight was confirmed.  Dose of thrombolytics to be administered was confirmed by 2 clinicians.  It was verified the patient had no history of intracranial bleed and had no evidence of intracranial bleed on CT.  It was verified that the patient had no anticoagulation use.  It was verified the patient was symptoms and last known well was under 4.5 hours.  7.6 mg of tPA was given as a bolus at 1052.  The blood pressure at that time was 116/75. PK: Case disc with radiology Dr. Gant about CTA, pt has left sided M1 segment infarction of undetermined age, no evidence of acute infarct on the right. PK: Case disc with radiology Dr. Gant, pt has chronic infarct on the left noted on CT head non con, no evidence of acute infarct on the right or evidence of cerebral hemorrhage. PK: Case endorsed to Katie PK: Pt endorsed to Katie PK: Pt re-assessed, improved left sided hemianopsia. Vitals WNL

## 2022-09-02 NOTE — H&P ADULT - NSICDXPASTMEDICALHX_GEN_ALL_CORE_FT
PAST MEDICAL HISTORY:  Cerebrovascular accident (CVA)     GERD (gastroesophageal reflux disease)     High cholesterol      PAST MEDICAL HISTORY:  Cerebrovascular accident (CVA)     DVT, lower extremity was prev on eliquis - taken off post trreatment    GERD (gastroesophageal reflux disease)     High cholesterol

## 2022-09-02 NOTE — SPEECH LANGUAGE PATHOLOGY EVALUATION - COMMENTS
Pt independently stated "I can read perfectly." Pt presented with short paragraph to which he was able to read with 80% intelligibility Pt with moderate expressive aphasia characterized by word-finding difficulties and impaired fluency during structured tasks and conversational tasks Vocal quality WFL Auditory comprehension grossly WFL Pt is s/p TPA and therefore NPO for 12 hours. Bedside swallow eval deferred until pt appropriate for PO trials Recommend OP speech therapy  Pt with hx CVA and baseline dysarthria.   Possible apraxia due to inconsistent motor speech errors Patient with moderate dysarthria and likely apraxia due to inconsistent motor speech errors. Models and visual cues were helpful in improving pt's speech intelligibility. Strategies such as over-articulation and slower rate of speech were beneficial for pt

## 2022-09-02 NOTE — ED PROVIDER NOTE - CLINICAL SUMMARY MEDICAL DECISION MAKING FREE TEXT BOX
Pt presents with acute stroke symptoms.  He is critically ill.  Patient was administered tPA in the emergency department.  He will be admitted to the ICU.

## 2022-09-02 NOTE — SPEECH LANGUAGE PATHOLOGY EVALUATION - SLP GENERAL OBSERVATIONS
Pt alert and cooperative. S/P TPA. AOx2. Dysarthria, aphasia, ?apraxia noted at baseline during conversation

## 2022-09-02 NOTE — ED ADULT NURSE NOTE - NSICDXPASTSURGICALHX_GEN_ALL_CORE_FT
PAST SURGICAL HISTORY:  H/O bilateral hip replacements     H/O skin graft upper body  legs    History of loop recorder     S/P tonsillectomy

## 2022-09-02 NOTE — ED ADULT NURSE NOTE - CHIEF COMPLAINT QUOTE
Pt brought in by ambulance as pre-notification for Stroke Code. Pt complains of left upper extremity weakness and headache as per EMS. Pt has a history of two strokes in the past with right sided weakness and expressive aphasia. Fingerstick 113 upon arrival to ED.

## 2022-09-02 NOTE — H&P ADULT - ASSESSMENT
72 yo male with hx of cva x2 - once prior with tpa - now presents with ataxia, left sided weakness, and left sided visual defects - sp stroke code - tpa given in ER with Dr Whyte neuro - improvement in symptoms  Left arm weakness resolved prior to presentation, apahasia resolved - now with chronic dysarthria at baseline , visual defects improved as well - admit to icu for post tpa protocol

## 2022-09-02 NOTE — CHART NOTE - NSCHARTNOTEFT_GEN_A_CORE
Brief Telestroke Note:    Responded via audio to stroke code at [facility].   Pt is a 72 yo M with PMHX of left MCA stroke in 2020 with residual mild aphasia, dysarthria and right hemiparesis, who presents with new onset blurred vision and transient left arm weakness. LKW 0920. NIHSS 8 per ED physician, mostly chronic symptoms however do note a new left homonymous hemianopia. CT head with chronic left MCA stroke. CTA head/neck with left MCA occlusion, likely chronic (worse than in 2020 but no interval vessel imaging since). Confirmed with ED physician again, all of his right sided weakness/dysarthria is chronic and unchanged as per p's wife at bedside.   Per chart review, home medications include eliquis, last filled 12/2021, however pt and his wife are adamant that he has not been taking eliquis for many months. Current home ATP include ASA/plavix.  Pt is an IV alteplase candidate given concern for acute stroke with debilitating symptoms given new LHH. Discussed with ED physician, plan to proceed with IV alteplase after discussing risks vs benefit with pt.     Admit to ICU after IV thrombolytics  ***PLEASE USE POST ALTEPLASE STROKE ORDERSET***  MAINTAIN BP <180/105 MMHG AFTER IV TPA  NEUROCHECKS/VITALS AS PER POST TPA PROTOCOL  SCD'S FOR DVT PPX  CT HEAD 24 HRS POST TPA AND FOR ANY SUDDEN NEURO DECLINE

## 2022-09-02 NOTE — ED PROVIDER NOTE - PHYSICAL EXAMINATION
CONSTITUTIONAL: NAD  SKIN: Warm dry  HEAD: NCAT  EYES: NL inspection  ENT: MMM  NECK: Supple; non tender.  CARD: RRR  RESP: CTAB  ABD: S/NT no R/G  EXT: no pedal edema  NEURO: Left sided hemianopsia, decreased strength RUE, 5/5 strength to Left upper, lower and Right lower ext. Positive finger to nose and heel to shin, + dysarthria + aphasia.   PSYCH: Cooperative, appropriate.

## 2022-09-02 NOTE — ED ADULT TRIAGE NOTE - CHIEF COMPLAINT QUOTE
Pt brought in by ambulance as pre-notification for Stroke Code. Pt complains of left upper extremity weakness and headache as per EMS. Pt has a history of two strokes in the past with right sided weakness and expressive aphasia. Pt brought in by ambulance as pre-notification for Stroke Code. Pt complains of left upper extremity weakness and headache as per EMS. Pt has a history of two strokes in the past with right sided weakness and expressive aphasia. Fingerstick 113 upon arrival to ED.

## 2022-09-02 NOTE — H&P ADULT - NSHPPHYSICALEXAM_GEN_ALL_CORE
nad  aaox3  dysarthria at baseline chronic  no apahsia   [id number]  ctabl  sofntnt+bs  cn intact eomi  perrla  chronic RUE>RLe weakness 3/5  no drift LEFT side  FTN intact  gait not assessed

## 2022-09-03 LAB
ALBUMIN SERPL ELPH-MCNC: 4 G/DL — SIGNIFICANT CHANGE UP (ref 3.5–5.2)
ALP SERPL-CCNC: 69 U/L — SIGNIFICANT CHANGE UP (ref 30–115)
ALT FLD-CCNC: 21 U/L — SIGNIFICANT CHANGE UP (ref 0–41)
ANION GAP SERPL CALC-SCNC: 8 MMOL/L — SIGNIFICANT CHANGE UP (ref 7–14)
AST SERPL-CCNC: 25 U/L — SIGNIFICANT CHANGE UP (ref 0–41)
BILIRUB SERPL-MCNC: 0.6 MG/DL — SIGNIFICANT CHANGE UP (ref 0.2–1.2)
BUN SERPL-MCNC: 10 MG/DL — SIGNIFICANT CHANGE UP (ref 10–20)
CALCIUM SERPL-MCNC: 8.7 MG/DL — SIGNIFICANT CHANGE UP (ref 8.5–10.1)
CHLORIDE SERPL-SCNC: 104 MMOL/L — SIGNIFICANT CHANGE UP (ref 98–110)
CO2 SERPL-SCNC: 25 MMOL/L — SIGNIFICANT CHANGE UP (ref 17–32)
CREAT SERPL-MCNC: 0.9 MG/DL — SIGNIFICANT CHANGE UP (ref 0.7–1.5)
EGFR: 92 ML/MIN/1.73M2 — SIGNIFICANT CHANGE UP
GLUCOSE SERPL-MCNC: 95 MG/DL — SIGNIFICANT CHANGE UP (ref 70–99)
MAGNESIUM SERPL-MCNC: 1.8 MG/DL — SIGNIFICANT CHANGE UP (ref 1.8–2.4)
POTASSIUM SERPL-MCNC: 4.3 MMOL/L — SIGNIFICANT CHANGE UP (ref 3.5–5)
POTASSIUM SERPL-SCNC: 4.3 MMOL/L — SIGNIFICANT CHANGE UP (ref 3.5–5)
PROT SERPL-MCNC: 6.3 G/DL — SIGNIFICANT CHANGE UP (ref 6–8)
SODIUM SERPL-SCNC: 137 MMOL/L — SIGNIFICANT CHANGE UP (ref 135–146)

## 2022-09-03 PROCEDURE — 99233 SBSQ HOSP IP/OBS HIGH 50: CPT

## 2022-09-03 PROCEDURE — 99222 1ST HOSP IP/OBS MODERATE 55: CPT

## 2022-09-03 PROCEDURE — 99232 SBSQ HOSP IP/OBS MODERATE 35: CPT

## 2022-09-03 PROCEDURE — 70450 CT HEAD/BRAIN W/O DYE: CPT | Mod: 26

## 2022-09-03 RX ORDER — ASPIRIN/CALCIUM CARB/MAGNESIUM 324 MG
81 TABLET ORAL DAILY
Refills: 0 | Status: DISCONTINUED | OUTPATIENT
Start: 2022-09-03 | End: 2022-09-04

## 2022-09-03 RX ORDER — ASPIRIN/CALCIUM CARB/MAGNESIUM 324 MG
325 TABLET ORAL ONCE
Refills: 0 | Status: COMPLETED | OUTPATIENT
Start: 2022-09-03 | End: 2022-09-03

## 2022-09-03 RX ADMIN — ATORVASTATIN CALCIUM 80 MILLIGRAM(S): 80 TABLET, FILM COATED ORAL at 21:03

## 2022-09-03 RX ADMIN — Medication 5 MILLIGRAM(S): at 21:03

## 2022-09-03 RX ADMIN — Medication 325 MILLIGRAM(S): at 17:37

## 2022-09-03 RX ADMIN — PANTOPRAZOLE SODIUM 40 MILLIGRAM(S): 20 TABLET, DELAYED RELEASE ORAL at 06:04

## 2022-09-03 RX ADMIN — Medication 50 MICROGRAM(S): at 06:04

## 2022-09-03 RX ADMIN — Medication 5 MILLIGRAM(S): at 13:22

## 2022-09-03 RX ADMIN — Medication 20 MILLIGRAM(S): at 11:08

## 2022-09-03 RX ADMIN — Medication 5 MILLIGRAM(S): at 06:04

## 2022-09-03 NOTE — PHYSICAL THERAPY INITIAL EVALUATION ADULT - GAIT DEVIATIONS NOTED, PT EVAL
decreased step length/decreased stride length/increased stride width/decreased swing-to-stance ratio/decreased weight-shifting ability

## 2022-09-03 NOTE — CONSULT NOTE ADULT - REASON FOR ADMISSION
stroke sp tpa - left sided weakness, ataxia, visual deficit
stroke sp tpa - left sided weakness, ataxia, visual deficit

## 2022-09-03 NOTE — PROGRESS NOTE ADULT - SUBJECTIVE AND OBJECTIVE BOX
Patient is a 71y old  Male who presents with a chief complaint of stroke sp tpa - left sided weakness, ataxia, visual deficit (02 Sep 2022 14:12)        HPI:  FROM ER:  · HPI Objective Statement: Patient is a 71y Male with prior CVAx2 with residual right sided weakness, dysarthria and aphasia with TPA administeredx1 presenting to the ED for evaluation of acute onset headache, blurry vision in his left eye, and LUE weakness that all occurred when he was going for his morning walk today at 920am. Headache and LUE weakness resolved prior to arrival to the ED and patient's left visual deficit has improved but not completely resolved. Otherwise denies fevers, chills, cp, sob, n/v/d, abd pain, back pain, calf tenderness or swelling, recent travel.  upon further review and speaking with family over phone:  post tpa pt appears to be at baseline which is dysarthric at baseline, RUE>RLE weakness - this morning had left sided weakness which resolved prior to arrival and left visual defects - which has improved  still states some blurry vision but no focal defects   denies cp., palp, sob, HA - feel smuch better now  sp tpa in ER - NIH 8 due to old deficits and mild visual changes residual  (02 Sep 2022 14:12)      Pt evaluated on rounds.  I reviewed the radiology tests and hospital record.    I reviewed previous notes on this patient.    Interval Events: No overnight events.        REVIEW OF SYSTEMS:   see HPI      OBJECTIVE:  ICU Vital Signs Last 24 Hrs  T(C): 36.4 (03 Sep 2022 03:00), Max: 36.4 (03 Sep 2022 03:00)  T(F): 97.5 (03 Sep 2022 03:00), Max: 97.5 (03 Sep 2022 03:00)  HR: 55 (03 Sep 2022 05:00) (49 - 80)  BP: 138/71 (03 Sep 2022 05:00) (110/67 - 144/69)  BP(mean): 99 (03 Sep 2022 05:00) (82 - 107)  RR: 17 (03 Sep 2022 05:00) (16 - 35)  SpO2: 94% (03 Sep 2022 05:00) (94% - 98%)    O2 Parameters below as of 03 Sep 2022 04:00  Patient On (Oxygen Delivery Method): room air              09-02 @ 07:01  -  09-03 @ 05:46  --------------------------------------------------------  IN: 120 mL / OUT: 500 mL / NET: -380 mL      CAPILLARY BLOOD GLUCOSE  113 (02 Sep 2022 10:04)            PHYSICAL EXAM:       · ENMT:   Airway patent,   Nasal mucosa clear.  Mouth with normal mucosa.   No thrush    · EYES:   Clear bilaterally,   pupils equal,   round and reactive to light.    · CARDIAC:   Normal rate,   regular rhythm.    Heart sounds S1, S2.   No murmurs, no rubs or gallops on auscultation  no edema        CAROTID:   normal systolic impulse  no bruits    · RESPIRATORY:   rales  normal chest expansion  no retractions or use of accessory muscles  percussion of chest demonstrates no hyperresonance or dullness    · GASTROINTESTINAL:  Abdomen soft,   non-tender,   + BS  liver/spleen not palpable    · MUSCULOSKELETAL:   no clubbing, cyanosis      · SKIN:   Skin normal color for race,   warm, dry   No evidence of rash.        · HEME LYMPH:   no splenomegaly.  No cervical  lymphadenopathy.  no inguinal lymphadenopathy    HOSPITAL MEDICATIONS:  MEDICATIONS  (STANDING):  atorvastatin 80 milliGRAM(s) Oral at bedtime  baclofen 5 milliGRAM(s) Oral every 8 hours  levothyroxine 50 MICROGram(s) Oral daily  pantoprazole    Tablet 40 milliGRAM(s) Oral before breakfast  PARoxetine 20 milliGRAM(s) Oral daily    MEDICATIONS  (PRN):    sodium chloride 0.9%.: Solution, 50 milliLiter(s) infuse at 20 mL/Hr  Special Instructions: For post-alteplase continuous infusion, infuse at same rate as alteplase until volume to be infused (VTBI) is complete using the same priming tube.  Provider's Contact #: [phone]      LABS:                        12.8   4.64  )-----------( 156      ( 02 Sep 2022 10:20 )             37.5     09-02    132<L>  |  98  |  10  ----------------------------<  111<H>  3.9   |  27  |  0.9    Ca    7.8<L>      02 Sep 2022 10:20    TPro  5.1<L>  /  Alb  3.4<L>  /  TBili  0.5  /  DBili  x   /  AST  21  /  ALT  19  /  AlkPhos  60  09-02    PT/INR - ( 02 Sep 2022 10:20 )   PT: 13.20 sec;   INR: 1.15 ratio         PTT - ( 02 Sep 2022 10:20 )  PTT:29.2 sec    CARDIAC MARKERS ( 02 Sep 2022 10:20 )  x     / <0.01 ng/mL / x     / x     / x          COVID-19 PCR: NotDetec (02 Sep 2022 10:00)      RADIOLOGY: Today I personally interpreted the latest CXR and other pertinent films.

## 2022-09-03 NOTE — OCCUPATIONAL THERAPY INITIAL EVALUATION ADULT - LIVES WITH, PROFILE
in a private house +4 steps to enter then all on one level; no commode; 1 grab bar in walk in shower no shower chair/spouse

## 2022-09-03 NOTE — CONSULT NOTE ADULT - SUBJECTIVE AND OBJECTIVE BOX
HPI: 71y Male with prior CVAx2 with residual right sided weakness, dysarthria and aphasia with TPA administeredx1 presenting to the ED for evaluation of acute onset headache, blurry vision in his left eye, and LUE weakness that all occurred when he was going for his morning walk today at 9,00am. Headache and LUE weakness resolved prior to arrival to the ED and patient's left visual deficit has improved but not completely resolved. Otherwise denies fevers, chills, cp, sob, n/v/d, abd pain, back pain, calf tenderness or swelling, recent travel.  upon further review and speaking with family over phone:  post tpa pt appears to be at baseline which is dysarthric at baseline, RUE>RLE weakness - this morning had left sided weakness which resolved prior to arrival and left visual defects - which has improved  still states some blurry vision but no focal defects   denies cp., palp, sob, HA - feel much better now  sp tpa in ER - ptn seen at  bed side nad  dysarthric notce  rt ue contraction       PTN  REFERRED TO ACUTE  REHAB  FOR  EVAL AND  TX   PAST MEDICAL & SURGICAL HISTORY:  GERD (gastroesophageal reflux disease)      Cerebrovascular accident (CVA)      High cholesterol      DVT, lower extremity  was prev on eliquis - taken off post trreatment      H/O skin graft  upper body  legs      H/O bilateral hip replacements      S/P tonsillectomy      History of loop recorder          Hospital Course:    TODAY'S SUBJECTIVE & REVIEW OF SYMPTOMS:     Constitutional WNL   Cardio WNL   Resp WNL   GI WNL  Heme WNL  Endo WNL  Skin WNL  MSK WNL  Neuro WNL  Cognitive WNL  Psych WNL      MEDICATIONS  (STANDING):  atorvastatin 80 milliGRAM(s) Oral at bedtime  baclofen 5 milliGRAM(s) Oral every 8 hours  levothyroxine 50 MICROGram(s) Oral daily  pantoprazole    Tablet 40 milliGRAM(s) Oral before breakfast  PARoxetine 20 milliGRAM(s) Oral daily    MEDICATIONS  (PRN):      FAMILY HISTORY:      Allergies      Intolerances        SOCIAL HISTORY:    [  ] Etoh  [  ] Smoking  [  ] Substance abuse     Home Environment:  [  ] Home Alone  [ x ] Lives with Family  [  ] Home Health Aid    Dwelling:  [  ] Apartment  [ x ] Private House  [  ] Adult Home  [  ] Skilled Nursing Facility      [  ] Short Term  [  ] Long Term  [ x ] Stairs       Elevator [  ]    FUNCTIONAL STATUS PTA: (Check all that apply)  Ambulation: [ x  ]Independent    [  ] Dependent     [  ] Non-Ambulatory  Assistive Device: [ x ] SA Cane  [  ]  Q Cane  [  ] Walker  [  ]  Wheelchair  ADL : [x  ] Independent  [  ]  Dependent       Vital Signs Last 24 Hrs  T(C): 36.4 (03 Sep 2022 03:00), Max: 36.4 (03 Sep 2022 03:00)  T(F): 97.5 (03 Sep 2022 03:00), Max: 97.5 (03 Sep 2022 03:00)  HR: 54 (03 Sep 2022 06:00) (49 - 80)  BP: 127/75 (03 Sep 2022 06:00) (110/67 - 144/69)  BP(mean): 95 (03 Sep 2022 06:00) (82 - 107)  RR: 20 (03 Sep 2022 06:00) (16 - 35)  SpO2: 94% (03 Sep 2022 06:00) (94% - 98%)    Parameters below as of 03 Sep 2022 04:00  Patient On (Oxygen Delivery Method): room air          PHYSICAL EXAM: Alert & Oriented X3  GENERAL: NAD, well-groomed, well-developed  HEAD:  Atraumatic, Normocephalic  EYES: EOMI, PERRLA, conjunctiva and sclera clear  NECK: Supple, No JVD, Normal thyroid  CHEST/LUNG: Clear to percussion bilaterally; No rales, rhonchi, wheezing, or rubs  HEART: Regular rate and rhythm; No murmurs, rubs, or gallops  ABDOMEN: Soft, Nontender, Nondistended; Bowel sounds present  EXTREMITIES:  2+ Peripheral Pulses, No clubbing, cyanosis, or edema    NERVOUS SYSTEM:  Cranial Nerves 2-12 intact [ x ] Abnormal  [  ]  ROM: WFL all extremities [ limited  rt  ue  ]  ]  Motor Strength: WFL all extremities  [  ]  Abnormal [3/4 rt ue 4-5/5  rt le   ]  Sensation: intact to light touch [  ] Abnormal [  ]  Reflexes: Symmetric [ x ]  Abnormal [  ]    FUNCTIONAL STATUS:  Bed Mobility: Independent [  ]  Supervision [  ]  Needs Assistance [  ]  N/A [ x ]  Transfers: Independent [  ]  Supervision [  ]  Needs Assistance [  ]  N/A [x  ]   Ambulation: Independent [  ]  Supervision [  ]  Needs Assistance [  ]  N/A [ x ]  ADL: Independent [  ] Requires Assistance [  ] N/A [ x ]  SEE PT/OT IE NOTES  ptn  is  sp tpa   LABS:                        12.8   4.64  )-----------( 156      ( 02 Sep 2022 10:20 )             37.5     09-02    132<L>  |  98  |  10  ----------------------------<  111<H>  3.9   |  27  |  0.9    Ca    7.8<L>      02 Sep 2022 10:20    TPro  5.1<L>  /  Alb  3.4<L>  /  TBili  0.5  /  DBili  x   /  AST  21  /  ALT  19  /  AlkPhos  60  09-02    PT/INR - ( 02 Sep 2022 10:20 )   PT: 13.20 sec;   INR: 1.15 ratio         PTT - ( 02 Sep 2022 10:20 )  PTT:29.2 sec      RADIOLOGY & ADDITIONAL STUDIES:< from: CT Brain Perfusion Maps Stroke (09.02.22 @ 10:19) >  IMPRESSION:    CT PERFUSION:  Perfusion abnormality of 60 mL (CBF<30%), and 136 mL (Tmax>6sec)   corresponding to the region of chronic left MCA infarct. Recommend   further evaluation with MRI if there is clinical concern for acute on   chronic infarct.    CTA HEAD/NECK:  Occlusion in the left M1 MCA with reconstitution of the enhancement in   the M2 branches distal to the trifurcation,age indeterminate.      < end of copied text >      Assesment:

## 2022-09-03 NOTE — PROGRESS NOTE ADULT - SUBJECTIVE AND OBJECTIVE BOX
KAINYDIA RODRIGUEZ  71y, Male  Allergy:     OVERNIGHT EVENTS:    PAST MEDICAL & SURGICAL HISTORY:  GERD (gastroesophageal reflux disease)      Cerebrovascular accident (CVA)      High cholesterol      DVT, lower extremity  was prev on eliquis - taken off post trreatment      H/O skin graft  upper body  legs      H/O bilateral hip replacements      S/P tonsillectomy      History of loop recorder          VITALS:  T(F): 96 (09-03-22 @ 11:01), Max: 97.5 (09-03-22 @ 03:00)  HR: 58 (09-03-22 @ 12:00)  BP: 140/77 (09-03-22 @ 12:00) (110/67 - 141/67)  RR: 18 (09-03-22 @ 12:00)  SpO2: 96% (09-03-22 @ 12:00)    TESTS & MEASUREMENTS:  Height (cm): 182.9 (09-02-22 @ 14:00)  Weight (kg): 83.8 (09-02-22 @ 14:00)  BMI (kg/m2): 25.1 (09-02-22 @ 14:00)    09-02-22 @ 07:01  -  09-03-22 @ 07:00  --------------------------------------------------------  IN: 220 mL / OUT: 650 mL / NET: -430 mL                            12.8   4.64  )-----------( 156      ( 02 Sep 2022 10:20 )             37.5     PT/INR - ( 02 Sep 2022 10:20 )   PT: 13.20 sec;   INR: 1.15 ratio         PTT - ( 02 Sep 2022 10:20 )  PTT:29.2 sec  09-03    137  |  104  |  10  ----------------------------<  95  4.3   |  25  |  0.9    Ca    8.7      03 Sep 2022 11:08  Mg     1.8     09-03    TPro  6.3  /  Alb  4.0  /  TBili  0.6  /  DBili  x   /  AST  25  /  ALT  21  /  AlkPhos  69  09-03    LIVER FUNCTIONS - ( 03 Sep 2022 11:08 )  Alb: 4.0 g/dL / Pro: 6.3 g/dL / ALK PHOS: 69 U/L / ALT: 21 U/L / AST: 25 U/L / GGT: x           CARDIAC MARKERS ( 02 Sep 2022 10:20 )  x     / <0.01 ng/mL / x     / x     / x                RADIOLOGY & ADDITIONAL TESTS:    MEDICATIONS:  MEDICATIONS  (STANDING):  atorvastatin 80 milliGRAM(s) Oral at bedtime  baclofen 5 milliGRAM(s) Oral every 8 hours  levothyroxine 50 MICROGram(s) Oral daily  pantoprazole    Tablet 40 milliGRAM(s) Oral before breakfast  PARoxetine 20 milliGRAM(s) Oral daily    MEDICATIONS  (PRN):      HEALTH ISSUES - PROBLEM Dx:  CVA (cerebrovascular accident)            Case discussed in details with:

## 2022-09-03 NOTE — PHYSICAL THERAPY INITIAL EVALUATION ADULT - ADDITIONAL COMMENTS
Pt has PMH of stroke, uses quad cane for indoor ambulation and straight cane for outdoors, also states he uses KAFO for R foot due to PMH of CVA. He states he is independent and does not require help from his wife. Pt lives on 1 story house, 4 steps to enter front door with no handrails, states he has no trouble walking up to house.

## 2022-09-03 NOTE — OCCUPATIONAL THERAPY INITIAL EVALUATION ADULT - GENERAL OBSERVATIONS, REHAB EVAL
09:10-09:40 Chart reviewed, ok to treat by Occupational Therapist as confirmed by RN Chau, Pt received semi-keita's in bed +tele +heplock Left arm in NAD. Pt in agreement with OT IE. PT Erik present for part of OT IE

## 2022-09-03 NOTE — CONSULT NOTE ADULT - SUBJECTIVE AND OBJECTIVE BOX
Neurology Consult    Patient is a 71y old  Male who presents with a chief complaint of stroke sp tpa - left sided weakness, ataxia, visual deficit (03 Sep 2022 07:51)      HPI:  FROM ER:  · HPI Objective Statement: Patient is a 71y Male with prior CVAx2 with residual right sided weakness, dysarthria and aphasia with TPA administeredx1 presenting to the ED for evaluation of acute onset headache, blurry vision in his left eye, and LUE weakness that all occurred when he was going for his morning walk today at 920am. Headache and LUE weakness resolved prior to arrival to the ED and patient's left visual deficit has improved but not completely resolved. Otherwise denies fevers, chills, cp, sob, n/v/d, abd pain, back pain, calf tenderness or swelling, recent travel.  upon further review and speaking with family over phone:  post tpa pt appears to be at baseline which is dysarthric at baseline, RUE>RLE weakness - this morning had left sided weakness which resolved prior to arrival and left visual defects - which has improved  still states some blurry vision but no focal defects   denies cp., palp, sob, HA - feel smuch better now  sp tpa in ER - [facility] due to old deficits and mild visual changes residual  (02 Sep 2022 14:12)      PAST MEDICAL & SURGICAL HISTORY:  GERD (gastroesophageal reflux disease)  Cerebrovascular accident (CVA)  High cholesterol  DVT, lower extremity, was prev on eliquis - taken off post trreatment  H/O skin graft, upper body, legs  H/O bilateral hip replacements  S/P tonsillectomy  History of loop recorder      FAMILY HISTORY:      Social History: (-) x 3    Allergies      Intolerances        MEDICATIONS  (STANDING):  atorvastatin 80 milliGRAM(s) Oral at bedtime  baclofen 5 milliGRAM(s) Oral every 8 hours  levothyroxine 50 MICROGram(s) Oral daily  pantoprazole    Tablet 40 milliGRAM(s) Oral before breakfast  PARoxetine 20 milliGRAM(s) Oral daily    MEDICATIONS  (PRN):      Review of systems:    Constitutional: No fever, weight loss or fatigue    Eyes: No eye pain or discharge  ENMT:  No difficulty hearing; No sinus or throat pain  Neck: No pain or stiffness  Respiratory: No cough, wheezing, chills or hemoptysis  Cardiovascular: No chest pain, palpitations, shortness of breath, dyspnea on exertion  Gastrointestinal: No abdominal pain, nausea, vomiting or hematemesis; No diarrhea or constipation.   Genitourinary: No dysuria, frequency, hematuria or incontinence  Neurological: As per HPI  Skin: No rashes or lesions   Endocrine: No heat or cold intolerance; No hair loss  Musculoskeletal: No joint pain or swelling  Psychiatric: No depression, anxiety, mood swings  Heme/Lymph: No easy bruising or bleeding gums    Vital Signs Last 24 Hrs  T(C): 36.3 (03 Sep 2022 07:00), Max: 36.4 (03 Sep 2022 03:00)  T(F): 97.3 (03 Sep 2022 07:00), Max: 97.5 (03 Sep 2022 03:00)  HR: 55 (03 Sep 2022 10:00) (49 - 70)  BP: 127/77 (03 Sep 2022 10:00) (110/67 - 144/69)  BP(mean): 93 (03 Sep 2022 10:00) (82 - 107)  RR: 22 (03 Sep 2022 10:00) (16 - 35)  SpO2: 94% (03 Sep 2022 10:00) (93% - 98%)    Parameters below as of 03 Sep 2022 07:00  Patient On (Oxygen Delivery Method): room air        Neurologic Examination:  General:  Appearance is consistent with chronologic age.  Mild facial asymmetry  General: The patient is oriented to person, place, time and date.  Recent and remote memory intact.  Fund of knowledge is intact and normal.  Language impaired naming and fluency, able to read sentences with minor paraphasic erros and able to repeat.  Cranial nerves:  VFF.  EOMI w/o nystagmus, skew or reported double vision.  PERRL.  No ptosis/weakness of eyelid closure.  Facial sensation is normal to LT and PP. Mild facial asymmetry (weakness on right).  Hearing grossly intact b/l.  Palate elevates midline.  Tongue midline. Spastic dysarthria.  Motor examination:  increased tone RUE.  Thumb in fist.  No tenderness, twitching, tremors or involuntary movements.  RUE drift but doesn't hit bed. No drift in other extremities.  Sensory examination:   Decreased PP RUE, otherwise intact in face and bilateral LE's.  Double simultaneous stim seems unaware when I touch RUE.  Cerebellum:  no ataxia out of proportion to weakness.    NIH score:  1 for facial asymmetry  1 for dysarthria  1 for aphasia  1 for neglect RUE to double simultaneous stim  1 for decrease PP RUE  ____________________________  5 points total      Labs:   CBC Full  -  ( 02 Sep 2022 10:20 )  WBC Count : 4.64 K/uL  RBC Count : 4.00 M/uL  Hemoglobin : 12.8 g/dL  Hematocrit : 37.5 %  Platelet Count - Automated : 156 K/uL  Mean Cell Volume : 93.8 fL  Mean Cell Hemoglobin : 32.0 pg  Mean Cell Hemoglobin Concentration : 34.1 g/dL  Auto Neutrophil # : 3.25 K/uL  Auto Lymphocyte # : 0.82 K/uL  Auto Monocyte # : 0.41 K/uL  Auto Eosinophil # : 0.11 K/uL  Auto Basophil # : 0.03 K/uL  Auto Neutrophil % : 70.1 %  Auto Lymphocyte % : 17.7 %  Auto Monocyte % : 8.8 %  Auto Eosinophil % : 2.4 %  Auto Basophil % : 0.6 %    09-02    132<L>  |  98  |  10  ----------------------------<  111<H>  3.9   |  27  |  0.9    Ca    7.8<L>      02 Sep 2022 10:20    TPro  5.1<L>  /  Alb  3.4<L>  /  TBili  0.5  /  DBili  x   /  AST  21  /  ALT  19  /  AlkPhos  60  09-02    LIVER FUNCTIONS - ( 02 Sep 2022 10:20 )  Alb: 3.4 g/dL / Pro: 5.1 g/dL / ALK PHOS: 60 U/L / ALT: 19 U/L / AST: 21 U/L / GGT: x           PT/INR - ( 02 Sep 2022 10:20 )   PT: 13.20 sec;   INR: 1.15 ratio         PTT - ( 02 Sep 2022 10:20 )  PTT:29.2 sec      < from: CT Brain Stroke Protocol (09.02.22 @ 10:11) >  No acute intracranial pathology.    Chronic left MCA infarct. Recommend further evaluation with MRI if there   is continued clinical concern for superimposed small acute infarct.    Communication: The summary of above findings were discussed with readback   confirmation with Dr. White by radiologist Dr. Gant on 9/2/2022 at 10:25   AM.    < end of copied text >    < from: CT Angio Brain Stroke Protocol  w/ IV Cont (09.02.22 @ 10:25) >  CT PERFUSION:  Perfusion abnormality of 60 mL (CBF<30%), and 136 mL (Tmax>6sec)   corresponding to the region of chronic left MCA infarct. Recommend   further evaluation with MRI if there is clinical concern for acute on   chronic infarct.    CTA HEAD/NECK:  Occlusion in the left M1 MCA with reconstitution of the enhancement in   the M2 branches distal to the trifurcation,age indeterminate.    < end of copied text >      Assessment:  This is a 71y Male with h/o left MCA stroke presenting with new left sided symptoms (left sided weakness improved but persisting visual impairment).  He is s/p TPA yesterday and feels back to his normal self this morning.  NIH score of 5 shows deficits compatible with his prior stroke.    Plan:   1.  MRI brain w/o contrast if not otherwise contraindicated.  2.  Atorvastatin 80 mg.  3.  May begin antiplatelet therapy aspirin 325 mg x 1 then 81 mg/day IF 24 hour post TPA head CT negative for bleeding.  4.  PT/OT/Speech therapy.    09-03-22 @ 10:47       Neurology Consult    Patient is a 71y old  Male who presents with a chief complaint of stroke sp tpa - left sided weakness, ataxia, visual deficit (03 Sep 2022 07:51)      HPI:  FROM ER:  · HPI Objective Statement: Patient is a 71y Male with prior CVAx2 with residual right sided weakness, dysarthria and aphasia with TPA administeredx1 presenting to the ED for evaluation of acute onset headache, blurry vision in his left eye, and LUE weakness that all occurred when he was going for his morning walk today at 920am. Headache and LUE weakness resolved prior to arrival to the ED and patient's left visual deficit has improved but not completely resolved. Otherwise denies fevers, chills, cp, sob, n/v/d, abd pain, back pain, calf tenderness or swelling, recent travel.  upon further review and speaking with family over phone:  post tpa pt appears to be at baseline which is dysarthric at baseline, RUE>RLE weakness - this morning had left sided weakness which resolved prior to arrival and left visual defects - which has improved  still states some blurry vision but no focal defects   denies cp., palp, sob, HA - feel smuch better now  sp tpa in ER - [facility] due to old deficits and mild visual changes residual  (02 Sep 2022 14:12)      PAST MEDICAL & SURGICAL HISTORY:  GERD (gastroesophageal reflux disease)  Cerebrovascular accident (CVA)  High cholesterol  DVT, lower extremity, was prev on eliquis - taken off post trreatment  H/O skin graft, upper body, legs  H/O bilateral hip replacements  S/P tonsillectomy  History of loop recorder      FAMILY HISTORY:      Social History: (-) x 3    Allergies      Intolerances        MEDICATIONS  (STANDING):  atorvastatin 80 milliGRAM(s) Oral at bedtime  baclofen 5 milliGRAM(s) Oral every 8 hours  levothyroxine 50 MICROGram(s) Oral daily  pantoprazole    Tablet 40 milliGRAM(s) Oral before breakfast  PARoxetine 20 milliGRAM(s) Oral daily    MEDICATIONS  (PRN):      Review of systems:    Constitutional: No fever, weight loss or fatigue    Eyes: No eye pain or discharge  ENMT:  No difficulty hearing; No sinus or throat pain  Neck: No pain or stiffness  Respiratory: No cough, wheezing, chills or hemoptysis  Cardiovascular: No chest pain, palpitations, shortness of breath, dyspnea on exertion  Gastrointestinal: No abdominal pain, nausea, vomiting or hematemesis; No diarrhea or constipation.   Genitourinary: No dysuria, frequency, hematuria or incontinence  Neurological: As per HPI  Skin: No rashes or lesions   Endocrine: No heat or cold intolerance; No hair loss  Musculoskeletal: No joint pain or swelling  Psychiatric: No depression, anxiety, mood swings  Heme/Lymph: No easy bruising or bleeding gums    Vital Signs Last 24 Hrs  T(C): 36.3 (03 Sep 2022 07:00), Max: 36.4 (03 Sep 2022 03:00)  T(F): 97.3 (03 Sep 2022 07:00), Max: 97.5 (03 Sep 2022 03:00)  HR: 55 (03 Sep 2022 10:00) (49 - 70)  BP: 127/77 (03 Sep 2022 10:00) (110/67 - 144/69)  BP(mean): 93 (03 Sep 2022 10:00) (82 - 107)  RR: 22 (03 Sep 2022 10:00) (16 - 35)  SpO2: 94% (03 Sep 2022 10:00) (93% - 98%)    Parameters below as of 03 Sep 2022 07:00  Patient On (Oxygen Delivery Method): room air        Neurologic Examination:  General:  Appearance is consistent with chronologic age.  Mild facial asymmetry  General: The patient is oriented to person, place, time and date.  Recent and remote memory intact.  Fund of knowledge is intact and normal.  Language impaired naming and fluency, able to read sentences with minor paraphasic erros and able to repeat.  Cranial nerves:  VFF.  EOMI w/o nystagmus, skew or reported double vision.  PERRL.  No ptosis/weakness of eyelid closure.  Facial sensation is normal to LT and PP. Mild facial asymmetry (weakness on right).  Hearing grossly intact b/l.  Palate elevates midline.  Tongue midline. Spastic dysarthria.  Motor examination:  increased tone RUE.  Thumb in fist.  No tenderness, twitching, tremors or involuntary movements.  RUE drift but doesn't hit bed. No drift in other extremities.  Sensory examination:   Decreased PP RUE, otherwise intact in face and bilateral LE's.  Double simultaneous stim seems unaware when I touch RUE.  Cerebellum:  no ataxia out of proportion to weakness.    NIH score:  1 for facial asymmetry  1 for dysarthria  1 for aphasia  1 for neglect RUE to double simultaneous stim  1 for decrease PP RUE  ____________________________  5 points total      Labs:   CBC Full  -  ( 02 Sep 2022 10:20 )  WBC Count : 4.64 K/uL  RBC Count : 4.00 M/uL  Hemoglobin : 12.8 g/dL  Hematocrit : 37.5 %  Platelet Count - Automated : 156 K/uL  Mean Cell Volume : 93.8 fL  Mean Cell Hemoglobin : 32.0 pg  Mean Cell Hemoglobin Concentration : 34.1 g/dL  Auto Neutrophil # : 3.25 K/uL  Auto Lymphocyte # : 0.82 K/uL  Auto Monocyte # : 0.41 K/uL  Auto Eosinophil # : 0.11 K/uL  Auto Basophil # : 0.03 K/uL  Auto Neutrophil % : 70.1 %  Auto Lymphocyte % : 17.7 %  Auto Monocyte % : 8.8 %  Auto Eosinophil % : 2.4 %  Auto Basophil % : 0.6 %    09-02    132<L>  |  98  |  10  ----------------------------<  111<H>  3.9   |  27  |  0.9    Ca    7.8<L>      02 Sep 2022 10:20    TPro  5.1<L>  /  Alb  3.4<L>  /  TBili  0.5  /  DBili  x   /  AST  21  /  ALT  19  /  AlkPhos  60  09-02    LIVER FUNCTIONS - ( 02 Sep 2022 10:20 )  Alb: 3.4 g/dL / Pro: 5.1 g/dL / ALK PHOS: 60 U/L / ALT: 19 U/L / AST: 21 U/L / GGT: x           PT/INR - ( 02 Sep 2022 10:20 )   PT: 13.20 sec;   INR: 1.15 ratio         PTT - ( 02 Sep 2022 10:20 )  PTT:29.2 sec      < from: CT Brain Stroke Protocol (09.02.22 @ 10:11) >  No acute intracranial pathology.    Chronic left MCA infarct. Recommend further evaluation with MRI if there   is continued clinical concern for superimposed small acute infarct.    Communication: The summary of above findings were discussed with readback   confirmation with Dr. White by radiologist Dr. Gant on 9/2/2022 at 10:25   AM.    < end of copied text >    < from: CT Angio Brain Stroke Protocol  w/ IV Cont (09.02.22 @ 10:25) >  CT PERFUSION:  Perfusion abnormality of 60 mL (CBF<30%), and 136 mL (Tmax>6sec)   corresponding to the region of chronic left MCA infarct. Recommend   further evaluation with MRI if there is clinical concern for acute on   chronic infarct.    CTA HEAD/NECK:  Occlusion in the left M1 MCA with reconstitution of the enhancement in   the M2 branches distal to the trifurcation,age indeterminate.    < end of copied text >    < from: TTE Echo Complete w/o Contrast w/ Doppler (09.02.22 @ 14:56) >  Summary:   1. Normal global left ventricular systolic function.   2. Left ventricular ejection fraction, by visual estimation, is 65 to   70%.   3. Normal left ventricular diastolic function.   4. Mild left ventricular hypertrophy.   5. Normal left atrial size.   6. Right atrial enlargement.   7. Mildmitral annular calcification.   8. Trace mitral valve regurgitation.   9. Dilatation of the aortic root, measures 3.9 cm.  10. Normal pulmonary artery pressure.  11. There is no evidence of pericardial effusion.    < end of copied text >        Assessment:  This is a 71y Male with h/o left MCA stroke presenting with new left sided symptoms (left sided weakness improved but persisting visual impairment).  He is s/p TPA yesterday and feels back to his normal self this morning.  NIH score of 5 shows deficits compatible with his prior stroke.    Plan:   1.  MRI brain w/o contrast if not otherwise contraindicated.  2.  Atorvastatin 80 mg.  3.  May begin antiplatelet therapy aspirin 325 mg x 1 then 81 mg/day IF 24 hour post TPA head CT negative for bleeding.  4.  PT/OT/Speech therapy.  5.  Lipid panel.    09-03-22 @ 10:47

## 2022-09-03 NOTE — OCCUPATIONAL THERAPY INITIAL EVALUATION ADULT - PERTINENT HX OF CURRENT PROBLEM, REHAB EVAL
presenting to the ED for evaluation of acute onset headache, blurry vision in his left eye, and LUE weakness that all occurred when he was going for his morning walk today at 920am. Headache and LUE weakness resolved prior to arrival to the ED and patient's left visual deficit has improved but not completely resolved.     s/p tPA on 9/2 14:04; bedrest stop orders 9/3 2:02am.     CT Scan: No acute intracranial pathology.    Chronic left MCA infarct. Recommend further evaluation with MRI if there   is continued clinical concern for superimposed small acute infarct.

## 2022-09-03 NOTE — OCCUPATIONAL THERAPY INITIAL EVALUATION ADULT - RANGE OF MOTION EXAMINATION
LUE WFLs; Right AROM shoulder 1/4 range, elbow 3/4 range, wrist 1/4 range; hand 1/4 range flexion 0 extension; PROM WFLs/deficits as listed below

## 2022-09-03 NOTE — PHYSICAL THERAPY INITIAL EVALUATION ADULT - NSPTDMEREC_GEN_A_CORE
pending progress/rolling walker/jennifer walker/wide-based quad cane/narrow-based quad cane/straight cane

## 2022-09-03 NOTE — PHYSICAL THERAPY INITIAL EVALUATION ADULT - GENERAL OBSERVATIONS, REHAB EVAL
9:15 - 9:46 Chart reviewed. Patient available to be seen for physical therapy, denies pain, +telemetry, +heplock, confirmed with WILLIE Carlos

## 2022-09-03 NOTE — CONSULT NOTE ADULT - ASSESSMENT
IMPRESSION: Rehab of 72 y/o m  rehab  for   decline  cva aphasia    PRECAUTIONS: [  ] Cardiac  [  ] Respiratory  [  ] Seizures [  ] Contact Isolation  [  ] Droplet Isolation  [ FALL ] Other    Weight Bearing Status:     RECOMMENDATION:    Out of Bed to Chair     DVT/Decubiti Prophylaxis    REHAB PLAN:     [  x ] Bedside P/T 3-5 times a week   [ x  ]   Bedside O/T  2-3 times a week             [   ] No Rehab Therapy Indicated                   [x   ]  Speech Therapy   Conditioning/ROM                                    ADL  Bed Mobility                                               Conditioning/ROM  Transfers                                                     Bed Mobility  Sitting /Standing Balance                         Transfers                                        Gait Training                                               Sitting/Standing Balance  Stair Training [   ]Applicable                    Home equipment Eval                                                                        Splinting  [   ] Only      GOALS:   ADL   [   ]   Independent                    Transfers  [   ] Independent                          Ambulation  [   ] Independent     [    ] With device                            [   ]  CG                                                         [   ]  CG                                                                  [   ] CG                            [    ] Min A                                                   [   ] Min A                                                              [   ] Min  A          DISCHARGE PLAN:   [   ]  Good candidate for Intensive Rehabilitation/Hospital based-[facility]A [facility]                                             Will tolerate 3hrs Intensive Rehab Daily                                       [    ]  Short Term Rehab in Skilled Nursing Facility                                       [xx    ]  Home with Outpatient or VN services dw  ptn  wife  over the  phone cont [facility]                                          [    ]  Possible Candidate for Intensive Hospital based Rehab

## 2022-09-04 ENCOUNTER — TRANSCRIPTION ENCOUNTER (OUTPATIENT)
Age: 72
End: 2022-09-04

## 2022-09-04 VITALS — TEMPERATURE: 96 F | OXYGEN SATURATION: 96 % | RESPIRATION RATE: 22 BRPM

## 2022-09-04 LAB
ALBUMIN SERPL ELPH-MCNC: 4 G/DL — SIGNIFICANT CHANGE UP (ref 3.5–5.2)
ALP SERPL-CCNC: 70 U/L — SIGNIFICANT CHANGE UP (ref 30–115)
ALT FLD-CCNC: 18 U/L — SIGNIFICANT CHANGE UP (ref 0–41)
ANION GAP SERPL CALC-SCNC: 10 MMOL/L — SIGNIFICANT CHANGE UP (ref 7–14)
AST SERPL-CCNC: 24 U/L — SIGNIFICANT CHANGE UP (ref 0–41)
BASOPHILS # BLD AUTO: 0.03 K/UL — SIGNIFICANT CHANGE UP (ref 0–0.2)
BASOPHILS NFR BLD AUTO: 0.4 % — SIGNIFICANT CHANGE UP (ref 0–1)
BILIRUB SERPL-MCNC: 0.6 MG/DL — SIGNIFICANT CHANGE UP (ref 0.2–1.2)
BUN SERPL-MCNC: 15 MG/DL — SIGNIFICANT CHANGE UP (ref 10–20)
CALCIUM SERPL-MCNC: 8.9 MG/DL — SIGNIFICANT CHANGE UP (ref 8.5–10.1)
CHLORIDE SERPL-SCNC: 103 MMOL/L — SIGNIFICANT CHANGE UP (ref 98–110)
CHOLEST SERPL-MCNC: 124 MG/DL — SIGNIFICANT CHANGE UP
CO2 SERPL-SCNC: 25 MMOL/L — SIGNIFICANT CHANGE UP (ref 17–32)
CREAT SERPL-MCNC: 0.9 MG/DL — SIGNIFICANT CHANGE UP (ref 0.7–1.5)
EGFR: 92 ML/MIN/1.73M2 — SIGNIFICANT CHANGE UP
EOSINOPHIL # BLD AUTO: 0.23 K/UL — SIGNIFICANT CHANGE UP (ref 0–0.7)
EOSINOPHIL NFR BLD AUTO: 3.2 % — SIGNIFICANT CHANGE UP (ref 0–8)
GLUCOSE SERPL-MCNC: 88 MG/DL — SIGNIFICANT CHANGE UP (ref 70–99)
HCT VFR BLD CALC: 44.3 % — SIGNIFICANT CHANGE UP (ref 42–52)
HDLC SERPL-MCNC: 54 MG/DL — SIGNIFICANT CHANGE UP
HGB BLD-MCNC: 15.2 G/DL — SIGNIFICANT CHANGE UP (ref 14–18)
IMM GRANULOCYTES NFR BLD AUTO: 0.3 % — SIGNIFICANT CHANGE UP (ref 0.1–0.3)
LIPID PNL WITH DIRECT LDL SERPL: 57 MG/DL — SIGNIFICANT CHANGE UP
LYMPHOCYTES # BLD AUTO: 1.49 K/UL — SIGNIFICANT CHANGE UP (ref 1.2–3.4)
LYMPHOCYTES # BLD AUTO: 20.7 % — SIGNIFICANT CHANGE UP (ref 20.5–51.1)
MAGNESIUM SERPL-MCNC: 1.9 MG/DL — SIGNIFICANT CHANGE UP (ref 1.8–2.4)
MCHC RBC-ENTMCNC: 32.3 PG — HIGH (ref 27–31)
MCHC RBC-ENTMCNC: 34.3 G/DL — SIGNIFICANT CHANGE UP (ref 32–37)
MCV RBC AUTO: 94.1 FL — HIGH (ref 80–94)
MONOCYTES # BLD AUTO: 0.62 K/UL — HIGH (ref 0.1–0.6)
MONOCYTES NFR BLD AUTO: 8.6 % — SIGNIFICANT CHANGE UP (ref 1.7–9.3)
NEUTROPHILS # BLD AUTO: 4.82 K/UL — SIGNIFICANT CHANGE UP (ref 1.4–6.5)
NEUTROPHILS NFR BLD AUTO: 66.8 % — SIGNIFICANT CHANGE UP (ref 42.2–75.2)
NON HDL CHOLESTEROL: 70 MG/DL — SIGNIFICANT CHANGE UP
NRBC # BLD: 0 /100 WBCS — SIGNIFICANT CHANGE UP (ref 0–0)
PLATELET # BLD AUTO: 172 K/UL — SIGNIFICANT CHANGE UP (ref 130–400)
POTASSIUM SERPL-MCNC: 4.1 MMOL/L — SIGNIFICANT CHANGE UP (ref 3.5–5)
POTASSIUM SERPL-SCNC: 4.1 MMOL/L — SIGNIFICANT CHANGE UP (ref 3.5–5)
PROT SERPL-MCNC: 6.1 G/DL — SIGNIFICANT CHANGE UP (ref 6–8)
RBC # BLD: 4.71 M/UL — SIGNIFICANT CHANGE UP (ref 4.7–6.1)
RBC # FLD: 11.4 % — LOW (ref 11.5–14.5)
SODIUM SERPL-SCNC: 138 MMOL/L — SIGNIFICANT CHANGE UP (ref 135–146)
TRIGL SERPL-MCNC: 65 MG/DL — SIGNIFICANT CHANGE UP
WBC # BLD: 7.21 K/UL — SIGNIFICANT CHANGE UP (ref 4.8–10.8)
WBC # FLD AUTO: 7.21 K/UL — SIGNIFICANT CHANGE UP (ref 4.8–10.8)

## 2022-09-04 PROCEDURE — 99232 SBSQ HOSP IP/OBS MODERATE 35: CPT

## 2022-09-04 PROCEDURE — 70551 MRI BRAIN STEM W/O DYE: CPT | Mod: 26

## 2022-09-04 RX ORDER — ATORVASTATIN CALCIUM 80 MG/1
1 TABLET, FILM COATED ORAL
Qty: 0 | Refills: 0 | DISCHARGE
Start: 2022-09-04

## 2022-09-04 RX ORDER — CLOPIDOGREL BISULFATE 75 MG/1
75 TABLET, FILM COATED ORAL DAILY
Refills: 0 | Status: DISCONTINUED | OUTPATIENT
Start: 2022-09-04 | End: 2022-09-04

## 2022-09-04 RX ADMIN — Medication 5 MILLIGRAM(S): at 14:30

## 2022-09-04 RX ADMIN — Medication 20 MILLIGRAM(S): at 12:15

## 2022-09-04 RX ADMIN — PANTOPRAZOLE SODIUM 40 MILLIGRAM(S): 20 TABLET, DELAYED RELEASE ORAL at 06:25

## 2022-09-04 RX ADMIN — Medication 5 MILLIGRAM(S): at 06:25

## 2022-09-04 RX ADMIN — Medication 81 MILLIGRAM(S): at 12:15

## 2022-09-04 RX ADMIN — Medication 50 MICROGRAM(S): at 06:26

## 2022-09-04 NOTE — PROGRESS NOTE ADULT - SUBJECTIVE AND OBJECTIVE BOX
Neurology Consult    Patient is a 71y old  Male who presents with a chief complaint of stroke sp tpa - left sided weakness, ataxia, visual deficit (03 Sep 2022 07:51)      HPI:  FROM ER:  · HPI Objective Statement: Patient is a 71y Male with prior CVAx2 with residual right sided weakness, dysarthria and aphasia with TPA administeredx1 presenting to the ED for evaluation of acute onset headache, blurry vision in his left eye, and LUE weakness that all occurred when he was going for his morning walk today at 920am. Headache and LUE weakness resolved prior to arrival to the ED and patient's left visual deficit has improved but not completely resolved. Otherwise denies fevers, chills, cp, sob, n/v/d, abd pain, back pain, calf tenderness or swelling, recent travel.  upon further review and speaking with family over phone:  post tpa pt appears to be at baseline which is dysarthric at baseline, RUE>RLE weakness - this morning had left sided weakness which resolved prior to arrival and left visual defects - which has improved  still states some blurry vision but no focal defects   denies cp., palp, sob, HA - feel smuch better now  sp tpa in ER - [facility] due to old deficits and mild visual changes residual  (02 Sep 2022 14:12)      PAST MEDICAL & SURGICAL HISTORY:  GERD (gastroesophageal reflux disease)  Cerebrovascular accident (CVA)  High cholesterol  DVT, lower extremity, was prev on eliquis - taken off post trreatment  H/O skin graft, upper body, legs  H/O bilateral hip replacements  S/P tonsillectomy  History of loop recorder      FAMILY HISTORY:      Social History: (-) x 3    Allergies      Intolerances      MEDICATIONS  (STANDING):  aspirin enteric coated 81 milliGRAM(s) Oral daily  atorvastatin 80 milliGRAM(s) Oral at bedtime  baclofen 5 milliGRAM(s) Oral every 8 hours  levothyroxine 50 MICROGram(s) Oral daily  LORazepam   Injectable 1 milliGRAM(s) IV Push once  pantoprazole    Tablet 40 milliGRAM(s) Oral before breakfast  PARoxetine 20 milliGRAM(s) Oral daily        Review of systems:    Constitutional: No fever, weight loss or fatigue    Eyes: No eye pain or discharge  ENMT:  No difficulty hearing; No sinus or throat pain  Neck: No pain or stiffness  Respiratory: No cough, wheezing, chills or hemoptysis  Cardiovascular: No chest pain, palpitations, shortness of breath, dyspnea on exertion  Gastrointestinal: No abdominal pain, nausea, vomiting or hematemesis; No diarrhea or constipation.   Genitourinary: No dysuria, frequency, hematuria or incontinence  Neurological: As per HPI  Skin: No rashes or lesions   Endocrine: No heat or cold intolerance; No hair loss  Musculoskeletal: No joint pain or swelling  Psychiatric: No depression, anxiety, mood swings  Heme/Lymph: No easy bruising or bleeding gums    ICU Vital Signs Last 24 Hrs  T(C): 36.6 (04 Sep 2022 07:00), Max: 36.7 (03 Sep 2022 15:00)  T(F): 97.8 (04 Sep 2022 07:00), Max: 98.1 (03 Sep 2022 15:00)  HR: 73 (04 Sep 2022 08:00) (49 - 73)  BP: 130/73 (04 Sep 2022 08:00) (116/67 - 140/77)  BP(mean): 96 (04 Sep 2022 08:00) (87 - 101)  ABP: --  ABP(mean): --  RR: 18 (04 Sep 2022 08:00) (16 - 34)  SpO2: 96% (04 Sep 2022 08:00) (94% - 96%)    O2 Parameters below as of 04 Sep 2022 07:00  Patient On (Oxygen Delivery Method): room air          Parameters below as of 03 Sep 2022 07:00  Patient On (Oxygen Delivery Method): room air        Neurologic Examination:  General:  Appearance is consistent with chronologic age.  Mild facial asymmetry  General: The patient is oriented to person, place, time and date.  Recent and remote memory intact.  Fund of knowledge is intact and normal.  Language impaired naming and fluency, able to read sentences with minor paraphasic errors and able to repeat.  Cranial nerves:  VFF.  EOMI w/o nystagmus, skew or reported double vision.  PERRL.  No ptosis/weakness of eyelid closure.  Facial sensation is normal to LT and PP. Mild facial asymmetry (weakness on right).  Hearing grossly intact b/l.  Palate elevates midline.  Tongue midline. Spastic dysarthria.  Motor examination:  increased tone RUE.  Thumb in fist.  No tenderness, twitching, tremors or involuntary movements.  RUE drift but doesn't hit bed. No drift in other extremities.  Sensory examination:   Decreased PP RUE, otherwise intact in face and bilateral LE's.    Cerebellum:  no ataxia out of proportion to weakness.    NIH score:  1 for facial asymmetry  1 for dysarthria  1 for aphasia  1 for decrease PP RUE  ____________________________  4 points total      Labs:   CBC Full  -  ( 02 Sep 2022 10:20 )  WBC Count : 4.64 K/uL  RBC Count : 4.00 M/uL  Hemoglobin : 12.8 g/dL  Hematocrit : 37.5 %  Platelet Count - Automated : 156 K/uL  Mean Cell Volume : 93.8 fL  Mean Cell Hemoglobin : 32.0 pg  Mean Cell Hemoglobin Concentration : 34.1 g/dL  Auto Neutrophil # : 3.25 K/uL  Auto Lymphocyte # : 0.82 K/uL  Auto Monocyte # : 0.41 K/uL  Auto Eosinophil # : 0.11 K/uL  Auto Basophil # : 0.03 K/uL  Auto Neutrophil % : 70.1 %  Auto Lymphocyte % : 17.7 %  Auto Monocyte % : 8.8 %  Auto Eosinophil % : 2.4 %  Auto Basophil % : 0.6 %    09-02    132<L>  |  98  |  10  ----------------------------<  111<H>  3.9   |  27  |  0.9    Ca    7.8<L>      02 Sep 2022 10:20    TPro  5.1<L>  /  Alb  3.4<L>  /  TBili  0.5  /  DBili  x   /  AST  21  /  ALT  19  /  AlkPhos  60  09-02    LIVER FUNCTIONS - ( 02 Sep 2022 10:20 )  Alb: 3.4 g/dL / Pro: 5.1 g/dL / ALK PHOS: 60 U/L / ALT: 19 U/L / AST: 21 U/L / GGT: x           PT/INR - ( 02 Sep 2022 10:20 )   PT: 13.20 sec;   INR: 1.15 ratio         PTT - ( 02 Sep 2022 10:20 )  PTT:29.2 sec      < from: CT Brain Stroke Protocol (09.02.22 @ 10:11) >  No acute intracranial pathology.    Chronic left MCA infarct. Recommend further evaluation with MRI if there   is continued clinical concern for superimposed small acute infarct.    Communication: The summary of above findings were discussed with readback   confirmation with Dr. White by radiologist Dr. Gant on 9/2/2022 at 10:25   AM.    < end of copied text >    < from: CT Angio Brain Stroke Protocol  w/ IV Cont (09.02.22 @ 10:25) >  CT PERFUSION:  Perfusion abnormality of 60 mL (CBF<30%), and 136 mL (Tmax>6sec)   corresponding to the region of chronic left MCA infarct. Recommend   further evaluation with MRI if there is clinical concern for acute on   chronic infarct.    CTA HEAD/NECK:  Occlusion in the left M1 MCA with reconstitution of the enhancement in   the M2 branches distal to the trifurcation,age indeterminate.    < end of copied text >    < from: TTE Echo Complete w/o Contrast w/ Doppler (09.02.22 @ 14:56) >  Summary:   1. Normal global left ventricular systolic function.   2. Left ventricular ejection fraction, by visual estimation, is 65 to   70%.   3. Normal left ventricular diastolic function.   4. Mild left ventricular hypertrophy.   5. Normal left atrial size.   6. Right atrial enlargement.   7. Mildmitral annular calcification.   8. Trace mitral valve regurgitation.   9. Dilatation of the aortic root, measures 3.9 cm.  10. Normal pulmonary artery pressure.  11. There is no evidence of pericardial effusion.    < end of copied text >      < from: CT Head No Cont (09.03.22 @ 12:45) >    IMPRESSION:    No acute intracranial pathology.    Stable scattered chronic infarcts inthe left MCA territory.    < end of copied text >        Assessment:  This is a 71y Male with h/o left MCA stroke presenting with new left sided symptoms (left sided weakness improved but persisting visual impairment).  He is s/p TPA yesterday and feels back to his normal self this morning.  NIH score of 5 shows deficits compatible with his prior stroke.    Plan:   1.  MRI brain w/o contrast pending (transport here).  2.  Atorvastatin 80 mg.  3.  Aspirin 81 mg/day.  4.  PT/OT/Speech therapy.  5.  Lipid panel.  6.  Decrease neuro check to q4h.  If no acute infarct on MRI may change to neuro check q8h.    09-03-22 @ 10:47       Neurology Consult    Patient is a 71y old  Male who presents with a chief complaint of stroke sp tpa - left sided weakness, ataxia, visual deficit (03 Sep 2022 07:51)      HPI:  FROM ER:  · HPI Objective Statement: Patient is a 71y Male with prior CVAx2 with residual right sided weakness, dysarthria and aphasia with TPA administeredx1 presenting to the ED for evaluation of acute onset headache, blurry vision in his left eye, and LUE weakness that all occurred when he was going for his morning walk today at 920am. Headache and LUE weakness resolved prior to arrival to the ED and patient's left visual deficit has improved but not completely resolved. Otherwise denies fevers, chills, cp, sob, n/v/d, abd pain, back pain, calf tenderness or swelling, recent travel.  upon further review and speaking with family over phone:  post tpa pt appears to be at baseline which is dysarthric at baseline, RUE>RLE weakness - this morning had left sided weakness which resolved prior to arrival and left visual defects - which has improved  still states some blurry vision but no focal defects   denies cp., palp, sob, HA - feel smuch better now  sp tpa in ER - [facility] due to old deficits and mild visual changes residual  (02 Sep 2022 14:12)      PAST MEDICAL & SURGICAL HISTORY:  GERD (gastroesophageal reflux disease)  Cerebrovascular accident (CVA)  High cholesterol  DVT, lower extremity, was prev on eliquis - taken off post trreatment  H/O skin graft, upper body, legs  H/O bilateral hip replacements  S/P tonsillectomy  History of loop recorder      FAMILY HISTORY:      Social History: (-) x 3    Allergies      Intolerances      MEDICATIONS  (STANDING):  aspirin enteric coated 81 milliGRAM(s) Oral daily  atorvastatin 80 milliGRAM(s) Oral at bedtime  baclofen 5 milliGRAM(s) Oral every 8 hours  levothyroxine 50 MICROGram(s) Oral daily  LORazepam   Injectable 1 milliGRAM(s) IV Push once  pantoprazole    Tablet 40 milliGRAM(s) Oral before breakfast  PARoxetine 20 milliGRAM(s) Oral daily        Review of systems:    Constitutional: No fever, weight loss or fatigue    Eyes: No eye pain or discharge  ENMT:  No difficulty hearing; No sinus or throat pain  Neck: No pain or stiffness  Respiratory: No cough, wheezing, chills or hemoptysis  Cardiovascular: No chest pain, palpitations, shortness of breath, dyspnea on exertion  Gastrointestinal: No abdominal pain, nausea, vomiting or hematemesis; No diarrhea or constipation.   Genitourinary: No dysuria, frequency, hematuria or incontinence  Neurological: As per HPI  Skin: No rashes or lesions   Endocrine: No heat or cold intolerance; No hair loss  Musculoskeletal: No joint pain or swelling  Psychiatric: No depression, anxiety, mood swings  Heme/Lymph: No easy bruising or bleeding gums    ICU Vital Signs Last 24 Hrs  T(C): 36.6 (04 Sep 2022 07:00), Max: 36.7 (03 Sep 2022 15:00)  T(F): 97.8 (04 Sep 2022 07:00), Max: 98.1 (03 Sep 2022 15:00)  HR: 73 (04 Sep 2022 08:00) (49 - 73)  BP: 130/73 (04 Sep 2022 08:00) (116/67 - 140/77)  BP(mean): 96 (04 Sep 2022 08:00) (87 - 101)  ABP: --  ABP(mean): --  RR: 18 (04 Sep 2022 08:00) (16 - 34)  SpO2: 96% (04 Sep 2022 08:00) (94% - 96%)    O2 Parameters below as of 04 Sep 2022 07:00  Patient On (Oxygen Delivery Method): room air          Parameters below as of 03 Sep 2022 07:00  Patient On (Oxygen Delivery Method): room air        Neurologic Examination:  General:  Appearance is consistent with chronologic age.  Mild facial asymmetry  General: The patient is oriented to person, place, time and date.  Recent and remote memory intact.  Fund of knowledge is intact and normal.  Language impaired naming and fluency, able to read sentences with minor paraphasic errors and able to repeat.  Cranial nerves:  VFF.  EOMI w/o nystagmus, skew or reported double vision.  PERRL.  No ptosis/weakness of eyelid closure.  Facial sensation is normal to LT and PP. Mild facial asymmetry (weakness on right).  Hearing grossly intact b/l.  Palate elevates midline.  Tongue midline. Spastic dysarthria.  Motor examination:  increased tone RUE.  Thumb in fist.  No tenderness, twitching, tremors or involuntary movements.  RUE drift but doesn't hit bed. No drift in other extremities.  Sensory examination:   Decreased PP RUE, otherwise intact in face and bilateral LE's.    Cerebellum:  no ataxia out of proportion to weakness.    NIH score:  1 for facial asymmetry  1 for dysarthria  1 for aphasia  1 for decrease PP RUE  ____________________________  4 points total      Labs:   CBC Full  -  ( 02 Sep 2022 10:20 )  WBC Count : 4.64 K/uL  RBC Count : 4.00 M/uL  Hemoglobin : 12.8 g/dL  Hematocrit : 37.5 %  Platelet Count - Automated : 156 K/uL  Mean Cell Volume : 93.8 fL  Mean Cell Hemoglobin : 32.0 pg  Mean Cell Hemoglobin Concentration : 34.1 g/dL  Auto Neutrophil # : 3.25 K/uL  Auto Lymphocyte # : 0.82 K/uL  Auto Monocyte # : 0.41 K/uL  Auto Eosinophil # : 0.11 K/uL  Auto Basophil # : 0.03 K/uL  Auto Neutrophil % : 70.1 %  Auto Lymphocyte % : 17.7 %  Auto Monocyte % : 8.8 %  Auto Eosinophil % : 2.4 %  Auto Basophil % : 0.6 %    09-02    132<L>  |  98  |  10  ----------------------------<  111<H>  3.9   |  27  |  0.9    Ca    7.8<L>      02 Sep 2022 10:20    TPro  5.1<L>  /  Alb  3.4<L>  /  TBili  0.5  /  DBili  x   /  AST  21  /  ALT  19  /  AlkPhos  60  09-02    LIVER FUNCTIONS - ( 02 Sep 2022 10:20 )  Alb: 3.4 g/dL / Pro: 5.1 g/dL / ALK PHOS: 60 U/L / ALT: 19 U/L / AST: 21 U/L / GGT: x           PT/INR - ( 02 Sep 2022 10:20 )   PT: 13.20 sec;   INR: 1.15 ratio         PTT - ( 02 Sep 2022 10:20 )  PTT:29.2 sec      Lipid Profile (09.04.22 @ 05:51)    Cholesterol, Serum: 124 mg/dL    Triglycerides, Serum: 65 mg/dL    HDL Cholesterol, Serum: 54 mg/dL    Non HDL Cholesterol: 70:     LDL Cholesterol Calculated: 57 mg/dL        < from: CT Brain Stroke Protocol (09.02.22 @ 10:11) >  No acute intracranial pathology.    Chronic left MCA infarct. Recommend further evaluation with MRI if there   is continued clinical concern for superimposed small acute infarct.    Communication: The summary of above findings were discussed with readback   confirmation with Dr. White by radiologist Dr. Gant on 9/2/2022 at 10:25   AM.    < end of copied text >    < from: CT Angio Brain Stroke Protocol  w/ IV Cont (09.02.22 @ 10:25) >  CT PERFUSION:  Perfusion abnormality of 60 mL (CBF<30%), and 136 mL (Tmax>6sec)   corresponding to the region of chronic left MCA infarct. Recommend   further evaluation with MRI if there is clinical concern for acute on   chronic infarct.    CTA HEAD/NECK:  Occlusion in the left M1 MCA with reconstitution of the enhancement in   the M2 branches distal to the trifurcation,age indeterminate.    < end of copied text >    < from: TTE Echo Complete w/o Contrast w/ Doppler (09.02.22 @ 14:56) >  Summary:   1. Normal global left ventricular systolic function.   2. Left ventricular ejection fraction, by visual estimation, is 65 to   70%.   3. Normal left ventricular diastolic function.   4. Mild left ventricular hypertrophy.   5. Normal left atrial size.   6. Right atrial enlargement.   7. Mildmitral annular calcification.   8. Trace mitral valve regurgitation.   9. Dilatation of the aortic root, measures 3.9 cm.  10. Normal pulmonary artery pressure.  11. There is no evidence of pericardial effusion.    < end of copied text >      < from: CT Head No Cont (09.03.22 @ 12:45) >    IMPRESSION:    No acute intracranial pathology.    Stable scattered chronic infarcts inthe left MCA territory.    < end of copied text >        Assessment:  This is a 71y Male with h/o left MCA stroke presenting with new left sided symptoms (left sided weakness improved but persisting visual impairment).  He is s/p TPA yesterday and feels back to his normal self this morning.  NIH score of 5 shows deficits compatible with his prior stroke.    Plan:   1.  MRI brain w/o contrast pending (transport here).  2.  Atorvastatin 80 mg.  3.  Aspirin 81 mg/day.  4.  PT/OT/Speech therapy.  5.  Decrease neuro check to q4h.  If no acute infarct on MRI may change to neuro check q8h.    09-03-22 @ 10:47

## 2022-09-04 NOTE — PROGRESS NOTE ADULT - ASSESSMENT
IMPRESSION:  CVA s/p TPA    SUGGEST:   Neuro management:   No antiplatelets or heparinoids for 24 hours from TPA administration.   Keep SBP < 180.   Head CT at 24 hours out from TPA completion or sooner if acute neuro changes.  Rehab evaluation, PT, OT, and speech evals.  After 24 hours begin aspirin 81 mg/day.   TTE.  MRI brain   Lipid panel and treat hyperlipidemia with statin.  
IMPRESSION:  CVA s/p TPA    SUGGEST:   Neuro management:   No antiplatelets or heparinoids for 24 hours from TPA administration.   Keep SBP < 180.   Head CT at 24 hours out from TPA completion or sooner if acute neuro changes.  Rehab evaluation, PT, OT, and speech evals.  After 24 hours begin aspirin 81 mg/day.   TTE.  MRI brain   Lipid panel and treat hyperlipidemia with statin.  
71y Male with prior CVAx2 with residual right sided weakness, dysarthria and aphasia with TPA administeredx1 presenting to the ED for evaluation of acute onset headache, blurry vision in his left eye, and LUE weakness that all occurred when he was going for his morning walk today at 9,00am. Headache and LUE weakness resolved prior to arrival to the ED and patient's left visual deficit has improved but not completely resolved. Otherwise denies fevers, chills, cp, sob, n/v/d, abd pain, back pain, calf tenderness or swelling, recent travel.  upon further review and speaking with family over phone:  post tpa pt appears to be at baseline which is dysarthric at baseline, RUE>RLE weakness - this morning had left sided weakness which resolved prior to arrival and left visual defects - which has improved  still states some blurry vision but no focal defects   denies cp., palp, sob, HA - feel much better now  sp tpa in ER - ptn seen at  bed side nad  dysarthric notce  rt ue contraction     · Rehab Potential	good, to achieve stated therapy goals  · Therapy Frequency	3-5x/week  · Predicted Duration of Therapy Intervention (days/wks)	1 week  · Occupational Therapy DME Recommendations	pending progress  · Occupational Therapy Discharge Recommendations	Home OT; Outpatient OT; pending progress

## 2022-09-04 NOTE — DISCHARGE NOTE PROVIDER - NSDCFUSCHEDAPPT_GEN_ALL_CORE_FT
[facility] Physician formerly [facility]  CARDIOLOGY [address]  Scheduled Appointment: 09/09/2022    Mercedes Whyte  [facility]  Neuro [address]  Scheduled Appointment: 11/01/2022

## 2022-09-04 NOTE — PROGRESS NOTE ADULT - REASON FOR ADMISSION
stroke sp tpa - left sided weakness, ataxia, visual deficit

## 2022-09-04 NOTE — DISCHARGE NOTE PROVIDER - HOSPITAL COURSE
70 yo M with PMH of CVAx2 with residual right sided weakness, dysarthria and aphasia with TPA administered x 1 presented to the ED for evaluation of acute onset headache, blurry vision in his left eye, and LUE weakness that all occurred when he was going for his morning walk on the day of admission. Headache and LUE weakness resolved prior to arrival to the ED and patient's left visual deficit has improved but not completely resolved. Otherwise he denied fevers, chills, cp, sob, n/v/d, abd pain, back pain, calf tenderness or swelling, recent travel.  He was given tPA in the ED and admitted to ICU for neurological monitoring. After tPA patient returned back to his baseline neurological status.   Patient's neurological status has remained stable while in the hospital. Repeat CT head didn't show hemorrhagic conversion.    MR brain showed - Acute lacunar infarct involving the posterior left insular cortex.   Patient was evaluated by PT/OT/Speech in the hospital. Neurology eval done - recommended dual antiplatelets and outpatient follow up in stroke clinic in 2 weeks.   Stable for discharge home with home pt. 70 yo M with PMH of CVAx2 with residual right sided weakness, dysarthria and aphasia with TPA administered x 1 presented to the ED for evaluation of acute onset headache, blurry vision in his left eye, and LUE weakness that all occurred when he was going for his morning walk on the day of admission. Headache and LUE weakness resolved prior to arrival to the ED and patient's left visual deficit has improved but not completely resolved. Otherwise he denied fevers, chills, cp, sob, n/v/d, abd pain, back pain, calf tenderness or swelling, recent travel.  He was given tPA in the ED and admitted to ICU for neurological monitoring. After tPA patient returned back to his baseline neurological status.   Patient's neurological status has remained stable while in the hospital. Repeat CT head didn't show hemorrhagic conversion.    MR brain showed - Acute lacunar infarct involving the posterior left insular cortex.   Patient was evaluated by PT/OT/Speech in the hospital. Neurology eval done - recommended dual antiplatelets and outpatient follow up in stroke clinic in 2 weeks. Advised to follow up with EP outpatient for loop recorder interrogation.   Stable for discharge home with home pt.

## 2022-09-04 NOTE — DISCHARGE NOTE PROVIDER - CARE PROVIDER_API CALL
Mercedes Whyte)  Neurology  [address] [place] [address]  Phone: [phone]  Fax: [phone]  Follow Up Time: 2 weeks    Manas Fleming)  Cardiac Electrophysiology; Cardiovascular Disease; [facility]  [address] [place] [address]  Phone: [phone]  Fax: [phone]  Follow Up Time: 2 weeks    LIBAN RODGERS  Endocrinology, Diabetes and Metabolism  [address] [place] [address]  Phone: [phone]  Fax: [phone]  Follow Up Time: 2 weeks

## 2022-09-04 NOTE — DISCHARGE NOTE PROVIDER - PROVIDER TOKENS
PROVIDER:[TOKEN:[[id number]],FOLLOWUP:[2 weeks]],PROVIDER:[TOKEN:[[id number]],FOLLOWUP:[2 weeks]],PROVIDER:[TOKEN:[[id number]],FOLLOWUP:[2 weeks]]

## 2022-09-04 NOTE — DISCHARGE NOTE NURSING/CASE MANAGEMENT/SOCIAL WORK - PATIENT PORTAL LINK FT
You can access the FollowMyHealth Patient Portal offered by [facility] by registering at the following website: [email or web address]. By joining [email or web address]’s FollowMyHealth portal, you will also be able to view your health information using other applications (apps) compatible with our system.

## 2022-09-04 NOTE — PROGRESS NOTE ADULT - NUTRITIONAL ASSESSMENT
71y Male with prior CVAx2 with residual right sided weakness, dysarthria and aphasia with TPA administeredx1 presenting to the ED for evaluation of acute onset headache, blurry vision in his left eye, and LUE weakness that all occurred when he was going for his morning walk today at 9,00am. Headache and LUE weakness resolved prior to arrival to the ED and patient's left visual deficit has improved but not completely resolved. Otherwise denies fevers, chills, cp, sob, n/v/d, abd pain, back pain, calf tenderness or swelling, recent travel.  upon further review and speaking with family over phone:  post tpa pt appears to be at baseline which is dysarthric at baseline, RUE>RLE weakness - this morning had left sided weakness which resolved prior to arrival and left visual defects - which has improved  still states some blurry vision but no focal defects   denies cp., palp, sob, HA - feel much better now  sp tpa in ER - ptn seen at  bed side nad  dysarthric notce  rt ue contraction     · Rehab Potential	good, to achieve stated therapy goals  · Therapy Frequency	3-5x/week  · Predicted Duration of Therapy Intervention (days/wks)	1 week  start to move left upper extremilty

## 2022-09-04 NOTE — SWALLOW BEDSIDE ASSESSMENT ADULT - COMMENTS
SLP attempted to complete dysphagia eval on 9/2; however pt NPO following TPA for 12 hours. Speech/lang eval was completed at the time. Pt is known to [facility] services. Was seen in 2020 with recommendations for NPO. As per wife, pt was then d/c to [facility] where he began eating PO again and has been tolerating regular/thin since.

## 2022-09-04 NOTE — PROGRESS NOTE ADULT - SUBJECTIVE AND OBJECTIVE BOX
NYDIA VALVERDE  71y, Male  Allergy: No Known Allergies      OVERNIGHT EVENTS:    PAST MEDICAL & SURGICAL HISTORY:  GERD (gastroesophageal reflux disease)      Cerebrovascular accident (CVA)      High cholesterol      DVT, lower extremity  was prev on eliquis - taken off post trreatment      H/O skin graft  upper body  legs      H/O bilateral hip replacements      S/P tonsillectomy      History of loop recorder          VITALS:  T(F): 97 (09-04-22 @ 05:00), Max: 98.1 (09-03-22 @ 15:00)  HR: 49 (09-04-22 @ 05:00)  BP: 140/71 (09-04-22 @ 05:00) (116/67 - 140/77)  RR: 19 (09-04-22 @ 03:00)  SpO2: 95% (09-04-22 @ 03:00)    TESTS & MEASUREMENTS:  Height (cm): 182.9 (09-02-22 @ 14:00)  Weight (kg): 83.8 (09-02-22 @ 14:00)  BMI (kg/m2): 25.1 (09-02-22 @ 14:00)    09-02-22 @ 07:01  -  09-03-22 @ 07:00  --------------------------------------------------------  IN: 220 mL / OUT: 650 mL / NET: -430 mL    09-03-22 @ 07:01  -  09-04-22 @ 07:00  --------------------------------------------------------  IN: 0 mL / OUT: 1250 mL / NET: -1250 mL                            15.2   7.21  )-----------( 172      ( 04 Sep 2022 05:51 )             44.3     PT/INR - ( 02 Sep 2022 10:20 )   PT: 13.20 sec;   INR: 1.15 ratio         PTT - ( 02 Sep 2022 10:20 )  PTT:29.2 sec  09-04    138  |  103  |  15  ----------------------------<  88  4.1   |  25  |  0.9    Ca    8.9      04 Sep 2022 05:51  Mg     1.9     09-04    TPro  6.1  /  Alb  4.0  /  TBili  0.6  /  DBili  x   /  AST  24  /  ALT  18  /  AlkPhos  70  09-04    LIVER FUNCTIONS - ( 04 Sep 2022 05:51 )  Alb: 4.0 g/dL / Pro: 6.1 g/dL / ALK PHOS: 70 U/L / ALT: 18 U/L / AST: 24 U/L / GGT: x           CARDIAC MARKERS ( 02 Sep 2022 10:20 )  x     / <0.01 ng/mL / x     / x     / x                RADIOLOGY & ADDITIONAL TESTS:    MEDICATIONS:  MEDICATIONS  (STANDING):  aspirin enteric coated 81 milliGRAM(s) Oral daily  atorvastatin 80 milliGRAM(s) Oral at bedtime  baclofen 5 milliGRAM(s) Oral every 8 hours  levothyroxine 50 MICROGram(s) Oral daily  LORazepam   Injectable 1 milliGRAM(s) IV Push once  pantoprazole    Tablet 40 milliGRAM(s) Oral before breakfast  PARoxetine 20 milliGRAM(s) Oral daily    MEDICATIONS  (PRN):      HEALTH ISSUES - PROBLEM Dx:  CVA (cerebrovascular accident)            Case discussed in details with:

## 2022-09-04 NOTE — SWALLOW BEDSIDE ASSESSMENT ADULT - SLP GENERAL OBSERVATIONS
Pt alert and cooperative. Apraxic speech characteristics and jargon noted during conversation. No c/o pain or discomfort at this time.

## 2022-09-04 NOTE — DISCHARGE NOTE PROVIDER - NSDCMRMEDTOKEN_GEN_ALL_CORE_FT
aspirin 81 mg oral tablet, chewable: 1 tab(s) orally once a day  atorvastatin 80 mg oral tablet: 1 tab(s) orally once a day (at bedtime)  baclofen 5 mg oral tablet: 1 tab(s) orally 3 times a day  Paxil 40 mg oral tablet: 1 tab(s) orally once a day  Plavix 75 mg oral tablet: 1 tab(s) orally once a day  Synthroid 50 mcg (0.05 mg) oral tablet: 1 tab(s) orally once a day

## 2022-09-04 NOTE — PROGRESS NOTE ADULT - SUBJECTIVE AND OBJECTIVE BOX
Patient is a 71y old  Male who presents with a chief complaint of stroke sp tpa - left sided weakness, ataxia, visual deficit (03 Sep 2022 12:37)        HPI:  FROM ER:  · HPI Objective Statement: Patient is a 71y Male with prior CVAx2 with residual right sided weakness, dysarthria and aphasia with TPA administeredx1 presenting to the ED for evaluation of acute onset headache, blurry vision in his left eye, and LUE weakness that all occurred when he was going for his morning walk today at 920am. Headache and LUE weakness resolved prior to arrival to the ED and patient's left visual deficit has improved but not completely resolved. Otherwise denies fevers, chills, cp, sob, n/v/d, abd pain, back pain, calf tenderness or swelling, recent travel.  upon further review and speaking with family over phone:  post tpa pt appears to be at baseline which is dysarthric at baseline, RUE>RLE weakness - this morning had left sided weakness which resolved prior to arrival and left visual defects - which has improved  still states some blurry vision but no focal defects   denies cp., palp, sob, HA - feel smuch better now  sp tpa in ER - NIH 8 due to old deficits and mild visual changes residual  (02 Sep 2022 14:12)      Pt evaluated on rounds.  I reviewed the radiology tests and hospital record.    I reviewed previous notes on this patient.    Interval Events: No overnight events.      CAM:    SAT:    SBT:      REVIEW OF SYSTEMS:   see HPI      OBJECTIVE:  ICU Vital Signs Last 24 Hrs  T(C): 36.1 (04 Sep 2022 05:00), Max: 36.7 (03 Sep 2022 15:00)  T(F): 97 (04 Sep 2022 05:00), Max: 98.1 (03 Sep 2022 15:00)  HR: 49 (04 Sep 2022 05:00) (49 - 68)  BP: 140/71 (04 Sep 2022 05:00) (116/67 - 140/77)  BP(mean): 88 (03 Sep 2022 18:00) (87 - 101)  ABP: --  ABP(mean): --  RR: 21 (03 Sep 2022 18:00) (17 - 22)  SpO2: 94% (03 Sep 2022 18:00) (93% - 96%)    O2 Parameters below as of 03 Sep 2022 07:00  Patient On (Oxygen Delivery Method): room air              09-02 @ 07:01 - 09-03 @ 07:00  --------------------------------------------------------  IN: 220 mL / OUT: 650 mL / NET: -430 mL    09-03 @ 07:01 - 09-04 @ 05:46  --------------------------------------------------------  IN: 0 mL / OUT: 950 mL / NET: -950 mL      CAPILLARY BLOOD GLUCOSE  113 (02 Sep 2022 10:04)            PHYSICAL EXAM:       · ENMT:   Airway patent,   Nasal mucosa clear.  Mouth with normal mucosa.   No thrush    · EYES:   Clear bilaterally,   pupils equal,   round and reactive to light.    · CARDIAC:   Normal rate,   regular rhythm.    Heart sounds S1, S2.   No murmurs, no rubs or gallops on auscultation  no edema        CAROTID:   normal systolic impulse  no bruits    · RESPIRATORY:   rales  normal chest expansion  no retractions or use of accessory muscles  percussion of chest demonstrates no hyperresonance or dullness    · GASTROINTESTINAL:  Abdomen soft,   non-tender,   + BS  liver/spleen not palpable    · MUSCULOSKELETAL:   no clubbing, cyanosis      · SKIN:   Skin normal color for race,   warm, dry   No evidence of rash.        · HEME LYMPH:   no splenomegaly.  No cervical  lymphadenopathy.  no inguinal lymphadenopathy    HOSPITAL MEDICATIONS:  MEDICATIONS  (STANDING):  aspirin enteric coated 81 milliGRAM(s) Oral daily  atorvastatin 80 milliGRAM(s) Oral at bedtime  baclofen 5 milliGRAM(s) Oral every 8 hours  levothyroxine 50 MICROGram(s) Oral daily  pantoprazole    Tablet 40 milliGRAM(s) Oral before breakfast  PARoxetine 20 milliGRAM(s) Oral daily    MEDICATIONS  (PRN):    sodium chloride 0.9%.: Solution, 50 milliLiter(s) infuse at 20 mL/Hr  Special Instructions: For post-alteplase continuous infusion, infuse at same rate as alteplase until volume to be infused (VTBI) is complete using the same priming tube.  Provider's Contact #: [phone]      LABS:                        12.8   4.64  )-----------( 156      ( 02 Sep 2022 10:20 )             37.5     09-03    137  |  104  |  10  ----------------------------<  95  4.3   |  25  |  0.9    Ca    8.7      03 Sep 2022 11:08  Mg     1.8     09-03    TPro  6.3  /  Alb  4.0  /  TBili  0.6  /  DBili  x   /  AST  25  /  ALT  21  /  AlkPhos  69  09-03    PT/INR - ( 02 Sep 2022 10:20 )   PT: 13.20 sec;   INR: 1.15 ratio         PTT - ( 02 Sep 2022 10:20 )  PTT:29.2 sec      CARDIAC MARKERS ( 02 Sep 2022 10:20 )  x     / <0.01 ng/mL / x     / x     / x          COVID-19 PCR: NotDetec (02 Sep 2022 10:00)    RADIOLOGY: Today I personally interpreted the latest CXR and other pertinent films.

## 2022-09-04 NOTE — DISCHARGE NOTE PROVIDER - NSDCCPCAREPLAN_GEN_ALL_CORE_FT
PRINCIPAL DISCHARGE DIAGNOSIS  Diagnosis: Stroke  Assessment and Plan of Treatment: You were admitted to the hospital because of worsening weakness and aphasia. In the hospital you were given tPA for the stroke, after which the weakness and aphasia improved. MRI brain showed acute stroke in the left insular cortex.   Continue taking the aspirin, plavix and atorvastatin. Follow up with your neurologist within 2 weeks.   Follow up with electrophysiologist for interrogation of loop recorder that you have.   You have been diagnosed with stroke which is a condition that develops when part of the brain doesn't get enough blood and oxygen   - Take your blood thinners and cholesterol medication as prescribed. Do not skip doses and do not run low on your medication. call your doctor if you need refills   - If you have diabetes, take your diabetes medication as prescribed. Check your blood sugar level every day and contact your doctor if the levels arr high as your medication may need to be re-adjusted or some medication may need to be added   - If you have hypertension, take your blood pressure medication as prescribed. Measure your blood pressure with a cuff every day and write down the values. Call your doctors if the values are high despite medication as he may adjust your medication   - Avoid smoking and do not let anyone smoke next to you. If you are a smoker and find it hard to smoke seek help or call your doctors for referrals for counselling programs   - Follow up with your doctor as outpatient. he may prescribe regular lab work to keep track of your cholesterol and sugar levels in your blood. Do not skip appointments and always be compliant wit your doctor's advise  - Call 911 or report to the emergency department if you have sudden onset severe headache, vision problems such as blurry or double vision or vision that is going dark, vertigo, numbness or weakness anywhere in your body, slurred speech or difficulty walking

## 2022-09-04 NOTE — DISCHARGE NOTE PROVIDER - CARE PROVIDERS DIRECT ADDRESSES
,DirectAddress_Unknown,[email or web address],DirectAddress_Unknown

## 2022-09-05 LAB
A1C WITH ESTIMATED AVERAGE GLUCOSE RESULT: 5.5 % — SIGNIFICANT CHANGE UP (ref 4–5.6)
ESTIMATED AVERAGE GLUCOSE: 111 MG/DL — SIGNIFICANT CHANGE UP (ref 68–114)

## 2022-09-08 DIAGNOSIS — E78.5 HYPERLIPIDEMIA, UNSPECIFIED: ICD-10-CM

## 2022-09-08 DIAGNOSIS — Z98.890 OTHER SPECIFIED POSTPROCEDURAL STATES: ICD-10-CM

## 2022-09-08 DIAGNOSIS — I69.320 APHASIA FOLLOWING CEREBRAL INFARCTION: ICD-10-CM

## 2022-09-08 DIAGNOSIS — Z86.718 PERSONAL HISTORY OF OTHER VENOUS THROMBOSIS AND EMBOLISM: ICD-10-CM

## 2022-09-08 DIAGNOSIS — I69.391 DYSPHAGIA FOLLOWING CEREBRAL INFARCTION: ICD-10-CM

## 2022-09-08 DIAGNOSIS — R27.0 ATAXIA, UNSPECIFIED: ICD-10-CM

## 2022-09-08 DIAGNOSIS — I63.9 CEREBRAL INFARCTION, UNSPECIFIED: ICD-10-CM

## 2022-09-08 DIAGNOSIS — R29.708 NIHSS SCORE 8: ICD-10-CM

## 2022-09-08 DIAGNOSIS — K21.9 GASTRO-ESOPHAGEAL REFLUX DISEASE WITHOUT ESOPHAGITIS: ICD-10-CM

## 2022-09-08 DIAGNOSIS — I69.351 HEMIPLEGIA AND HEMIPARESIS FOLLOWING CEREBRAL INFARCTION AFFECTING RIGHT DOMINANT SIDE: ICD-10-CM

## 2022-09-08 DIAGNOSIS — Z96.643 PRESENCE OF ARTIFICIAL HIP JOINT, BILATERAL: ICD-10-CM

## 2022-09-08 DIAGNOSIS — H53.462 HOMONYMOUS BILATERAL FIELD DEFECTS, LEFT SIDE: ICD-10-CM

## 2022-09-08 DIAGNOSIS — Z20.822 CONTACT WITH AND (SUSPECTED) EXPOSURE TO COVID-19: ICD-10-CM

## 2022-09-08 DIAGNOSIS — H53.8 OTHER VISUAL DISTURBANCES: ICD-10-CM

## 2022-09-09 ENCOUNTER — NON-APPOINTMENT (OUTPATIENT)
Age: 72
End: 2022-09-09

## 2022-09-09 ENCOUNTER — APPOINTMENT (OUTPATIENT)
Dept: CARDIOLOGY | Facility: CLINIC | Age: 72
End: 2022-09-09

## 2022-09-09 PROCEDURE — 93298 REM INTERROG DEV EVAL SCRMS: CPT

## 2022-09-09 PROCEDURE — G2066: CPT

## 2022-09-12 PROBLEM — I82.409 ACUTE EMBOLISM AND THROMBOSIS OF UNSPECIFIED DEEP VEINS OF UNSPECIFIED LOWER EXTREMITY: Chronic | Status: ACTIVE | Noted: 2022-09-02

## 2022-09-22 ENCOUNTER — RX RENEWAL (OUTPATIENT)
Age: 72
End: 2022-09-22

## 2022-10-03 NOTE — ED PROVIDER NOTE - PROGRESS NOTE ADDITIONAL3
Occupational Therapy Visit    Referred by: Linus Tsang MD; Medical Diagnosis (from order):    Diagnosis Information      Diagnosis    794.02 (ICD-9-CM) - R94.01 (ICD-10-CM) - Abnormal EEG    331.83 (ICD-9-CM) - G31.84 (ICD-10-CM) - Mild cognitive impairment    784.3 (ICD-9-CM) - G31.01, F02.80 (ICD-10-CM) - Primary progressive aphasia (CMS/HCC)              Visit: 8      Daily Treatment Note  Patient alert and oriented X3.    SUBJECTIVE                                                                                                                 Patient verbally consented to allow observer present during session.  Patient and his wife stated they went to the [employer] to watch the new movie Kameron. Patient shared that deep breathing strategies take time from tasks and this was a reason why he did not initiate them when feeling stressed.   Pain / Symptoms:  Patient denies pain/symptoms       OBJECTIVE                                                                                                                          TREATMENT                                                                                                                  Therapeutic Activity:  Multimatrix  - Patient participated in multi-matrix task in order to address visual scanning skills  - Patient guided on strategies to assist in completing task (flipping cubes to shape side before organizing), fixing errors (order of cubes to match visual guides).   -Completed task in 13 minutes with min verbal cueing, taking steps toward card example, then fixed errors with card on table for reference.     Patient guided on use of calming strategies due to heavy breathing during stressful tasks       Neuromuscular Re-Education:  Task specific training with picking up coins/poker chips using thumb and index finger  - completed x10 on each hand with coins and poker chips with demonstration and verbal cues for use of thumb/index finger as he initially slid  the coins to the edge of the table to pick them up    Skilled input: verbal instruction/cues and as detailed above    Writer verbally educated and received verbal consent for hand placement, positioning of patient, and techniques to be performed today from patient for therapist position for techniques as described above and how they are pertinent to the patient's plan of care.    Home Exercise Program/Education Materials: 8/23: Diaphragmatic breathing   9/7: Writing, daily meditation  9/14: Spot it, cancellation activities  10/3: Using thumb/index finger to manipulate small puzzle pieces when completing puzzles at home         ASSESSMENT                                                                                                             Patient demonstrated difficulty with visual scanning from 3 ft as he took steps towards card example in order to locate shapes and their order and he was able to fix errors when card was placed on the table next to the cubes. Difficulty initiating calming strategies during stressful tasks, patient was educated on using diaphragmatic breathing even if it takes time from task at hand in order to come back to task refreshed. Patient is improving with manual dexterity and finger isolation with use of thumb and index finger to  small objects with increased repetitions.     Addendum 11/11/22: Patient called and writer spoke to wife. Patient has been unable to attend d/t his anxiety/health and his wife recently has been sick as well. Plan is for discharge from OT services as they find three therapies is too much for him, he will continue with SLP and PT in a few weeks.   Education:   - Results of above outlined education: Verbalizes understanding    I was in the immediate presence of the student and directed the student’s performance of the services. I am responsible for all treatment, assessment, documentation, and billing rendered for this patient  Hortencia Guzman,  OT    PLAN                                                                                                                           Suggestions for next session as indicated: Progress per plan of care, fine motor tasks, visual scanning            Therapy procedure time and total treatment time can be found documented on the Time Entry flowsheet.   Additional Progress Note...

## 2022-10-12 ENCOUNTER — NON-APPOINTMENT (OUTPATIENT)
Age: 72
End: 2022-10-12

## 2022-10-12 ENCOUNTER — APPOINTMENT (OUTPATIENT)
Dept: CARDIOLOGY | Facility: CLINIC | Age: 72
End: 2022-10-12

## 2022-10-12 PROCEDURE — G2066: CPT

## 2022-10-12 PROCEDURE — 93298 REM INTERROG DEV EVAL SCRMS: CPT

## 2022-10-18 ENCOUNTER — RX RENEWAL (OUTPATIENT)
Age: 72
End: 2022-10-18

## 2022-11-15 ENCOUNTER — APPOINTMENT (OUTPATIENT)
Dept: CARDIOLOGY | Facility: CLINIC | Age: 72
End: 2022-11-15

## 2022-11-15 ENCOUNTER — NON-APPOINTMENT (OUTPATIENT)
Age: 72
End: 2022-11-15

## 2022-11-15 PROCEDURE — G2066: CPT

## 2022-11-15 PROCEDURE — 93298 REM INTERROG DEV EVAL SCRMS: CPT

## 2022-12-20 ENCOUNTER — NON-APPOINTMENT (OUTPATIENT)
Age: 72
End: 2022-12-20

## 2022-12-20 ENCOUNTER — APPOINTMENT (OUTPATIENT)
Dept: CARDIOLOGY | Facility: CLINIC | Age: 72
End: 2022-12-20

## 2022-12-20 PROCEDURE — G2066: CPT

## 2022-12-20 PROCEDURE — 93298 REM INTERROG DEV EVAL SCRMS: CPT

## 2022-12-29 ENCOUNTER — APPOINTMENT (OUTPATIENT)
Dept: NEUROLOGY | Facility: CLINIC | Age: 72
End: 2022-12-29
Payer: MEDICARE

## 2022-12-29 VITALS
DIASTOLIC BLOOD PRESSURE: 70 MMHG | SYSTOLIC BLOOD PRESSURE: 162 MMHG | BODY MASS INDEX: 25.73 KG/M2 | WEIGHT: 190 LBS | HEART RATE: 72 BPM | TEMPERATURE: 97.8 F | OXYGEN SATURATION: 96 % | HEIGHT: 72 IN

## 2022-12-29 PROCEDURE — 99215 OFFICE O/P EST HI 40 MIN: CPT

## 2022-12-29 NOTE — PHYSICAL EXAM
[General Appearance - Alert] : alert [General Appearance - In No Acute Distress] : in no acute distress [General Appearance - Well Nourished] : well nourished [General Appearance - Well Developed] : well developed [Oriented To Time, Place, And Person] : oriented to person, place, and time [Affect] : the affect was normal [Person] : oriented to person [Place] : oriented to place [Time] : oriented to time [Cranial Nerves Optic (II)] : visual acuity intact bilaterally,  visual fields full to confrontation, pupils equal round and reactive to light [Cranial Nerves Oculomotor (III)] : extraocular motion intact [Cranial Nerves Trigeminal (V)] : facial sensation intact symmetrically [Cranial Nerves Glossopharyngeal (IX)] : tongue and palate midline [Cranial Nerves Accessory (XI - Cranial And Spinal)] : head turning and shoulder shrug symmetric [Cranial Nerves Hypoglossal (XII)] : there was no tongue deviation with protrusion [Flattening Of Nasolabial Fold On The Right] : flattening of the nasolabial fold [Flattening Of Nasolabial Fold On The Left] : no flattening of the nasolabial fold [Sensation Tactile Decrease] : light touch was intact [Coordination - Dysmetria Impaired Finger-to-Nose Right Only] : present on the ride side [Coordination - Dysmetria Impaired Finger-to-Nose Left Only] : not present on the left side [2+] : Patella left 2+ [FreeTextEntry4] : decreased fluency, able to name some objects (others able to identify when given multiple choice), difficulty repeating, comprehension intact [FreeTextEntry6] : Increased tone in RUE RLE. RUE 3/5, RLE 3/5\par LUE/LLE 5/5 [FreeTextEntry8] : ambulates with cane [Respiration, Rhythm And Depth] : normal respiratory rhythm and effort [Heart Sounds] : normal S1 and S2 [Arterial Pulses Carotid] : carotid pulses were normal with no bruits

## 2022-12-29 NOTE — HISTORY OF PRESENT ILLNESS
[FreeTextEntry1] : Interval History: \par Pt presents today with his wife. He was seen at [facility] with vision loss in 09/2022. Given tPA. MRI brain showed small acute ischemic stroke in left MCA region adjacent to prior strokes. CTA head/neck also showed occlusion of L MCA. Pt now back to his baseline prior to most recent stroke. he exercises daily, compliant with DAPT (was not changed). Plan for tooth extraction soon however wife wanted to f/u before holding any blood thinners. \par \par HPI: Pt is a 72 yo M with PMHx of L MCA ischemic stroke x 2, HLD, and hypothyroidism, for whom a televisit (audio only) was conducted. Pt referred by Renetta, stroke clinic PA, for further evaluation of stroke etiology and management. Much of history and discussion was with pt's wife, as pt has residual aphasia from his strokes. Pt's initial stroke was in August 2020, presented with Left MCA/ICA, s/p EVT. MRI aruna showed moderate stroke in in left MCA territory, particularly frontoparietal region and small stroke in left ADEOLA as well. Pt was discharged on ASA/statin. He returned in October 2020 with worsening of aphasia and right sided weakness. Again found to have left MCA branch occlusion. CT head showed new ischemia in left basal ganglia. Pt was discharged on ASA/plavix to [facility], where he was found to have a LE DVT and was switched to eliquis monotherapy. He has been taking eliquis since then. He had a JANET that was negative for clot or shunt. S/p loop with no evidence of afib thus far. Pt's wife states that he has been doing well over all. Ambulates with a cane, has some difficulty with speech, peg tube was removed and he has been eating/drinking. recently completed PT/ST.\par No h/o stroke, clot or heart disease. No family h/o stroke or clotting disorder, but he has a brother with afib. No h/o tobacco, alcohol or drug use. \par \par 04/2022: Pt presents today with his wife. Endorses feeling well since his last visit. Continues to exercise daily, goes for long walks with his son. He has also been reading, has some difficulties, but is able to get through at least a page at a time. He is still taking ASA/plavix. Baclofen has helped with his spasticity and movement. Denies any new symptoms. \par

## 2022-12-29 NOTE — DISCUSSION/SUMMARY
[FreeTextEntry1] : Pt is a 70 yo M with PMHx of L ADEOLA/MCA ischemic stroke , HLD, and hypothyroidism, who presents for stroke f/u.\par \par # Recurrent ischemic strokes:\par  Initial stroke 08/2020: Left ADEOLA/MCA s/p EVT\par 10/2020: L BG. CTA head/neck showed left MCA superior division occlusion\par 09/2022: small left corona radiata. CTA h/n showed L M1 occlusion\par \par Residual right hemiplegia and expressive aphasia. NIHSS 7 (1 for right face, UE/LE each, 2 for aphasia and 2 for dysarthria). mRS 2. Recurrent strokes in left MCA distribution with progressive occlusion. Hypercoag and utoimmune workup unrevealing. \par - Continue DAPT/statin\par - Check PRU/ARU, then will likely switch to ticagrelor 90mg BID\par - MR NOVA\par  -Loop interrogation f/u\par - Continue baclofen 5mg TID. \par - It is reasonable to hold anti-platelets briefly, if needed, for dental work (continue ASA until day prior to procedure, and resume DAPT as soon as permissible).\par \par \par RTC in 2 mo

## 2022-12-29 NOTE — DATA REVIEWED
[de-identified] : CT head 10/2020: IMPRESSION:\par Since the CT head performed on the previous day:\par \par 1.  Interval development of left basal ganglia hypoattenuation compatible with acute infarct.\par \par 2.  Stable subacute infarcts in the left frontal and parietal lobes and posterior insular cortex.\par \par 3.  No significant mass effect. No acute intracranial hemorrhage.\par \par MRI brain 9/1/2020: \par IMPRESSION: \par \par Redemonstration of evolving acute/subacute infarcts involving the left frontal, temporal, parietal lobes with partially decreased mass effect in comparison with the prior study. Associated scattered lamina petechial hemorrhage with associated susceptibility signal, and enhancement. No evidence of underlying mass. \par \par No malika intracranial hemorrhage, new acute territorial infarct, or midline shift. \par \par JANET: 9/2020\par Summary: \par  1. Left ventricular ejection fraction, by visual estimation, is 60 to 65%. \par  2. Normal left atrial size. \par  3. Normal right atrial size. \par  4. Mild mitral valve regurgitation. \par  5. Color flow doppler and intravenous injection of agitated saline demonstrates the presence of an intact intra atrial septum. \par  6. No left atrial appendage thrombus and normal left atrial appendage velocities. \par \par \par MRI brain 09/2022: IMPRESSION:\par Acute lacunar infarct involving the posterior left insular cortex. No \par acute hemorrhage.\par \par CTA head/neck: 09/2022: CTA HEAD/NECK:\par Occlusion in the left M1 MCA with reconstitution of the enhancement in \par the M2 branches distal to the trifurcation, age indeterminate.\par

## 2023-01-04 NOTE — PROGRESS NOTE ADULT - ASSESSMENT
A 69 years old man with no significant pmhx presents with LMCA syndrome, initial NIHSS 20 when Stroke Code called. CTH reports of hypodensity of left temporal lobe and no intracranial hemorrhage. He is s/p IV-tPA. CTP/CTA reports of multifocal occlusion of the left M2/M3 branches, large area of ischemic within the left MCA territory measuring 172 cc and core infarct of 52 cc (mismatch volume/penumbra of 120 cc), he is s/p emergent IA mechanical thrombectomy of left ICA/MCA of superior/inferior division. No overnight events, significant neurological improvement noted and no right groin hematoma.     SUGGESTIONS:  - Neuro management per Neurocritical care team  - Medical management per primary team   - Follow up at [facility] at [address] [place] [address] [address]. May call Tel: [phone] to change appointment time    Case discussed and patient seen with attending physician   Toshia Rizo NP  [id number] Drysol Pregnancy And Lactation Text: This medication is considered safe during pregnancy and breast feeding.

## 2023-01-24 ENCOUNTER — NON-APPOINTMENT (OUTPATIENT)
Age: 73
End: 2023-01-24

## 2023-01-24 ENCOUNTER — APPOINTMENT (OUTPATIENT)
Dept: CARDIOLOGY | Facility: CLINIC | Age: 73
End: 2023-01-24
Payer: MEDICARE

## 2023-01-24 PROCEDURE — 93298 REM INTERROG DEV EVAL SCRMS: CPT

## 2023-01-24 PROCEDURE — G2066: CPT

## 2023-01-31 NOTE — DIETITIAN INITIAL EVALUATION ADULT. - +GENDER
Date of Surgery:     Procedure: Repair umbilical hernia, repair left inguinal hernia    Anatomical location and position if indicated: left    Dx:    Type of Anesthesia:General     Type of Admission: Outpatient        Other:      Statement Selected

## 2023-02-09 NOTE — ED PROVIDER NOTE - DATE/TIME 3
From: Latonya Mcnair  To: Dr. Abdon Arora  Sent: 2/8/2023 4:22 PM EST  Subject: [facility]    [facility]yi [facility] [address] [address]  Please change this on your records!   Thanks,  [employer]
02-Sep-2022 10:56

## 2023-02-22 ENCOUNTER — APPOINTMENT (OUTPATIENT)
Dept: NEUROSURGERY | Facility: CLINIC | Age: 73
End: 2023-02-22
Payer: MEDICARE

## 2023-02-22 VITALS — WEIGHT: 185 LBS | BODY MASS INDEX: 25.06 KG/M2 | HEIGHT: 72 IN

## 2023-02-22 DIAGNOSIS — I63.9 CEREBRAL INFARCTION, UNSPECIFIED: ICD-10-CM

## 2023-02-22 PROCEDURE — 99214 OFFICE O/P EST MOD 30 MIN: CPT

## 2023-02-23 ENCOUNTER — RESULT REVIEW (OUTPATIENT)
Age: 73
End: 2023-02-23

## 2023-02-23 ENCOUNTER — OUTPATIENT (OUTPATIENT)
Dept: OUTPATIENT SERVICES | Facility: HOSPITAL | Age: 73
LOS: 1 days | End: 2023-02-23
Payer: MEDICARE

## 2023-02-23 VITALS
TEMPERATURE: 97 F | DIASTOLIC BLOOD PRESSURE: 76 MMHG | HEIGHT: 72 IN | OXYGEN SATURATION: 95 % | WEIGHT: 184.97 LBS | SYSTOLIC BLOOD PRESSURE: 119 MMHG | RESPIRATION RATE: 16 BRPM | HEART RATE: 71 BPM

## 2023-02-23 DIAGNOSIS — Z98.890 OTHER SPECIFIED POSTPROCEDURAL STATES: Chronic | ICD-10-CM

## 2023-02-23 DIAGNOSIS — Z90.89 ACQUIRED ABSENCE OF OTHER ORGANS: Chronic | ICD-10-CM

## 2023-02-23 DIAGNOSIS — Z94.5 SKIN TRANSPLANT STATUS: Chronic | ICD-10-CM

## 2023-02-23 DIAGNOSIS — Z96.643 PRESENCE OF ARTIFICIAL HIP JOINT, BILATERAL: Chronic | ICD-10-CM

## 2023-02-23 DIAGNOSIS — Z01.818 ENCOUNTER FOR OTHER PREPROCEDURAL EXAMINATION: ICD-10-CM

## 2023-02-23 DIAGNOSIS — I67.1 CEREBRAL ANEURYSM, NONRUPTURED: ICD-10-CM

## 2023-02-23 LAB
ALBUMIN SERPL ELPH-MCNC: 4.5 G/DL — SIGNIFICANT CHANGE UP (ref 3.5–5.2)
ALP SERPL-CCNC: 88 U/L — SIGNIFICANT CHANGE UP (ref 30–115)
ALT FLD-CCNC: 30 U/L — SIGNIFICANT CHANGE UP (ref 0–41)
ANION GAP SERPL CALC-SCNC: 9 MMOL/L — SIGNIFICANT CHANGE UP (ref 7–14)
APTT BLD: 31.6 SEC — SIGNIFICANT CHANGE UP (ref 27–39.2)
AST SERPL-CCNC: 31 U/L — SIGNIFICANT CHANGE UP (ref 0–41)
BASOPHILS # BLD AUTO: 0.03 K/UL — SIGNIFICANT CHANGE UP (ref 0–0.2)
BASOPHILS NFR BLD AUTO: 0.4 % — SIGNIFICANT CHANGE UP (ref 0–1)
BILIRUB SERPL-MCNC: 0.5 MG/DL — SIGNIFICANT CHANGE UP (ref 0.2–1.2)
BUN SERPL-MCNC: 13 MG/DL — SIGNIFICANT CHANGE UP (ref 10–20)
CALCIUM SERPL-MCNC: 9.4 MG/DL — SIGNIFICANT CHANGE UP (ref 8.4–10.5)
CHLORIDE SERPL-SCNC: 102 MMOL/L — SIGNIFICANT CHANGE UP (ref 98–110)
CO2 SERPL-SCNC: 29 MMOL/L — SIGNIFICANT CHANGE UP (ref 17–32)
CREAT SERPL-MCNC: 1.2 MG/DL — SIGNIFICANT CHANGE UP (ref 0.7–1.5)
EGFR: 65 ML/MIN/1.73M2 — SIGNIFICANT CHANGE UP
EOSINOPHIL # BLD AUTO: 0.19 K/UL — SIGNIFICANT CHANGE UP (ref 0–0.7)
EOSINOPHIL NFR BLD AUTO: 2.7 % — SIGNIFICANT CHANGE UP (ref 0–8)
GLUCOSE SERPL-MCNC: 62 MG/DL — LOW (ref 70–99)
HCT VFR BLD CALC: 47.9 % — SIGNIFICANT CHANGE UP (ref 42–52)
HGB BLD-MCNC: 16.4 G/DL — SIGNIFICANT CHANGE UP (ref 14–18)
IMM GRANULOCYTES NFR BLD AUTO: 0.6 % — HIGH (ref 0.1–0.3)
INR BLD: 1.03 RATIO — SIGNIFICANT CHANGE UP (ref 0.65–1.3)
LYMPHOCYTES # BLD AUTO: 1.3 K/UL — SIGNIFICANT CHANGE UP (ref 1.2–3.4)
LYMPHOCYTES # BLD AUTO: 18.7 % — LOW (ref 20.5–51.1)
MCHC RBC-ENTMCNC: 32.2 PG — HIGH (ref 27–31)
MCHC RBC-ENTMCNC: 34.2 G/DL — SIGNIFICANT CHANGE UP (ref 32–37)
MCV RBC AUTO: 94.1 FL — HIGH (ref 80–94)
MONOCYTES # BLD AUTO: 0.61 K/UL — HIGH (ref 0.1–0.6)
MONOCYTES NFR BLD AUTO: 8.8 % — SIGNIFICANT CHANGE UP (ref 1.7–9.3)
NEUTROPHILS # BLD AUTO: 4.8 K/UL — SIGNIFICANT CHANGE UP (ref 1.4–6.5)
NEUTROPHILS NFR BLD AUTO: 68.8 % — SIGNIFICANT CHANGE UP (ref 42.2–75.2)
NRBC # BLD: 0 /100 WBCS — SIGNIFICANT CHANGE UP (ref 0–0)
PLATELET # BLD AUTO: 215 K/UL — SIGNIFICANT CHANGE UP (ref 130–400)
POTASSIUM SERPL-MCNC: 5 MMOL/L — SIGNIFICANT CHANGE UP (ref 3.5–5)
POTASSIUM SERPL-SCNC: 5 MMOL/L — SIGNIFICANT CHANGE UP (ref 3.5–5)
PROT SERPL-MCNC: 7.3 G/DL — SIGNIFICANT CHANGE UP (ref 6–8)
PROTHROM AB SERPL-ACNC: 11.8 SEC — SIGNIFICANT CHANGE UP (ref 9.95–12.87)
RBC # BLD: 5.09 M/UL — SIGNIFICANT CHANGE UP (ref 4.7–6.1)
RBC # FLD: 11.8 % — SIGNIFICANT CHANGE UP (ref 11.5–14.5)
SODIUM SERPL-SCNC: 140 MMOL/L — SIGNIFICANT CHANGE UP (ref 135–146)
WBC # BLD: 6.97 K/UL — SIGNIFICANT CHANGE UP (ref 4.8–10.8)
WBC # FLD AUTO: 6.97 K/UL — SIGNIFICANT CHANGE UP (ref 4.8–10.8)

## 2023-02-23 PROCEDURE — 93010 ELECTROCARDIOGRAM REPORT: CPT

## 2023-02-23 PROCEDURE — 93005 ELECTROCARDIOGRAM TRACING: CPT

## 2023-02-23 PROCEDURE — 80053 COMPREHEN METABOLIC PANEL: CPT

## 2023-02-23 PROCEDURE — 85610 PROTHROMBIN TIME: CPT

## 2023-02-23 PROCEDURE — 71046 X-RAY EXAM CHEST 2 VIEWS: CPT | Mod: 26

## 2023-02-23 PROCEDURE — 36415 COLL VENOUS BLD VENIPUNCTURE: CPT

## 2023-02-23 PROCEDURE — 85730 THROMBOPLASTIN TIME PARTIAL: CPT

## 2023-02-23 PROCEDURE — 71046 X-RAY EXAM CHEST 2 VIEWS: CPT

## 2023-02-23 PROCEDURE — 85025 COMPLETE CBC W/AUTO DIFF WBC: CPT

## 2023-02-23 PROCEDURE — 99214 OFFICE O/P EST MOD 30 MIN: CPT | Mod: 25

## 2023-02-23 RX ORDER — LEVOTHYROXINE SODIUM 125 MCG
1 TABLET ORAL
Qty: 0 | Refills: 0 | DISCHARGE

## 2023-02-23 RX ORDER — CHOLECALCIFEROL (VITAMIN D3) 125 MCG
1 CAPSULE ORAL
Qty: 0 | Refills: 0 | DISCHARGE

## 2023-02-23 RX ORDER — CLOPIDOGREL BISULFATE 75 MG/1
1 TABLET, FILM COATED ORAL
Qty: 0 | Refills: 0 | DISCHARGE

## 2023-02-23 RX ORDER — ASPIRIN/CALCIUM CARB/MAGNESIUM 324 MG
1 TABLET ORAL
Qty: 0 | Refills: 0 | DISCHARGE

## 2023-02-23 RX ORDER — BACLOFEN 100 %
1 POWDER (GRAM) MISCELLANEOUS
Qty: 0 | Refills: 0 | DISCHARGE

## 2023-02-23 NOTE — H&P PST ADULT - NSICDXPASTMEDICALHX_GEN_ALL_CORE_FT
PAST MEDICAL HISTORY:  Cerebrovascular accident (CVA)     DVT, lower extremity was prev on eliquis - taken off post trreatment    GERD (gastroesophageal reflux disease)     High cholesterol

## 2023-02-23 NOTE — H&P PST ADULT - HISTORY OF PRESENT ILLNESS
Patient is a  72 year old  male presenting to PAST in preparation for  CEREBRAL ANGIOGRAM on  2/28 under LSB anesthesia by Dr. García  .  pt w/ hx 3 strokes.  most recent 9/202. CT scan 1/2023  "there is a thrombus seen on left carotid, he will be having an angiogram"  pt w/ right side weakness, speech issues (aphasia) ambulates w/ cane    PATIENT CURRENTLY DENIES CHEST PAIN  SHORTNESS OF BREATH  PALPITATIONS,  DYSURIA, OR UPPER RESPIRATORY INFECTION IN PAST 2 WEEKS  EXERCISE  TOLERANCE  10 blocks   WITHOUT SHORTNESS OF BREATH, limited dt previous strokes& right side weakness  denies travel outside the [place] in the past 30 days  Patient denies any signs or symptoms of COVID 19 and denies contact with known positive individuals.  They have an appointment for repeat COVID testing pre-procedure and acknowledge its time and place.  They were instructed to quarantine pre-procedure, practice exposure control measures, continue to self-monitor and report any concerns to their proceduralist.  pt advised self quarantine till day of procedure  Anesthesia Alert  NO--Difficult Airway  NO--History of neck surgery or radiation  NO--Limited ROM of neck  NO--History of Malignant hyperthermia  NO--No personal or family history of Pseudocholinesterase deficiency.  NO--Prior Anesthesia Complication  NO--Latex Allergy  NO--Loose teeth  NO--History of Rheumatoid Arthritis  NO--Bleeding risk  NO--ENMANUEL  NO--Other_____    PT DENIES ANY RASHES, ABRASION, OR OPEN WOUNDS OR CUTS    AS PER THE PT, THIS IS HIS/HER COMPLETE MEDICAL AND SURGICAL HX, INCLUDING MEDICATIONS PRESCRIBED AND OVER THE COUNTER    Patient verbalized understanding of instructions and was given the opportunity to ask questions and have them answered.    pt denies any suicidal ideation or thoughts, pt states not a threat to self or others

## 2023-02-24 DIAGNOSIS — I67.1 CEREBRAL ANEURYSM, NONRUPTURED: ICD-10-CM

## 2023-02-24 DIAGNOSIS — Z01.818 ENCOUNTER FOR OTHER PREPROCEDURAL EXAMINATION: ICD-10-CM

## 2023-02-27 ENCOUNTER — NON-APPOINTMENT (OUTPATIENT)
Age: 73
End: 2023-02-27

## 2023-02-27 ENCOUNTER — APPOINTMENT (OUTPATIENT)
Dept: CARDIOLOGY | Facility: CLINIC | Age: 73
End: 2023-02-27
Payer: MEDICARE

## 2023-02-27 PROCEDURE — G2066: CPT

## 2023-02-27 PROCEDURE — 93298 REM INTERROG DEV EVAL SCRMS: CPT

## 2023-02-28 ENCOUNTER — APPOINTMENT (OUTPATIENT)
Dept: NEUROSURGERY | Facility: HOSPITAL | Age: 73
End: 2023-02-28

## 2023-02-28 ENCOUNTER — TRANSCRIPTION ENCOUNTER (OUTPATIENT)
Age: 73
End: 2023-02-28

## 2023-02-28 ENCOUNTER — OUTPATIENT (OUTPATIENT)
Dept: OUTPATIENT SERVICES | Facility: HOSPITAL | Age: 73
LOS: 1 days | Discharge: HOME | End: 2023-02-28
Payer: MEDICARE

## 2023-02-28 VITALS
OXYGEN SATURATION: 99 % | SYSTOLIC BLOOD PRESSURE: 135 MMHG | HEART RATE: 76 BPM | TEMPERATURE: 98 F | DIASTOLIC BLOOD PRESSURE: 75 MMHG | RESPIRATION RATE: 20 BRPM

## 2023-02-28 VITALS — HEART RATE: 64 BPM | TEMPERATURE: 98 F | RESPIRATION RATE: 20 BRPM | OXYGEN SATURATION: 99 %

## 2023-02-28 DIAGNOSIS — Z94.5 SKIN TRANSPLANT STATUS: Chronic | ICD-10-CM

## 2023-02-28 DIAGNOSIS — Z98.890 OTHER SPECIFIED POSTPROCEDURAL STATES: Chronic | ICD-10-CM

## 2023-02-28 DIAGNOSIS — Z96.643 PRESENCE OF ARTIFICIAL HIP JOINT, BILATERAL: Chronic | ICD-10-CM

## 2023-02-28 DIAGNOSIS — I65.22 OCCLUSION AND STENOSIS OF LEFT CAROTID ARTERY: ICD-10-CM

## 2023-02-28 DIAGNOSIS — Z90.89 ACQUIRED ABSENCE OF OTHER ORGANS: Chronic | ICD-10-CM

## 2023-02-28 DIAGNOSIS — I67.1 CEREBRAL ANEURYSM, NONRUPTURED: ICD-10-CM

## 2023-02-28 PROCEDURE — 76937 US GUIDE VASCULAR ACCESS: CPT | Mod: 26

## 2023-02-28 PROCEDURE — 36222 PLACE CATH CAROTID/INOM ART: CPT | Mod: 59,LT

## 2023-02-28 PROCEDURE — 36223 PLACE CATH CAROTID/INOM ART: CPT | Mod: LT

## 2023-02-28 PROCEDURE — C1887: CPT

## 2023-02-28 PROCEDURE — 36226 PLACE CATH VERTEBRAL ART: CPT | Mod: RT

## 2023-02-28 PROCEDURE — 36223 PLACE CATH CAROTID/INOM ART: CPT | Mod: 50

## 2023-02-28 PROCEDURE — 76937 US GUIDE VASCULAR ACCESS: CPT

## 2023-02-28 PROCEDURE — C1894: CPT

## 2023-02-28 PROCEDURE — C1769: CPT

## 2023-02-28 RX ORDER — SODIUM CHLORIDE 9 MG/ML
1000 INJECTION INTRAMUSCULAR; INTRAVENOUS; SUBCUTANEOUS
Refills: 0 | Status: DISCONTINUED | OUTPATIENT
Start: 2023-02-28 | End: 2023-03-01

## 2023-02-28 NOTE — CHART NOTE - NSCHARTNOTEFT_GEN_A_CORE
PACU ANESTHESIA ADMISSION NOTE      Procedure: Arteriogram, cerebral, diagnostic      Post op diagnosis:      ____  Intubated  TV:______       Rate: ______      FiO2: ______    _x___  Patent Airway    _x___  Full return of protective reflexes    _x___  Full recovery from anesthesia / back to baseline status    Vitals:    BP  123/56  P  54  R  15  Sat  97    Mental Status:  _x___ Awake   _____ Alert   _____ Drowsy   _____ Sedated    Nausea/Vomiting:  _x___  NO       ______Yes,   See Post - Op Orders         Pain Scale (0-10):  __0___    Treatment: _x___ None    ____ See Post - Op/PCA Orders    Post - Operative Fluids:   __x__ Oral   ____ See Post - Op Orders    Plan: Discharge:   _x___Home       _____Floor     _____Critical Care    _____  Other:_________________    Comments:  No anesthesia issues or complications noted.  Discharge when criteria met.

## 2023-02-28 NOTE — ASU PATIENT PROFILE, ADULT - NSICDXPASTMEDICALHX_GEN_ALL_CORE_FT
PAST MEDICAL HISTORY:  Cerebrovascular accident (CVA)     DVT, lower extremity was prev on eliquis - taken off post trreatment    GERD (gastroesophageal reflux disease)     High cholesterol      Statement Selected

## 2023-02-28 NOTE — ASU DISCHARGE PLAN (ADULT/PEDIATRIC) - NS MD DC FALL RISK RISK
For information on Fall & Injury Prevention, visit: [email or web address] OR  [email or web address] OR  https://www.cdc.gov/steadi/patient.html

## 2023-02-28 NOTE — BRIEF OPERATIVE NOTE - COMMENTS
Patient is s/p diagnostic cerebral angiogram. Immediately post procedure patient had mild swelling proximal to the arteriotomy site, which subsequently resolved. Manual pressure was held for 10 minutes following which 5 cc of air was inflated in a TR band to be released after 10 minutes by the IR RN.   Brief Findings: Left ICA occlusion not amenable to angioplasty/stenting without significant associated risk. Compensatory perfusion observed from the right side. Official IR neuro report to follow.

## 2023-02-28 NOTE — ASU PATIENT PROFILE, ADULT - FALL HARM RISK - RISK INTERVENTIONS

## 2023-02-28 NOTE — PRE PROCEDURE NOTE - PRE PROCEDURE EVALUATION
Interventional Neuro Radiology  Pre-Procedure Note PA-C    This is a 71yo right hand dominant male    HPI:  The patient is a 72-year-old male with a past medical history of DVT, left MCA occlusion with small acute ischemic L MCA stroke September 2022 and small left ADEOLA stroke, hypothyroidism, and HLD. Recent CTH showed a new ischemic region in the left basal ganglia, and MR NOVA was recommended outpatient. The patient presents today for a diagnostic cerebral angiogram to better assess the intracranial vessels.   NIHSS 0   NPO after midnight last night   Normal saline started on arrival    Allergies: No Known Allergies    PAST MEDICAL & SURGICAL HISTORY:  GERD (gastroesophageal reflux disease)  Cerebrovascular accident (CVA)  High cholesterol  DVT, lower extremity  was prev on eliquis - taken off post trreatment  H/O skin graft  upper body  legs  H/O bilateral hip replacements  S/P tonsillectomy  History of loop recorder    Current Medications: sodium chloride 0.9%. 1000 milliLiter(s) IV Continuous <Continuous>    Assessment/Plan:     This is a 72-year-old, right-hand-dominant male who presents for a diagnostic cerebral angiogram.  Procedure, goals, risks, benefits and alternatives were discussed with patient.  All questions were answered to best understanding.   Risks discussed include but are not limited to stroke, vessel injury, hemorrhage, and/or hematoma.  Patient demonstrates understanding of all risks involved with this procedure and wishes to continue.     Appropriate consent was obtained from patient and consent is in the patient's chart.     Interventional Neuro Radiology  Pre-Procedure Note PA-C    This is a 73yo right hand dominant male    HPI:  The patient is a 72-year-old male with a past medical history of DVT, left MCA occlusion with small acute ischemic L MCA stroke September 2022 and small left ADEOLA stroke, hypothyroidism, and HLD. Recent CTH showed a new ischemic region in the left basal ganglia, and MR NOVA was recommended outpatient. The patient presents today for a diagnostic cerebral angiogram to better assess the intracranial vessels +/- Carotid angioplasty and stenting.   NIHSS 0   NPO after midnight last night   Normal saline started on arrival    Allergies: No Known Allergies    PAST MEDICAL & SURGICAL HISTORY:  GERD (gastroesophageal reflux disease)  Cerebrovascular accident (CVA)  High cholesterol  DVT, lower extremity  was prev on eliquis - taken off post trreatment  H/O skin graft  upper body  legs  H/O bilateral hip replacements  S/P tonsillectomy  History of loop recorder    Current Medications: sodium chloride 0.9%. 1000 milliLiter(s) IV Continuous <Continuous>    Assessment/Plan:     This is a 72-year-old, right-hand-dominant male who presents for a diagnostic cerebral angiogram +/- carotid angioplasty and stenting.   Procedure, goals, risks, benefits and alternatives were discussed with patient's wife.  All questions were answered to best understanding.   Risks discussed include but are not limited to stroke, vessel injury, hemorrhage, and/or hematoma.  Patient's wife demonstrates understanding of all risks involved with this procedure and wishes to continue.     Appropriate consent was obtained from patient's wife and consent is in the patient's chart.     Interventional Neuro Radiology  Pre-Procedure Note PA-C    This is a 71yo right hand dominant male    HPI:  The patient is a 72-year-old male with a past medical history of DVT, left MCA occlusion with small acute ischemic L MCA stroke September 2022 and small left ADEOLA stroke, hypothyroidism, and HLD. Recent CTH showed a new ischemic region in the left basal ganglia, and MR NOVA was recommended outpatient. The patient presents today for a diagnostic cerebral angiogram to better assess the intracranial vessels +/- Carotid angioplasty and stenting.   NIHSS 6  NPO after midnight last night   Normal saline started on arrival    Allergies: No Known Allergies    PAST MEDICAL & SURGICAL HISTORY:  GERD (gastroesophageal reflux disease)  Cerebrovascular accident (CVA)  High cholesterol  DVT, lower extremity  was prev on eliquis - taken off post trreatment  H/O skin graft  upper body  legs  H/O bilateral hip replacements  S/P tonsillectomy  History of loop recorder    Current Medications: sodium chloride 0.9%. 1000 milliLiter(s) IV Continuous <Continuous>    Assessment/Plan:     This is a 72-year-old, right-hand-dominant male who presents for a diagnostic cerebral angiogram +/- carotid angioplasty and stenting.   Procedure, goals, risks, benefits and alternatives were discussed with patient's wife.  All questions were answered to best understanding.   Risks discussed include but are not limited to stroke, vessel injury, hemorrhage, and/or hematoma.  Patient's wife demonstrates understanding of all risks involved with this procedure and wishes to continue.     Appropriate consent was obtained from patient's wife and consent is in the patient's chart.

## 2023-02-28 NOTE — BRIEF OPERATIVE NOTE - OPERATION/FINDINGS
Procedure: Diagnostic Cerebral Angiogram    Amount and type of contrast: Visipaque 320  Medications given during procedure: Verapamil 2.5 mg IA, Heparin 3,000 IU IA, Nitroglycerin 200 mcg IA, see also anesthesiology note  Implants placed: None  Complications: None    Post-procedure exam:   NIHSS: 6 for moderate-severe aphasia, left visual field deficit, right arm drift to bed, right arm sensory loss.   Extremity - RUE c/d/i without swelling, bleeding, tenderness, or signs of infection. Brachial and radial pulses palpated bilaterally. Temperature and color consistent bilaterally.  Abd - NTND  Please follow post NI orders for neuro checks, distal pulses, vitals, and arm checks placed for total of 2 hours recovery period following procedure. Please notify provider with any change in physical exam or neurological status.   [phone]

## 2023-02-28 NOTE — ASU DISCHARGE PLAN (ADULT/PEDIATRIC) - CARE PROVIDER_API CALL
Luis García; [facility])  Surgery  Neurosurgery  [address] [place] [address]  Phone: [phone]  Fax: [phone]  Follow Up Time:

## 2023-03-03 ENCOUNTER — APPOINTMENT (OUTPATIENT)
Dept: NEUROSURGERY | Facility: CLINIC | Age: 73
End: 2023-03-03
Payer: MEDICARE

## 2023-03-03 VITALS — HEIGHT: 72 IN | BODY MASS INDEX: 25.06 KG/M2 | WEIGHT: 185 LBS

## 2023-03-03 PROBLEM — I63.9 STROKE: Status: RESOLVED | Noted: 2023-03-03 | Resolved: 2023-03-03

## 2023-03-03 PROCEDURE — 99024 POSTOP FOLLOW-UP VISIT: CPT

## 2023-03-03 NOTE — HISTORY OF PRESENT ILLNESS
[FreeTextEntry1] : I reviewed this pleasant gentleman today in the neurosurgery clinic accompanied by his wife.  He is doing well with no symptoms since his angiogram.  He denies any postural TIAs or any other features that are concerning for cerebral hypoperfusion.  I explained to him that based on the angiogram that were performed earlier this week, there does not appear to be any flow that is anterior grade along his left carotid and it is totally occluded as a result of thrombosis.  Therefore we did not proceed with carotid artery stenting or intervention given the fact that he is asymptomatic from this.\par \par I had discussed with his stroke neurologist about switching him to his Eliquis which was something that his wife had asked about and he will discuss this at his appointment with her within the next week or so.  From my perspective, I would like him to have another CT angiogram of his head and neck in about 6 months time.\par \par His puncture site appears to be doing well with no obvious infection or concerns.\par \par He knows to contact us in the interim if there are any concerns in the meantime continues his dual antiplatelet therapy.

## 2023-03-14 ENCOUNTER — RX RENEWAL (OUTPATIENT)
Age: 73
End: 2023-03-14

## 2023-03-17 ENCOUNTER — APPOINTMENT (OUTPATIENT)
Dept: NEUROLOGY | Facility: CLINIC | Age: 73
End: 2023-03-17
Payer: MEDICARE

## 2023-03-17 ENCOUNTER — RX RENEWAL (OUTPATIENT)
Age: 73
End: 2023-03-17

## 2023-03-17 VITALS
DIASTOLIC BLOOD PRESSURE: 75 MMHG | HEART RATE: 80 BPM | BODY MASS INDEX: 25.73 KG/M2 | TEMPERATURE: 98.4 F | HEIGHT: 72 IN | WEIGHT: 190 LBS | OXYGEN SATURATION: 96 % | SYSTOLIC BLOOD PRESSURE: 122 MMHG

## 2023-03-17 DIAGNOSIS — K21.9 GASTRO-ESOPHAGEAL REFLUX DISEASE W/OUT ESOPHAGITIS: ICD-10-CM

## 2023-03-17 PROCEDURE — 99215 OFFICE O/P EST HI 40 MIN: CPT

## 2023-03-17 RX ORDER — CLOPIDOGREL BISULFATE 75 MG/1
75 TABLET, FILM COATED ORAL
Qty: 90 | Refills: 2 | Status: DISCONTINUED | COMMUNITY
Start: 2022-01-18 | End: 2023-03-17

## 2023-03-17 RX ORDER — PAROXETINE HYDROCHLORIDE 40 MG/1
40 TABLET, FILM COATED ORAL DAILY
Refills: 0 | Status: ACTIVE | COMMUNITY

## 2023-03-17 NOTE — PHYSICAL EXAM
[General Appearance - Alert] : alert [General Appearance - In No Acute Distress] : in no acute distress [General Appearance - Well Nourished] : well nourished [General Appearance - Well Developed] : well developed [Oriented To Time, Place, And Person] : oriented to person, place, and time [Affect] : the affect was normal [Person] : oriented to person [Place] : oriented to place [Time] : oriented to time [Cranial Nerves Oculomotor (III)] : extraocular motion intact [Cranial Nerves Optic (II)] : visual acuity intact bilaterally,  visual fields full to confrontation, pupils equal round and reactive to light [Cranial Nerves Trigeminal (V)] : facial sensation intact symmetrically [Cranial Nerves Glossopharyngeal (IX)] : tongue and palate midline [Cranial Nerves Accessory (XI - Cranial And Spinal)] : head turning and shoulder shrug symmetric [Flattening Of Nasolabial Fold On The Right] : flattening of the nasolabial fold [Cranial Nerves Hypoglossal (XII)] : there was no tongue deviation with protrusion [Sensation Tactile Decrease] : light touch was intact [Coordination - Dysmetria Impaired Finger-to-Nose Right Only] : present on the ride side [2+] : Patella left 2+ [Respiration, Rhythm And Depth] : normal respiratory rhythm and effort [Heart Sounds] : normal S1 and S2 [Arterial Pulses Carotid] : carotid pulses were normal with no bruits [Cranial Nerves Vestibulocochlear (VIII)] : hearing was intact bilaterally [Flattening Of Nasolabial Fold On The Left] : no flattening of the nasolabial fold [Coordination - Dysmetria Impaired Finger-to-Nose Left Only] : not present on the left side [FreeTextEntry4] : decreased fluency, able to name some objects (others able to identify when given multiple choice), difficulty repeating, comprehension intact [FreeTextEntry6] : Increased tone in RUE RLE. RUE 3/5, RLE 3/5\par LUE/LLE 5/5 [FreeTextEntry8] : ambulates with cane

## 2023-03-17 NOTE — END OF VISIT
[] : Resident [FreeTextEntry3] : Pt was seen, examined and discussed with resident. Assessment and plan developed between myself and pt and his spouse.  [Time Spent: ___ minutes] : I have spent [unfilled] minutes of time on the encounter.

## 2023-03-17 NOTE — HISTORY OF PRESENT ILLNESS
[FreeTextEntry1] : Interval History: \par Pt presents today with his wife. DSA was done recently Feb 28 and showed complete occlusion of L M1, seen by Dr. García and recommended discuss with us about re-starting AC. No new symptoms since last visit and he was compliant with DAPT, Lipitor 20 mg daily, BP is well controlled, on PT every day \par at He was seen at [facility] with vision loss in 09/2022. Given tPA. MRI brain showed small acute ischemic stroke in left MCA region adjacent to prior strokes. CTA head/neck also showed occlusion of L MCA. Pt now back to his baseline prior to most recent stroke. he exercises daily, compliant with DAPT (was not changed). Plan for tooth extraction soon however wife wanted to f/u before holding any blood thinners. \par \par HPI: Pt is a 70 yo M with PMHx of L MCA ischemic stroke x 2, HLD, and hypothyroidism, for whom a televisit (audio only) was conducted. Pt referred by Renetta, stroke clinic PA, for further evaluation of stroke etiology and management. Much of history and discussion was with pt's wife, as pt has residual aphasia from his strokes. Pt's initial stroke was in August 2020, presented with Left MCA/ICA, s/p EVT. MRI aruna showed moderate stroke in in left MCA territory, particularly frontoparietal region and small stroke in left ADEOLA as well. Pt was discharged on ASA/statin. He returned in October 2020 with worsening of aphasia and right sided weakness. Again found to have left MCA branch occlusion. CT head showed new ischemia in left basal ganglia. Pt was discharged on ASA/plavix to [facility], where he was found to have a LE DVT and was switched to eliquis monotherapy. He has been taking eliquis since then. He had a JANET that was negative for clot or shunt. S/p loop with no evidence of afib thus far. Pt's wife states that he has been doing well over all. Ambulates with a cane, has some difficulty with speech, peg tube was removed and he has been eating/drinking. recently completed PT/ST.\par No h/o stroke, clot or heart disease. No family h/o stroke or clotting disorder, but he has a brother with afib. No h/o tobacco, alcohol or drug use. \par \par 04/2022: Pt presents today with his wife. Endorses feeling well since his last visit. Continues to exercise daily, goes for long walks with his son. He has also been reading, has some difficulties, but is able to get through at least a page at a time. He is still taking ASA/plavix. Baclofen has helped with his spasticity and movement. Denies any new symptoms. \par

## 2023-03-17 NOTE — DATA REVIEWED
[de-identified] : CT head 10/2020: IMPRESSION:\par Since the CT head performed on the previous day:\par \par 1.  Interval development of left basal ganglia hypoattenuation compatible with acute infarct.\par \par 2.  Stable subacute infarcts in the left frontal and parietal lobes and posterior insular cortex.\par \par 3.  No significant mass effect. No acute intracranial hemorrhage.\par \par MRI brain 9/1/2020: \par IMPRESSION: \par \par Redemonstration of evolving acute/subacute infarcts involving the left frontal, temporal, parietal lobes with partially decreased mass effect in comparison with the prior study. Associated scattered lamina petechial hemorrhage with associated susceptibility signal, and enhancement. No evidence of underlying mass. \par \par No malika intracranial hemorrhage, new acute territorial infarct, or midline shift. \par \par JANET: 9/2020\par Summary: \par  1. Left ventricular ejection fraction, by visual estimation, is 60 to 65%. \par  2. Normal left atrial size. \par  3. Normal right atrial size. \par  4. Mild mitral valve regurgitation. \par  5. Color flow doppler and intravenous injection of agitated saline demonstrates the presence of an intact intra atrial septum. \par  6. No left atrial appendage thrombus and normal left atrial appendage velocities. \par \par \par MRI brain 09/2022: IMPRESSION:\par Acute lacunar infarct involving the posterior left insular cortex. No \par acute hemorrhage.\par \par CTA head/neck: 09/2022: CTA HEAD/NECK:\par Occlusion in the left M1 MCA with reconstitution of the enhancement in \par the M2 branches distal to the trifurcation, age indeterminate.\par  [de-identified] : DSA 03/2023: Brief Findings: Left ICA occlusion not amenable to angioplasty/stenting without\par significant associated risk. Compensatory perfusion observed from the right\par side. Official IR neuro report to follow.

## 2023-03-17 NOTE — DISCUSSION/SUMMARY
[FreeTextEntry1] : Pt is a 72 yo M with PMHx of L ADEOLA/MCA ischemic stroke , HLD, and hypothyroidism, who presents for stroke f/u.\par \par # Recurrent ischemic strokes:\par  Initial stroke 08/2020: Left ADEOLA/MCA s/p EVT\par 10/2020: L BG. CTA head/neck showed left MCA superior division occlusion\par 09/2022: small left corona radiata. CTA h/n showed L M1 occlusion\par \par Residual right hemiplegia and expressive aphasia. NIHSS 7 (1 for right face, UE/LE each, 2 for aphasia and 2 for dysarthria). mRS 2. Recurrent strokes in left MCA distribution with progressive occlusion of L MCA and then rapid occlusion of left cervical ICA. Reviewed all available prior CTA's again since 2020. Mild proximal left ICA stenosis noted without evident progression up until recently. Favor nonatherosclerotic disease. Prior inflammatory workup negative, unlikely vasculitic process. No known h/o dissection, thus possibly due to FMD. Question whether pt had undiagnosed carotid web which led to the recent occlusion. S/p DSA which confirms complete occlusion. No new stroke associated with these changes. \par - Hypercoag and autoimmune workup unrevealing. Repeat ESR, CRP, SALVATORE, ANCA and obtain HIV (low suspicion)\par - Extensive discussion with patient and his wife regarding optimal medical management for suspected FMD and secondary stroke prevention. Discussed risks vs benefit of antiplatelet therapy vs anticoagulation. Given progression of disease (with well controlled vascular risk factors), pt and his wife prefer AC. Thus will restart eliquis 5mg BID, continue ASA 81mg daily and d/c plavix. \par - Continue baclofen 5mg TID. \par - repeat CTA head/neck ordered by Dr. García, f/u once imaging is completed\par \par \par RTC in 4 mo

## 2023-03-30 ENCOUNTER — NON-APPOINTMENT (OUTPATIENT)
Age: 73
End: 2023-03-30

## 2023-03-30 ENCOUNTER — APPOINTMENT (OUTPATIENT)
Dept: CARDIOLOGY | Facility: CLINIC | Age: 73
End: 2023-03-30
Payer: MEDICARE

## 2023-03-30 PROCEDURE — G2066: CPT

## 2023-03-30 PROCEDURE — 93298 REM INTERROG DEV EVAL SCRMS: CPT

## 2023-04-12 NOTE — H&P ADULT - NSHPPHYSICALEXAM_GEN_ALL_CORE
Yes PHYSICAL EXAMINATION  - BP noted  VItals: T(C): 36.7 (10-19-20 @ 13:53), Max: 37.3 (10-19-20 @ 04:02)  HR: 64 (10-19-20 @ 13:53) (50 - 69)  BP: 94/54 (10-19-20 @ 13:53) (94/54 - 127/66)  RR: 18 (10-19-20 @ 13:53) (18 - 20)  SpO2: 95% (10-19-20 @ 13:53) (94% - 98%)    General: NAD, Resting Comfortable,                                  HEENT: NC/AT, EOM I, PERRLA, Normal Conjunctivae. excess saliva in oral cavity. mild right sided facial droop. NGT in right nostril  Cardio: RRR                              Pulm: No Respiratory Distress,  Lungs with bilateral rhonchi                       Abdomen: ND/NT, Soft                                              MSK: No joint swelling, decreased ROM in the right upper and lower extremity.                                         Ext: No C/C/E, No calf tenderness    Skin: intact                                                                   Neurological Examination:  Cognitive: [  x  ] AAO x 3   [    ]  other                                                                      Attention:  [ x   ] intact   [    ]  other                            Mood/Affect:  [   x ] wnl    [    ]  other                                                                             Communication:  [    ]Fluent,  No dysarthria,   [   x ] other dysarthria  CN II - XII  [    ] intact   [  x  ] other decrease function in clearing saliva  Coordination:   [    ] FTN/HTS intact   [    x] other    normal on left, unable to assess on right due to hemiparesis                                                                   Sensory: [  x  ]Intact to light touch   [    ] other                                                                                        Tone:  [    ]  wnl,   [    x]  other decreased tone in RUE .     Motor    LEFT    UE: [ x  ] WNL.  [   ] other:  RIGHT UE:  [   ] WNL.  [ x  ] other: shoulder 1/5, elbow 0/5. wrist 0/5, fingers 0/5  LEFT    LE:  [  x ] WNL.  [   ] other:  RIGHT LE:  [   ] WNL.  [ x  ] other:  Hip 3-/5, knee 0/5. ankle and great toe 0/5    Reflex:  [    ]  symmetric,  [  x  ]  other: Right bicep 3+otherwise reflexes normal PHYSICAL EXAMINATION  - BP noted  VItals: T(C): 36.7 (10-19-20 @ 13:53), Max: 37.3 (10-19-20 @ 04:02)  HR: 64 (10-19-20 @ 13:53) (50 - 69)  BP: 94/54 (10-19-20 @ 13:53) (94/54 - 127/66)  RR: 18 (10-19-20 @ 13:53) (18 - 20)  SpO2: 95% (10-19-20 @ 13:53) (94% - 98%)    General: NAD, Resting Comfortable,                                  HEENT: NC/AT, EOM I, PERRLA, Normal Conjunctivae. excess saliva in oral cavity. mild right sided facial droop. NGT in right nostril  Cardio: RRR                              Pulm: No Respiratory Distress,  Lungs with bilateral rhonchi                       Abdomen: ND/NT, Soft                                              MSK: No joint swelling, decreased ROM in the right upper and lower extremity.                                         Ext: No C/C/E, No calf tenderness    Skin: intact                                                                   Neurological Examination:  Cognitive: [  x  ] AAO x 3   [    ]  other                                                                      Attention:  [ x   ] intact   [    ]  other                            Mood/Affect:  [   x ] wnl    [    ]  other                                                                             Communication:  [    ]Fluent,  No dysarthria,   [   x ] other dysarthria, apraxia   CN II - XII  [    ] intact   [  x  ] other decrease function in clearing saliva  Coordination:   [    ] FTN/HTS intact   [    x] other    normal on left, unable to assess on right due to hemiparesis                                                                   Sensory: [  x  ]Intact to light touch   [    ] other                                                                                        Tone:  [    ]  wnl,   [    x]  other decreased tone in RUE .     Motor    LEFT    UE: [ x  ] WNL.  [   ] other:  RIGHT UE:  [   ] WNL.  [ x  ] other: shoulder 1/5, elbow 0/5. wrist 0/5, fingers 0/5  LEFT    LE:  [  x ] WNL.  [   ] other:  RIGHT LE:  [   ] WNL.  [ x  ] other:  Hip 3-/5, knee 0/5. ankle and great toe 0/5    Reflex:  [    ]  symmetric,  [  x  ]  other: Right bicep 3+otherwise reflexes normal PHYSICAL EXAMINATION  - BP noted  VItals: T(C): 36.7 (10-19-20 @ 13:53), Max: 37.3 (10-19-20 @ 04:02)  HR: 64 (10-19-20 @ 13:53) (50 - 69)  BP: 94/54 (10-19-20 @ 13:53) (94/54 - 127/66)  RR: 18 (10-19-20 @ 13:53) (18 - 20)  SpO2: 95% (10-19-20 @ 13:53) (94% - 98%)    General: NAD, Resting Comfortable,                                  HEENT: NC/AT, EOM I, PERRLA, Normal Conjunctivae. excess saliva in oral cavity. mild right sided facial droop. NGT in right nostril  Cardio: RRR                              Pulm: No Respiratory Distress,  Lungs with bilateral rhonchi                       Abdomen: ND/NT, Soft                                              MSK: No joint swelling, decreased ROM in the right upper and lower extremity.                                         Ext: No C/C/E, No calf tenderness    Skin: intact                                                                   Neurological Examination:  Cognitive: [  x  ] AAO x 3   [    ]  other                                                                      Attention:  [ x   ] intact   [    ]  other                            Mood/Affect:  [   x ] wnl    [    ]  other                                                                             Communication:  [    ]Fluent,  No dysarthria,   [   x ] other dysarthria, apraxia   CN II - XII  [    ] intact   [  x  ] other decrease function in clearing saliva  Coordination:   [    ] FTN/HTS intact   [    x] other    normal on left, unable to assess on right due to hemiparesis                                                                   Sensory: [    ]Intact to light touch   [  x  ] other     decreased rt leg                                                                                    Tone:  [    ]  wnl,   [    x]  other decreased tone in RUE .     Motor    LEFT    UE: [ x  ] WNL.  [   ] other:  RIGHT UE:  [   ] WNL.  [ x  ] other: shoulder 1/5, elbow 0/5. wrist 0/5, fingers 0/5  LEFT    LE:  [  x ] WNL.  [   ] other:  RIGHT LE:  [   ] WNL.  [ x  ] other:  Hip 3-/5 extensor synergy, knee 0/5. ankle and great toe 0/5    Reflex:  [    ]  symmetric,  [  x  ]  other: Right bicep 3+otherwise reflexes normal

## 2023-04-14 ENCOUNTER — RX RENEWAL (OUTPATIENT)
Age: 73
End: 2023-04-14

## 2023-05-03 ENCOUNTER — APPOINTMENT (OUTPATIENT)
Dept: CARDIOLOGY | Facility: CLINIC | Age: 73
End: 2023-05-03
Payer: MEDICARE

## 2023-05-03 ENCOUNTER — NON-APPOINTMENT (OUTPATIENT)
Age: 73
End: 2023-05-03

## 2023-05-03 PROCEDURE — 93298 REM INTERROG DEV EVAL SCRMS: CPT

## 2023-05-03 PROCEDURE — G2066: CPT

## 2023-05-07 NOTE — HISTORY OF PRESENT ILLNESS
[FreeTextEntry1] : 72-year-old male presents to the neurosurgery office today as a hospital follow-up alongside his wife.\par \par PMHx of L MCA ischemic stroke x 2, HLD, and hypothyroidism\par Currently has a loop recorder that has not reported any recent events as per the spouse\par Ambulating with a quad cane\par Right upper extremity weakness\par \par Patient is on Aspirin and Plavix dual antiplatelet therapy\par \par CTA of the head and neck from 9/2/2022 depicted in occlusion of the left M1 MCA\par \par Mr. Biggs possesses a hypercoagulable profile.  He is being worked up for vasculitis or some form of an inflammatory state.  He poses a high risk for another stroke without treatment.  It was discussed today whether this is a carotid artery problem and if clots are coming off of a carotid artery considering that there is no demonstration of narrowing. \par \par It was discussed going forward that the plan would be to do a diagnostic cerebral angiogram prior to placing a carotid stent for the stent would increase flow to the brain and decrease the chance of a stroke to his left side.  Another surgical option would be an endarterectomy however this was deferred.\par \par The risks of the angiogram as well as stent placement were discussed however the patient wishes to proceed and will be scheduled accordingly.

## 2023-05-08 NOTE — PROGRESS NOTE ADULT - SUBJECTIVE AND OBJECTIVE BOX
SUBJECTIVE:    Patient is a 69y old Male who presents with a chief complaint of right-sided facial droop, weakness, and dysarthria (14 Oct 2020 11:25)    Currently admitted to MICU with the primary diagnosis of acute CVA (cerebral vascular accident)     Today is hospital day 2d. Patient seen and examined at bedside this AM.     PAST MEDICAL & SURGICAL HISTORY  Cerebrovascular accident (CVA)    GERD (gastroesophageal reflux disease)    S/P tonsillectomy    H/O bilateral hip replacements    H/O skin graft  upper body  legs      SOCIAL HISTORY:  Negative for smoking/alcohol/drug use.     ALLERGIES:  amoxicillin (Rash)    MEDICATIONS:  STANDING MEDICATIONS  chlorhexidine 4% Liquid 1 Application(s) Topical <User Schedule>  eptifibatide Infusion 75 mg/100 mL 1 MICROgram(s)/kG/Min IV Continuous <Continuous>  pantoprazole  Injectable 40 milliGRAM(s) IV Push daily    PRN MEDICATIONS    VITALS:   T(F): 98.1  HR: 60  BP: 130/69  RR: 14  SpO2: 98%    LABS:                        13.7   7.55  )-----------( 193      ( 14 Oct 2020 04:37 )             39.9     10-14    139  |  106  |  15  ----------------------------<  119<H>  4.2   |  25  |  1.1    Ca    9.3      14 Oct 2020 04:37  Phos  3.4     10-14  Mg     1.8     10-14    TPro  6.8  /  Alb  4.2  /  TBili  0.2  /  DBili  x   /  AST  24  /  ALT  17  /  AlkPhos  75  10-13    PT/INR - ( 14 Oct 2020 04:37 )   PT: 12.60 sec;   INR: 1.10 ratio         PTT - ( 14 Oct 2020 04:37 )  PTT:29.7 sec      Troponin T, Serum: <0.01 ng/mL (10-13-20 @ 22:04)      CARDIAC MARKERS ( 13 Oct 2020 22:04 )  x     / <0.01 ng/mL / x     / x     / x        PHYSICAL EXAM:  GEN: No acute distress  LUNGS: Clear to auscultation bilaterally   HEART: s1/s2, NO m/r/g  ABD: Soft, non-tender, non-distended  EXT: no LE edema  NEURO: dysarthric, right-sided weakness    Intravenous access:   NG tube:   Mcclellan Catheter:        Price (Do Not Change): 0.00 Instructions: This plan will send the code FBSD to the PM system.  DO NOT or CHANGE the price. Detail Level: Simple

## 2023-05-16 ENCOUNTER — RX RENEWAL (OUTPATIENT)
Age: 73
End: 2023-05-16

## 2023-06-06 ENCOUNTER — NON-APPOINTMENT (OUTPATIENT)
Age: 73
End: 2023-06-06

## 2023-06-06 ENCOUNTER — APPOINTMENT (OUTPATIENT)
Dept: CARDIOLOGY | Facility: CLINIC | Age: 73
End: 2023-06-06
Payer: MEDICARE

## 2023-06-06 PROCEDURE — G2066: CPT

## 2023-06-06 PROCEDURE — 93298 REM INTERROG DEV EVAL SCRMS: CPT

## 2023-07-11 ENCOUNTER — NON-APPOINTMENT (OUTPATIENT)
Age: 73
End: 2023-07-11

## 2023-07-11 ENCOUNTER — APPOINTMENT (OUTPATIENT)
Dept: CARDIOLOGY | Facility: CLINIC | Age: 73
End: 2023-07-11
Payer: MEDICARE

## 2023-07-11 PROCEDURE — G2066: CPT

## 2023-07-11 PROCEDURE — 93298 REM INTERROG DEV EVAL SCRMS: CPT

## 2023-08-15 ENCOUNTER — APPOINTMENT (OUTPATIENT)
Dept: CARDIOLOGY | Facility: CLINIC | Age: 73
End: 2023-08-15
Payer: MEDICARE

## 2023-08-15 ENCOUNTER — NON-APPOINTMENT (OUTPATIENT)
Age: 73
End: 2023-08-15

## 2023-08-16 PROCEDURE — G2066: CPT

## 2023-08-16 PROCEDURE — 93298 REM INTERROG DEV EVAL SCRMS: CPT

## 2023-09-06 ENCOUNTER — APPOINTMENT (OUTPATIENT)
Dept: NEUROSURGERY | Facility: CLINIC | Age: 73
End: 2023-09-06
Payer: MEDICARE

## 2023-09-06 VITALS — BODY MASS INDEX: 25.73 KG/M2 | WEIGHT: 190 LBS | HEIGHT: 72 IN

## 2023-09-06 PROCEDURE — 99213 OFFICE O/P EST LOW 20 MIN: CPT

## 2023-09-06 NOTE — PLAN
[TextEntry] : Case to be discussed at Neurovascular/Neuroendovascular [facility] Conference 9/11/2023 after which a telehealth visit will take place with the patient and his wife to delineate a further plan   No concerns were identified during the visit and we thank Mr. VALVERDE for allowing us to be a part of his care team

## 2023-09-06 NOTE — ASSESSMENT
[FreeTextEntry1] : I reviewed this very pleasant gentleman today in the neurosurgery clinic together with his wife.  I went over the most recent results of the CT angiogram of his head and [facility] that [facility].  Essentially, he remains asymptomatic from the point of view of new neurological deficits.  He is able to ambulate with assistance of a walker, he has expressive dysphagia but is able to communicate somewhat his wishes and thoughts.  He denies any active new TIA-like events or amaurosis fugax.  He continues on Eliquis.  From my perspective, his CT angiogram demonstrates recanalization of his internal carotid artery on the left-hand side in a partial fashion with a string sign which appears to be a true carotid recanalization rather than vasa vasorum.  To this effect, he is exposed to some risk of recurrent stroke in the MCA territory on the left-hand side.  On the other hand, he has significant loss of left-sided MCA territory brain tissue from previous strokes and on top of this appears to have a diminished caliber of the middle cerebral arterial vasculature on the left-hand side.  My understanding is that his work-up for a nonatherosclerotic source of stroke is so far been negative in terms of a vasculitic or infectious source.  Before embarking on further catheter angiography and potential stenting of his left-sided internal carotid artery, I think that we should discuss him at our neurovascular conference for a consensus decision about how to proceed forward.  We will catch up with him again next week after our conversations and conference.  He and his wife had no further questions and was satisfied with the outcome of the consultation.  In the meantime I would recommend continuing on Eliquis.

## 2023-09-06 NOTE — PHYSICAL EXAM
[General Appearance - Alert] : alert [General Appearance - In No Acute Distress] : in no acute distress [Oriented To Time, Place, And Person] : oriented to person, place, and time [Person] : oriented to person [Place] : oriented to place [Time] : oriented to time [FreeTextEntry5] : residual aphasia post stroke [FreeTextEntry6] : right sided residual weakness post stroke

## 2023-09-06 NOTE — REVIEW OF SYSTEMS
[As Noted in HPI] : as noted in HPI [Difficulty Walking] : difficulty walking [de-identified] : quad cane

## 2023-09-06 NOTE — HISTORY OF PRESENT ILLNESS
[FreeTextEntry1] : Mr. VALVERDE is a 72-year-old male presenting to the neurosurgery office today alongside his wife as a follow-up to discuss results of CTA performed on 8/28/2023 [facility], history of left sided strokes and L ICA occlusion.   Of note, he has a PMHx of L MCA ischemic stroke x 2, HLD, and hypothyroidism  Patient endorses that he is overall doing well but has some residual aphasia from his strokes. The initial stroke was in August 2020, presented with Left MCA/ICA. MRI brain showed moderate stroke in in left MCA territory, particularly frontoparietal region and small stroke in left ADEOLA as well. Patient was initially discharged from [facility] on ASA/statin. He returned in October 2020 with worsening of aphasia and right sided weakness. Mr. VALVERDE was again found to have left MCA branch occlusion. CT head showed new ischemia in left basal ganglia. This time, he was discharged on DAPT of ASA/Plavix to [facility] where he was found to have a LE DVT and was switched to Eliquis monotherapy that he remains on today. He had a JANET that was negative for clot or shunt and is s/p loop recorder with no evidence of atrial fibrillation thus far.   Today, Mrs. Valverde states that he has been doing well, exercising daily, but the left visual deficits remain in addition to the R sided weakness and moderate expressive aphasia.  Patient ambulates with a quad cane. He states that he is self-sufficient with ADL's.   It was once again reviewed with the patient and his wife that the DSA 2/28/2023 identified a L ICA occlusion. No stent was placed and since the patient started Eliquis it seems as though there has been better blood flow to the left carotid artery however he is still at risk for stroke. If the carotid artery was to be open with a stent then a thrombus could be expelled as a result. In 2021 the diagnostic imaging appreciated and normal blood vessels when the stroke occurred which could have been from a possible dissection but this is not clear.  Review of the CTA brain and neck from 8/28/2023 identifies impaired and narrowed flow to the left MCA. The plan delineated today is that the patient will remain on Eliquis. He has given permission for his case to be discussed at the Neurovascular/Neuroendovascular [facility] Conference 9/11/2023 after which we will reconvene to delineate a further plan.

## 2023-09-07 NOTE — PROGRESS NOTE ADULT - NSHPATTENDINGPLANDISCUSS_GEN_ALL_CORE
Patient presented with hypoxia 8/23, found to have right lower lobe segmental PE. Initially saturating well on nasal cannula. Rapid response was called 8/24 secondary to hypoxia and altered mental status requiring emergent intubation. Hypoxic post intubation requiring high ventilator settings. · CTA PE study 8/23: Pulmonary embolism in the right lower lobe segmental pulmonary artery. Measured RV/LV ratio is within normal limits at less than 0.9.  · Initial CXR post intubation: Near complete opacity of the left lung likely related to worsening infiltrate and/or atelectasis. Patchy right upper lobe infiltrates. · Strep pneumo/legionella urine antigen negative  · COVID/Flu/RSV negative  · 8/25: Bronchoscopy: poorly tolerated secondary to hypoxia. Some mucus plugging present on the left. · 8/26: CT chest: Complete right lower lobe and near complete left lower lobe atelectasis. Pneumonia not excluded in the appropriate clinical setting. Mild interstitial edema with trace effusions  · Received diuresis with IV Lasix   · CXR 9/5: Improved aeration of the bilateral lungs with mild bibasilar atelectasis. · Completed Zosyn x 7 days (8/25-8/31) - MRSA nares neg  · Tolerates pressure support during the day, but still requires full vent support HS secondary to tachypnea/secretions. Plan:  · Current vent settings: AC/VC 14/500/6/40%, PS 12/6 40% during the day  · Day 15 on Vent  · Continues to be weak. Unclear if etiology is neurologic/neuromuscular vs psychological (conversion disorder)  · NIF -8   · Trach/PEG planned deferred yesterday d/t scheduling conflict.  Now plan for trach/PEG Monday  · Wean Fio2 for goal Sp02 >90  · Continue pulmonary hygiene   · VAP ppx; chlorhexidine, PPI, HOB greater than 30 degrees
resident

## 2023-09-12 ENCOUNTER — NON-APPOINTMENT (OUTPATIENT)
Age: 73
End: 2023-09-12

## 2023-09-13 ENCOUNTER — APPOINTMENT (OUTPATIENT)
Dept: NEUROSURGERY | Facility: CLINIC | Age: 73
End: 2023-09-13
Payer: MEDICARE

## 2023-09-13 PROCEDURE — 99441: CPT | Mod: 95

## 2023-09-19 ENCOUNTER — APPOINTMENT (OUTPATIENT)
Dept: CARDIOLOGY | Facility: CLINIC | Age: 73
End: 2023-09-19
Payer: MEDICARE

## 2023-09-19 ENCOUNTER — NON-APPOINTMENT (OUTPATIENT)
Age: 73
End: 2023-09-19

## 2023-09-20 ENCOUNTER — RX RENEWAL (OUTPATIENT)
Age: 73
End: 2023-09-20

## 2023-09-20 PROCEDURE — G2066: CPT

## 2023-09-20 PROCEDURE — 93298 REM INTERROG DEV EVAL SCRMS: CPT

## 2023-10-20 ENCOUNTER — NON-APPOINTMENT (OUTPATIENT)
Age: 73
End: 2023-10-20

## 2023-10-20 ENCOUNTER — APPOINTMENT (OUTPATIENT)
Dept: CARDIOLOGY | Facility: CLINIC | Age: 73
End: 2023-10-20
Payer: MEDICARE

## 2023-10-21 PROCEDURE — G2066: CPT | Mod: NC

## 2023-10-21 PROCEDURE — 93298 REM INTERROG DEV EVAL SCRMS: CPT | Mod: NC

## 2023-11-15 NOTE — PHYSICAL THERAPY INITIAL EVALUATION ADULT - PHYSICAL ASSIST/NONPHYSICAL ASSIST: SCOOT/BRIDGE, REHAB EVAL
Virtual Visit Details    Type of service:  Video Visit     Originating Location (pt. Location): Home    Distant Location (provider location):  On-site  Platform used for Video Visit: [facility]    Palliative Care Outpatient Clinic      Patient ID/Chief Complaint: Walter Reyes 63 year old male who is presenting to the palliative medicine clinic today at the request of Yamilet Espinoza PA-C for a palliative care follow-up secondary to metastatic prostate cancer.   The patient's primary care provider is:  Poncho Ortiz       Impression & Recommendations:  63-year-old male with metastatic prostate cancer with spinal and bone metastases.  Palliative care assistance requested for symptom management of chronic cancer associated back pain with radiculopathy in the legs.    He also has a history of clear-cell RCC, status post right nephrectomy March 2019, currently no evidence of disease.  High risk for recurrence.    Past medical history also includes hyperlipidemia, hypothyroidism, HTN, GERD, Acosta's neuroma, plantar fasciitis, peroneal tendinitis, and mild chemotherapy-induced polyneuropathy.    Radiculopathy in both legs, has improved on gabapentin.  He has constant pain over the sacrum and to the left of the sacrum.  Pain is constant, deep, aching.  Interferes with sleep. Pain worsens when reclining or lying down. Due to progressive cancer, he is also having more rib and back pain. He is not seeking RTX at this time, rather opting for medication management.     Symptoms/recommendations/discussion:  Due to worsening cancer associated pain, plan to discontinue Butrans and start fentanyl patch 12 mcg/h when prior authorization completed  Continue gabapentin 300 mg every morning and afternoon, 600 mg at bedtime (he may decrease bedtime dose to 300 mg for morning somnolence).  Continue oxycodone IR 5-10 mg every 4 hours as needed  Counseled to monitor for constipation  Counseled opioid safety, Narcan nasal spray  "ordered  Social situation includes residing in home with wife Micheline. Stable housing and food. No h/o substance abuse.   Palliative follow-up within the next 4 weeks. He has direct phone # for our team.         How to get a hold of us:  For non-urgent matters, MyChart works best.    For more urgent matters, or if you prefer not to use MyChart, call our clinic nurse coordinator Sherry Palma RN at [phone] or [phone]    We have an on-call number for evenings and weekends. Please call this only if you are having uncontrolled symptoms or serious side effects from your medicines: [phone].     For refills, please give us a week (5 working days) notice. We don't always have providers available everyday to do refills. If you call the day you run out of your medicine, we may not be able to refill it in time, so call 5 days in advance        History:  History gathered today from: patient, family/loved ones, medical chart, outside records including Care Everywhere      PE: /77   Ht 1.803 m (5' 11\")   Wt 117.9 kg (260 lb)   BMI 36.26 kg/m     Wt Readings from Last 3 Encounters:   11/15/23 117.9 kg (260 lb)   11/08/23 120.7 kg (266 lb 1.6 oz)   10/02/23 118.4 kg (261 lb)       Gen alert, comfortable appearing, NAD.   Head NCAT.  Eyes anicteric without injection  Face symmetric, eyes conjugate  Lungs unlabored, no cough, speaking full sentences  Skin no rashes or lesions evident on face/neck  Neuro Face symmetric, eyes conjugate; speech fluent.  Neuropsych exam normal including affect, sensorium, gross memory, thought processes, and fund of knowledge.         Data reviewed:  I reviewed electrolytes, BUN/creatinine, liver profile, hemoglobin and hematocrit, platelet count, and most recent imaging  Recent oncology notes     database reviewed: 11/15/2023        50 minutes spent on the date of the encounter doing chart review, history and exam, patient education & counseling, documentation and other " activities as noted above.        Thank you for involving us in the patient's care.   LATESHA Seaman, [facility] Palliative Care Service   1 person assist

## 2023-11-20 ENCOUNTER — APPOINTMENT (OUTPATIENT)
Dept: CARDIOLOGY | Facility: CLINIC | Age: 73
End: 2023-11-20
Payer: MEDICARE

## 2023-11-20 ENCOUNTER — NON-APPOINTMENT (OUTPATIENT)
Age: 73
End: 2023-11-20

## 2023-11-21 PROCEDURE — G2066: CPT | Mod: NC

## 2023-11-21 PROCEDURE — 93298 REM INTERROG DEV EVAL SCRMS: CPT | Mod: NC

## 2023-12-21 ENCOUNTER — NON-APPOINTMENT (OUTPATIENT)
Age: 73
End: 2023-12-21

## 2023-12-21 ENCOUNTER — APPOINTMENT (OUTPATIENT)
Dept: CARDIOLOGY | Facility: CLINIC | Age: 73
End: 2023-12-21
Payer: MEDICARE

## 2023-12-22 PROCEDURE — G2066: CPT

## 2023-12-22 PROCEDURE — 93298 REM INTERROG DEV EVAL SCRMS: CPT

## 2024-01-25 ENCOUNTER — NON-APPOINTMENT (OUTPATIENT)
Age: 74
End: 2024-01-25

## 2024-01-25 ENCOUNTER — APPOINTMENT (OUTPATIENT)
Dept: CARDIOLOGY | Facility: CLINIC | Age: 74
End: 2024-01-25
Payer: MEDICARE

## 2024-01-25 PROCEDURE — 93298 REM INTERROG DEV EVAL SCRMS: CPT

## 2024-02-18 NOTE — CHART NOTE - NSCHARTNOTEFT_GEN_A_CORE
Problem: Discharge Planning  Goal: Discharge to home or other facility with appropriate resources  Outcome: Progressing  Flowsheets  Taken 2/17/2024 2040 by Frank Allred RN  Discharge to home or other facility with appropriate resources: Identify barriers to discharge with patient and caregiver  Taken 2/17/2024 0955 by Rocio Fitch RN  Discharge to home or other facility with appropriate resources: Identify barriers to discharge with patient and caregiver     Problem: Safety - Adult  Goal: Free from fall injury  Outcome: Progressing      PACU ANESTHESIA ADMISSION NOTE      Procedure: Repair of right inguinal hernia with mesh    Post op diagnosis:  Inguinal hernia, right      ____  Intubated  TV:______       Rate: ______      FiO2: ______    _x___  Patent Airway    _x___  Full return of protective reflexes    _x___  Full recovery from anesthesia / back to baseline status    Vitals:  T(C): 36.4  HR: 54  BP: 126/69  RR: 16  SpO2: 97%    Mental Status:  _x___ Awake   _____ Alert   _____ Drowsy   _____ Sedated    Nausea/Vomiting:  _x___  NO       ______Yes,   See Post - Op Orders         Pain Scale (0-10):  __0___    Treatment: _x___ None    ____ See Post - Op/PCA Orders    Post - Operative Fluids:   __x__ Oral   ____ See Post - Op Orders    Plan: Discharge:   _x___Home       _____Floor     _____Critical Care    _____  Other:_________________    Comments:  No anesthesia issues or complications noted.  Discharge when criteria met.

## 2024-02-20 ENCOUNTER — RX RENEWAL (OUTPATIENT)
Age: 74
End: 2024-02-20

## 2024-02-29 ENCOUNTER — NON-APPOINTMENT (OUTPATIENT)
Age: 74
End: 2024-02-29

## 2024-02-29 ENCOUNTER — APPOINTMENT (OUTPATIENT)
Dept: CARDIOLOGY | Facility: CLINIC | Age: 74
End: 2024-02-29
Payer: MEDICARE

## 2024-02-29 PROCEDURE — 93298 REM INTERROG DEV EVAL SCRMS: CPT

## 2024-03-25 ENCOUNTER — RX RENEWAL (OUTPATIENT)
Age: 74
End: 2024-03-25

## 2024-03-25 RX ORDER — APIXABAN 5 MG/1
5 TABLET, FILM COATED ORAL
Qty: 60 | Refills: 5 | Status: ACTIVE | COMMUNITY
Start: 2023-03-17 | End: 1900-01-01

## 2024-04-01 ENCOUNTER — NON-APPOINTMENT (OUTPATIENT)
Age: 74
End: 2024-04-01

## 2024-04-01 ENCOUNTER — APPOINTMENT (OUTPATIENT)
Dept: CARDIOLOGY | Facility: CLINIC | Age: 74
End: 2024-04-01
Payer: MEDICARE

## 2024-04-01 PROCEDURE — 93298 REM INTERROG DEV EVAL SCRMS: CPT

## 2024-04-04 ENCOUNTER — APPOINTMENT (OUTPATIENT)
Dept: NEUROLOGY | Facility: CLINIC | Age: 74
End: 2024-04-04
Payer: MEDICARE

## 2024-04-04 VITALS
DIASTOLIC BLOOD PRESSURE: 70 MMHG | WEIGHT: 190 LBS | OXYGEN SATURATION: 97 % | SYSTOLIC BLOOD PRESSURE: 126 MMHG | HEIGHT: 72 IN | TEMPERATURE: 97.8 F | BODY MASS INDEX: 25.73 KG/M2 | HEART RATE: 87 BPM

## 2024-04-04 DIAGNOSIS — I65.22 OCCLUSION AND STENOSIS OF LEFT CAROTID ARTERY: ICD-10-CM

## 2024-04-04 DIAGNOSIS — I63.9 CEREBRAL INFARCTION, UNSPECIFIED: ICD-10-CM

## 2024-04-04 DIAGNOSIS — I66.02 OCCLUSION AND STENOSIS OF LEFT MIDDLE CEREBRAL ARTERY: ICD-10-CM

## 2024-04-04 PROCEDURE — 99214 OFFICE O/P EST MOD 30 MIN: CPT

## 2024-04-04 RX ORDER — BACLOFEN 5 MG/1
5 TABLET ORAL
Qty: 270 | Refills: 1 | Status: ACTIVE | COMMUNITY
Start: 2021-12-17 | End: 1900-01-01

## 2024-04-04 RX ORDER — ATORVASTATIN CALCIUM 20 MG/1
20 TABLET, FILM COATED ORAL
Qty: 90 | Refills: 1 | Status: ACTIVE | COMMUNITY
Start: 1900-01-01 | End: 1900-01-01

## 2024-04-04 NOTE — HISTORY OF PRESENT ILLNESS
[FreeTextEntry1] : Interval History:  Pt presents today for f/u. Endorses left eye blurred vision and left ear fullness/pain. Denies new weakness, numbness, speech or balance difficulty. Baclofen has been helping with right sided spasticity. Takes it about 1-2 times a day as needed. Tolerating medications well so far.  Ambulates with a cane.   HPI: Pt is a 72 yo M with PMHx of L MCA ischemic stroke x 2, HLD, and hypothyroidism, for whom a televisit (audio only) was conducted. Pt referred by Renetta, stroke clinic PA, for further evaluation of stroke etiology and management. Much of history and discussion was with pt's wife, as pt has residual aphasia from his strokes. Pt's initial stroke was in August 2020, presented with Left MCA/ICA, s/p EVT. MRI brain showed moderate stroke in in left MCA territory, particularly frontoparietal region and small stroke in left ADEOLA as well. Pt was discharged on ASA/statin. He returned in October 2020 with worsening of aphasia and right sided weakness. Again found to have left MCA branch occlusion. CT head showed new ischemia in left basal ganglia. Pt was discharged on ASA/plavix to [facility], where he was found to have a LE DVT and was switched to eliquis monotherapy. He has been taking eliquis since then. He had a JANET that was negative for clot or shunt. S/p loop with no evidence of afib thus far. Pt's wife states that he has been doing well over all. Ambulates with a cane, has some difficulty with speech, peg tube was removed and he has been eating/drinking. recently completed PT/ST. No h/o stroke, clot or heart disease. No family h/o stroke or clotting disorder, but he has a brother with afib. No h/o tobacco, alcohol or drug use.   04/2022: Pt presents today with his wife. Endorses feeling well since his last visit. Continues to exercise daily, goes for long walks with his son. He has also been reading, has some difficulties, but is able to get through at least a page at a time. He is still taking ASA/plavix. Baclofen has helped with his spasticity and movement. Denies any new symptoms.    03/2023: Pt presents today with his wife. DSA was done recently Feb 28 and showed complete occlusion of L M1, seen by Dr. García and recommended discuss with us about re-starting AC. No new symptoms since last visit and he was compliant with DAPT, Lipitor 20 mg daily, BP is well controlled, on PT every day  at He was seen at [facility] with vision loss in 09/2022. Given tPA. MRI brain showed small acute ischemic stroke in left MCA region adjacent to prior strokes. CTA head/neck also showed occlusion of L MCA. Pt now back to his baseline prior to most recent stroke. he exercises daily, compliant with DAPT (was not changed). Plan for tooth extraction soon however wife wanted to f/u before holding any blood thinners.

## 2024-04-04 NOTE — REVIEW OF SYSTEMS
[As Noted in HPI] : as noted in HPI [Eyesight Problems] : eyesight problems [Earache] : earache [Negative] : Respiratory

## 2024-04-04 NOTE — PHYSICAL EXAM
[General Appearance - Alert] : alert [General Appearance - In No Acute Distress] : in no acute distress [General Appearance - Well Nourished] : well nourished [General Appearance - Well Developed] : well developed [Oriented To Time, Place, And Person] : oriented to person, place, and time [Affect] : the affect was normal [Person] : oriented to person [Place] : oriented to place [Time] : oriented to time [Cranial Nerves Optic (II)] : visual acuity intact bilaterally,  visual fields full to confrontation, pupils equal round and reactive to light [Cranial Nerves Oculomotor (III)] : extraocular motion intact [Cranial Nerves Trigeminal (V)] : facial sensation intact symmetrically [Cranial Nerves Vestibulocochlear (VIII)] : hearing was intact bilaterally [Cranial Nerves Hypoglossal (XII)] : there was no tongue deviation with protrusion [PERRLA] : pupils equal in size, round, reactive to light, with normal accommodation [____] : [unfilled] mm [Sensation Tactile Decrease] : light touch was intact [Coordination - Dysmetria Impaired Finger-to-Nose Right Only] : present on the ride side [3+] : Biceps right 3+ [2+] : Patella left 2+ [Mouth Droop On The Left] : left-sided mouth droop [Coordination - Dysmetria Impaired Finger-to-Nose Left Only] : not present on the left side [FreeTextEntry4] : limited fluency, able to name objects when presented with multiple choice, comprehension intact [FreeTextEntry6] : Increase tone in RUE. RUE 4/5. RLE 4/5 LUE/LLE 5/5

## 2024-04-04 NOTE — DISCUSSION/SUMMARY
[FreeTextEntry1] : Pt is a 74 yo M with PMHx of L ADEOLA/MCA ischemic stroke, HLD, and hypothyroidism, who presents for stroke f/u.  # Recurrent ischemic strokes: 08/2020: Left ADEOLA/MCA s/p EVT 10/2020: L BG. CTA head/neck showed left MCA superior division occlusion 09/2022: small left corona radiata. CTA h/n showed L M1 occlusion  Residual right hemiplegia and expressive aphasia. NIHSS 7 (1 for right face, RUE/LE- 1 each, 2 for aphasia and 2 for dysarthria). mRS 2. Recurrent strokes in left MCA distribution with progressive occlusion of L MCA and then rapid occlusion of left cervical ICA. Favor nonatherosclerotic disease. Prior inflammatory workup negative, unlikely vasculitic process. No known h/o dissection, thus possibly due to FMD. Question whether pt had undiagnosed carotid web which led to the recent occlusion. S/p DSA which confirms complete occlusion. No new stroke associated since these changes. Prior discussion with patient and his wife regarding optimal medical management for secondary stroke prevention. Discussed risks vs benefit of antiplatelet therapy vs anticoagulation. Given progression of disease (with well controlled vascular risk factors), pt and his wife preferred AC, has been tolerating well thus far.  - Continue eliquis 5mg BID and ASA 81mg daily. - Continue baclofen 5mg TID - repeat CTA head/neck. if stable, may d/c ASA - F/u with Dr. García as directed   RTC in 12 mo  Residual right hemiplegia and expressive aphasia. NIHSS 7 (1 for right face, UE/LE each, 2 for aphasia and 2 for dysarthria). mRS 2. Recurrent strokes in left MCA distribution with progressive occlusion. Hypercoag and autoimmune workup unrevealing.  - Continue DAPT/statin - Check PRU/ARU, then will likely switch to ticagrelor 90mg BID - MR NOVA  -Loop interrogation f/u - Continue baclofen 5mg TID.  - It is reasonable to hold anti-platelets briefly, if needed, for dental work (continue ASA until day prior to procedure, and resume DAPT as soon as permissible).   RTC in 2 mo [Goals and Counseling] : I have reviewed the goals of stroke risk factor modification. I counseled the patient on measures to reduce stroke risk, including the importance of medication compliance, risk factor control, exercise, healthy diet and avoidance of smoking. I reviewed stroke warning signs and symptoms and appropriate actions to take if such occur.

## 2024-05-06 ENCOUNTER — APPOINTMENT (OUTPATIENT)
Dept: CARDIOLOGY | Facility: CLINIC | Age: 74
End: 2024-05-06
Payer: MEDICARE

## 2024-05-06 ENCOUNTER — NON-APPOINTMENT (OUTPATIENT)
Age: 74
End: 2024-05-06

## 2024-05-06 PROCEDURE — 93298 REM INTERROG DEV EVAL SCRMS: CPT

## 2024-06-10 ENCOUNTER — APPOINTMENT (OUTPATIENT)
Dept: CARDIOLOGY | Facility: CLINIC | Age: 74
End: 2024-06-10

## 2024-10-01 ENCOUNTER — RX RENEWAL (OUTPATIENT)
Age: 74
End: 2024-10-01

## 2024-11-14 ENCOUNTER — RX RENEWAL (OUTPATIENT)
Age: 74
End: 2024-11-14

## 2025-01-21 NOTE — ASU PATIENT PROFILE, ADULT - VISION (WITH CORRECTIVE LENSES IF THE PATIENT USUALLY WEARS THEM):
Physical Therapy    Visit Type: initial evaluation    Relevant History/Co-morbidities: Per 12/18/24 podiatry note: \"He was encouraged to significantly reduce his activity, and reduce weightbearing to the area with the surgical shoe, keeping weight only to the heel.  I encouraged him to use a walker or crutches that he has at home.\" Patient with lymphedema/diabetic slippers present in room - indicates he has been utilizing at home.    Prior OP PT for lymphedema - indicates it was beneficial until sessions discontinued due to insurance    SUBJECTIVE  \"My sinuses are bothering me.\"  Denies pain, endorses stiffness from reduced mobility.  Patient / Family Goal: return home    Pain   Patient denies pain.     OBJECTIVE     Cognitive Status   Orientation    - Oriented to: person, place, time and situation  Functional Communication   - Overall Communication Status: within functional limits    Vitals:  No complaints noted until pt endorses dizziness after ambulation across room.   Patient with delayed command following, regressing to inability to follow commands when seated as cues provided to release grasp from walker to lean backwards into recliner. Patient requires physical assistance to recline backwards into chair. Observed difficulty keeping eyes open, pt not replying to verbal prompts. BP 90/56. Heart rate 103. Pulse oximetry 93%. RN immediately called into room for further assessment. Alertness gradually improving. Additional reclined /80.     Hospitalist NP informed.       Sitting Balance  (BIBI = base of support)  Static      - Trial 1 details: independent, with back unsupported and minimal assist (regressed due to syncopal episode)    Standing Balance  (BIBI = base of support)  Firm Surface: Double Leg      - Static, Eyes Open       - Trial 1 details: stand by assist and with double UE support     - Dynamic, Eyes Open       - Trial 1 details: stand by assist, with double UE support and minimal  assist  Regression due to pre-syncopal episode in standing         Bed Mobility  - Supine to sit: modified independent (HOB slightly elevated, exit to right, use of bedrail)  Transfers  Assistive devices: gait belt, 2-wheeled walker  - Sit to stand: stand by assist (pt pulls from 2ww, noted weight bearing through RLE despite cues to utilize 2ww for reducing weight bearing)  - Stand to sit: minimal assist (guidance to chair for appropriate positioning & safety given pre-syncopal symptoms, impaired command following)    Ambulation / Gait  - Assistive device: gait belt and 2-wheeled walker  - Distance (feet unless otherwise indicated): 20  - Assist Level: stand by assist and minimal assist    Standby assist initially required for first 15 feet due to cues for utilization of walker, limiting WB RLE. Patient verbalizes he is not adhering to heel weight bearing, able to feel he is placing weight through RLE forefoot. After 15 feet ambulation, pt verbalizes onset of dizziness, requiring increase to Min assist for guidance ~5 feet to chair - delayed processing and slow pace, suspect secondary to hypotension.     Further ambulation deferred as given time to remain supine reclined in recliner for stabilization of BP end of session.     Interventions   Education on therapy role, functional mobility assessment, vital signs monitoring given symptoms. Reiterated recommendations documented by podiatry.   Skilled input: Verbal instruction/cues  Verbal Consent: Writer verbally educated and received verbal consent for hand placement, positioning of patient, and techniques to be performed today from patient for therapist position for techniques as described above and how they are pertinent to the patient's plan of care.         Education:   - Present and ready to learn: patient  Education provided during session:  - Please refer to the mobility and intervention note sections for education provided during the session.   - Results of  above outlined education: Verbalizes understanding and Needs reinforcement    ASSESSMENT   Patient will benefit from skilled therapy to address listed impairments and functional limitations.  Interfering components: decreased activity tolerance and medical status limitations    Discharge needs based on today's assessment:   - Current level of function: slightly below baseline level of function   - Therapy needs at discharge: therapy 5 or more times per week   - Activities of daily living (ADLs) requiring support at discharge: bed mobility, transfers, ambulation and stairs   - Instrumental activities of daily living (IADLs) requiring support at discharge: community mobility and home management   - Impairments that require further therapy intervention: activity tolerance, balance, strength and safety awareness    AM-PAC  - Generalized Prior Level of Function: IND/MOD I ([facility] 22-24)       Key: MOD A=moderate assistance, IND/MOD I=independent/modified independent  - Generalized Current Level of Function     - Current Mobility Score: 17       AM-PAC Scoring Key= >21 Modified Independent; 20-21 Supervision; 18-19 Minimal assist; 13-18 Moderate assist; 9-12 Max assist; <9 Total assist        Predicted patient presentation: Moderate (evolving) - Patient comorbidities and complexities, as defined above, may have varying impact on steady progress for prescribed plan of care.    PLAN (while hospitalized)  Suggestions for next session as indicated: LE strengthening, bed mobility, sitting balance, transfers, gait training with walker; closely monitor positional BP; further training on RLE WB limitation    PT Frequency: 3-5 x per week      PT/OT Mobility Equipment for Discharge: to be further assessed given podiatry recommendations & BP management  Interventions: balance, bed mobility, functional transfer training, endurance training, gait training, energy conservation, strengthening, safety education, patient/family training,  HEP train/position, equipment eval/education and stairs retraining  Agreement to plan and goals: patient agrees with goals and treatment plan        GOALS  Review Date: 1/27/2025  Long Term Goals: (to be met by time of discharge from hospital)  Sit to supine: Patient will complete sit to supine modified independent.  Supine to sit: Patient will complete supine to sit modified independent.  Sit to stand: Patient will complete sit to stand transfer with 2-wheeled walker, modified independent.   Stand to sit: Patient will complete stand to sit transfer with 2-wheeled walker, modified independent.   Ambulation (even): Patient will ambulate on even surface for 100 feet with 2-wheeled walker, modified independent.   Stairs: Patient will ambulate 2 steps with least restrictive device using one rail, modified independent.   Documented in the chart in the following areas: Assessment/Plan.      Patient at End of Session:   Location: in chair (reclined in chair)  Safety measures: call light within reach, alarm system in place/re-engaged, equipment intact and lines intact  Handoff to: nurse      Therapy procedure time and total treatment time can be found documented on the Time Entry flowsheet   Normal vision: sees adequately in most situations; can see medication labels, newsprint

## 2025-03-26 ENCOUNTER — RX RENEWAL (OUTPATIENT)
Age: 75
End: 2025-03-26

## 2025-04-23 ENCOUNTER — APPOINTMENT (OUTPATIENT)
Dept: NEUROLOGY | Facility: CLINIC | Age: 75
End: 2025-04-23
Payer: MEDICARE

## 2025-04-23 ENCOUNTER — NON-APPOINTMENT (OUTPATIENT)
Age: 75
End: 2025-04-23

## 2025-04-23 VITALS
SYSTOLIC BLOOD PRESSURE: 143 MMHG | RESPIRATION RATE: 20 BRPM | BODY MASS INDEX: 25.73 KG/M2 | HEART RATE: 72 BPM | OXYGEN SATURATION: 99 % | HEIGHT: 72 IN | WEIGHT: 190 LBS | DIASTOLIC BLOOD PRESSURE: 79 MMHG

## 2025-04-23 DIAGNOSIS — I65.22 OCCLUSION AND STENOSIS OF LEFT CAROTID ARTERY: ICD-10-CM

## 2025-04-23 DIAGNOSIS — I63.9 CEREBRAL INFARCTION, UNSPECIFIED: ICD-10-CM

## 2025-04-23 DIAGNOSIS — I66.02 OCCLUSION AND STENOSIS OF LEFT MIDDLE CEREBRAL ARTERY: ICD-10-CM

## 2025-04-23 PROCEDURE — 99214 OFFICE O/P EST MOD 30 MIN: CPT

## 2025-05-27 ENCOUNTER — RX RENEWAL (OUTPATIENT)
Age: 75
End: 2025-05-27

## 2025-07-01 ENCOUNTER — RX RENEWAL (OUTPATIENT)
Age: 75
End: 2025-07-01